# Patient Record
Sex: FEMALE | Race: WHITE | Employment: OTHER | URBAN - METROPOLITAN AREA
[De-identification: names, ages, dates, MRNs, and addresses within clinical notes are randomized per-mention and may not be internally consistent; named-entity substitution may affect disease eponyms.]

---

## 2019-06-08 ENCOUNTER — HOSPITAL ENCOUNTER (OUTPATIENT)
Facility: MEDICAL CENTER | Age: 84
End: 2019-06-08
Payer: MEDICARE

## 2019-06-08 ENCOUNTER — APPOINTMENT (OUTPATIENT)
Dept: RADIOLOGY | Facility: MEDICAL CENTER | Age: 84
DRG: 291 | End: 2019-06-08
Attending: INTERNAL MEDICINE
Payer: MEDICARE

## 2019-06-08 ENCOUNTER — HOSPITAL ENCOUNTER (INPATIENT)
Facility: MEDICAL CENTER | Age: 84
LOS: 9 days | DRG: 291 | End: 2019-06-17
Attending: INTERNAL MEDICINE | Admitting: INTERNAL MEDICINE
Payer: MEDICARE

## 2019-06-08 DIAGNOSIS — J44.1 COPD EXACERBATION (HCC): ICD-10-CM

## 2019-06-08 DIAGNOSIS — J90 PLEURAL EFFUSION ON RIGHT: ICD-10-CM

## 2019-06-08 PROBLEM — J81.0 FLASH PULMONARY EDEMA (HCC): Status: ACTIVE | Noted: 2019-06-08

## 2019-06-08 PROBLEM — J96.01 ACUTE HYPOXEMIC RESPIRATORY FAILURE (HCC): Status: ACTIVE | Noted: 2019-06-08

## 2019-06-08 PROBLEM — M79.89 SWELLING OF LOWER LEG: Status: ACTIVE | Noted: 2019-06-08

## 2019-06-08 LAB
ACTION RANGE TRIGGERED IACRT: NO
ANION GAP SERPL CALC-SCNC: 14 MMOL/L (ref 0–11.9)
APPEARANCE FLD: CLEAR
BASE EXCESS BLDA CALC-SCNC: -2 MMOL/L (ref -4–3)
BASOPHILS # BLD AUTO: 0.1 % (ref 0–1.8)
BASOPHILS # BLD: 0.02 K/UL (ref 0–0.12)
BNP SERPL-MCNC: 451 PG/ML (ref 0–100)
BODY FLD TYPE: NORMAL
BODY TEMPERATURE: ABNORMAL DEGREES
BUN SERPL-MCNC: 28 MG/DL (ref 8–22)
CALCIUM SERPL-MCNC: 8.7 MG/DL (ref 8.4–10.2)
CHLORIDE SERPL-SCNC: 99 MMOL/L (ref 96–112)
CO2 BLDA-SCNC: 24 MMOL/L (ref 20–33)
CO2 SERPL-SCNC: 24 MMOL/L (ref 20–33)
COLOR FLD: YELLOW
CREAT SERPL-MCNC: 1.48 MG/DL (ref 0.5–1.4)
CSF COMMENTS 1658: NORMAL
EOSINOPHIL # BLD AUTO: 0 K/UL (ref 0–0.51)
EOSINOPHIL NFR BLD: 0 % (ref 0–6.9)
EOSINOPHIL NFR FLD: 1 %
ERYTHROCYTE [DISTWIDTH] IN BLOOD BY AUTOMATED COUNT: 48.2 FL (ref 35.9–50)
GLUCOSE FLD-MCNC: 176 MG/DL
GLUCOSE SERPL-MCNC: 187 MG/DL (ref 65–99)
HCO3 BLDA-SCNC: 23.1 MMOL/L (ref 17–25)
HCT VFR BLD AUTO: 35.1 % (ref 37–47)
HGB BLD-MCNC: 10.8 G/DL (ref 12–16)
HOROWITZ INDEX BLDA+IHG-RTO: 63 MM[HG]
IMM GRANULOCYTES # BLD AUTO: 0.29 K/UL (ref 0–0.11)
IMM GRANULOCYTES NFR BLD AUTO: 1.6 % (ref 0–0.9)
INR PPP: 1.74 (ref 0.87–1.13)
INST. QUALIFIED PATIENT IIQPT: YES
LYMPHOCYTES # BLD AUTO: 0.3 K/UL (ref 1–4.8)
LYMPHOCYTES NFR BLD: 1.6 % (ref 22–41)
LYMPHOCYTES NFR FLD: 68 %
MCH RBC QN AUTO: 29.2 PG (ref 27–33)
MCHC RBC AUTO-ENTMCNC: 30.8 G/DL (ref 33.6–35)
MCV RBC AUTO: 94.9 FL (ref 81.4–97.8)
MESOTHL CELL NFR FLD: 3 %
MONOCYTES # BLD AUTO: 0.76 K/UL (ref 0–0.85)
MONOCYTES NFR BLD AUTO: 4.1 % (ref 0–13.4)
MONONUC CELLS NFR FLD: 8 %
NEUTROPHILS # BLD AUTO: 17.05 K/UL (ref 2–7.15)
NEUTROPHILS NFR BLD: 92.6 % (ref 44–72)
NEUTROPHILS NFR FLD: 20 %
NRBC # BLD AUTO: 0 K/UL
NRBC BLD-RTO: 0 /100 WBC
O2/TOTAL GAS SETTING VFR VENT: 100 %
PCO2 BLDA: 40.8 MMHG (ref 26–37)
PH BLDA: 7.36 [PH] (ref 7.4–7.5)
PH FLD: 8 [PH]
PLATELET # BLD AUTO: 413 K/UL (ref 164–446)
PMV BLD AUTO: 9.4 FL (ref 9–12.9)
PO2 BLDA: 63 MMHG (ref 64–87)
POTASSIUM SERPL-SCNC: 4.2 MMOL/L (ref 3.6–5.5)
PROT FLD-MCNC: 1.7 G/DL
PROTHROMBIN TIME: 20.7 SEC (ref 12–14.6)
RBC # BLD AUTO: 3.7 M/UL (ref 4.2–5.4)
RBC # FLD: <2000 CELLS/UL
SAO2 % BLDA: 91 % (ref 93–99)
SODIUM SERPL-SCNC: 137 MMOL/L (ref 135–145)
SPECIMEN DRAWN FROM PATIENT: ABNORMAL
TROPONIN I SERPL-MCNC: 0.03 NG/ML (ref 0–0.04)
WBC # BLD AUTO: 18.4 K/UL (ref 4.8–10.8)
WBC # FLD: 585 CELLS/UL

## 2019-06-08 PROCEDURE — 88112 CYTOPATH CELL ENHANCE TECH: CPT

## 2019-06-08 PROCEDURE — 80048 BASIC METABOLIC PNL TOTAL CA: CPT

## 2019-06-08 PROCEDURE — 89051 BODY FLUID CELL COUNT: CPT

## 2019-06-08 PROCEDURE — 94760 N-INVAS EAR/PLS OXIMETRY 1: CPT

## 2019-06-08 PROCEDURE — 700101 HCHG RX REV CODE 250: Performed by: INTERNAL MEDICINE

## 2019-06-08 PROCEDURE — 93005 ELECTROCARDIOGRAM TRACING: CPT | Performed by: INTERNAL MEDICINE

## 2019-06-08 PROCEDURE — 87015 SPECIMEN INFECT AGNT CONCNTJ: CPT

## 2019-06-08 PROCEDURE — 700105 HCHG RX REV CODE 258: Performed by: INTERNAL MEDICINE

## 2019-06-08 PROCEDURE — 84145 PROCALCITONIN (PCT): CPT

## 2019-06-08 PROCEDURE — 85610 PROTHROMBIN TIME: CPT

## 2019-06-08 PROCEDURE — A9270 NON-COVERED ITEM OR SERVICE: HCPCS | Performed by: INTERNAL MEDICINE

## 2019-06-08 PROCEDURE — 94640 AIRWAY INHALATION TREATMENT: CPT

## 2019-06-08 PROCEDURE — 71045 X-RAY EXAM CHEST 1 VIEW: CPT

## 2019-06-08 PROCEDURE — 82803 BLOOD GASES ANY COMBINATION: CPT

## 2019-06-08 PROCEDURE — 87070 CULTURE OTHR SPECIMN AEROBIC: CPT

## 2019-06-08 PROCEDURE — 700111 HCHG RX REV CODE 636 W/ 250 OVERRIDE (IP): Performed by: INTERNAL MEDICINE

## 2019-06-08 PROCEDURE — 85025 COMPLETE CBC W/AUTO DIFF WBC: CPT

## 2019-06-08 PROCEDURE — 87116 MYCOBACTERIA CULTURE: CPT

## 2019-06-08 PROCEDURE — 82945 GLUCOSE OTHER FLUID: CPT

## 2019-06-08 PROCEDURE — 99291 CRITICAL CARE FIRST HOUR: CPT | Mod: 25 | Performed by: INTERNAL MEDICINE

## 2019-06-08 PROCEDURE — 87205 SMEAR GRAM STAIN: CPT

## 2019-06-08 PROCEDURE — 83880 ASSAY OF NATRIURETIC PEPTIDE: CPT

## 2019-06-08 PROCEDURE — 0W993ZZ DRAINAGE OF RIGHT PLEURAL CAVITY, PERCUTANEOUS APPROACH: ICD-10-PCS | Performed by: INTERNAL MEDICINE

## 2019-06-08 PROCEDURE — 770022 HCHG ROOM/CARE - ICU (200)

## 2019-06-08 PROCEDURE — 87102 FUNGUS ISOLATION CULTURE: CPT

## 2019-06-08 PROCEDURE — 36600 WITHDRAWAL OF ARTERIAL BLOOD: CPT

## 2019-06-08 PROCEDURE — 87206 SMEAR FLUORESCENT/ACID STAI: CPT

## 2019-06-08 PROCEDURE — 84484 ASSAY OF TROPONIN QUANT: CPT | Mod: 91

## 2019-06-08 PROCEDURE — 700102 HCHG RX REV CODE 250 W/ 637 OVERRIDE(OP): Performed by: INTERNAL MEDICINE

## 2019-06-08 PROCEDURE — 83615 LACTATE (LD) (LDH) ENZYME: CPT

## 2019-06-08 PROCEDURE — 83986 ASSAY PH BODY FLUID NOS: CPT

## 2019-06-08 PROCEDURE — 94660 CPAP INITIATION&MGMT: CPT

## 2019-06-08 PROCEDURE — 32554 ASPIRATE PLEURA W/O IMAGING: CPT | Mod: RT

## 2019-06-08 PROCEDURE — 84157 ASSAY OF PROTEIN OTHER: CPT

## 2019-06-08 PROCEDURE — 700111 HCHG RX REV CODE 636 W/ 250 OVERRIDE (IP)

## 2019-06-08 PROCEDURE — 32555 ASPIRATE PLEURA W/ IMAGING: CPT | Mod: RT | Performed by: INTERNAL MEDICINE

## 2019-06-08 RX ORDER — LOSARTAN POTASSIUM 25 MG/1
25 TABLET ORAL
Status: DISCONTINUED | OUTPATIENT
Start: 2019-06-08 | End: 2019-06-14

## 2019-06-08 RX ORDER — METHYLPREDNISOLONE SODIUM SUCCINATE 40 MG/ML
62.5 INJECTION, POWDER, LYOPHILIZED, FOR SOLUTION INTRAMUSCULAR; INTRAVENOUS EVERY 6 HOURS
Status: DISCONTINUED | OUTPATIENT
Start: 2019-06-08 | End: 2019-06-09

## 2019-06-08 RX ORDER — FUROSEMIDE 10 MG/ML
80 INJECTION INTRAMUSCULAR; INTRAVENOUS
Status: DISCONTINUED | OUTPATIENT
Start: 2019-06-08 | End: 2019-06-09

## 2019-06-08 RX ORDER — FUROSEMIDE 10 MG/ML
INJECTION INTRAMUSCULAR; INTRAVENOUS
Status: COMPLETED
Start: 2019-06-08 | End: 2019-06-08

## 2019-06-08 RX ORDER — IPRATROPIUM BROMIDE AND ALBUTEROL SULFATE 2.5; .5 MG/3ML; MG/3ML
3 SOLUTION RESPIRATORY (INHALATION)
Status: DISCONTINUED | OUTPATIENT
Start: 2019-06-08 | End: 2019-06-17 | Stop reason: HOSPADM

## 2019-06-08 RX ORDER — CARVEDILOL 6.25 MG/1
6.25 TABLET ORAL 2 TIMES DAILY WITH MEALS
Status: DISCONTINUED | OUTPATIENT
Start: 2019-06-08 | End: 2019-06-17 | Stop reason: HOSPADM

## 2019-06-08 RX ADMIN — FUROSEMIDE 80 MG: 10 INJECTION, SOLUTION INTRAMUSCULAR; INTRAVENOUS at 17:48

## 2019-06-08 RX ADMIN — LOSARTAN POTASSIUM 25 MG: 25 TABLET, FILM COATED ORAL at 20:20

## 2019-06-08 RX ADMIN — DOXYCYCLINE 100 MG: 100 INJECTION, POWDER, LYOPHILIZED, FOR SOLUTION INTRAVENOUS at 18:42

## 2019-06-08 RX ADMIN — METHYLPREDNISOLONE SODIUM SUCCINATE 62.4 MG: 40 INJECTION, POWDER, FOR SOLUTION INTRAMUSCULAR; INTRAVENOUS at 18:41

## 2019-06-08 RX ADMIN — CARVEDILOL 6.25 MG: 6.25 TABLET, FILM COATED ORAL at 20:20

## 2019-06-08 RX ADMIN — METHYLPREDNISOLONE SODIUM SUCCINATE 62.4 MG: 40 INJECTION, POWDER, FOR SOLUTION INTRAMUSCULAR; INTRAVENOUS at 23:48

## 2019-06-08 RX ADMIN — CEFTRIAXONE 1 G: 1 INJECTION, POWDER, FOR SOLUTION INTRAMUSCULAR; INTRAVENOUS at 18:41

## 2019-06-08 RX ADMIN — IPRATROPIUM BROMIDE AND ALBUTEROL SULFATE 3 ML: .5; 3 SOLUTION RESPIRATORY (INHALATION) at 20:32

## 2019-06-08 RX ADMIN — IPRATROPIUM BROMIDE AND ALBUTEROL SULFATE 3 ML: .5; 3 SOLUTION RESPIRATORY (INHALATION) at 23:24

## 2019-06-08 ASSESSMENT — COGNITIVE AND FUNCTIONAL STATUS - GENERAL
CLIMB 3 TO 5 STEPS WITH RAILING: A LOT
SUGGESTED CMS G CODE MODIFIER MOBILITY: CL
WALKING IN HOSPITAL ROOM: A LOT
STANDING UP FROM CHAIR USING ARMS: A LITTLE
TURNING FROM BACK TO SIDE WHILE IN FLAT BAD: A LITTLE
DAILY ACTIVITIY SCORE: 24
SUGGESTED CMS G CODE MODIFIER DAILY ACTIVITY: CH
MOVING FROM LYING ON BACK TO SITTING ON SIDE OF FLAT BED: A LOT
MOBILITY SCORE: 14
MOVING TO AND FROM BED TO CHAIR: A LOT

## 2019-06-08 ASSESSMENT — PATIENT HEALTH QUESTIONNAIRE - PHQ9
SUM OF ALL RESPONSES TO PHQ QUESTIONS 1-9: 6
1. LITTLE INTEREST OR PLEASURE IN DOING THINGS: SEVERAL DAYS
7. TROUBLE CONCENTRATING ON THINGS, SUCH AS READING THE NEWSPAPER OR WATCHING TELEVISION: NOT AT ALL
6. FEELING BAD ABOUT YOURSELF - OR THAT YOU ARE A FAILURE OR HAVE LET YOURSELF OR YOUR FAMILY DOWN: NOT AL ALL
4. FEELING TIRED OR HAVING LITTLE ENERGY: SEVERAL DAYS
SUM OF ALL RESPONSES TO PHQ9 QUESTIONS 1 AND 2: 2
8. MOVING OR SPEAKING SO SLOWLY THAT OTHER PEOPLE COULD HAVE NOTICED. OR THE OPPOSITE, BEING SO FIGETY OR RESTLESS THAT YOU HAVE BEEN MOVING AROUND A LOT MORE THAN USUAL: NOT AT ALL
3. TROUBLE FALLING OR STAYING ASLEEP OR SLEEPING TOO MUCH: SEVERAL DAYS
5. POOR APPETITE OR OVEREATING: MORE THAN HALF THE DAYS
9. THOUGHTS THAT YOU WOULD BE BETTER OFF DEAD, OR OF HURTING YOURSELF: NOT AT ALL
2. FEELING DOWN, DEPRESSED, IRRITABLE, OR HOPELESS: SEVERAL DAYS

## 2019-06-08 ASSESSMENT — COPD QUESTIONNAIRES
HAVE YOU SMOKED AT LEAST 100 CIGARETTES IN YOUR ENTIRE LIFE: YES
IN THE PAST 12 MONTHS DO YOU DO LESS THAN YOU USED TO BECAUSE OF YOUR BREATHING PROBLEMS: AGREE
DO YOU EVER COUGH UP ANY MUCUS OR PHLEGM?: YES, A FEW DAYS A WEEK OR MONTH
DURING THE PAST 4 WEEKS HOW MUCH DID YOU FEEL SHORT OF BREATH: MOST  OR ALL OF THE TIME
COPD SCREENING SCORE: 8

## 2019-06-08 ASSESSMENT — LIFESTYLE VARIABLES
EVER_SMOKED: YES
EVER_SMOKED: YES
ALCOHOL_USE: NO

## 2019-06-08 NOTE — PROGRESS NOTES
Patient is a direct admit from [facility] in [place].   Dr Doss is accepting the patient and will place admit orders into [facility].

## 2019-06-09 ENCOUNTER — APPOINTMENT (OUTPATIENT)
Dept: CARDIOLOGY | Facility: MEDICAL CENTER | Age: 84
DRG: 291 | End: 2019-06-09
Attending: INTERNAL MEDICINE
Payer: MEDICARE

## 2019-06-09 ENCOUNTER — APPOINTMENT (OUTPATIENT)
Dept: RADIOLOGY | Facility: MEDICAL CENTER | Age: 84
DRG: 291 | End: 2019-06-09
Attending: INTERNAL MEDICINE
Payer: MEDICARE

## 2019-06-09 PROBLEM — I48.20 CHRONIC ATRIAL FIBRILLATION (HCC): Status: ACTIVE | Noted: 2019-06-09

## 2019-06-09 PROBLEM — I13.10 CARDIORENAL SYNDROME: Status: ACTIVE | Noted: 2019-06-09

## 2019-06-09 LAB
ACTION RANGE TRIGGERED IACRT: NO
ALBUMIN SERPL BCP-MCNC: 2.3 G/DL (ref 3.2–4.9)
ALBUMIN/GLOB SERPL: 0.7 G/DL
ALP SERPL-CCNC: 62 U/L (ref 30–99)
ALT SERPL-CCNC: 21 U/L (ref 2–50)
ANION GAP SERPL CALC-SCNC: 12 MMOL/L (ref 0–11.9)
AST SERPL-CCNC: 24 U/L (ref 12–45)
BASE EXCESS BLDA CALC-SCNC: 4 MMOL/L (ref -4–3)
BASOPHILS # BLD AUTO: 0.1 % (ref 0–1.8)
BASOPHILS # BLD: 0.01 K/UL (ref 0–0.12)
BILIRUB SERPL-MCNC: 0.4 MG/DL (ref 0.1–1.5)
BODY TEMPERATURE: ABNORMAL DEGREES
BUN SERPL-MCNC: 29 MG/DL (ref 8–22)
CALCIUM SERPL-MCNC: 8.5 MG/DL (ref 8.4–10.2)
CHLORIDE SERPL-SCNC: 101 MMOL/L (ref 96–112)
CO2 BLDA-SCNC: 30 MMOL/L (ref 20–33)
CO2 SERPL-SCNC: 27 MMOL/L (ref 20–33)
CREAT SERPL-MCNC: 1.44 MG/DL (ref 0.5–1.4)
EKG IMPRESSION: NORMAL
EOSINOPHIL # BLD AUTO: 0 K/UL (ref 0–0.51)
EOSINOPHIL NFR BLD: 0 % (ref 0–6.9)
ERYTHROCYTE [DISTWIDTH] IN BLOOD BY AUTOMATED COUNT: 46.9 FL (ref 35.9–50)
GLOBULIN SER CALC-MCNC: 3.3 G/DL (ref 1.9–3.5)
GLUCOSE SERPL-MCNC: 158 MG/DL (ref 65–99)
GRAM STN SPEC: NORMAL
HCO3 BLDA-SCNC: 29.1 MMOL/L (ref 17–25)
HCT VFR BLD AUTO: 32.3 % (ref 37–47)
HGB BLD-MCNC: 10.3 G/DL (ref 12–16)
HOROWITZ INDEX BLDA+IHG-RTO: 89 MM[HG]
IMM GRANULOCYTES # BLD AUTO: 0.14 K/UL (ref 0–0.11)
IMM GRANULOCYTES NFR BLD AUTO: 1.1 % (ref 0–0.9)
INST. QUALIFIED PATIENT IIQPT: YES
LDH FLD L TO P-CCNC: 109 U/L
LV EJECT FRACT  99904: 70
LV EJECT FRACT MOD 2C 99903: 76.43
LV EJECT FRACT MOD 4C 99902: 75.29
LV EJECT FRACT MOD BP 99901: 77.43
LYMPHOCYTES # BLD AUTO: 0.31 K/UL (ref 1–4.8)
LYMPHOCYTES NFR BLD: 2.5 % (ref 22–41)
MCH RBC QN AUTO: 29.8 PG (ref 27–33)
MCHC RBC AUTO-ENTMCNC: 31.9 G/DL (ref 33.6–35)
MCV RBC AUTO: 93.4 FL (ref 81.4–97.8)
MONOCYTES # BLD AUTO: 0.43 K/UL (ref 0–0.85)
MONOCYTES NFR BLD AUTO: 3.5 % (ref 0–13.4)
MRSA DNA SPEC QL NAA+PROBE: NORMAL
NEUTROPHILS # BLD AUTO: 11.55 K/UL (ref 2–7.15)
NEUTROPHILS NFR BLD: 92.8 % (ref 44–72)
NRBC # BLD AUTO: 0 K/UL
NRBC BLD-RTO: 0 /100 WBC
O2/TOTAL GAS SETTING VFR VENT: 65 %
PCO2 BLDA: 43.2 MMHG (ref 26–37)
PH BLDA: 7.44 [PH] (ref 7.4–7.5)
PLATELET # BLD AUTO: 374 K/UL (ref 164–446)
PMV BLD AUTO: 9.6 FL (ref 9–12.9)
PO2 BLDA: 58 MMHG (ref 64–87)
POTASSIUM SERPL-SCNC: 3.8 MMOL/L (ref 3.6–5.5)
PROCALCITONIN SERPL-MCNC: 0.08 NG/ML
PROT SERPL-MCNC: 5.6 G/DL (ref 6–8.2)
RBC # BLD AUTO: 3.46 M/UL (ref 4.2–5.4)
SAO2 % BLDA: 90 % (ref 93–99)
SIGNIFICANT IND 70042: NORMAL
SIGNIFICANT IND 70042: NORMAL
SITE SITE: NORMAL
SITE SITE: NORMAL
SODIUM SERPL-SCNC: 140 MMOL/L (ref 135–145)
SOURCE SOURCE: NORMAL
SOURCE SOURCE: NORMAL
SPECIMEN DRAWN FROM PATIENT: ABNORMAL
TROPONIN I SERPL-MCNC: <0.02 NG/ML (ref 0–0.04)
WBC # BLD AUTO: 12.4 K/UL (ref 4.8–10.8)

## 2019-06-09 PROCEDURE — 99292 CRITICAL CARE ADDL 30 MIN: CPT | Performed by: INTERNAL MEDICINE

## 2019-06-09 PROCEDURE — 94660 CPAP INITIATION&MGMT: CPT

## 2019-06-09 PROCEDURE — 71045 X-RAY EXAM CHEST 1 VIEW: CPT

## 2019-06-09 PROCEDURE — 94640 AIRWAY INHALATION TREATMENT: CPT

## 2019-06-09 PROCEDURE — 770022 HCHG ROOM/CARE - ICU (200)

## 2019-06-09 PROCEDURE — 93306 TTE W/DOPPLER COMPLETE: CPT | Mod: 26 | Performed by: INTERNAL MEDICINE

## 2019-06-09 PROCEDURE — 700101 HCHG RX REV CODE 250: Performed by: INTERNAL MEDICINE

## 2019-06-09 PROCEDURE — 93010 ELECTROCARDIOGRAM REPORT: CPT | Performed by: INTERNAL MEDICINE

## 2019-06-09 PROCEDURE — A9270 NON-COVERED ITEM OR SERVICE: HCPCS | Performed by: INTERNAL MEDICINE

## 2019-06-09 PROCEDURE — 93306 TTE W/DOPPLER COMPLETE: CPT

## 2019-06-09 PROCEDURE — 85025 COMPLETE CBC W/AUTO DIFF WBC: CPT

## 2019-06-09 PROCEDURE — 93970 EXTREMITY STUDY: CPT

## 2019-06-09 PROCEDURE — 700105 HCHG RX REV CODE 258: Performed by: INTERNAL MEDICINE

## 2019-06-09 PROCEDURE — 99291 CRITICAL CARE FIRST HOUR: CPT | Performed by: INTERNAL MEDICINE

## 2019-06-09 PROCEDURE — 94760 N-INVAS EAR/PLS OXIMETRY 1: CPT

## 2019-06-09 PROCEDURE — 82803 BLOOD GASES ANY COMBINATION: CPT

## 2019-06-09 PROCEDURE — 700111 HCHG RX REV CODE 636 W/ 250 OVERRIDE (IP): Performed by: INTERNAL MEDICINE

## 2019-06-09 PROCEDURE — 80053 COMPREHEN METABOLIC PANEL: CPT

## 2019-06-09 PROCEDURE — 87641 MR-STAPH DNA AMP PROBE: CPT

## 2019-06-09 PROCEDURE — 700102 HCHG RX REV CODE 250 W/ 637 OVERRIDE(OP): Performed by: INTERNAL MEDICINE

## 2019-06-09 RX ORDER — MORPHINE SULFATE 4 MG/ML
2 INJECTION, SOLUTION INTRAMUSCULAR; INTRAVENOUS EVERY 6 HOURS PRN
Status: DISCONTINUED | OUTPATIENT
Start: 2019-06-09 | End: 2019-06-17 | Stop reason: HOSPADM

## 2019-06-09 RX ORDER — AMLODIPINE BESYLATE 10 MG/1
10 TABLET ORAL DAILY
Status: ON HOLD | COMMUNITY
End: 2019-06-17

## 2019-06-09 RX ORDER — FUROSEMIDE 10 MG/ML
40 INJECTION INTRAMUSCULAR; INTRAVENOUS ONCE
Status: COMPLETED | OUTPATIENT
Start: 2019-06-09 | End: 2019-06-09

## 2019-06-09 RX ORDER — IPRATROPIUM BROMIDE AND ALBUTEROL SULFATE 2.5; .5 MG/3ML; MG/3ML
3 SOLUTION RESPIRATORY (INHALATION) EVERY 6 HOURS PRN
COMMUNITY

## 2019-06-09 RX ORDER — DOXYCYCLINE 100 MG/1
100 TABLET ORAL EVERY 12 HOURS
Status: DISPENSED | OUTPATIENT
Start: 2019-06-09 | End: 2019-06-12

## 2019-06-09 RX ORDER — METHYLPREDNISOLONE SODIUM SUCCINATE 40 MG/ML
40 INJECTION, POWDER, LYOPHILIZED, FOR SOLUTION INTRAMUSCULAR; INTRAVENOUS EVERY 8 HOURS
Status: DISCONTINUED | OUTPATIENT
Start: 2019-06-09 | End: 2019-06-12

## 2019-06-09 RX ORDER — NEOMYCIN SULFATE, POLYMYXIN B SULFATE, AND DEXAMETHASONE 3.5; 10000; 1 MG/G; [USP'U]/G; MG/G
1 OINTMENT OPHTHALMIC SEE ADMIN INSTRUCTIONS
Status: ON HOLD | COMMUNITY
Start: 2019-05-29 | End: 2019-07-09

## 2019-06-09 RX ORDER — MORPHINE SULFATE 4 MG/ML
2 INJECTION, SOLUTION INTRAMUSCULAR; INTRAVENOUS ONCE
Status: COMPLETED | OUTPATIENT
Start: 2019-06-09 | End: 2019-06-09

## 2019-06-09 RX ORDER — BUPROPION HYDROCHLORIDE 150 MG/1
150 TABLET, EXTENDED RELEASE ORAL DAILY
COMMUNITY
End: 2019-08-16

## 2019-06-09 RX ORDER — FERROUS GLUCONATE 324(38)MG
324 TABLET ORAL
COMMUNITY

## 2019-06-09 RX ORDER — FUROSEMIDE 10 MG/ML
40 INJECTION INTRAMUSCULAR; INTRAVENOUS
Status: DISCONTINUED | OUTPATIENT
Start: 2019-06-09 | End: 2019-06-13

## 2019-06-09 RX ADMIN — BENZOCAINE AND MENTHOL 1 LOZENGE: 15; 3.6 LOZENGE ORAL at 09:22

## 2019-06-09 RX ADMIN — DOXYCYCLINE 100 MG: 100 INJECTION, POWDER, LYOPHILIZED, FOR SOLUTION INTRAVENOUS at 05:21

## 2019-06-09 RX ADMIN — LOSARTAN POTASSIUM 25 MG: 25 TABLET, FILM COATED ORAL at 06:33

## 2019-06-09 RX ADMIN — IPRATROPIUM BROMIDE AND ALBUTEROL SULFATE 3 ML: .5; 3 SOLUTION RESPIRATORY (INHALATION) at 03:00

## 2019-06-09 RX ADMIN — METHYLPREDNISOLONE SODIUM SUCCINATE 62.4 MG: 40 INJECTION, POWDER, FOR SOLUTION INTRAMUSCULAR; INTRAVENOUS at 05:21

## 2019-06-09 RX ADMIN — FUROSEMIDE 40 MG: 10 INJECTION, SOLUTION INTRAMUSCULAR; INTRAVENOUS at 05:21

## 2019-06-09 RX ADMIN — FUROSEMIDE 40 MG: 10 INJECTION, SOLUTION INTRAMUSCULAR; INTRAVENOUS at 05:56

## 2019-06-09 RX ADMIN — DOXYCYCLINE 100 MG: 100 TABLET, FILM COATED ORAL at 17:05

## 2019-06-09 RX ADMIN — RIVAROXABAN 15 MG: 15 TABLET, FILM COATED ORAL at 17:05

## 2019-06-09 RX ADMIN — FUROSEMIDE 40 MG: 10 INJECTION, SOLUTION INTRAMUSCULAR; INTRAVENOUS at 17:04

## 2019-06-09 RX ADMIN — CARVEDILOL 6.25 MG: 6.25 TABLET, FILM COATED ORAL at 07:19

## 2019-06-09 RX ADMIN — CEFTRIAXONE 1 G: 1 INJECTION, POWDER, FOR SOLUTION INTRAMUSCULAR; INTRAVENOUS at 17:04

## 2019-06-09 RX ADMIN — METHYLPREDNISOLONE SODIUM SUCCINATE 40 MG: 40 INJECTION, POWDER, FOR SOLUTION INTRAMUSCULAR; INTRAVENOUS at 22:18

## 2019-06-09 RX ADMIN — MORPHINE SULFATE 2 MG: 4 INJECTION INTRAVENOUS at 14:52

## 2019-06-09 RX ADMIN — METHYLPREDNISOLONE SODIUM SUCCINATE 40 MG: 40 INJECTION, POWDER, FOR SOLUTION INTRAMUSCULAR; INTRAVENOUS at 14:00

## 2019-06-09 RX ADMIN — IPRATROPIUM BROMIDE AND ALBUTEROL SULFATE 3 ML: .5; 3 SOLUTION RESPIRATORY (INHALATION) at 22:06

## 2019-06-09 ASSESSMENT — CHA2DS2 SCORE
AGE 75 OR GREATER: YES
DIABETES: NO
CHF OR LEFT VENTRICULAR DYSFUNCTION: NO
CHA2DS2 VASC SCORE: 4
AGE 65 TO 74: NO
HYPERTENSION: YES
SEX: FEMALE
PRIOR STROKE OR TIA OR THROMBOEMBOLISM: NO
VASCULAR DISEASE: NO

## 2019-06-09 ASSESSMENT — PATIENT HEALTH QUESTIONNAIRE - PHQ9
SUM OF ALL RESPONSES TO PHQ QUESTIONS 1-9: 6
9. THOUGHTS THAT YOU WOULD BE BETTER OFF DEAD, OR OF HURTING YOURSELF: NOT AT ALL
3. TROUBLE FALLING OR STAYING ASLEEP OR SLEEPING TOO MUCH: SEVERAL DAYS
1. LITTLE INTEREST OR PLEASURE IN DOING THINGS: SEVERAL DAYS
4. FEELING TIRED OR HAVING LITTLE ENERGY: SEVERAL DAYS
SUM OF ALL RESPONSES TO PHQ9 QUESTIONS 1 AND 2: 2
5. POOR APPETITE OR OVEREATING: MORE THAN HALF THE DAYS
2. FEELING DOWN, DEPRESSED, IRRITABLE, OR HOPELESS: SEVERAL DAYS
6. FEELING BAD ABOUT YOURSELF - OR THAT YOU ARE A FAILURE OR HAVE LET YOURSELF OR YOUR FAMILY DOWN: NOT AL ALL
8. MOVING OR SPEAKING SO SLOWLY THAT OTHER PEOPLE COULD HAVE NOTICED. OR THE OPPOSITE, BEING SO FIGETY OR RESTLESS THAT YOU HAVE BEEN MOVING AROUND A LOT MORE THAN USUAL: NOT AT ALL
7. TROUBLE CONCENTRATING ON THINGS, SUCH AS READING THE NEWSPAPER OR WATCHING TELEVISION: NOT AT ALL

## 2019-06-09 NOTE — FLOWSHEET NOTE
This note also relates to the following rows which could not be included:  Respiration - Cannot attach notes to unvalidated device data  Pulse - Cannot attach notes to unvalidated device data  Heart Rate (Monitored) - Cannot attach notes to unvalidated device data  Pulse Oximetry - Cannot attach notes to unvalidated device data       06/08/19 2325   Events/Summary/Plan   Events/Summary/Plan Inline duoneb to HFNC   Interdisciplinary Plan of Care-Goals (Indications)   Bronchodilator Indications Physical Exam / Hyperinflation / Wheezing (bronchospasm)   Interdisciplinary Plan of Care-Outcomes    Bronchodilator Outcome Improved Vital Signs and Measures of Gas Exchange   Education   Education Yes - Pt. / Family has been Instructed in use of Respiratory Medications and Adverse Reactions   RT Assessment of Delivered Medications   Evaluation of Medication Delivery Daily Yes-- Pt /Family has been Instructed in use of Respiratory Medications and Adverse Reactions   Heated Hi Flow Nasal Cannula   Heated Hi Flow Nasal Cannula (HHFNC) Yes   FiO2 (HHFNC) 100   Flowrate (FNC) 70   Humidifier Temperature (FNC) 31   Date of Last Circuit Change 06/08/19   Circuit Change Next Due Date (Q 7 Days) 06/15/19   Breath Sounds   RUL Breath Sounds Crackles   RML Breath Sounds Diminished   RLL Breath Sounds Diminished   GINA Breath Sounds Crackles   LLL Breath Sounds Diminished   Oximetry   #Pulse Oximetry (Single Determination) Yes   Continuous Oximetry Yes   Oxygen   Home O2 Use Prior To Admission? Yes   Home O2 LPM Flow 3 LPM   Home O2 Delivery Method Nasal Cannula   Home O2 Frequency of Use At Sleep   O2 (LPM) 70   FiO2% 100 %   O2 Daily Delivery Respiratory  Highflow Nasal Cannula

## 2019-06-09 NOTE — FLOWSHEET NOTE
06/09/19 0500   Events/Summary/Plan   Events/Summary/Plan decreased FI02 to 50%   CPAP/BiPAP [employer]   FiO2 or LPM 50

## 2019-06-09 NOTE — FLOWSHEET NOTE
06/09/19 1425   Events/Summary/Plan   Events/Summary/Plan Placed pt on CPAP for increased WOB   Non-Invasive Resp Device Site Inspection Completed Intact   General Vent Information   Pulse Oximetry 91 %   Heart Rate (Monitored) 82   CPAP/BiPAP [employer]   Nocturnal CPAP or BiPAP CPAP   #System Evaluation Yes   Home Unit Used? No   Settings (If Known) 8   FiO2 or LPM 65   Chest Exam   Work Of Breathing / Effort Increased Work of Breathing

## 2019-06-09 NOTE — ASSESSMENT & PLAN NOTE
S/p 1000 cc removed on admission   Transudative by criteria, cardiogenic vs iatrogenic volume overload  Being diurised   F/u cytology and cultures

## 2019-06-09 NOTE — FLOWSHEET NOTE
06/09/19 1129   Events/Summary/Plan   Events/Summary/Plan Pt remains on HFNC    Non-Invasive Resp Device Site Inspection Completed Intact   Education   Education Yes - Pt. / Family has been Instructed in use of Respiratory Equipment   Heated Hi Flow Nasal Cannula   Heated Hi Flow Nasal Cannula (HHFNC) Yes   FiO2 (HHFNC) 80   Flowrate (HHFNC) 60   Humidifier Temperature (HHFNC) 31.0   Chest Exam   Respiration (!) 21   Pulse 98   Heart Rate (Monitored) 96   Oximetry   #Pulse Oximetry (Single Determination) Yes   Oxygen   Pulse Oximetry 88 %   O2 (LPM) 60   FiO2% 80 %   O2 Daily Delivery Respiratory  Highflow Nasal Cannula

## 2019-06-09 NOTE — FLOWSHEET NOTE
06/09/19 0627   Events/Summary/Plan   Events/Summary/Plan Pt remains on CPAP, sleeping comfortably   Non-Invasive Resp Device Site Inspection Completed Intact   Education   Education Yes - Pt. / Family has been Instructed in use of Respiratory Equipment   Chest Exam   Respiration 19   Pulse 90   Heart Rate (Monitored) 70   Breath Sounds   RUL Breath Sounds Crackles   RML Breath Sounds Diminished   RLL Breath Sounds Diminished   GINA Breath Sounds Crackles   LLL Breath Sounds Diminished   Oximetry   #Pulse Oximetry (Single Determination) Yes   Oxygen   Pulse Oximetry 93 %   FiO2% 50 %   O2 Daily Delivery Respiratory  (CPAP)

## 2019-06-09 NOTE — FLOWSHEET NOTE
06/08/19 1725   General Vent Information   Pulse Oximetry 94 %   Heart Rate (Monitored) (!) 116   Blood Gas / Site draw   Blood Gas / Site draw  #L Radial   Allens Test Performed Yes   Site Pressure Held X 5 Min Yes

## 2019-06-09 NOTE — H&P
Critical Care History & Physical Note    Date of Service  6/8/2019    Primary Care Physician  Winston Galvan M.D.    Consultants  None    Code Status  Full    Chief Complaint  Shortness of breath     History of Presenting Illness  84 y.o. female who presented 6/8/2019 From alcohol with progressive shortness of breath at rest and refractory hypoxemia.  The patient has a known history of COPD but does not take inhalers at home.  She continues to be an active smoker.  She denies recent fever, change in the character of sputum, or sick contacts.  She is bringing up mixed sputum.  She denies hemoptysis.  She has noticed chronic bilateral extensive leg swelling.  Chest x-ray showed flash pulmonary edema here and she had a brain atretic peptide of 4000 and [place].  Creatinine 1.33.  The patient denies cardiogenic history with except to atrial fibrillation on Xarelto at home.  She takes losartan for hypertension.  She has no previous history of heart cath or heart attacks.  This has been confirmed by the family at the bedside.  She denies chest pain currently.  EKG and troponin are pending. Requiring HFNC 100% with 70 LPM to barely maintain 88-92%.       Review of Systems  Review of Systems   Unable to perform ROS: Critical illness       Past Medical History   has a past medical history of Arrhythmia; GERD (gastroesophageal reflux disease); Heart burn; Hypertension; Indigestion; Other specified disorder of intestines; Pain; and Renal disorder.    Surgical History   has a past surgical history that includes knee replacement, total (2003); other neurological surg (2013); and lumbar fusion posterior (1/29/2014).     Family History  family history is not on file.     Social History   reports that she has been smoking Cigarettes.  She has a 40.00 pack-year smoking history. She has never used smokeless tobacco. She reports that she does not drink alcohol or use drugs.    Allergies  Allergies   Allergen Reactions   • Pcn  [Penicillins] Rash       Medications  Prior to Admission Medications   Prescriptions Last Dose Informant Patient Reported? Taking?   amiodarone (CORDARONE) 200 MG TABS  Patient Yes No   Sig: Take 200 mg by mouth every day.   aspirin EC (ECOTRIN) 81 MG TBEC  Patient Yes No   Sig: Take 81 mg by mouth every day.   docosahexanoic acid (OMEGA 3 FA) 1000 MG CAPS  Patient Yes No   Sig: Take 1,000 mg by mouth every day.   hydrochlorothiazide (HYDRODIURIL) 25 MG TABS  Patient Yes No   Sig: Take 25 mg by mouth every day.   hydrocodone-acetaminophen (NORCO) 5-325 MG TABS per tablet  Patient Yes No   Sig: Take 1-2 Tabs by mouth every four hours as needed.   losartan (COZAAR) 25 MG TABS  Patient Yes No   Sig: Take 25 mg by mouth every day.   omeprazole (PRILOSEC) 20 MG CPDR  Patient Yes No   Sig: Take 20 mg by mouth every day.   therapeutic multivitamin-minerals (THERAGRAN-M) TABS  Patient Yes No   Sig: Take 1 Tab by mouth every day.      Facility-Administered Medications: None       Physical Exam       Physical Exam   Constitutional: She is oriented to person, place, and time. She appears distressed.   HENT:   Head: Normocephalic and atraumatic.   Mouth/Throat: Oropharynx is clear and moist. No oropharyngeal exudate.   Eyes: Right eye exhibits no discharge. Left eye exhibits no discharge. No scleral icterus.   Neck: No tracheal deviation present. No thyromegaly present.   Cardiovascular: Regular rhythm, normal heart sounds and intact distal pulses.  Exam reveals no gallop and no friction rub.    No murmur heard.  Sinus tach    Pulmonary/Chest: No stridor. She is in respiratory distress. She has no wheezes. She has rales. She exhibits no tenderness.   Reduced AE BL    Abdominal: Soft. She exhibits no distension. There is no tenderness.   Musculoskeletal: She exhibits edema. She exhibits no tenderness or deformity.   +4 BL edema    Lymphadenopathy:     She has no cervical adenopathy.   Neurological: She is alert and oriented to  person, place, and time.   Awake, follows commands    Skin: Skin is warm. No rash noted. She is not diaphoretic. No erythema. No pallor.   Psychiatric: She has a normal mood and affect. Her behavior is normal. Judgment and thought content normal.   Nursing note and vitals reviewed.      Laboratory:          Recent Labs      06/08/19   1730   GLUCOSE  187*                 No results for input(s): TROPONINI in the last 72 hours.    Urinalysis:    No results found     Imaging:  DX-CHEST-PORTABLE (1 VIEW)   Final Result      1.  Diffuse bilateral moderate to severe pulmonary airspace process that are most consistent with edema      2.  Probable small right pleural effusion      3.  Enlarged cardiac silhouette      US-EXTREMITY VENOUS LOWER BILAT    (Results Pending)   EC-ECHOCARDIOGRAM COMPLETE W/O CONT    (Results Pending)         Assessment/Plan:  I anticipate this patient will require at least two midnights for appropriate medical management, necessitating inpatient admission.    * Acute hypoxemic respiratory failure (HCC)   Assessment & Plan    Stat ABG, CXR, EKG, troponin and BNP   Severe, currently requiring HFNC 70 LPM with 100%  Titrate oxygen for SpO2 >= 88%  D/w RT        Flash pulmonary edema (HCC)   Assessment & Plan    Cardiogenic vs ARDS  Leukocytosis 18.4 but no clinical indications of pneumonia  2D echo   Lasix 80 mg iv bid stat   Losartan + coreg low doses      COPD exacerbation (HCC)   Assessment & Plan    Abx for PNA per above  Duoneb q4 prn   Titrate oxygen for SpO2 >= 88%  Solumedrol 60 mg iv q6 hrs      Pleural effusion on right   Assessment & Plan    Suspect parapneumonic etiology   CXR - confirmed location and size  Likely CHF related but RLL is more infiltrated  Diagnostic and therapeutic thoracentesis now  Recheck CXR  Light's criteria, cell count & diff, cultures on fluid      Swelling of lower leg   Assessment & Plan    BL +4 edema  ? Cor pulmonale   Doppler lower ext r/o DVT   On xarelto for  afib at home          VTE prophylaxis: lovenox   Patient is critically ill.   The patient continues to have: severe hypoxic respiratory failure   The vital organ system that is affected is the: lungs, heart   If untreated there is a high chance of deterioration into: respiratory arrest   And eventually death.   The critical care that I am providing today is: HFNC titration, management of flash pulmonary edema   The critical that has been undertaken is medically complex.   There has been no overlap in critical care time.   D/w RN, RT, family and patient   Critical Care Time not including procedures: 45 minutes

## 2019-06-09 NOTE — FLOWSHEET NOTE
06/09/19 0255   Ventilator Management Group   Intensivist Group Yes   Events/Summary/Plan   Events/Summary/Plan Patient back on CPAP V60   Non-Invasive Resp Device Site Inspection Completed Intact;Skin Barrier Used   Location bridge of nose   General Vent Information   Resuscitation Bag Resuscitation Bag and Mask at Bedside and Checked   CPAP/BiPAP [employer]   Nocturnal CPAP or BiPAP CPAP   Home Unit Used? No   Settings (If Known) 12   FiO2 or LPM 60   Home Mask Used? (full face medium)   Vent Inline Medication   #(Single Treatment) Vent Inline Medication Yes   Chest Exam   Work Of Breathing / Effort Increased Work of Breathing   Breath Sounds   RUL Breath Sounds Crackles   RML Breath Sounds Diminished   RLL Breath Sounds Diminished   GINA Breath Sounds Crackles   LLL Breath Sounds Diminished   Secretions   Cough Non Productive

## 2019-06-09 NOTE — FLOWSHEET NOTE
06/09/19 0029   Events/Summary/Plan   Events/Summary/Plan Patient asleep on cpap   General Vent Information   Pulse Oximetry 92 %   Heart Rate (Monitored) 95

## 2019-06-09 NOTE — FLOWSHEET NOTE
06/08/19 9385   Events/Summary/Plan   Events/Summary/Plan CPAP started with V60: will titrate FI02 for Sp02 >92%   CPAP/BiPAP [employer]   Nocturnal CPAP or BiPAP CPAP   #System Evaluation Yes   Home Unit Used? No   Equipment Inspected for Cleanliness, Operation, and Safety? Yes   Settings (If Known) 12   FiO2 or    Home Mask Used? (Small full face mask)   Breath Sounds   RUL Breath Sounds Crackles   RML Breath Sounds Diminished   RLL Breath Sounds Diminished   GINA Breath Sounds Crackles   LLL Breath Sounds Diminished

## 2019-06-09 NOTE — PROGRESS NOTES
Critical Care Progress Note    Date of admission  6/8/2019    Chief Complaint  84 y.o. female admitted 6/8/2019 with shortness of breath     Hospital Course  84 y.o. female who presented 6/8/2019 From alcohol with progressive shortness of breath at rest and refractory hypoxemia.  The patient has a known history of COPD but does not take inhalers at home.  She continues to be an active smoker.  She denies recent fever, change in the character of sputum, or sick contacts.  She is bringing up mixed sputum.  She denies hemoptysis.  She has noticed chronic bilateral extensive leg swelling.  Chest x-ray showed flash pulmonary edema here and she had a BNP of 4000 and [place].  Creatinine 1.33.  The patient denies cardiogenic history with except to atrial fibrillation on Xarelto at home.  She takes losartan for hypertension.  She has no previous history of heart cath or heart attacks.  This has been confirmed by the family at the bedside.  She denies chest pain currently.  EKG and troponin were negative for ischemic event. Requiring HFNC 100% with 70 LPM to barely maintain 88-92%.     Interval Problem Update  Reviewed last 24 hour events:  Received CPAP 12 overnight  Back to HFNC but FiO2 has been reduced to 70% with good tolerance, remains on 70 LPM  S/p Rt sided thoracentesis last night, 1000 cc removed, transudative by criteria so far, LDH pending   No fever  On rocephin + doxy  -3300 with lasix 80 iv bid, no hypotension   Feels slightly better     Review of Systems  Review of Systems   Unable to perform ROS: Critical illness        Vital Signs for last 24 hours   Temp:  [36.3 °C (97.3 °F)-36.9 °C (98.4 °F)] 36.3 °C (97.3 °F)  Pulse:  [] 93  Resp:  [16-38] 16  SpO2:  [90 %-97 %] 91 %      Physical Exam   Physical Exam   Constitutional: She is oriented to person, place, and time. No distress.   HENT:   Head: Normocephalic and atraumatic.   Eyes: Right eye exhibits no discharge. Left eye exhibits no discharge. No  scleral icterus.   Neck: No tracheal deviation present. No thyromegaly present.   Cardiovascular: Regular rhythm, normal heart sounds and intact distal pulses.  Exam reveals no gallop and no friction rub.    No murmur heard.  Pulmonary/Chest: Effort normal. No stridor. No respiratory distress. She has no wheezes. She has rales. She exhibits no tenderness.   Reduced AE BL    Abdominal: Soft. She exhibits no distension. There is no tenderness.   Musculoskeletal: She exhibits no edema, tenderness or deformity.   Lymphadenopathy:     She has no cervical adenopathy.   Neurological: She is alert and oriented to person, place, and time.   Skin: Skin is warm. No rash noted. She is not diaphoretic. No erythema. No pallor.   Psychiatric: She has a normal mood and affect. Her behavior is normal. Judgment and thought content normal.   Nursing note and vitals reviewed.      Medications  Current Facility-Administered Medications   Medication Dose Route Frequency Provider Last Rate Last Dose   • methylPREDNISolone (SOLU-MEDROL) 40 MG injection 40 mg  40 mg Intravenous Q8HRS Jericho Doss M.D.       • furosemide (LASIX) injection 40 mg  40 mg Intravenous BID DIURETIC Jericho Doss M.D.       • cefTRIAXone (ROCEPHIN) 1 g in  mL IVPB  1 g Intravenous Q24HRS Jericho Doss M.D.   Stopped at 06/08/19 1911   • doxycycline (VIBRAMYCIN) 100 mg in  mL IVPB  100 mg Intravenous Q12HRS Jericho Doss M.D.   Stopped at 06/09/19 0621   • ipratropium-albuterol (DUONEB) nebulizer solution  3 mL Nebulization Q4H PRN (RT) Jericho Doss M.D.   3 mL at 06/09/19 0300   • carvedilol (COREG) tablet 6.25 mg  6.25 mg Oral BID WITH MEALS Jericho Doss M.D.   6.25 mg at 06/09/19 0719   • losartan (COZAAR) tablet 25 mg  25 mg Oral Q DAY Jericho Doss M.D.   25 mg at 06/09/19 0633       Fluids    Intake/Output Summary (Last 24 hours) at 06/09/19 0723  Last data filed at 06/09/19 0700   Gross per 24 hour   Intake              420 ml   Output              3725 ml   Net            -3305 ml       Laboratory  Recent Labs      06/08/19   1728   ISTATAPH  7.360*   ISTATAPCO2  40.8*   ISTATAPO2  63*   ISTATATCO2  24   [id number]  91*   ISTATARTHCO3  23.1   ISTATARTBE  -2   ISTATTEMP  see below   ISTATFIO2  100   ISTATSPEC  Arterial     Recent Labs      06/08/19   1730  06/08/19   2345   TROPONINI  0.03  <0.02   BNPBTYPENAT  451*   --      Recent Labs      06/08/19 1730 06/09/19   0310   SODIUM  137  140   POTASSIUM  4.2  3.8   CHLORIDE  99  101   CO2  24  27   BUN  28*  29*   CREATININE  1.48*  1.44*   CALCIUM  8.7  8.5     Recent Labs      06/08/19 1730 06/09/19   0310   ALTSGPT   --   21   ASTSGOT   --   24   ALKPHOSPHAT   --   62   TBILIRUBIN   --   0.4   GLUCOSE  187*  158*     Recent Labs      06/08/19 1730 06/08/19   1820  06/09/19   0310   WBC  18.4*   --   12.4*   NEUTSPOLYS  92.60*   --   92.80*   LYMPHOCYTES  1.60*   --   2.50*   MONOCYTES  4.10   --   3.50   EOSINOPHILS  0.00  1  0.00   BASOPHILS  0.10   --   0.10   ASTSGOT   --    --   24   ALTSGPT   --    --   21   ALKPHOSPHAT   --    --   62   TBILIRUBIN   --    --   0.4     Recent Labs      06/08/19 1730 06/09/19   0310   RBC  3.70*  3.46*   HEMOGLOBIN  10.8*  10.3*   HEMATOCRIT  35.1*  32.3*   PLATELETCT  413  374   PROTHROMBTM  20.7*   --    INR  1.74*   --        Imaging  X-Ray:  I have personally reviewed the images and compared with prior images.  EKG:  I have personally reviewed the images and compared with prior images.    Assessment/Plan  * Acute hypoxemic respiratory failure (HCC)   Assessment & Plan    Stat ABG showed yesterday 7.36/41/63/23  CXR showed Rt sided effusion, pulmonary edema and underlying fibrosis ? Amiodarone lung   Troponin -ve x2  , was 4000 in [place]   EKG left axis deviation with atrial flutter 1:2, no signs of ischemic event   Last night did 5 hours of CPAP 12   Now back to HFNC, 70% with 70 LPM  Reduce FiO2 to 65% and flow to 60 LPM, targeting  88-92%  Repeat CXR in PM   S/p Rt thoracentesis, f/u labs   Being diurised with good response, continue   D/c amiodarone, coreg for rate control      Flash pulmonary edema (HCC)   Assessment & Plan    Likely in part iatrogenic volume overload from previous hospital as she was being treated for PNA  BNP was 4000 in [place]   Leukocytosis down to 12 but no clinical indications of pneumonia  2D echo pending   Lasix 80 mg iv bid ongoing with over diuresis, -3300 since admission   Reduce lasix to 40 mg iv bid   Losartan + coreg low doses   RLL suspicious for a superimposed PNA  MRSA swab   Rocephin + Doxycycline; improving clinically      Cardiorenal syndrome   Assessment & Plan    Improving with diuresis   Recheck BMP tomorrow  Avoid nephrotoxins  Cautious electrolyte correction  Renal dose meds          Chronic atrial fibrillation (HCC)   Assessment & Plan    On Xarelto and amiodarone at home  DC amiodarone for suspected lung toxicity  Started Coreg yesterday  Resume home Xarelto       COPD exacerbation (HCC)   Assessment & Plan    Abx for PNA per above  Duoneb q4 prn   Titrate oxygen for SpO2 >= 88%  Reduce Solumedrol to 40 mg iv q8 hrs      Pleural effusion on right   Assessment & Plan    S/p 1000 cc removed on admission   Transudative by criteria, cardiogenic vs iatrogenic volume overload  Being diurised   F/u cytology and cultures      Swelling of lower leg   Assessment & Plan    BL +4 edema  ? Cor pulmonale   F/u Doppler lower ext r/o DVT   On xarelto for afib at home - resumed           VTE:  Lovenox  Ulcer: Not Indicated  Lines: Loaiza Catheter  Ongoing indication addressed    I have performed a physical exam and reviewed and updated ROS and Plan today (6/9/2019). In review of yesterday's note (6/8/2019), there are no changes except as documented above.     Discussed patient condition and risk of morbidity and/or mortality with Family, RN, RT and Pharmacy  The patient remains critically ill.  Critical care time = 43  minutes in directly providing and coordinating critical care and extensive data review.  No time overlap and excludes procedures.

## 2019-06-09 NOTE — FLOWSHEET NOTE
06/08/19 2032   Events/Summary/Plan   Events/Summary/Plan Franki tobarb given, family here socializing with patient    Non-Invasive Resp Device Site Inspection Completed Intact   Interdisciplinary Plan of Care-Goals (Indications)   Bronchodilator Indications Physical Exam / Hyperinflation / Wheezing (bronchospasm)   Interdisciplinary Plan of Care-Outcomes    Bronchodilator Outcome Improved Vital Signs and Measures of Gas Exchange   Education   Education Yes - Pt. / Family has been Instructed in use of Respiratory Equipment;Yes - Pt. / Family has been Instructed in use of Respiratory Medications and Adverse Reactions   RT Assessment of Delivered Medications   Evaluation of Medication Delivery Daily Yes-- Pt /Family has been Instructed in use of Respiratory Medications and Adverse Reactions   Heated Hi Flow Nasal Cannula   Heated Hi Flow Nasal Cannula (HHFNC) Yes   FiO2 (FNC) 100   Flowrate (FNC) 70   Humidifier Temperature ([facility]) 31   Date of Last Circuit Change 06/08/19   Circuit Change Next Due Date (Q 7 Days) 06/15/19   Breath Sounds   RUL Breath Sounds Rhonchi   RML Breath Sounds Diminished   RLL Breath Sounds Diminished   GINA Breath Sounds [place]onchi   LLL Breath Sounds Diminished   Oximetry   #Pulse Oximetry (Single Determination) Yes   Continuous Oximetry Yes   Oxygen   Home O2 Use Prior To Admission? Yes   Home O2 LPM Flow 2 LPM   Home O2 Delivery Method Nasal Cannula   Home O2 Frequency of Use At Sleep   O2 (LPM) 70   FiO2% 100 %   O2 Daily Delivery Respiratory  Highflow Nasal Cannula

## 2019-06-09 NOTE — FLOWSHEET NOTE
06/09/19 0235   Events/Summary/Plan   Events/Summary/Plan Patient requested to be taken off BiPAP anf placed back on HHFNC   Heated Hi Flow Nasal Cannula   Heated Hi Flow Nasal Cannula (HHFNC) Yes   FiO2 (HHFNC) 70   Flowrate (HHFNC) 50   Humidifier Temperature (HHFNC) 30   Chest Exam   Respiration 18   Pulse 90   Oximetry   Continuous Oximetry Yes

## 2019-06-09 NOTE — FLOWSHEET NOTE
06/09/19 0702   Events/Summary/Plan   Events/Summary/Plan Increased FIO2 to 70% floww to 70L    Heated Hi Flow Nasal Cannula   Heated Hi Flow Nasal Cannula (HHFNC) Yes   FiO2 (HHFNC) 70   Flowrate (HHFNC) 70   Chest Exam   Respiration 16   Pulse 93   Heart Rate (Monitored) 90   Oxygen   Pulse Oximetry 91 %

## 2019-06-09 NOTE — FLOWSHEET NOTE
06/08/19 0575   Events/Summary/Plan   Events/Summary/Plan Pt arrived in the unit on BIPAP. Placed pt on HFNC 100% 70L   Non-Invasive Resp Device Site Inspection Completed Intact   Heated Hi Flow Nasal Cannula NICU   Heated Hi Flow Nasal Cannula (HHFNC) NICU Yes   O2 Analyzer Calibrated Yes   Analyzed FIO2 (HHFNC) 100   Flowrate (HHFNC) 70   Humidifier Temperature (HHFNC) (warming up)   Date of Last Circuit Change 06/08/19   Circuit Change Next Due Date (Q 7 Days) 06/15/19

## 2019-06-09 NOTE — ED NOTES
Med Rec complete per Pt's Pharmacy [facility] @ [phone]  Allergies Reviewed    Pt started a 28 day course of Eye ointment on 5/29

## 2019-06-09 NOTE — ASSESSMENT & PLAN NOTE
On Xarelto and amiodarone at home  DC amiodarone for suspected lung toxicity  Started Coreg yesterday  Resume home Xarelto

## 2019-06-09 NOTE — CARE PLAN
Problem: Knowledge Deficit  Goal: Knowledge of disease process/condition, treatment plan, diagnostic tests, and medications will improve    Intervention: Explain information regarding disease process/condition, treatment plan, diagnostic tests, and medications and document in education  Updated pt and family on POC. Explained the purpose of pm meds, labs, and monitoring equipment to pt and family. They verbalized understanding.       Problem: Respiratory:  Goal: Respiratory status will improve    Intervention: Assess and monitor pulmonary status  Pt currently on HFNC at 70L and 100% FiO2. Pt is comfortable at rest, but does desat to the 80s with minimal exertion.

## 2019-06-09 NOTE — FLOWSHEET NOTE
06/09/19 0657   Events/Summary/Plan   Events/Summary/Plan Decreased FIO2 to 50% Flow to 50L   Non-Invasive Resp Device Site Inspection Completed Intact   Education   Education Yes - Pt. / Family has been Instructed in use of Respiratory Equipment   Heated Hi Flow Nasal Cannula   Heated Hi Flow Nasal Cannula (HHFNC) Yes   FiO2 (HHFNC) 50   Flowrate (HHFNC) 50   Chest Exam   Respiration 18   Pulse 73   Heart Rate (Monitored) 72   Breath Sounds   RUL Breath Sounds Clear;Crackles   RML Breath Sounds Clear;Crackles   RLL Breath Sounds Diminished   GINA Breath Sounds Clear;Crackles   LLL Breath Sounds Diminished   Oximetry   #Pulse Oximetry (Single Determination) Yes   Oxygen   Pulse Oximetry 92 %   O2 (LPM) 50   FiO2% 50 %   O2 Daily Delivery Respiratory  Highflow Nasal Cannula

## 2019-06-09 NOTE — ASSESSMENT & PLAN NOTE
Improving with diuresis   Recheck BMP tomorrow  Avoid nephrotoxins  Cautious electrolyte correction  Renal dose meds

## 2019-06-09 NOTE — ASSESSMENT & PLAN NOTE
BL +4 edema  ? Cor pulmonale   F/u Doppler lower ext r/o DVT   On xarelto for afib at home - resumed

## 2019-06-09 NOTE — FLOWSHEET NOTE
06/09/19 0653   Events/Summary/Plan   Events/Summary/Plan Pt is awake. Placed pt on HFNC   Non-Invasive Resp Device Site Inspection Completed Intact   General Vent Information   Pulse Oximetry 97 %   Heart Rate (Monitored) 75   Resuscitation Bag Resuscitation Bag and Mask at Bedside and Checked   Heated Hi Flow Nasal Cannula NICU   Analyzed FIO2 (HHFNC) 100   Flowrate (HHFNC) 70   Humidifier Temperature (HHFNC) (warming up)

## 2019-06-09 NOTE — PROGRESS NOTES
Assumed pt care. AAOx4. Pt denied pain or SOB at rest. Pt did desat to the 80s just talking to RN during assessment. Assessment completed. Updated pt on POC. Reminded pt to call for assistance. Call light within reach, bed in lowest position, bed alarm on, will continue to monitor.

## 2019-06-09 NOTE — ASSESSMENT & PLAN NOTE
Abx for PNA per above  Duoneb q4 prn   Titrate oxygen for SpO2 >= 88%  Reduce Solumedrol to 40 mg iv q8 hrs

## 2019-06-09 NOTE — ASSESSMENT & PLAN NOTE
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

## 2019-06-09 NOTE — ASSESSMENT & PLAN NOTE
Likely in part iatrogenic volume overload from previous hospital as she was being treated for PNA  BNP was 4000 in [place]   Leukocytosis down to 12 but no clinical indications of pneumonia  2D echo pending   Lasix 80 mg iv bid ongoing with over diuresis, -3300 since admission   Reduce lasix to 40 mg iv bid   Losartan + coreg low doses   RLL suspicious for a superimposed PNA  MRSA swab   Rocephin + Doxycycline; improving clinically

## 2019-06-09 NOTE — PROCEDURES
Thoracentesis  Date/Time: 6/8/2019 5:49 PM  Performed by: LUNA TOBAR  Authorized by: LUNA TOBAR     Consent:     Consent obtained:  Written    Consent given by:  Patient    Risks discussed:  Bleeding, infection and pneumothorax    Alternatives discussed:  No treatment  Universal protocol:     Procedure explained and questions answered to patient or proxy's satisfaction: yes      Relevant documents present and verified: yes      Test results available and properly labeled: yes      Imaging studies available: yes      Required blood products, implants, devices and special equipment available: yes      Site/side marked: yes      Immediately prior to procedure a time out was called: yes      Patient identity confirmed:  Verbally with patient and arm band  Anesthesia (see MAR for exact dosages):     Anesthesia method:  Local infiltration    Local anesthetic:  Lidocaine 1% w/o epi  Procedure details:     Preparation: Patient was prepped and draped in usual sterile fashion      Patient position:  Sitting    Location:  R midscapular line    Intercostal space:  8th    Puncture method:  Over-the-needle catheter    Ultrasound guidance: yes      Indwelling catheter placed: no      Needle gauge:  16    Catheter size:  6 Fr    Number of attempts:  1    Drainage characteristics:  Cloudy  Post-procedure details:     Chest x-ray performed: yes      Chest x-ray findings: Pending.    Patient tolerance of procedure:  Tolerated well, no immediate complications  Comments:      Removed 1000 ml slightly cloudy yellow fluid   EBL < 5 ml

## 2019-06-10 ENCOUNTER — APPOINTMENT (OUTPATIENT)
Dept: RADIOLOGY | Facility: MEDICAL CENTER | Age: 84
DRG: 291 | End: 2019-06-10
Attending: INTERNAL MEDICINE
Payer: MEDICARE

## 2019-06-10 LAB
ANION GAP SERPL CALC-SCNC: 14 MMOL/L (ref 0–11.9)
BASOPHILS # BLD AUTO: 0.1 % (ref 0–1.8)
BASOPHILS # BLD: 0.01 K/UL (ref 0–0.12)
BUN SERPL-MCNC: 42 MG/DL (ref 8–22)
CALCIUM SERPL-MCNC: 8 MG/DL (ref 8.4–10.2)
CHLORIDE SERPL-SCNC: 99 MMOL/L (ref 96–112)
CO2 SERPL-SCNC: 27 MMOL/L (ref 20–33)
CREAT SERPL-MCNC: 1.47 MG/DL (ref 0.5–1.4)
EOSINOPHIL # BLD AUTO: 0 K/UL (ref 0–0.51)
EOSINOPHIL NFR BLD: 0 % (ref 0–6.9)
ERYTHROCYTE [DISTWIDTH] IN BLOOD BY AUTOMATED COUNT: 47.4 FL (ref 35.9–50)
GLUCOSE SERPL-MCNC: 174 MG/DL (ref 65–99)
HCT VFR BLD AUTO: 31.7 % (ref 37–47)
HGB BLD-MCNC: 10.1 G/DL (ref 12–16)
IMM GRANULOCYTES # BLD AUTO: 0.1 K/UL (ref 0–0.11)
IMM GRANULOCYTES NFR BLD AUTO: 1 % (ref 0–0.9)
LYMPHOCYTES # BLD AUTO: 0.35 K/UL (ref 1–4.8)
LYMPHOCYTES NFR BLD: 3.4 % (ref 22–41)
MCH RBC QN AUTO: 29.5 PG (ref 27–33)
MCHC RBC AUTO-ENTMCNC: 31.9 G/DL (ref 33.6–35)
MCV RBC AUTO: 92.7 FL (ref 81.4–97.8)
MONOCYTES # BLD AUTO: 0.38 K/UL (ref 0–0.85)
MONOCYTES NFR BLD AUTO: 3.7 % (ref 0–13.4)
NEUTROPHILS # BLD AUTO: 9.56 K/UL (ref 2–7.15)
NEUTROPHILS NFR BLD: 91.8 % (ref 44–72)
NRBC # BLD AUTO: 0 K/UL
NRBC BLD-RTO: 0 /100 WBC
PLATELET # BLD AUTO: 360 K/UL (ref 164–446)
PMV BLD AUTO: 10.1 FL (ref 9–12.9)
POTASSIUM SERPL-SCNC: 3.1 MMOL/L (ref 3.6–5.5)
RBC # BLD AUTO: 3.42 M/UL (ref 4.2–5.4)
RHODAMINE-AURAMINE STN SPEC: NORMAL
SIGNIFICANT IND 70042: NORMAL
SITE SITE: NORMAL
SODIUM SERPL-SCNC: 140 MMOL/L (ref 135–145)
SOURCE SOURCE: NORMAL
WBC # BLD AUTO: 10.4 K/UL (ref 4.8–10.8)

## 2019-06-10 PROCEDURE — 94640 AIRWAY INHALATION TREATMENT: CPT

## 2019-06-10 PROCEDURE — A9270 NON-COVERED ITEM OR SERVICE: HCPCS | Performed by: INTERNAL MEDICINE

## 2019-06-10 PROCEDURE — 71250 CT THORAX DX C-: CPT

## 2019-06-10 PROCEDURE — 700102 HCHG RX REV CODE 250 W/ 637 OVERRIDE(OP): Performed by: INTERNAL MEDICINE

## 2019-06-10 PROCEDURE — 99291 CRITICAL CARE FIRST HOUR: CPT | Performed by: INTERNAL MEDICINE

## 2019-06-10 PROCEDURE — 94760 N-INVAS EAR/PLS OXIMETRY 1: CPT

## 2019-06-10 PROCEDURE — 700101 HCHG RX REV CODE 250: Performed by: INTERNAL MEDICINE

## 2019-06-10 PROCEDURE — 700102 HCHG RX REV CODE 250 W/ 637 OVERRIDE(OP): Performed by: HOSPITALIST

## 2019-06-10 PROCEDURE — 85025 COMPLETE CBC W/AUTO DIFF WBC: CPT

## 2019-06-10 PROCEDURE — A9270 NON-COVERED ITEM OR SERVICE: HCPCS | Performed by: HOSPITALIST

## 2019-06-10 PROCEDURE — 770022 HCHG ROOM/CARE - ICU (200)

## 2019-06-10 PROCEDURE — 700105 HCHG RX REV CODE 258: Performed by: INTERNAL MEDICINE

## 2019-06-10 PROCEDURE — 80048 BASIC METABOLIC PNL TOTAL CA: CPT

## 2019-06-10 PROCEDURE — 700111 HCHG RX REV CODE 636 W/ 250 OVERRIDE (IP): Performed by: INTERNAL MEDICINE

## 2019-06-10 RX ORDER — POTASSIUM CHLORIDE 20 MEQ/1
40 TABLET, EXTENDED RELEASE ORAL ONCE
Status: COMPLETED | OUTPATIENT
Start: 2019-06-10 | End: 2019-06-10

## 2019-06-10 RX ADMIN — IPRATROPIUM BROMIDE AND ALBUTEROL SULFATE 3 ML: .5; 3 SOLUTION RESPIRATORY (INHALATION) at 15:32

## 2019-06-10 RX ADMIN — FUROSEMIDE 40 MG: 10 INJECTION, SOLUTION INTRAMUSCULAR; INTRAVENOUS at 16:37

## 2019-06-10 RX ADMIN — METHYLPREDNISOLONE SODIUM SUCCINATE 40 MG: 40 INJECTION, POWDER, FOR SOLUTION INTRAMUSCULAR; INTRAVENOUS at 21:53

## 2019-06-10 RX ADMIN — IPRATROPIUM BROMIDE AND ALBUTEROL SULFATE 3 ML: .5; 3 SOLUTION RESPIRATORY (INHALATION) at 07:21

## 2019-06-10 RX ADMIN — DOXYCYCLINE 100 MG: 100 TABLET, FILM COATED ORAL at 05:51

## 2019-06-10 RX ADMIN — POTASSIUM CHLORIDE 40 MEQ: 1500 TABLET, EXTENDED RELEASE ORAL at 05:52

## 2019-06-10 RX ADMIN — METHYLPREDNISOLONE SODIUM SUCCINATE 40 MG: 40 INJECTION, POWDER, FOR SOLUTION INTRAMUSCULAR; INTRAVENOUS at 05:51

## 2019-06-10 RX ADMIN — FUROSEMIDE 40 MG: 10 INJECTION, SOLUTION INTRAMUSCULAR; INTRAVENOUS at 06:04

## 2019-06-10 RX ADMIN — CEFTRIAXONE 1 G: 1 INJECTION, POWDER, FOR SOLUTION INTRAMUSCULAR; INTRAVENOUS at 17:38

## 2019-06-10 RX ADMIN — RIVAROXABAN 15 MG: 15 TABLET, FILM COATED ORAL at 17:38

## 2019-06-10 RX ADMIN — IPRATROPIUM BROMIDE AND ALBUTEROL SULFATE 3 ML: .5; 3 SOLUTION RESPIRATORY (INHALATION) at 10:40

## 2019-06-10 RX ADMIN — DOXYCYCLINE 100 MG: 100 TABLET, FILM COATED ORAL at 17:37

## 2019-06-10 RX ADMIN — METHYLPREDNISOLONE SODIUM SUCCINATE 40 MG: 40 INJECTION, POWDER, FOR SOLUTION INTRAMUSCULAR; INTRAVENOUS at 13:24

## 2019-06-10 ASSESSMENT — ENCOUNTER SYMPTOMS
CARDIOVASCULAR NEGATIVE: 1
PSYCHIATRIC NEGATIVE: 1
WEAKNESS: 1
EYES NEGATIVE: 1
MUSCULOSKELETAL NEGATIVE: 1
SHORTNESS OF BREATH: 1
GASTROINTESTINAL NEGATIVE: 1

## 2019-06-10 NOTE — CARE PLAN
Problem: Oxygenation:  Goal: Maintain adequate oxygenation dependent on patient condition    Intervention: Levels of oxygenation will improve to baseline  Pt use O2 at home at 2L NC at sleep only. Pt current FIO2 is 65% on CPAP 8cmH2O. HFNC is standy a bedside. Per MD keep SPO2 between 88-92%

## 2019-06-10 NOTE — FLOWSHEET NOTE
06/10/19 0300   Events/Summary/Plan   Events/Summary/Plan CPAP cont with FI02 45%   Non-Invasive Resp Device Site Inspection Completed Skin Barrier Used   Location bridge of nose   General Vent Information   Pulse Oximetry 97 %   Heart Rate (Monitored) 79   CPAP/BiPAP [facility] [employer]   Nocturnal CPAP or BiPAP CPAP   Home Unit Used? No   Settings (If Known) 8   FiO2 or LPM 45   Home Mask Used? (full face mask)   Chest Exam   Work Of Breathing / Effort Mild   Breath Sounds   RUL Breath Sounds Clear;Diminished   RML Breath Sounds Diminished   RLL Breath Sounds Diminished   GINA Breath Sounds Clear;Diminished   LLL Breath Sounds Diminished

## 2019-06-10 NOTE — DIETARY
"Nutrition services: Day 2 of admit.  Ama Castillo is a 84 y.o. female with admitting DX of PNA, respiratory distress, acute respiratory failure with hypoxia.    Consult received for poor PO and wt loss PTA    Assessment:  Height: 165.1 cm (5' 5\")  Weight: 74.7 kg (164 lb 10.9 oz)  Body mass index is 27.4 kg/m²., BMI classification: acceptable for age  Diet/Intake: Cardiac/renal diet, 25-50% x2, 50-75% x1, % x1 meal    Evaluation:   1. Pt reports ongoing poor PO intake, worse over the last 1-2 weeks with <50% of her normal intake during this time. She has never been 'a big eater' but her appetite has been worse lately.  2. UBW was 150# prior to wt loss. Wt is currently 164# (74.7kg) by bed scale. No wt loss noted per records, however, pt had bilateral 4+ edema POA and 1L removed by thoracentesis a few days ago per MD notes, so fluid overload may be masking true wt loss.  3. She states that food is bland. She is amenable to trying more salt-free seasoning packets with her meals to improve the taste. Kitchen staff to provide menu so pt can order food items PRN.  4. Breakfast preferences obtained to guide meals going forward. She also would like to add snacks between meals.  5. Labs: K+ 3.1, Glc 174 - note steroids  6. Meds/fluids: Rocephin, Adoxa, Lasix, Solu-medrol    Malnutrition Risk: At risk 2' pt report of poor PO intake <50% of normal intake over prior 1-2 weeks, however, unable to meet criteria at this time.    Recommendations/Plan:  1. Continue current diet. BID snacks between meals. Breakfast per pt preference.   2. Encourage intake of meals/snacks  3. Document intake of all meals/snacks as % taken in ADL's to provide interdisciplinary communication across all shifts.   4. Monitor weight.  5. Nutrition rep will continue to see patient for ongoing meal and snack preferences.     RD monitoring.        "

## 2019-06-10 NOTE — FLOWSHEET NOTE
06/10/19 1040   Events/Summary/Plan   Events/Summary/Plan SVN given on pt's request   Interdisciplinary Plan of Care-Goals (Indications)   Bronchodilator Indications Physical Exam / Hyperinflation / Wheezing (bronchospasm)   Interdisciplinary Plan of Care-Outcomes    Bronchodilator Outcome Improved Vital Signs and Measures of Gas Exchange   SVN Group   #SVN Performed Yes   Given By: Mouthpiece   Heated Hi Flow Nasal Cannula   Heated Hi Flow Nasal Cannula (HHFNC) Yes   FiO2 (HHFNC) 90   Flowrate (HHFNC) 60   Respiratory WDL   Respiratory (WDL) X   Chest Exam   Work Of Breathing / Effort Moderate   Respiration (!) 22  (post 22)   Pulse 99  (post 117)   Heart Rate (Monitored) 99   Breath Sounds   Pre/Post Intervention Pre Intervention Assessment  (increased aeration following svn)   RUL Breath Sounds Expiratory Wheezes   RML Breath Sounds Diminished   RLL Breath Sounds Diminished   GINA Breath Sounds Expiratory Wheezes   LLL Breath Sounds Diminished   Oximetry   #Pulse Oximetry (Single Determination) Yes   Oxygen   Pulse Oximetry 90 %   O2 (LPM) 60   FiO2% 90 %   O2 Daily Delivery Respiratory  Highflow Nasal Cannula

## 2019-06-10 NOTE — PROGRESS NOTES
Received report and assumed care of pt. Pt is alert and oriented with HFNC in place, tolerating well. Pt not reporting any pain at this time. VSS.

## 2019-06-10 NOTE — PROGRESS NOTES
Report received from night RN this AM, POC discussed. Assessment completed. No c/o pain. Pt on HFNC, titrate O2 sats 88-92% per MD. Lines verified. POC discussed with pt. Fadia to gravity. Pt sitting up in bed for breakfast. Call light in reach, no needs at this time. Encouraged pt to call with needs, pt verbalizes understanding.

## 2019-06-10 NOTE — PROGRESS NOTES
Critical Care Progress Note    Date of admission  6/8/2019    Chief Complaint  84 y.o. female admitted 6/8/2019 with shortness of breath     Hospital Course  84 y.o. female who presented 6/8/2019 From alcohol with progressive shortness of breath at rest and refractory hypoxemia.  The patient has a known history of COPD but does not take inhalers at home.  She continues to be an active smoker.  She denies recent fever, change in the character of sputum, or sick contacts.  She is bringing up mixed sputum.  She denies hemoptysis.  She has noticed chronic bilateral extensive leg swelling.  Chest x-ray showed flash pulmonary edema here and she had a BNP of 4000 and [place].  Creatinine 1.33.  The patient denies cardiogenic history with except to atrial fibrillation on Xarelto at home.  She takes losartan for hypertension.  She has no previous history of heart cath or heart attacks.  This has been confirmed by the family at the bedside.  She denies chest pain currently.  EKG and troponin were negative for ischemic event. Requiring HFNC 100% with 70 LPM to barely maintain 88-92%.     Interval Problem Update  Reviewed last 24 hour events:  Continue to be on high flow nasal cannula FiO2 of 90% and flow of 60 L/min  No tachycardia  S/p Rt sided thoracentesis on Saturday, 1000 cc removed, transudative by white criteria so far  No fever  On rocephin + doxy  Procalcitonin was not elevated at 0.08  -5.5 Lwith lasix 80 iv bid  Borderline hypotension  No hypercapnia on ABG  CT chest done today showed right lower lobe pneumonia with diffuse airspace disease superimposing severe emphysema        Review of Systems  Review of Systems   Unable to perform ROS: Critical illness   Constitutional: Positive for malaise/fatigue.   HENT: Negative.    Eyes: Negative.    Respiratory: Positive for shortness of breath.    Cardiovascular: Negative.    Gastrointestinal: Negative.    Genitourinary: Negative.    Musculoskeletal: Negative.    Skin:  Negative.    Neurological: Positive for weakness.   Endo/Heme/Allergies: Negative.    Psychiatric/Behavioral: Negative.         Vital Signs for last 24 hours   Temp:  [36.3 °C (97.4 °F)-36.7 °C (98 °F)] 36.7 °C (98 °F)  Pulse:  [] 104  Resp:  [14-86] 20  BP: (104)/(59) 104/59  SpO2:  [88 %-99 %] 95 %      Physical Exam   Physical Exam   Constitutional: She is oriented to person, place, and time. No distress.   HENT:   Head: Normocephalic and atraumatic.   Eyes: Right eye exhibits no discharge. Left eye exhibits no discharge. No scleral icterus.   Neck: No tracheal deviation present. No thyromegaly present.   Cardiovascular: Regular rhythm, normal heart sounds and intact distal pulses.  Exam reveals no gallop and no friction rub.    No murmur heard.  Pulmonary/Chest: Effort normal. No stridor. No respiratory distress. She has no wheezes. She has rales. She exhibits no tenderness.   Reduced AE BL    Abdominal: Soft. She exhibits no distension. There is no tenderness.   Musculoskeletal: She exhibits no edema, tenderness or deformity.   Lymphadenopathy:     She has no cervical adenopathy.   Neurological: She is alert and oriented to person, place, and time.   Skin: Skin is warm. No rash noted. She is not diaphoretic. No erythema. No pallor.   Psychiatric: She has a normal mood and affect. Her behavior is normal. Judgment and thought content normal.   Nursing note and vitals reviewed.      Medications  Current Facility-Administered Medications   Medication Dose Route Frequency Provider Last Rate Last Dose   • methylPREDNISolone (SOLU-MEDROL) 40 MG injection 40 mg  40 mg Intravenous Q8HRS Jericho Doss M.D.   40 mg at 06/10/19 0551   • furosemide (LASIX) injection 40 mg  40 mg Intravenous BID DIURETIC Jericho Doss M.D.   40 mg at 06/10/19 0604   • rivaroxaban (XARELTO) tablet 15 mg  15 mg Oral PM MEAL Jericho oDss M.D.   15 mg at 06/09/19 1705   • benzocaine-menthol (CEPACOL) lozenge 1 Lozenge  1 Lozenge  Mouth/Throat Q2HRS PRN Jericho Doss M.D.   1 Lozenge at 06/09/19 0922   • doxycycline monohydrate (ADOXA) tablet 100 mg  100 mg Oral Q12HRS Jericho Doss M.D.   100 mg at 06/10/19 0551   • morphine (pf) 4 mg/ml injection 2 mg  2 mg Intravenous Q6HRS PRN Jericho Doss M.D.       • cefTRIAXone (ROCEPHIN) 1 g in  mL IVPB  1 g Intravenous Q24HRS Jericho Doss M.D.   Stopped at 06/09/19 1734   • ipratropium-albuterol (DUONEB) nebulizer solution  3 mL Nebulization Q4H PRN (RT) Jericho Doss M.D.   3 mL at 06/10/19 0721   • carvedilol (COREG) tablet 6.25 mg  6.25 mg Oral BID WITH MEALS Jericho Doss M.D.   Stopped at 06/09/19 1730   • losartan (COZAAR) tablet 25 mg  25 mg Oral Q DAY Jericho oDss M.D.   Stopped at 06/10/19 0600       Fluids    Intake/Output Summary (Last 24 hours) at 06/10/19 0944  Last data filed at 06/10/19 0800   Gross per 24 hour   Intake           646.67 ml   Output             2725 ml   Net         -2078.33 ml       Laboratory  Recent Labs      06/08/19   1728  06/09/19   1454   ISTATAPH  7.360*  7.437   ISTATAPCO2  40.8*  43.2*   ISTATAPO2  63*  58*   ISTATATCO2  24  30   [id number]  91*  90*   ISTATARTHCO3  23.1  29.1*   ISTATARTBE  -2  4*   ISTATTEMP  see below  see below   ISTATFIO2  100  65   ISTATSPEC  Arterial  Arterial     Recent Labs      06/08/19   1730  06/08/19   2345   TROPONINI  0.03  <0.02   BNPBTYPENAT  451*   --      Recent Labs      06/08/19   1730  06/09/19   0310  06/10/19   0307   SODIUM  137  140  140   POTASSIUM  4.2  3.8  3.1*   CHLORIDE  99  101  99   CO2  24  27  27   BUN  28*  29*  42*   CREATININE  1.48*  1.44*  1.47*   CALCIUM  8.7  8.5  8.0*     Recent Labs      06/08/19   1730  06/09/19   0310  06/10/19   0307   ALTSGPT   --   21   --    ASTSGOT   --   24   --    ALKPHOSPHAT   --   62   --    TBILIRUBIN   --   0.4   --    GLUCOSE  187*  158*  174*     Recent Labs      06/08/19 1730 06/08/19   1820  06/09/19   0310  06/10/19   0307   WBC  18.4*   --   12.4*   10.4   NEUTSPOLYS  92.60*   --   92.80*  91.80*   LYMPHOCYTES  1.60*   --   2.50*  3.40*   MONOCYTES  4.10   --   3.50  3.70   EOSINOPHILS  0.00  1  0.00  0.00   BASOPHILS  0.10   --   0.10  0.10   ASTSGOT   --    --   24   --    ALTSGPT   --    --   21   --    ALKPHOSPHAT   --    --   62   --    TBILIRUBIN   --    --   0.4   --      Recent Labs      06/08/19   1730  06/09/19   0310  06/10/19   0307   RBC  3.70*  3.46*  3.42*   HEMOGLOBIN  10.8*  10.3*  10.1*   HEMATOCRIT  35.1*  32.3*  31.7*   PLATELETCT  413  374  360   PROTHROMBTM  20.7*   --    --    INR  1.74*   --    --        Imaging  X-Ray:  I have personally reviewed the images and compared with prior images.  EKG:  I have personally reviewed the images and compared with prior images.    Assessment/Plan  * Acute hypoxemic respiratory failure (HCC)   Assessment & Plan    ABG did not show hypercapnia.  Patient is requiring high flow nasal cannula to maintain oxygen saturation above 90%.  CT chest showed right lower lobe pneumonia, fluid overload with severe underlying emphysema     BNP 4000 in [place], will repeat BMP today  EKG left axis deviation with atrial flutter 1:2, no signs of ischemic event    S/p Rt thoracentesis, showed transudate of effusion  Continue Lasix  D/c amiodarone, coreg for rate control      Flash pulmonary edema (HCC)   Assessment & Plan    Likely in part iatrogenic volume overload from previous hospital as she was being treated for PNA  BNP was 4000 in [place]   Echo showed elevated right ventricular systolic pressure 55 mmHg   Continue Lasix  -6 L since admission  Losartan + coreg low doses   RLL suspicious for a superimposed PNA  MRSA swab   Rocephin + Doxycycline; improving clinically      Acute kidney injury   Assessment & Plan    Improving  Now patient is at baseline with creatinine of 1.43       Chronic atrial fibrillation (HCC)   Assessment & Plan    On Xarelto and amiodarone at home  Continue Coreg  Resume home Xarelto       COPD  exacerbation (HCC)   Assessment & Plan    Abx for PNA per above  Duoneb q4 prn   Titrate oxygen for SpO2 >= 88%  Continue Solu-Medrol     Pleural effusion on right   Assessment & Plan    S/p 1000 cc removed on admission   Transudative by criteria, cardiogenic vs iatrogenic volume overload  Being diurised        Swelling of lower leg   Assessment & Plan    BL edema    Probably secondary to fluid overload/elevated right heart pressures  Doppler lower ext negative for DVT  Improving on diuretics        Right lower lobe pneumonia  With air bronchograms and consolidation  Continue antibiotics  Repeat procalcitonin level  Follow-up cultures  VTE:  Lovenox and NOAC  Ulcer: Not Indicated  Lines: Loaiza Catheter  Ongoing indication addressed    I have performed a physical exam and reviewed and updated ROS and Plan today (6/10/2019). In review of yesterday's note (6/9/2019), there are no changes except as documented above.     Discussed patient condition and risk of morbidity and/or mortality with Family, RN, RT and Pharmacy  The patient remains critically ill.  Critical care time = 38 minutes in directly providing and coordinating critical care and extensive data review.  No time overlap and excludes procedures.

## 2019-06-10 NOTE — PROGRESS NOTES
Pt down to CT by bed with ICU RN, ICU monitoring in place. Pt O2 sats 88-93% on 15 L NRB, telemetry afib 's, 's/60's. Pt back to room.

## 2019-06-10 NOTE — FLOWSHEET NOTE
06/10/19 1532   Events/Summary/Plan   Events/Summary/Plan SVN given while pt is sitting up in chair   Interdisciplinary Plan of Care-Goals (Indications)   Bronchodilator Indications Physical Exam / Hyperinflation / Wheezing (bronchospasm)   Interdisciplinary Plan of Care-Outcomes    Bronchodilator Outcome Improved Vital Signs and Measures of Gas Exchange   SVN Group   #SVN Performed Yes   Given By: Mouthpiece   Heated Hi Flow Nasal Cannula   Heated Hi Flow Nasal Cannula (HHFNC) Yes   FiO2 (HHFNC) 60   Flowrate (HHFNC) 65   Humidifier Temperature (HHFNC) 31   Chest Exam   Work Of Breathing / Effort Mild   Respiration 16  (post 16)   Pulse 82  (post 99)   Heart Rate (Monitored) 82   Breath Sounds   Pre/Post Intervention Pre Intervention Assessment  (increased aeration following svn)   RUL Breath Sounds Diminished   RML Breath Sounds Diminished   RLL Breath Sounds Diminished   GINA Breath Sounds Diminished   LLL Breath Sounds Diminished   Oximetry   #Pulse Oximetry (Single Determination) Yes   Oxygen   Pulse Oximetry 93 %   O2 (LPM) 65   FiO2% 60 %   O2 Daily Delivery Respiratory  Highflow Nasal Cannula

## 2019-06-10 NOTE — FLOWSHEET NOTE
06/09/19 1800   Events/Summary/Plan   Events/Summary/Plan switched from cpap to HFNC to eat meal   Non-Invasive Resp Device Site Inspection Completed Skin Barrier Used   Location bridge of nose   Heated Hi Flow Nasal Cannula   Heated Hi Flow Nasal Cannula (HHFNC) Yes   FiO2 (FNC) 80   Flowrate (HHFNC) 60   Humidifier Temperature (FNC) 31   Date of Last Circuit Change 06/08/19   Circuit Change Next Due Date (Q 7 Days) 06/15/19   Chest Exam   Respiration (!) 25   Pulse 91   Heart Rate (Monitored) 89   Work Of Breathing / Effort Increased Work of Breathing   Breath Sounds   RUL Breath Sounds Clear;Diminished   RML Breath Sounds Diminished   RLL Breath Sounds Diminished   GINA Breath Sounds Clear;Diminished   LLL Breath Sounds Diminished   Secretions   Cough Congested;Productive   Oximetry   Continuous Oximetry Yes   Oxygen   Home O2 Use Prior To Admission? Yes   Home O2 LPM Flow 3 LPM   Home O2 Delivery Method Nasal Cannula   Home O2 Frequency of Use At Sleep   Pulse Oximetry 89 %   O2 (LPM) 60   FiO2% 100 %   O2 Daily Delivery Respiratory  Highflow Nasal Cannula

## 2019-06-10 NOTE — CARE PLAN
Problem: Nutritional:  Goal: Achieve adequate nutritional intake  Patient will consume >50% of meals and snacks consistently  Outcome: PROGRESSING AS EXPECTED

## 2019-06-10 NOTE — FLOWSHEET NOTE
06/10/19 0541   Events/Summary/Plan   Events/Summary/Plan pt off cpap and on HFNC @ request of KIMBERLEY Rees   Heated Hi Flow Nasal Cannula   FiO2 (HHFNC) 50   Flowrate (HHFNC) 55   Humidifier Temperature (HHFNC) 31   Date of Last Circuit Change 06/08/19   Circuit Change Next Due Date (Q 7 Days) 06/15/19   Chest Exam   Work Of Breathing / Effort Mild   Breath Sounds   RUL Breath Sounds Clear;Diminished   RML Breath Sounds Diminished   RLL Breath Sounds Diminished   GINA Breath Sounds Clear;Diminished   LLL Breath Sounds Diminished   Secretions   Cough Congested   Oximetry   #Pulse Oximetry (Single Determination) Yes   Continuous Oximetry Yes   Oxygen   Home O2 Use Prior To Admission? Yes   Home O2 LPM Flow 3 LPM   Home O2 Delivery Method Nasal Cannula   Home O2 Frequency of Use At Sleep   O2 (LPM) 55   FiO2% 50 %   O2 Daily Delivery Respiratory  Highflow Nasal Cannula

## 2019-06-10 NOTE — PROGRESS NOTES
Critical care update  Called for respiratory distress  Started CPAP 8   Stat CXR showed slightly improved pulmonary edema and persisting RLL infiltrate  Ongoing diuresis and antibiotics  FiO2 requirement currently 65% with CPAP   Stat ABG ordered and revealed 7.44/43/58/29  Doppler LE pending, xarelto for afib on board  Reviewed Echo images, report is pending, it appears to me that her EF is preserved with significant diastolic dysfunction, no regional wall motion abnormality, mild pericardial effusion noticed, no apparent signs of RV failure, mild AR   Pt is likely anxious, start morphine 2 mg iv q6 prn  Continue diuresis for diastolic CHF with pulmonary edema    D/w RN, RT, patient and her granddaughter   Critical care time not including procedures 36 minutes

## 2019-06-10 NOTE — FLOWSHEET NOTE
06/10/19 0726   Events/Summary/Plan   Events/Summary/Plan SVN given, FI02 decreased to 90%, flow turned down to 60L   Interdisciplinary Plan of Care-Goals (Indications)   Bronchodilator Indications Physical Exam / Hyperinflation / Wheezing (bronchospasm)   Interdisciplinary Plan of Care-Outcomes    Bronchodilator Outcome Improved Vital Signs and Measures of Gas Exchange   SVN Group   #SVN Performed Yes   Given By: Mouthpiece   Date SVN Last Changed 06/10/19   Date SVN Next Change Due (Q 7 Days) 06/17/19   Heated Hi Flow Nasal Cannula   Heated Hi Flow Nasal Cannula (HHFNC) Yes   FiO2 (HHFNC) 100   Flowrate (HHFNC) 70   Humidifier Temperature (HHFNC) 31   Chest Exam   Respiration (!) 22  (post 20)   Pulse 83  (post 86)   Heart Rate (Monitored) 83   Work Of Breathing / Effort Moderate   Breath Sounds   RUL Breath Sounds Diminished  (increased aeration following svn)   RML Breath Sounds Diminished   RLL Breath Sounds Diminished   GINA Breath Sounds Diminished   LLL Breath Sounds Diminished   Oximetry   #Pulse Oximetry (Single Determination) Yes   Continuous Oximetry Yes   Oxygen   Pulse Oximetry 97 %   O2 (LPM) 70   FiO2% 100 %   O2 Daily Delivery Respiratory  Highflow Nasal Cannula

## 2019-06-10 NOTE — CARE PLAN
Problem: Safety  Goal: Will remain free from falls    Intervention: Implement fall precautions  Bed in low position, treaded slipper socks in place, personal belongings at bedside, call light in reach and bed alarm on. Pt demonstrates correct use of call light. Pt will remain free from falls.       Problem: Knowledge Deficit  Goal: Knowledge of disease process/condition, treatment plan, diagnostic tests, and medications will improve    Intervention: Explain information regarding disease process/condition, treatment plan, diagnostic tests, and medications and document in education  Lasix BID, huerta in place, discussed labs and titrate O2 for O2 sats 88-92% per MD order. Pt verbalizes understanding.       Problem: Urinary Elimination:  Goal: Ability to reestablish a normal urinary elimination pattern will improve    Intervention: Evaluate need to continue indwelling urinary catheter  Huerta in place, Lasix BID.

## 2019-06-10 NOTE — FLOWSHEET NOTE
"   06/10/19 0645   Events/Summary/Plan   Events/Summary/Plan Received call from night RN, Pt's sats low 80\"s on [facility]. FI02 increased to 100%, Flow increased to 70     "

## 2019-06-10 NOTE — CARE PLAN
Problem: Knowledge Deficit  Goal: Knowledge of disease process/condition, treatment plan, diagnostic tests, and medications will improve  Outcome: PROGRESSING AS EXPECTED  Discussed POC with pt and pts family. All questions answered and all are in agreement with POC.    Problem: Respiratory:  Goal: Respiratory status will improve  Outcome: PROGRESSING AS EXPECTED  Pt tolerated HFNC for longer period of time through the night. Pt requested CPAP at bedtime to help sleep.

## 2019-06-10 NOTE — FLOWSHEET NOTE
06/09/19 2200   Events/Summary/Plan   Events/Summary/Plan Patirnt place on cpap   Non-Invasive Resp Device Site Inspection Completed Skin Barrier Used   Location bridge of nose   General Vent Information   Resuscitation Bag Resuscitation Bag and Mask at Bedside and Checked   CPAP/BiPAP [facility] [employer]   Nocturnal CPAP or BiPAP CPAP   #System Evaluation Yes   Home Unit Used? No   Equipment Inspected for Cleanliness, Operation, and Safety? Yes   Settings (If Known) 8   FiO2 or LPM 65   Home Mask Used? (full face mask)   Vent Inline Medication   #(Single Treatment) Vent Inline Medication Yes   Chest Exam   Work Of Breathing / Effort Increased Work of Breathing   Breath Sounds   RUL Breath Sounds Expiratory Wheezes   RML Breath Sounds Diminished   RLL Breath Sounds Diminished   GINA Breath Sounds Expiratory Wheezes   LLL Breath Sounds Diminished   Secretions   Cough Congested;Strong   How Sputum Obtained Spontaneous

## 2019-06-10 NOTE — PROGRESS NOTES
Notified Dr Perkins of pts consistently low blood pressures. Per Dr Perkins no need to intervene unless MAP drops below 60

## 2019-06-11 LAB
ANION GAP SERPL CALC-SCNC: 13 MMOL/L (ref 0–11.9)
BASOPHILS # BLD AUTO: 0.1 % (ref 0–1.8)
BASOPHILS # BLD: 0.01 K/UL (ref 0–0.12)
BNP SERPL-MCNC: 307 PG/ML (ref 0–100)
BUN SERPL-MCNC: 45 MG/DL (ref 8–22)
CALCIUM SERPL-MCNC: 8.1 MG/DL (ref 8.4–10.2)
CHLORIDE SERPL-SCNC: 98 MMOL/L (ref 96–112)
CO2 SERPL-SCNC: 30 MMOL/L (ref 20–33)
CREAT SERPL-MCNC: 1.54 MG/DL (ref 0.5–1.4)
CYTOLOGY REG CYTOL: NORMAL
EOSINOPHIL # BLD AUTO: 0 K/UL (ref 0–0.51)
EOSINOPHIL NFR BLD: 0 % (ref 0–6.9)
ERYTHROCYTE [DISTWIDTH] IN BLOOD BY AUTOMATED COUNT: 47 FL (ref 35.9–50)
GLUCOSE SERPL-MCNC: 186 MG/DL (ref 65–99)
GRAM STN SPEC: NORMAL
HCT VFR BLD AUTO: 33.5 % (ref 37–47)
HGB BLD-MCNC: 10.7 G/DL (ref 12–16)
IMM GRANULOCYTES # BLD AUTO: 0.11 K/UL (ref 0–0.11)
IMM GRANULOCYTES NFR BLD AUTO: 1 % (ref 0–0.9)
LYMPHOCYTES # BLD AUTO: 0.3 K/UL (ref 1–4.8)
LYMPHOCYTES NFR BLD: 2.9 % (ref 22–41)
MCH RBC QN AUTO: 29.4 PG (ref 27–33)
MCHC RBC AUTO-ENTMCNC: 31.9 G/DL (ref 33.6–35)
MCV RBC AUTO: 92 FL (ref 81.4–97.8)
MONOCYTES # BLD AUTO: 0.35 K/UL (ref 0–0.85)
MONOCYTES NFR BLD AUTO: 3.3 % (ref 0–13.4)
NEUTROPHILS # BLD AUTO: 9.75 K/UL (ref 2–7.15)
NEUTROPHILS NFR BLD: 92.7 % (ref 44–72)
NRBC # BLD AUTO: 0 K/UL
NRBC BLD-RTO: 0 /100 WBC
PLATELET # BLD AUTO: 370 K/UL (ref 164–446)
PMV BLD AUTO: 9.6 FL (ref 9–12.9)
POTASSIUM SERPL-SCNC: 3.4 MMOL/L (ref 3.6–5.5)
PROCALCITONIN SERPL-MCNC: <0.05 NG/ML
RBC # BLD AUTO: 3.64 M/UL (ref 4.2–5.4)
SIGNIFICANT IND 70042: NORMAL
SITE SITE: NORMAL
SODIUM SERPL-SCNC: 141 MMOL/L (ref 135–145)
SOURCE SOURCE: NORMAL
WBC # BLD AUTO: 10.5 K/UL (ref 4.8–10.8)

## 2019-06-11 PROCEDURE — 94640 AIRWAY INHALATION TREATMENT: CPT

## 2019-06-11 PROCEDURE — 700102 HCHG RX REV CODE 250 W/ 637 OVERRIDE(OP): Performed by: INTERNAL MEDICINE

## 2019-06-11 PROCEDURE — 94760 N-INVAS EAR/PLS OXIMETRY 1: CPT

## 2019-06-11 PROCEDURE — 770022 HCHG ROOM/CARE - ICU (200)

## 2019-06-11 PROCEDURE — 83880 ASSAY OF NATRIURETIC PEPTIDE: CPT

## 2019-06-11 PROCEDURE — 85025 COMPLETE CBC W/AUTO DIFF WBC: CPT

## 2019-06-11 PROCEDURE — 700101 HCHG RX REV CODE 250: Performed by: INTERNAL MEDICINE

## 2019-06-11 PROCEDURE — 84145 PROCALCITONIN (PCT): CPT

## 2019-06-11 PROCEDURE — 700111 HCHG RX REV CODE 636 W/ 250 OVERRIDE (IP): Performed by: INTERNAL MEDICINE

## 2019-06-11 PROCEDURE — 87070 CULTURE OTHR SPECIMN AEROBIC: CPT

## 2019-06-11 PROCEDURE — 99233 SBSQ HOSP IP/OBS HIGH 50: CPT | Performed by: INTERNAL MEDICINE

## 2019-06-11 PROCEDURE — 80048 BASIC METABOLIC PNL TOTAL CA: CPT

## 2019-06-11 PROCEDURE — 700105 HCHG RX REV CODE 258: Performed by: INTERNAL MEDICINE

## 2019-06-11 PROCEDURE — A9270 NON-COVERED ITEM OR SERVICE: HCPCS | Performed by: INTERNAL MEDICINE

## 2019-06-11 PROCEDURE — 87205 SMEAR GRAM STAIN: CPT

## 2019-06-11 RX ADMIN — METHYLPREDNISOLONE SODIUM SUCCINATE 40 MG: 40 INJECTION, POWDER, FOR SOLUTION INTRAMUSCULAR; INTRAVENOUS at 06:03

## 2019-06-11 RX ADMIN — FUROSEMIDE 40 MG: 10 INJECTION, SOLUTION INTRAMUSCULAR; INTRAVENOUS at 16:37

## 2019-06-11 RX ADMIN — IPRATROPIUM BROMIDE AND ALBUTEROL SULFATE 3 ML: .5; 3 SOLUTION RESPIRATORY (INHALATION) at 07:20

## 2019-06-11 RX ADMIN — RIVAROXABAN 15 MG: 15 TABLET, FILM COATED ORAL at 17:43

## 2019-06-11 RX ADMIN — METHYLPREDNISOLONE SODIUM SUCCINATE 40 MG: 40 INJECTION, POWDER, FOR SOLUTION INTRAMUSCULAR; INTRAVENOUS at 15:09

## 2019-06-11 RX ADMIN — DOXYCYCLINE 100 MG: 100 TABLET, FILM COATED ORAL at 17:43

## 2019-06-11 RX ADMIN — CARVEDILOL 6.25 MG: 6.25 TABLET, FILM COATED ORAL at 08:47

## 2019-06-11 RX ADMIN — CEFTRIAXONE 1 G: 1 INJECTION, POWDER, FOR SOLUTION INTRAMUSCULAR; INTRAVENOUS at 18:03

## 2019-06-11 RX ADMIN — DOXYCYCLINE 100 MG: 100 TABLET, FILM COATED ORAL at 06:03

## 2019-06-11 RX ADMIN — METHYLPREDNISOLONE SODIUM SUCCINATE 40 MG: 40 INJECTION, POWDER, FOR SOLUTION INTRAMUSCULAR; INTRAVENOUS at 21:49

## 2019-06-11 RX ADMIN — IPRATROPIUM BROMIDE AND ALBUTEROL SULFATE 3 ML: .5; 3 SOLUTION RESPIRATORY (INHALATION) at 22:09

## 2019-06-11 RX ADMIN — FUROSEMIDE 40 MG: 10 INJECTION, SOLUTION INTRAMUSCULAR; INTRAVENOUS at 06:03

## 2019-06-11 ASSESSMENT — ENCOUNTER SYMPTOMS
PSYCHIATRIC NEGATIVE: 1
CARDIOVASCULAR NEGATIVE: 1
GASTROINTESTINAL NEGATIVE: 1
EYES NEGATIVE: 1
WEAKNESS: 1
MUSCULOSKELETAL NEGATIVE: 1
SHORTNESS OF BREATH: 1

## 2019-06-11 NOTE — PROGRESS NOTES
Pt sitting up in chair, tolerating well. O2 sats 88-92% on HFNC. Family at bedside throughout the morning.

## 2019-06-11 NOTE — FLOWSHEET NOTE
06/11/19 0720   Interdisciplinary Plan of Care-Goals (Indications)   Bronchodilator Indications Physical Exam / Hyperinflation / Wheezing (bronchospasm)   Interdisciplinary Plan of Care-Outcomes    Bronchodilator Outcome Improved Vital Signs and Measures of Gas Exchange   Education   Education Yes - Pt. / Family has been Instructed in use of Respiratory Equipment;Yes - Pt. / Family has been Instructed in use of Respiratory Medications and Adverse Reactions   RT Assessment of Delivered Medications   Evaluation of Medication Delivery Daily Yes-- Pt /Family has been Instructed in use of Respiratory Medications and Adverse Reactions   SVN Group   #SVN Performed Yes   Given By: Mouthpiece   Heated Hi Flow Nasal Cannula   Heated Hi Flow Nasal Cannula (HHFNC) Yes   FiO2 (HHFNC) 85   Flowrate (HHFNC) 55   Humidifier Temperature (HHFNC) 31   Respiratory WDL   Respiratory (WDL) X   Chest Exam   Work Of Breathing / Effort Moderate   Respiration (!) 25   Pulse (!) 106   Heart Rate (Monitored) 90   Breath Sounds   Pre/Post Intervention Pre Intervention Assessment   RUL Breath Sounds Diminished   RML Breath Sounds Diminished   RLL Breath Sounds Diminished   GINA Breath Sounds Diminished   LLL Breath Sounds Diminished   Secretions   Cough Congested   Oximetry   #Pulse Oximetry (Single Determination) Yes   Continuous Oximetry Yes   Oxygen   Pulse Oximetry 91 %   O2 (LPM) 55   FiO2% 85 %   O2 Daily Delivery Respiratory  Highflow Nasal Cannula

## 2019-06-11 NOTE — CARE PLAN
Problem: Safety  Goal: Will remain free from falls  Outcome: PROGRESSING AS EXPECTED  Pt calls appropriately when in need of assistance and does not act impulsively. Safety measures in place. All pt needs are being met.     Problem: Bowel/Gastric:  Goal: Normal bowel function is maintained or improved  Outcome: PROGRESSING SLOWER THAN EXPECTED  Pt still has not had a bowel movement. She states tht because he appetite has been poor she does not feel the need to go    Problem: Respiratory:  Goal: Respiratory status will improve  Pt requested to stay on HFNC throughout the night instead of switching to CPAP machine for sleep. Pt tolerating HFNC well with oxygen saturation remaining in the 90s range.

## 2019-06-11 NOTE — CARE PLAN
Problem: Safety  Goal: Will remain free from falls    Intervention: Implement fall precautions  Treaded slipper socks in place, bed in low position, pt near nurses station, personal belongings at bedside, call light in reach and pt demonstrates correct use of call light. Pt will remain free from falls.       Problem: Knowledge Deficit  Goal: Knowledge of disease process/condition, treatment plan, diagnostic tests, and medications will improve    Intervention: Explain information regarding disease process/condition, treatment plan, diagnostic tests, and medications and document in education  RT medications and steroids discussed with pt. Respiratory sputum culture obtained and sent to lab. Pt on HFNC, goal for O2 sats 88-92%. Pt verbalizes understanding.

## 2019-06-11 NOTE — DISCHARGE PLANNING
LSW spoke with pt and dtr Piper ([phone]) at bedside. Pt lives alone, has support from dtr and son. Pt has a walker at home but is independent with ADLs. Pt has a concentrator at home from [employer] for nightly 02. Pt has used [facility] in the past as well as [facility] and [facility] SNFs. Pt uses [facility] in [place] for Rx. Pt stated her PCP is Dr. Lalita Byrne. Pt f/u with [facility] for cardiology and also has a nephrologist.   D/C needs unknown at this time. CM team will follow.     Care Transition Team Assessment    Information Source  Orientation : Oriented x 4  Information Given By: Patient  Who is responsible for making decisions for patient? : Patient    Readmission Evaluation  Is this a readmission?: No    Elopement Risk  Legal Hold: No  Ambulatory or Self Mobile in Wheelchair: Yes  Disoriented: No  Psychiatric Symptoms: None  History of Wandering: No  Elopement this Admit: No  Vocalizing Wanting to Leave: No  Displays Behaviors, Body Language Wanting to Leave: No-Not at Risk for Elopement  Elopement Risk: Not at Risk for Elopement    Interdisciplinary Discharge Planning  Does Admitting Nurse Feel This Could be a Complex Discharge?: No  Lives with - Patient's Self Care Capacity: Unable To Determine At This Time  Patient or legal guardian wants to designate a caregiver (see row info): No  Support Systems: Family Member(s)  Housing / Facility: [address]  Do You Take your Prescribed Medications Regularly: Yes  Able to Return to Previous ADL's: Yes  Mobility Issues: Yes  Prior Services: None  Assistance Needed: Yes  Durable Medical Equipment: Walker, Home Oxygen    Discharge Preparedness  What is your plan after discharge?: Uncertain - pending medical team collaboration  What are your discharge supports?: Child (daughter Piper and son Corbin)  Prior Functional Level: Independent with Activities of Daily Living, Uses Walker  Difficulity with ADLs: None  Difficulity with IADLs: None    Functional  Assesment  Prior Functional Level: Independent with Activities of Daily Living, Uses Walker    Finances  Financial Barriers to Discharge: No  Prescription Coverage: Yes    Vision / Hearing Impairment  Vision Impairment : No  Right Eye Vision: Impaired, Wears Glasses  Left Eye Vision: Impaired, Wears Glasses  Hearing Impairment : No         Advance Directive  Advance Directive?: None    Domestic Abuse  Have you ever been the victim of abuse or violence?: No  Physical Abuse or Sexual Abuse: No  Verbal Abuse or Emotional Abuse: No  Possible Abuse Reported to:: Not Applicable    Psychological Assessment  History of Substance Abuse: None  History of Psychiatric Problems: No  Non-compliant with Treatment: No    Discharge Risks or Barriers  Discharge risks or barriers?: Lives alone, no community support, Complex medical needs    Anticipated Discharge Information  Anticipated discharge disposition: Discharge needs currently unknown  Discharge Address:  ([address] [place] [address] [address] [place] [address])  Discharge Contact Phone Number:  ([phone], [phone])

## 2019-06-11 NOTE — FLOWSHEET NOTE
06/10/19 2305   Heated Hi Flow Nasal Cannula   Heated Hi Flow Nasal Cannula (HHFNC) Yes   FiO2 (HHFNC) 60   Flowrate (HHFNC) 60   Humidifier Temperature (HHFNC) 31   Chest Exam   Respiration 18   Pulse 94   Heart Rate (Monitored) 92   Oxygen   Pulse Oximetry 98 %

## 2019-06-11 NOTE — FLOWSHEET NOTE
06/10/19 1905   Events/Summary/Plan   Events/Summary/Plan Patient in chair on hhfnc   Heated Hi Flow Nasal Cannula   Heated Hi Flow Nasal Cannula (HHFNC) Yes   FiO2 (HHFNC) 60   Flowrate (HHFNC) 60   Humidifier Temperature (HHFNC) 31   Respiratory WDL   Respiratory (WDL) X   Chest Exam   Respiration (!) 25   Pulse (!) 102   Heart Rate (Monitored) (!) 105   Breath Sounds   RUL Breath Sounds Diminished   RML Breath Sounds Diminished   RLL Breath Sounds Diminished   GINA Breath Sounds Diminished   LLL Breath Sounds Diminished   Oximetry   Continuous Oximetry Yes   Oxygen   Pulse Oximetry 93 %   O2 (LPM) 60   FiO2% 60 %

## 2019-06-11 NOTE — FLOWSHEET NOTE
06/11/19 0124   Heated Hi Flow Nasal Cannula   Heated Hi Flow Nasal Cannula (HHFNC) Yes   FiO2 (HHFNC) 60   Flowrate (HHFNC) 60   Humidifier Temperature (FNC) 31   Chest Exam   Respiration 18   Pulse 93   Heart Rate (Monitored) 76   Oxygen   Pulse Oximetry 98 %

## 2019-06-11 NOTE — PROGRESS NOTES
Report received from night RN, POC discussed. Assessment completed. Lines verified. Pt up sitting in chair for breakfast. POC discussed with pt, pt verbalizes understanding. No c/o pain. Call light in reach and pt demonstrates correct use of call light.

## 2019-06-11 NOTE — PROGRESS NOTES
Received report and assumed care of pt. Pt is alert and oriented with family at bedside. She is tolerating sitting up in chair with HFNC in place. Pt not reporting any pain at this time. VSS. Safety measures in place.

## 2019-06-12 ENCOUNTER — APPOINTMENT (OUTPATIENT)
Dept: RADIOLOGY | Facility: MEDICAL CENTER | Age: 84
DRG: 291 | End: 2019-06-12
Attending: INTERNAL MEDICINE
Payer: MEDICARE

## 2019-06-12 LAB
ANION GAP SERPL CALC-SCNC: 14 MMOL/L (ref 0–11.9)
BACTERIA FLD AEROBE CULT: NORMAL
BASOPHILS # BLD AUTO: 0.1 % (ref 0–1.8)
BASOPHILS # BLD: 0.01 K/UL (ref 0–0.12)
BUN SERPL-MCNC: 46 MG/DL (ref 8–22)
CALCIUM SERPL-MCNC: 8.4 MG/DL (ref 8.4–10.2)
CHLORIDE SERPL-SCNC: 93 MMOL/L (ref 96–112)
CO2 SERPL-SCNC: 32 MMOL/L (ref 20–33)
CREAT SERPL-MCNC: 1.64 MG/DL (ref 0.5–1.4)
EOSINOPHIL # BLD AUTO: 0 K/UL (ref 0–0.51)
EOSINOPHIL NFR BLD: 0 % (ref 0–6.9)
ERYTHROCYTE [DISTWIDTH] IN BLOOD BY AUTOMATED COUNT: 46.4 FL (ref 35.9–50)
GLUCOSE SERPL-MCNC: 209 MG/DL (ref 65–99)
GRAM STN SPEC: NORMAL
HCT VFR BLD AUTO: 34.2 % (ref 37–47)
HGB BLD-MCNC: 10.9 G/DL (ref 12–16)
IMM GRANULOCYTES # BLD AUTO: 0.15 K/UL (ref 0–0.11)
IMM GRANULOCYTES NFR BLD AUTO: 1.4 % (ref 0–0.9)
LYMPHOCYTES # BLD AUTO: 0.39 K/UL (ref 1–4.8)
LYMPHOCYTES NFR BLD: 3.7 % (ref 22–41)
MCH RBC QN AUTO: 29.4 PG (ref 27–33)
MCHC RBC AUTO-ENTMCNC: 31.9 G/DL (ref 33.6–35)
MCV RBC AUTO: 92.2 FL (ref 81.4–97.8)
MONOCYTES # BLD AUTO: 0.31 K/UL (ref 0–0.85)
MONOCYTES NFR BLD AUTO: 2.9 % (ref 0–13.4)
NEUTROPHILS # BLD AUTO: 9.78 K/UL (ref 2–7.15)
NEUTROPHILS NFR BLD: 91.9 % (ref 44–72)
NRBC # BLD AUTO: 0 K/UL
NRBC BLD-RTO: 0 /100 WBC
PLATELET # BLD AUTO: 384 K/UL (ref 164–446)
PMV BLD AUTO: 9.3 FL (ref 9–12.9)
POTASSIUM SERPL-SCNC: 3 MMOL/L (ref 3.6–5.5)
RBC # BLD AUTO: 3.71 M/UL (ref 4.2–5.4)
SIGNIFICANT IND 70042: NORMAL
SITE SITE: NORMAL
SODIUM SERPL-SCNC: 139 MMOL/L (ref 135–145)
SOURCE SOURCE: NORMAL
WBC # BLD AUTO: 10.6 K/UL (ref 4.8–10.8)

## 2019-06-12 PROCEDURE — 770022 HCHG ROOM/CARE - ICU (200)

## 2019-06-12 PROCEDURE — 80048 BASIC METABOLIC PNL TOTAL CA: CPT

## 2019-06-12 PROCEDURE — 700111 HCHG RX REV CODE 636 W/ 250 OVERRIDE (IP): Performed by: INTERNAL MEDICINE

## 2019-06-12 PROCEDURE — 85025 COMPLETE CBC W/AUTO DIFF WBC: CPT

## 2019-06-12 PROCEDURE — A9270 NON-COVERED ITEM OR SERVICE: HCPCS | Performed by: INTERNAL MEDICINE

## 2019-06-12 PROCEDURE — 94640 AIRWAY INHALATION TREATMENT: CPT

## 2019-06-12 PROCEDURE — 700102 HCHG RX REV CODE 250 W/ 637 OVERRIDE(OP): Performed by: INTERNAL MEDICINE

## 2019-06-12 PROCEDURE — 99406 BEHAV CHNG SMOKING 3-10 MIN: CPT

## 2019-06-12 PROCEDURE — 94760 N-INVAS EAR/PLS OXIMETRY 1: CPT

## 2019-06-12 PROCEDURE — 71045 X-RAY EXAM CHEST 1 VIEW: CPT

## 2019-06-12 PROCEDURE — 700105 HCHG RX REV CODE 258: Performed by: INTERNAL MEDICINE

## 2019-06-12 RX ORDER — POTASSIUM CHLORIDE 20 MEQ/1
40 TABLET, EXTENDED RELEASE ORAL ONCE
Status: COMPLETED | OUTPATIENT
Start: 2019-06-12 | End: 2019-06-12

## 2019-06-12 RX ORDER — PREDNISONE 20 MG/1
20 TABLET ORAL DAILY
Status: DISCONTINUED | OUTPATIENT
Start: 2019-06-12 | End: 2019-06-15

## 2019-06-12 RX ADMIN — PREDNISONE 20 MG: 20 TABLET ORAL at 12:49

## 2019-06-12 RX ADMIN — CEFTRIAXONE 1 G: 1 INJECTION, POWDER, FOR SOLUTION INTRAMUSCULAR; INTRAVENOUS at 18:04

## 2019-06-12 RX ADMIN — RIVAROXABAN 15 MG: 15 TABLET, FILM COATED ORAL at 18:04

## 2019-06-12 RX ADMIN — CARVEDILOL 6.25 MG: 6.25 TABLET, FILM COATED ORAL at 08:05

## 2019-06-12 RX ADMIN — POTASSIUM CHLORIDE 40 MEQ: 1500 TABLET, EXTENDED RELEASE ORAL at 05:50

## 2019-06-12 RX ADMIN — FUROSEMIDE 40 MG: 10 INJECTION, SOLUTION INTRAMUSCULAR; INTRAVENOUS at 16:16

## 2019-06-12 RX ADMIN — METHYLPREDNISOLONE SODIUM SUCCINATE 40 MG: 40 INJECTION, POWDER, FOR SOLUTION INTRAMUSCULAR; INTRAVENOUS at 05:45

## 2019-06-12 RX ADMIN — FUROSEMIDE 40 MG: 10 INJECTION, SOLUTION INTRAMUSCULAR; INTRAVENOUS at 05:45

## 2019-06-12 NOTE — PROGRESS NOTES
Patient sitting up in chair visiting with family. Ambulated ICU hallway earlier on 5L O2 via NC. Tolerated well.

## 2019-06-12 NOTE — PROGRESS NOTES
Patient continues to call appropriately for assistance. Transitioned to 5L NC per RT. Denies any pain at this time. Sitting up in recliner for lunch.

## 2019-06-12 NOTE — CARE PLAN
Problem: Oxygenation:  Goal: Maintain adequate oxygenation dependent on patient condition  Respiratory Therapy Update    Therapy changed to 5 LPM NC  Events/Summary/Plan:  Cont as tolerated. HHFNC at bedside if needed.

## 2019-06-12 NOTE — PROGRESS NOTES
Received report and assumed care of pt. Pt is sitting up in chair with HFNC in place. Family is at bedside with pt. VSS. Pt not complaining of any pain at this time. All needs being met. Safety measures in place and call light within reach.

## 2019-06-12 NOTE — RESPIRATORY CARE
"COPD EDUCATION by COPD CLINICAL EDUCATOR  6/12/2019  at  4:35 PM by Sanjuana Rosas     Patient interviewed by COPD education team.  Patient unable to participate in full program.  Short intervention has been conducted.  A comprehensive packet including information about COPD, treatments, and smoking cessation given.    Smoking Cessation Intervention 3 -10 minutes completed.  Provided smoking cessation packet with \"Tips to Quit\" and flyer for \"Free Smoking Cessation Classes\".     A personalized \"COPD Action Plan\" was reviewed with and provided to the patient.  "

## 2019-06-12 NOTE — PROGRESS NOTES
Critical Care Progress Note    Date of admission  6/8/2019    Chief Complaint  84 y.o. female admitted 6/8/2019 with shortness of breath     Hospital Course  84 y.o. female who presented 6/8/2019 From alcohol with progressive shortness of breath at rest and refractory hypoxemia.  The patient has a known history of COPD but does not take inhalers at home.  She continues to be an active smoker.  She denies recent fever, change in the character of sputum, or sick contacts.  She is bringing up mixed sputum.  She denies hemoptysis.  She has noticed chronic bilateral extensive leg swelling.  Chest x-ray showed flash pulmonary edema here and she had a BNP of 4000 and [place].  Creatinine 1.33.  The patient denies cardiogenic history with except to atrial fibrillation on Xarelto at home.  She takes losartan for hypertension.  She has no previous history of heart cath or heart attacks.  This has been confirmed by the family at the bedside.  She denies chest pain currently.  EKG and troponin were negative for ischemic event. Requiring HFNC 100% with 70 LPM to barely maintain 88-92%.     Interval Problem Update  Reviewed last 24 hour events:  Continue to be on high flow nasal cannula FiO2 of 70 and flow of 60 L/min  No tachycardia  No fever  On rocephin + doxy  Repeated procrastinating continue to be elevated  -7.9 Lwith lasix 80 iv bid  Borderline hypotension  No hypercapnia on ABG  CT chest done  showed right lower lobe pneumonia with diffuse airspace disease superimposing severe emphysema        Review of Systems  Review of Systems   Unable to perform ROS: Critical illness   Constitutional: Positive for malaise/fatigue.   HENT: Negative.    Eyes: Negative.    Respiratory: Positive for shortness of breath.    Cardiovascular: Negative.    Gastrointestinal: Negative.    Genitourinary: Negative.    Musculoskeletal: Negative.    Skin: Negative.    Neurological: Positive for weakness.   Endo/Heme/Allergies: Negative.     Psychiatric/Behavioral: Negative.         Vital Signs for last 24 hours   Temp:  [36.4 °C (97.5 °F)-37 °C (98.6 °F)] 37 °C (98.6 °F)  Pulse:  [] 72  Resp:  [14-68] 48  BP: ()/(52-70) 87/70  SpO2:  [88 %-100 %] 97 %      Physical Exam   Physical Exam   Constitutional: She is oriented to person, place, and time. No distress.   HENT:   Head: Normocephalic and atraumatic.   Eyes: Right eye exhibits no discharge. Left eye exhibits no discharge. No scleral icterus.   Neck: No tracheal deviation present. No thyromegaly present.   Cardiovascular: Regular rhythm, normal heart sounds and intact distal pulses.  Exam reveals no gallop and no friction rub.    No murmur heard.  Pulmonary/Chest: Effort normal. No stridor. No respiratory distress. She has no wheezes. She has rales. She exhibits no tenderness.   Reduced AE BL    Abdominal: Soft. She exhibits no distension. There is no tenderness.   Musculoskeletal: She exhibits no edema, tenderness or deformity.   Lymphadenopathy:     She has no cervical adenopathy.   Neurological: She is alert and oriented to person, place, and time.   Skin: Skin is warm. No rash noted. She is not diaphoretic. No erythema. No pallor.   Psychiatric: She has a normal mood and affect. Her behavior is normal. Judgment and thought content normal.   Nursing note and vitals reviewed.      Medications  Current Facility-Administered Medications   Medication Dose Route Frequency Provider Last Rate Last Dose   • methylPREDNISolone (SOLU-MEDROL) 40 MG injection 40 mg  40 mg Intravenous Q8HRS Jericho Doss M.D.   40 mg at 06/11/19 2149   • furosemide (LASIX) injection 40 mg  40 mg Intravenous BID DIURETIC Jericho Doss M.D.   40 mg at 06/11/19 1637   • rivaroxaban (XARELTO) tablet 15 mg  15 mg Oral PM MEAL Jericho Doss M.D.   15 mg at 06/11/19 1743   • benzocaine-menthol (CEPACOL) lozenge 1 Lozenge  1 Lozenge Mouth/Throat Q2HRS PRN Jericho Doss M.D.   1 Lozenge at 06/09/19 0922   • doxycycline  monohydrate (ADOXA) tablet 100 mg  100 mg Oral Q12HRS Jericho Doss M.D.   100 mg at 06/11/19 1743   • morphine (pf) 4 mg/ml injection 2 mg  2 mg Intravenous Q6HRS PRN Jericho Doss M.D.       • cefTRIAXone (ROCEPHIN) 1 g in  mL IVPB  1 g Intravenous Q24HRS Jericho Doss M.D.   Stopped at 06/11/19 1833   • ipratropium-albuterol (DUONEB) nebulizer solution  3 mL Nebulization Q4H PRN (RT) Jericho Doss M.D.   3 mL at 06/11/19 2209   • carvedilol (COREG) tablet 6.25 mg  6.25 mg Oral BID WITH MEALS Jericho Doss M.D.   Stopped at 06/11/19 1738   • losartan (COZAAR) tablet 25 mg  25 mg Oral Q DAY Jericho Doss M.D.   Stopped at 06/10/19 0600       Fluids    Intake/Output Summary (Last 24 hours) at 06/11/19 2254  Last data filed at 06/11/19 2220   Gross per 24 hour   Intake             1460 ml   Output             2425 ml   Net             -965 ml       Laboratory  Recent Labs      06/09/19   1454   ISTATAPH  7.437   ISTATAPCO2  43.2*   ISTATAPO2  58*   ISTATATCO2  30   [id number]  90*   ISTATARTHCO3  29.1*   ISTATARTBE  4*   ISTATTEMP  see below   ISTATFIO2  65   ISTATSPEC  Arterial     Recent Labs      06/08/19   2345  06/11/19   0459   TROPONINI  <0.02   --    BNPBTYPENAT   --   307*     Recent Labs      06/09/19   0310  06/10/19   0307  06/11/19   0459   SODIUM  140  140  141   POTASSIUM  3.8  3.1*  3.4*   CHLORIDE  101  99  98   CO2  27  27  30   BUN  29*  42*  45*   CREATININE  1.44*  1.47*  1.54*   CALCIUM  8.5  8.0*  8.1*     Recent Labs      06/09/19   0310  06/10/19   0307  06/11/19   0459   ALTSGPT  21   --    --    ASTSGOT  24   --    --    ALKPHOSPHAT  62   --    --    TBILIRUBIN  0.4   --    --    GLUCOSE  158*  174*  186*     Recent Labs      06/09/19   0310  06/10/19   0307  06/11/19   0459   WBC  12.4*  10.4  10.5   NEUTSPOLYS  92.80*  91.80*  92.70*   LYMPHOCYTES  2.50*  3.40*  2.90*   MONOCYTES  3.50  3.70  3.30   EOSINOPHILS  0.00  0.00  0.00   BASOPHILS  0.10  0.10  0.10   ASTSGOT  24   --     --    ALTSGPT  21   --    --    ALKPHOSPHAT  62   --    --    TBILIRUBIN  0.4   --    --      Recent Labs      06/09/19   0310  06/10/19   0307  06/11/19   0459   RBC  3.46*  3.42*  3.64*   HEMOGLOBIN  10.3*  10.1*  10.7*   HEMATOCRIT  32.3*  31.7*  33.5*   PLATELETCT  374  360  370       Imaging  X-Ray:  I have personally reviewed the images and compared with prior images.  EKG:  I have personally reviewed the images and compared with prior images.    Assessment/Plan  * Acute hypoxemic respiratory failure (HCC)   Assessment & Plan    Multifactorial including severe emphysema, pulmonary edema and probable component of pneumonia even though the patient procalcitonin was noted elevated   ABG did not show hypercapnia.    Patient is requiring high flow nasal cannula to maintain oxygen saturation above 90%.  CT chest showed right lower lobe pneumonia, fluid overload with severe underlying emphysema     BNP 4000 in [place], will repeat BMP here showed significant improvement, 307  Continue to be in A. fib rate controlled   S/p Rt thoracentesis, showed transudate of effusion  Continue Lasix   coreg for rate control      Flash pulmonary edema (HCC)   Assessment & Plan    Likely in part iatrogenic volume overload from previous hospital as she was being treated for PNA  BNP was 4000 in [place]   Echo showed elevated right ventricular systolic pressure 55 mmHg   Continue Lasix  -7.7 L since admission  Losartan + coreg low doses   RLL suspicious for a superimposed PNA  MRSA swab negative  Rocephin + Doxycycline; improving clinically      Acute kidney injury   Assessment & Plan    Improving  Now patient is at baseline with creatinine of 1.43       Chronic atrial fibrillation (HCC)   Assessment & Plan    On Xarelto and amiodarone at home  Continue Coreg  Resume home Xarelto       COPD exacerbation (HCC)   Assessment & Plan    Abx for PNA per above  Duoneb q4 prn   Titrate oxygen for SpO2 >= 88%  Continue steroids, switch to p.o.  Prednisone  She will need outpatient follow-up with the pulmonary function test     Pleural effusion on right   Assessment & Plan    S/p 1000 cc removed on admission   Transudative by criteria, cardiogenic vs iatrogenic volume overload  Being diurised        Swelling of lower leg   Assessment & Plan    BL edema    Probably secondary to fluid overload/elevated right heart pressures  Doppler lower ext negative for DVT  Improving on diuretics        Right lower lobe pneumonia  With air bronchograms and consolidation  Continue antibiotics  Follow-up cultures  VTE:  Lovenox and NOAC  Ulcer: Not Indicated  Lines: Loaiza Catheter  Ongoing indication addressed    I have performed a physical exam and reviewed and updated ROS and Plan today (6/11/2019). In review of yesterday's note (6/10/2019), there are no changes except as documented above.     Discussed patient condition and risk of morbidity and/or mortality with Family, RN, RT and Pharmacy

## 2019-06-12 NOTE — FLOWSHEET NOTE
This note also relates to the following rows which could not be included:  Respiration - Cannot attach notes to unvalidated device data  Pulse - Cannot attach notes to unvalidated device data  Heart Rate (Monitored) - Cannot attach notes to unvalidated device data  Pulse Oximetry - Cannot attach notes to unvalidated device data       06/11/19 2203   Events/Summary/Plan   Events/Summary/Plan prn tx   Interdisciplinary Plan of Care-Goals (Indications)   Bronchodilator Indications History / Diagnosis   Interdisciplinary Plan of Care-Outcomes    Bronchodilator Outcome Improved Vital Signs and Measures of Gas Exchange   Education   Education Yes - Pt. / Family has been Instructed in use of Respiratory Equipment;Yes - Pt. / Family has been Instructed in use of Respiratory Medications and Adverse Reactions   RT Assessment of Delivered Medications   Evaluation of Medication Delivery Daily Yes-- Pt /Family has been Instructed in use of Respiratory Medications and Adverse Reactions   SVN Group   #SVN Performed Yes   Given By: Mouthpiece   Heated Hi Flow Nasal Cannula   Heated Hi Flow Nasal Cannula (HHFNC) Yes   FiO2 (HHFNC) 50   Flowrate (HHFNC) 40   Humidifier Temperature (HHFNC) 31   Respiratory WDL   Respiratory (WDL) X   Chest Exam   Work Of Breathing / Effort Mild   Secretions   Cough Congested;Non Productive   Oximetry   #Pulse Oximetry (Single Determination) Yes   Oxygen   O2 (LPM) 40   FiO2% 50 %   O2 Daily Delivery Respiratory  Highflow Nasal Cannula

## 2019-06-12 NOTE — PROGRESS NOTES
0700-Report from Negrita AMADOR RN. POC reviewed. Patient currently sitting up in bed. Denies any pain or discomfort at this time. Call light in reach. Pt demonstrates appropriate use of light.

## 2019-06-12 NOTE — CARE PLAN
Problem: Respiratory:  Goal: Respiratory status will improve  Outcome: PROGRESSING AS EXPECTED  Patient's O2 sats will remain > 90%. Currently on high flow nasal cannula. Plan to transition to normal NC this afternoon.     Problem: Psychosocial Needs:  Goal: Level of anxiety will decrease  Outcome: PROGRESSING AS EXPECTED  Patient's psychosocial needs will be met. Encouraged to express any concerns throughout stay.

## 2019-06-12 NOTE — CARE PLAN
Problem: Communication  Goal: The ability to communicate needs accurately and effectively will improve  Outcome: PROGRESSING AS EXPECTED  Discussed POC with pt and family. All in agreement with POC. Updated on current pt status. Per family and pt pt is improving.     Problem: Bowel/Gastric:  Goal: Normal bowel function is maintained or improved  Outcome: PROGRESSING SLOWER THAN EXPECTED  Pt still has not had a bowel movement on this admit. Pt keeps trying, RN will try to get orders for medication to help.     Problem: Respiratory:  Goal: Respiratory status will improve  Outcome: PROGRESSING AS EXPECTED  Pt was able to wean down on O2 demands significantly throughout the night. Pt is still tolerating HFNC.

## 2019-06-12 NOTE — FLOWSHEET NOTE
This note also relates to the following rows which could not be included:  Respiration - Cannot attach notes to unvalidated device data  Pulse - Cannot attach notes to unvalidated device data  Heart Rate (Monitored) - Cannot attach notes to unvalidated device data  Pulse Oximetry - Cannot attach notes to unvalidated device data       06/11/19 1940   Heated Hi Flow Nasal Cannula   Heated Hi Flow Nasal Cannula (HHFNC) Yes   FiO2 (HHFNC) 75   Flowrate (HHFNC) 55   Humidifier Temperature (HHFNC) 31   Chest Exam   Work Of Breathing / Effort Mild  (pt talking on phone)   Oxygen   O2 (LPM) 55   FiO2% 75 %   O2 Daily Delivery Respiratory  Highflow Nasal Cannula

## 2019-06-12 NOTE — CARE PLAN
Problem: Nutritional:  Goal: Achieve adequate nutritional intake  Patient will consume >50% of meals and snacks consistently   Outcome: MET Date Met: 06/12/19  PO intake % of most recorded meals on 6/10 and 6/11.

## 2019-06-13 LAB
ANION GAP SERPL CALC-SCNC: 13 MMOL/L (ref 0–11.9)
BACTERIA SPEC RESP CULT: NORMAL
BASOPHILS # BLD AUTO: 0.1 % (ref 0–1.8)
BASOPHILS # BLD: 0.02 K/UL (ref 0–0.12)
BUN SERPL-MCNC: 48 MG/DL (ref 8–22)
CALCIUM SERPL-MCNC: 8.3 MG/DL (ref 8.4–10.2)
CHLORIDE SERPL-SCNC: 90 MMOL/L (ref 96–112)
CO2 SERPL-SCNC: 36 MMOL/L (ref 20–33)
CREAT SERPL-MCNC: 1.71 MG/DL (ref 0.5–1.4)
EOSINOPHIL # BLD AUTO: 0 K/UL (ref 0–0.51)
EOSINOPHIL NFR BLD: 0 % (ref 0–6.9)
ERYTHROCYTE [DISTWIDTH] IN BLOOD BY AUTOMATED COUNT: 46.4 FL (ref 35.9–50)
GLUCOSE SERPL-MCNC: 293 MG/DL (ref 65–99)
GRAM STN SPEC: NORMAL
HCT VFR BLD AUTO: 39.7 % (ref 37–47)
HGB BLD-MCNC: 12.5 G/DL (ref 12–16)
IMM GRANULOCYTES # BLD AUTO: 0.25 K/UL (ref 0–0.11)
IMM GRANULOCYTES NFR BLD AUTO: 1.4 % (ref 0–0.9)
LYMPHOCYTES # BLD AUTO: 0.49 K/UL (ref 1–4.8)
LYMPHOCYTES NFR BLD: 2.8 % (ref 22–41)
MCH RBC QN AUTO: 29.3 PG (ref 27–33)
MCHC RBC AUTO-ENTMCNC: 31.5 G/DL (ref 33.6–35)
MCV RBC AUTO: 93 FL (ref 81.4–97.8)
MONOCYTES # BLD AUTO: 0.74 K/UL (ref 0–0.85)
MONOCYTES NFR BLD AUTO: 4.2 % (ref 0–13.4)
NEUTROPHILS # BLD AUTO: 16.1 K/UL (ref 2–7.15)
NEUTROPHILS NFR BLD: 91.5 % (ref 44–72)
NRBC # BLD AUTO: 0 K/UL
NRBC BLD-RTO: 0 /100 WBC
PLATELET # BLD AUTO: 431 K/UL (ref 164–446)
PMV BLD AUTO: 9.4 FL (ref 9–12.9)
POTASSIUM SERPL-SCNC: 3.5 MMOL/L (ref 3.6–5.5)
RBC # BLD AUTO: 4.27 M/UL (ref 4.2–5.4)
SIGNIFICANT IND 70042: NORMAL
SITE SITE: NORMAL
SODIUM SERPL-SCNC: 139 MMOL/L (ref 135–145)
SOURCE SOURCE: NORMAL
WBC # BLD AUTO: 17.6 K/UL (ref 4.8–10.8)

## 2019-06-13 PROCEDURE — 700105 HCHG RX REV CODE 258: Performed by: INTERNAL MEDICINE

## 2019-06-13 PROCEDURE — 99233 SBSQ HOSP IP/OBS HIGH 50: CPT | Performed by: INTERNAL MEDICINE

## 2019-06-13 PROCEDURE — 700102 HCHG RX REV CODE 250 W/ 637 OVERRIDE(OP): Performed by: INTERNAL MEDICINE

## 2019-06-13 PROCEDURE — 83036 HEMOGLOBIN GLYCOSYLATED A1C: CPT

## 2019-06-13 PROCEDURE — 85025 COMPLETE CBC W/AUTO DIFF WBC: CPT

## 2019-06-13 PROCEDURE — A9270 NON-COVERED ITEM OR SERVICE: HCPCS | Performed by: INTERNAL MEDICINE

## 2019-06-13 PROCEDURE — 700111 HCHG RX REV CODE 636 W/ 250 OVERRIDE (IP): Performed by: INTERNAL MEDICINE

## 2019-06-13 PROCEDURE — 770020 HCHG ROOM/CARE - TELE (206)

## 2019-06-13 PROCEDURE — 80048 BASIC METABOLIC PNL TOTAL CA: CPT

## 2019-06-13 RX ORDER — POTASSIUM CHLORIDE 20 MEQ/1
40 TABLET, EXTENDED RELEASE ORAL ONCE
Status: COMPLETED | OUTPATIENT
Start: 2019-06-13 | End: 2019-06-13

## 2019-06-13 RX ORDER — FUROSEMIDE 10 MG/ML
20 INJECTION INTRAMUSCULAR; INTRAVENOUS
Status: DISCONTINUED | OUTPATIENT
Start: 2019-06-13 | End: 2019-06-14

## 2019-06-13 RX ADMIN — POTASSIUM CHLORIDE 40 MEQ: 1500 TABLET, EXTENDED RELEASE ORAL at 11:15

## 2019-06-13 RX ADMIN — FUROSEMIDE 20 MG: 10 INJECTION, SOLUTION INTRAVENOUS at 16:29

## 2019-06-13 RX ADMIN — FUROSEMIDE 40 MG: 10 INJECTION, SOLUTION INTRAMUSCULAR; INTRAVENOUS at 05:32

## 2019-06-13 RX ADMIN — CEFTRIAXONE 1 G: 1 INJECTION, POWDER, FOR SOLUTION INTRAMUSCULAR; INTRAVENOUS at 17:52

## 2019-06-13 RX ADMIN — PREDNISONE 20 MG: 20 TABLET ORAL at 05:32

## 2019-06-13 RX ADMIN — RIVAROXABAN 15 MG: 15 TABLET, FILM COATED ORAL at 17:52

## 2019-06-13 ASSESSMENT — ENCOUNTER SYMPTOMS
MUSCULOSKELETAL NEGATIVE: 1
GASTROINTESTINAL NEGATIVE: 1
WEAKNESS: 1
PSYCHIATRIC NEGATIVE: 1
CARDIOVASCULAR NEGATIVE: 1
SHORTNESS OF BREATH: 1
EYES NEGATIVE: 1

## 2019-06-13 NOTE — PROGRESS NOTES
Critical Care Progress Note    Date of admission  6/8/2019    Chief Complaint  84 y.o. female admitted 6/8/2019 with shortness of breath     Hospital Course  84 y.o. female who presented 6/8/2019 From alcohol with progressive shortness of breath at rest and refractory hypoxemia.  The patient has a known history of COPD but does not take inhalers at home.  She continues to be an active smoker.  She denies recent fever, change in the character of sputum, or sick contacts.  She is bringing up mixed sputum.  She denies hemoptysis.  She has noticed chronic bilateral extensive leg swelling.  Chest x-ray showed flash pulmonary edema here and she had a BNP of 4000 and [place].  Creatinine 1.33.  The patient denies cardiogenic history with except to atrial fibrillation on Xarelto at home.  She takes losartan for hypertension.  She has no previous history of heart cath or heart attacks.  This has been confirmed by the family at the bedside.  She denies chest pain currently.  EKG and troponin were negative for ischemic event. Requiring HFNC 100% with 70 LPM to barely maintain 88-92%.     Interval Problem Update  Reviewed last 24 hour events:  Significant improvement since yesterday.  The patient is on nasal cannula 4 L/min, O2 saturation around 94%  No tachycardia  No fever  Completed course of Rocephin today    -8.8 Lwith lasix 80 iv bid  Borderline hypotension  No hypercapnia on ABG  CT chest done  showed right lower lobe pneumonia with diffuse airspace disease superimposing severe emphysema        Review of Systems  Review of Systems   Unable to perform ROS: Critical illness   Constitutional: Positive for malaise/fatigue.   HENT: Negative.    Eyes: Negative.    Respiratory: Positive for shortness of breath.    Cardiovascular: Negative.    Gastrointestinal: Negative.    Genitourinary: Negative.    Musculoskeletal: Negative.    Skin: Negative.    Neurological: Positive for weakness.   Endo/Heme/Allergies: Negative.     Psychiatric/Behavioral: Negative.         Vital Signs for last 24 hours   Temp:  [36.7 °C (98.1 °F)-37.1 °C (98.8 °F)] 37.1 °C (98.7 °F)  Pulse:  [] 90  Resp:  [15-48] 33  SpO2:  [87 %-96 %] 92 %      Physical Exam   Physical Exam   Constitutional: She is oriented to person, place, and time. No distress.   HENT:   Head: Normocephalic and atraumatic.   Eyes: Right eye exhibits no discharge. Left eye exhibits no discharge. No scleral icterus.   Neck: No tracheal deviation present. No thyromegaly present.   Cardiovascular: Regular rhythm, normal heart sounds and intact distal pulses.  Exam reveals no gallop and no friction rub.    No murmur heard.  Pulmonary/Chest: Effort normal. No stridor. No respiratory distress. She has no wheezes. She has rales. She exhibits no tenderness.   Reduced AE BL    Abdominal: Soft. She exhibits no distension. There is no tenderness.   Musculoskeletal: She exhibits no edema, tenderness or deformity.   Lymphadenopathy:     She has no cervical adenopathy.   Neurological: She is alert and oriented to person, place, and time.   Skin: Skin is warm. No rash noted. She is not diaphoretic. No erythema. No pallor.   Psychiatric: She has a normal mood and affect. Her behavior is normal. Judgment and thought content normal.   Nursing note and vitals reviewed.      Medications  Current Facility-Administered Medications   Medication Dose Route Frequency Provider Last Rate Last Dose   • predniSONE (DELTASONE) tablet 20 mg  20 mg Oral DAILY Bryanna Herman M.D.   20 mg at 06/13/19 0532   • furosemide (LASIX) injection 40 mg  40 mg Intravenous BID DIURETIC Jericho Doss M.D.   40 mg at 06/13/19 0532   • rivaroxaban (XARELTO) tablet 15 mg  15 mg Oral PM MEAL Jericho Doss M.D.   15 mg at 06/12/19 1804   • benzocaine-menthol (CEPACOL) lozenge 1 Lozenge  1 Lozenge Mouth/Throat Q2HRS PRN Jericho Doss M.D.   1 Lozenge at 06/09/19 0960   • morphine (pf) 4 mg/ml injection 2 mg  2 mg Intravenous Q6HRS  PRN Jericho Doss M.D.       • cefTRIAXone (ROCEPHIN) 1 g in  mL IVPB  1 g Intravenous Q24HRS Jericho Doss M.D.   Stopped at 06/12/19 1834   • ipratropium-albuterol (DUONEB) nebulizer solution  3 mL Nebulization Q4H PRN (RT) Jericho Doss M.D.   3 mL at 06/11/19 2209   • carvedilol (COREG) tablet 6.25 mg  6.25 mg Oral BID WITH MEALS Jericho Doss M.D.   Stopped at 06/12/19 1730   • losartan (COZAAR) tablet 25 mg  25 mg Oral Q DAY Jericho Doss M.D.   Stopped at 06/10/19 0600       Fluids    Intake/Output Summary (Last 24 hours) at 06/13/19 1200  Last data filed at 06/13/19 1000   Gross per 24 hour   Intake             1160 ml   Output             2185 ml   Net            -1025 ml       Laboratory      Recent Labs      06/11/19 0459   BNPBTYPENAT  307*     Recent Labs      06/11/19 0459 06/12/19   0408  06/13/19   0915   SODIUM  141  139  139   POTASSIUM  3.4*  3.0*  3.5*   CHLORIDE  98  93*  90*   CO2  30  32  36*   BUN  45*  46*  48*   CREATININE  1.54*  1.64*  1.71*   CALCIUM  8.1*  8.4  8.3*     Recent Labs      06/11/19 0459 06/12/19   0408  06/13/19   0915   GLUCOSE  186*  209*  293*     Recent Labs      06/11/19 0459 06/12/19   0408  06/13/19   0915   WBC  10.5  10.6  17.6*   NEUTSPOLYS  92.70*  91.90*  91.50*   LYMPHOCYTES  2.90*  3.70*  2.80*   MONOCYTES  3.30  2.90  4.20   EOSINOPHILS  0.00  0.00  0.00   BASOPHILS  0.10  0.10  0.10     Recent Labs      06/11/19 0459 06/12/19   0408  06/13/19   0915   RBC  3.64*  3.71*  4.27   HEMOGLOBIN  10.7*  10.9*  12.5   HEMATOCRIT  33.5*  34.2*  39.7   PLATELETCT  370  384  431       Imaging  X-Ray:  I have personally reviewed the images and compared with prior images.  EKG:  I have personally reviewed the images and compared with prior images.    Assessment/Plan  * Acute hypoxemic respiratory failure (HCC)   Assessment & Plan    Multifactorial including severe emphysema, pulmonary edema and probable component of pneumonia even though the patient  procalcitonin was not elevated   ABG did not show hypercapnia.    We were able to wean her from high flow nasal cannula yesterday  CT chest showed right lower lobe pneumonia, fluid overload with severe underlying emphysema     BNP 4000 in [place], will repeat BMP here showed significant improvement, 307  Continue to be in A. fib rate controlled   S/p Rt thoracentesis, showed transudate of effusion  Continue Lasix, we will decrease the dose  Completed course of ceftriaxone and doxycycline   coreg for rate control     Patient was taken amiodarone.  This will be discontinued acute the patient and rate control for her A. fib for the possibility of pulmonary toxicity     Flash pulmonary edema (HCC)   Assessment & Plan    Likely in part iatrogenic volume overload from previous hospital as she was being treated for PNA  BNP was 4000 in [place]   Echo showed elevated right ventricular systolic pressure 55 mmHg   Continue Lasix  -7.7 L since admission  Losartan + coreg low doses   RLL suspicious for a superimposed PNA  MRSA swab negative  Rocephin + Doxycycline; improving clinically      Acute kidney injury   Assessment & Plan    Improving  Now patient is at baseline with creatinine of 1.43       Chronic atrial fibrillation (HCC)   Assessment & Plan    On Xarelto and amiodarone at home  Continue Coreg  Resume home Xarelto  Amiodarone will be discontinued given the patient's chronic lung disease and the possibility of pulmonary toxicity.  Heart rate is controlled with Coreg.  She will need close follow-up after discharge to manage her atrial fibrillation       COPD exacerbation (HCC)   Assessment & Plan    Abx for PNA per above  Duoneb q4 prn   Titrate oxygen for SpO2 >= 88%  Continue steroids, switch to p.o. Prednisone  She will need outpatient follow-up with the pulmonary function test     Pleural effusion on right   Assessment & Plan    S/p 1000 cc removed on admission   Transudative by criteria, cardiogenic vs iatrogenic volume  overload  Being diurised        Swelling of lower leg   Assessment & Plan    BL edema    Probably secondary to fluid overload/elevated right heart pressures  Doppler lower ext negative for DVT  Improving on diuretics        Right lower lobe pneumonia  With air bronchograms and consolidation  Completed antibiotics  Follow-up cultures  VTE:  Lovenox and NOAC  Ulcer: Not Indicated  Lines: Loaiza Catheter  Ongoing indication addressed    I have performed a physical exam and reviewed and updated ROS and Plan today (6/13/2019). In review of yesterday's note (6/12/2019), there are no changes except as documented above.     Discussed patient condition and risk of morbidity and/or mortality with Family, RN, RT and Pharmacy

## 2019-06-13 NOTE — CARE PLAN
Problem: Bowel/Gastric:  Goal: Normal bowel function is maintained or improved  Outcome: PROGRESSING AS EXPECTED      Problem: Respiratory:  Goal: Respiratory status will improve  Outcome: PROGRESSING AS EXPECTED  Pt is almost back to baseline O2 demands and is tolerating NC well.

## 2019-06-13 NOTE — PROGRESS NOTES
Report to Negrita CHU. POC reviewed. Pt sitting up in chair visiting with family. No needs at this time.

## 2019-06-13 NOTE — PROGRESS NOTES
Received report and assumed care of pt. Pt is alert and oriented with family at bedside. Pt not complaining of any pain and all needs are being met. Safety measures in place. VSS. Pt tolerating 3L NC at this time.

## 2019-06-13 NOTE — PROGRESS NOTES
0700-Report from KIMBERLEY Rees. POC reviewed. Patient assisted to chair and with bed bath. No complaints of pain or discomfort at this time. Call light in reach.     1000-Loaiza catheter removed. Expectations set with patient for her to void on her own and up to commode. Family at bedside visiting.

## 2019-06-13 NOTE — CARE PLAN
Problem: Respiratory:  Goal: Respiratory status will improve  Outcome: PROGRESSING AS EXPECTED  Patient's respiratory status continues to improve. On 4L NC and sating mid 90s.    Problem: Urinary Elimination:  Goal: Ability to reestablish a normal urinary elimination pattern will improve  Outcome: PROGRESSING AS EXPECTED  Loaiza dc'd this AM. Patient will void within 6 hours of removal.

## 2019-06-14 LAB
GLUCOSE BLD-MCNC: 130 MG/DL (ref 65–99)
GLUCOSE BLD-MCNC: 164 MG/DL (ref 65–99)

## 2019-06-14 PROCEDURE — 97161 PT EVAL LOW COMPLEX 20 MIN: CPT

## 2019-06-14 PROCEDURE — 700102 HCHG RX REV CODE 250 W/ 637 OVERRIDE(OP): Performed by: INTERNAL MEDICINE

## 2019-06-14 PROCEDURE — 700111 HCHG RX REV CODE 636 W/ 250 OVERRIDE (IP): Performed by: INTERNAL MEDICINE

## 2019-06-14 PROCEDURE — 99232 SBSQ HOSP IP/OBS MODERATE 35: CPT | Performed by: INTERNAL MEDICINE

## 2019-06-14 PROCEDURE — 97535 SELF CARE MNGMENT TRAINING: CPT

## 2019-06-14 PROCEDURE — A9270 NON-COVERED ITEM OR SERVICE: HCPCS | Performed by: INTERNAL MEDICINE

## 2019-06-14 PROCEDURE — 82962 GLUCOSE BLOOD TEST: CPT

## 2019-06-14 PROCEDURE — 97165 OT EVAL LOW COMPLEX 30 MIN: CPT

## 2019-06-14 PROCEDURE — 99233 SBSQ HOSP IP/OBS HIGH 50: CPT | Performed by: INTERNAL MEDICINE

## 2019-06-14 PROCEDURE — 770020 HCHG ROOM/CARE - TELE (206)

## 2019-06-14 RX ORDER — DEXTROSE MONOHYDRATE 25 G/50ML
25 INJECTION, SOLUTION INTRAVENOUS
Status: DISCONTINUED | OUTPATIENT
Start: 2019-06-14 | End: 2019-06-17 | Stop reason: HOSPADM

## 2019-06-14 RX ORDER — FUROSEMIDE 20 MG/1
20 TABLET ORAL
Status: DISCONTINUED | OUTPATIENT
Start: 2019-06-15 | End: 2019-06-17

## 2019-06-14 RX ADMIN — CARVEDILOL 6.25 MG: 6.25 TABLET, FILM COATED ORAL at 18:05

## 2019-06-14 RX ADMIN — CARVEDILOL 6.25 MG: 6.25 TABLET, FILM COATED ORAL at 08:05

## 2019-06-14 RX ADMIN — PREDNISONE 20 MG: 20 TABLET ORAL at 05:01

## 2019-06-14 RX ADMIN — INSULIN HUMAN 1 UNITS: 100 INJECTION, SOLUTION PARENTERAL at 18:04

## 2019-06-14 RX ADMIN — RIVAROXABAN 15 MG: 15 TABLET, FILM COATED ORAL at 18:05

## 2019-06-14 RX ADMIN — FUROSEMIDE 20 MG: 10 INJECTION, SOLUTION INTRAVENOUS at 05:01

## 2019-06-14 ASSESSMENT — ENCOUNTER SYMPTOMS
EYES NEGATIVE: 1
SHORTNESS OF BREATH: 1
WEAKNESS: 1
NAUSEA: 0
WHEEZING: 1
CARDIOVASCULAR NEGATIVE: 1
MUSCULOSKELETAL NEGATIVE: 1
ABDOMINAL PAIN: 0
VOMITING: 0
GASTROINTESTINAL NEGATIVE: 1
CHILLS: 0
COUGH: 1
FEVER: 0
PSYCHIATRIC NEGATIVE: 1

## 2019-06-14 ASSESSMENT — GAIT ASSESSMENTS
GAIT LEVEL OF ASSIST: STAND BY ASSIST
ASSISTIVE DEVICE: OTHER (COMMENTS)
DISTANCE (FEET): 80
DEVIATION: BRADYKINETIC;OTHER (COMMENT)

## 2019-06-14 ASSESSMENT — ACTIVITIES OF DAILY LIVING (ADL): TOILETING: INDEPENDENT

## 2019-06-14 ASSESSMENT — COGNITIVE AND FUNCTIONAL STATUS - GENERAL
MOBILITY SCORE: 21
WALKING IN HOSPITAL ROOM: A LITTLE
SUGGESTED CMS G CODE MODIFIER MOBILITY: CJ
CLIMB 3 TO 5 STEPS WITH RAILING: A LOT

## 2019-06-14 NOTE — THERAPY
"Occupational Therapy Evaluation completed.   Functional Status:  Admitted with SOB. O2@4L NC (at home,wears O2 at night). A&Ox4. Performs ADL transfers with Sup. Walks to/from br,sink,chair with Sup, tri-pod walker. Toileting, grooming with Sup. LB dressing with Sup. Reviewed energy conservation, home safety during ADL's.      Plan of Care: Patient with no further skilled OT needs in the acute care setting at this time  Discharge Recommendations:  Equipment: No Equipment Needed; owns tri-pod walk, sh chair,reacher. Discharge to home with home health for home safety eval.  Lives alone. Daughter and grandson live 1 block away; bring meals over and assist with other IADL's when needed.     See \"Rehab Therapy-Acute\" Patient Summary Report for complete documentation.    "

## 2019-06-14 NOTE — ASSESSMENT & PLAN NOTE
Appears to have resolved. She really has no wheezing at all right now  Stop steroids  Add inhalers for this.   Rt protocol

## 2019-06-14 NOTE — PROGRESS NOTES
Bedside report received from KIMBERLEY Fletcher. Pt sitting on EOB, c/o SIMS. Remains on O2 via nasal cannula. Fall precautions/safety and hourly rounds maintained. Will continue to monitor.

## 2019-06-14 NOTE — PROGRESS NOTES
Patient transferred to room 316-1. Able to ambulate with assistance of walker. Report to JUAN CHU.

## 2019-06-14 NOTE — CARE PLAN
Problem: Knowledge Deficit  Goal: Knowledge of the prescribed therapeutic regimen will improve  Outcome: PROGRESSING AS EXPECTED  Pt education provided on hospital medications and plan of care, including low salt diet and monitoring of I/Os. Smoking cessation education provided. All questions answered.     Problem: Respiratory:  Goal: Respiratory status will improve  Outcome: PROGRESSING AS EXPECTED  Remains on O2 via nasal cannula, O2 sats stable. +SIMS while ambulating to bathroom. Continues IV lasix as ordered. I/Os maintained.

## 2019-06-14 NOTE — PROGRESS NOTES
VSS. Remains on 3-4L O2 via nasal cannula, desats with exertion per PT, recovers with a few minutes of rest. O2 sats 90-93% while resting. Continues diuresis as ordered, strict I/Os maintained. Labs reviewed with hospitalist and pulm. No new orders for K replacement at this time. Education provided. Fall precautions/safety maintained. Hourly rounding completed. Will continue to monitor.

## 2019-06-14 NOTE — ASSESSMENT & PLAN NOTE
-slowly improving, multifactorial  I suspect this is 2/2 pulmonary hypertension, iatrogenic from previous IV fluids and pneumonia.   S/p diuresis. Titrate o2 as tolerated. Not improved today.   Treated for pneumonia already

## 2019-06-14 NOTE — PROGRESS NOTES
Monitor Summary     Rhythm: a-fib  Measurements: -/0.08/-  ECTOPIES: Rare PVCs        Normal Values  Rhythm SR  HR Range    Measurements 0.12-0.20 / 0.06-0.10  / 0.30-0.52

## 2019-06-14 NOTE — PROGRESS NOTES
· 2 RN skin check complete with KIMBERLEY Epps  · Skin assessment: Pt has diffuse bruising, warts on bilat hands, old scar on back, small tear on L groin, sacrum red, intact, blanching. BLE edema +4, nonpitting, warts on bilat feet.   · Devices in place: telemetry monitor, silicone NC, skid socks, IV.   · Skin assessed under devices yes  · Confirmed pressure ulcers found on none  · New potential pressure ulcers noted on sacrum    · The following interventions in place: silicone NC, mepilex, pillows used for positioning.

## 2019-06-14 NOTE — PROGRESS NOTES
Hospital Medicine Daily Progress Note    Date of Service  6/14/2019    Chief Complaint  84 y.o. female admitted 6/8/2019 with severe emphysema and HF    Hospital Course    see below      Interval Problem Update  Hyperglycemia-likely steroid induced, but check A1c and start ISS    RF-improving, still on 4 L, BUN and CO2 rising. Pruning appearing. Patient feels that her breathing has improved quite bit    A fib-persistent on tele    Consultants/Specialty  PULM    Code Status  FCFC    Disposition  TELE    Review of Systems  Review of Systems   Constitutional: Negative for chills and fever.   Respiratory: Positive for cough, shortness of breath and wheezing.    Cardiovascular: Positive for leg swelling. Negative for chest pain.   Gastrointestinal: Negative for abdominal pain, nausea and vomiting.   Neurological: Positive for weakness.   All other systems reviewed and are negative.       Physical Exam  Temp:  [36.1 °C (97 °F)-36.7 °C (98.1 °F)] 36.7 °C (98.1 °F)  Pulse:  [] 80  Resp:  [18-22] 20  BP: ()/() 97/45  SpO2:  [90 %-96 %] 93 %    Physical Exam   Constitutional: She is oriented to person, place, and time. She appears well-developed and well-nourished. No distress.   Speaking in full sentences   HENT:   Head: Normocephalic and atraumatic.   Mouth/Throat: No oropharyngeal exudate.   NC in place   Eyes: Pupils are equal, round, and reactive to light. No scleral icterus.   Neck: Normal range of motion. Neck supple. No thyromegaly present.   Cardiovascular: Normal rate, regular rhythm, normal heart sounds and intact distal pulses.    No murmur heard.  Pulmonary/Chest: Effort normal. No respiratory distress. She has wheezes (diffuse, mild). She has rales.   Abdominal: Soft. Bowel sounds are normal. There is no tenderness.   Musculoskeletal: Normal range of motion. She exhibits edema. She exhibits no tenderness.   Warm peripherally   Neurological: She is alert and oriented to person, place, and time.  No cranial nerve deficit.   Skin: Skin is warm and dry. No rash noted.   Psychiatric: She has a normal mood and affect.   Nursing note and vitals reviewed.      Fluids    Intake/Output Summary (Last 24 hours) at 06/14/19 1521  Last data filed at 06/14/19 1400   Gross per 24 hour   Intake              580 ml   Output             2100 ml   Net            -1520 ml       Laboratory  Recent Labs      06/12/19   0408  06/13/19   0915   WBC  10.6  17.6*   RBC  3.71*  4.27   HEMOGLOBIN  10.9*  12.5   HEMATOCRIT  34.2*  39.7   MCV  92.2  93.0   MCH  29.4  29.3   MCHC  31.9*  31.5*   RDW  46.4  46.4   PLATELETCT  384  431   MPV  9.3  9.4     Recent Labs      06/12/19   0408  06/13/19   0915   SODIUM  139  139   POTASSIUM  3.0*  3.5*   CHLORIDE  93*  90*   CO2  32  36*   GLUCOSE  209*  293*   BUN  46*  48*   CREATININE  1.64*  1.71*   CALCIUM  8.4  8.3*                   Imaging  DX-CHEST-PORTABLE (1 VIEW)   Final Result         1.  Pulmonary edema and/or infiltrates are identified, which are stable since the prior exam.   2.  Cardiomegaly   3.  Atherosclerosis      CT-CHEST (THORAX) W/O   Final Result      1.  Multifocal pneumonia and small right pleural effusion. Follow-up chest CT in 2-3 months is advised to evaluate for any underlying lung lesions.   2.  COPD.   3.  Mild mediastinal lymphadenopathy, likely reactive.   4.  Cardiomegaly with right atrial enlargement.               EC-ECHOCARDIOGRAM COMPLETE W/O CONT   Final Result      DX-CHEST-PORTABLE (1 VIEW)   Final Result      No significant change      US-EXTREMITY VENOUS LOWER BILAT   Final Result      Negative for right or left lower extremity deep venous thrombus      DX-CHEST-PORTABLE (1 VIEW)   Final Result      1.  No pneumothorax status post right thoracentesis.   2.  Persistent bilateral interstitial and alveolar opacities, slightly improved on the right.      DX-CHEST-PORTABLE (1 VIEW)   Final Result      1.  Diffuse bilateral moderate to severe pulmonary  airspace process that are most consistent with edema      2.  Probable small right pleural effusion      3.  Enlarged cardiac silhouette           Assessment/Plan  * Acute hypoxemic respiratory failure (HCC)- (present on admission)   Assessment & Plan    -slowly improving, multifactorial, mostlry related to underlying primary lung issues  -see above  -try to wean o2     Flash pulmonary edema (HCC)- (present on admission)   Assessment & Plan    -improving     Cardiorenal syndrome- (present on admission)   Assessment & Plan    -change diuretics to 20 mg daily, Cr and CO2 rising  -check daily BMP and MG     Chronic atrial fibrillation (HCC)- (present on admission)   Assessment & Plan    -HR is ok, on xarelto  -no AMIO given lung concerns  -continue coreg 6.25 mg BID     COPD exacerbation (HCC)- (present on admission)   Assessment & Plan    -improving, BD's, steroids, back off on diuretics, O2 supplementation, almost certainly will need home o2  -stop using tobacco products     Pleural effusion on right- (present on admission)   Assessment & Plan    -s/p thora, transudative, doing well with diuretics     Swelling of lower leg- (present on admission)   Assessment & Plan    -improving, back off on diuretics   -mechanically elevate legs          VTE prophylaxis: xarelto

## 2019-06-14 NOTE — THERAPY
"Physical Therapy Evaluation completed.   Bed Mobility:  Supine to Sit: Modified Independent  Transfers: Sit to Stand: Supervised  Gait: Level Of Assist: Stand by Assist with tri-pod walker       Plan of Care: Will benefit from Physical Therapy 3 times per week  Discharge Recommendations: Equipment: No Equipment Needed. Recommend outpatient or home health transitional care services for continued physical therapy services.    See \"Rehab Therapy-Acute\" Patient Summary Report for complete documentation.     Pt was recently admitted for SOB, COPD excacerbation, and pulmonary edema. Pt presented to PT with impaired gait and dec activity tolerance. Pt was primarily limited by SOB and fatigue. Pt was able to demonstrate Mod I to SPV for bed mobility, sit<>stand and transfers, however, SBA for ambulation secondary to poor activity tolerance and poor SpO2 during ambulation. Pt required seated rest break during ambulation and demonstrated with SpO2 of 73% on 4L. Pt was able to return back to 90% with deep breathing and bump of O2 to 5L. Pt required edcuation, cues, and demo on appropriate deep breathing, pacing, rest breaks, walk talk test, and limitations on activity tolerance. Pt will continue to benefit from skilled PT while in house, with recommendation for HH therapy services upon d/c to home. Will continue to follow.   "

## 2019-06-14 NOTE — ASSESSMENT & PLAN NOTE
Likely from pulmonary hypertension and from IV fluids.   S/p diuresis. No dvt seen. Continue to trend.

## 2019-06-14 NOTE — PROGRESS NOTES
Critical Care Progress Note    Date of admission  6/8/2019    Chief Complaint  84 y.o. female admitted 6/8/2019 with shortness of breath     Hospital Course  84 y.o. female who presented 6/8/2019 From alcohol with progressive shortness of breath at rest and refractory hypoxemia.  The patient has a known history of COPD but does not take inhalers at home.  She continues to be an active smoker.  She denies recent fever, change in the character of sputum, or sick contacts.  She is bringing up mixed sputum.  She denies hemoptysis.  She has noticed chronic bilateral extensive leg swelling.  Chest x-ray showed flash pulmonary edema here and she had a BNP of 4000 and [place].  Creatinine 1.33.  The patient denies cardiogenic history with except to atrial fibrillation on Xarelto at home.  She takes losartan for hypertension.  She has no previous history of heart cath or heart attacks.  This has been confirmed by the family at the bedside.  She denies chest pain currently.  EKG and troponin were negative for ischemic event. Requiring HFNC 100% with 70 LPM to barely maintain 88-92%.     Interval Problem Update  Reviewed last 24 hour events:  Transferred out of the ICU last night  Continue to need oxygen supplementation 4 L/min via nasal cannula  -10Lwith lasix 80 iv bid  Borderline hypotension  No hypercapnia on ABG  CT chest done  showed right lower lobe pneumonia with diffuse airspace disease superimposing severe emphysema  Creatinine today is 1.71  Afebrile overnight        Review of Systems  Review of Systems   Unable to perform ROS: Critical illness   Constitutional: Positive for malaise/fatigue.   HENT: Negative.    Eyes: Negative.    Respiratory: Positive for shortness of breath.    Cardiovascular: Negative.    Gastrointestinal: Negative.    Genitourinary: Negative.    Musculoskeletal: Negative.    Skin: Negative.    Neurological: Positive for weakness.   Endo/Heme/Allergies: Negative.    Psychiatric/Behavioral: Negative.          Vital Signs for last 24 hours   Temp:  [36.1 °C (97 °F)-36.7 °C (98.1 °F)] 36.4 °C (97.6 °F)  Pulse:  [] 85  Resp:  [18-22] 18  BP: ()/() 98/55  SpO2:  [90 %-96 %] 93 %      Physical Exam   Physical Exam   Constitutional: She is oriented to person, place, and time. No distress.   HENT:   Head: Normocephalic and atraumatic.   Eyes: Right eye exhibits no discharge. Left eye exhibits no discharge. No scleral icterus.   Neck: No tracheal deviation present. No thyromegaly present.   Cardiovascular: Regular rhythm, normal heart sounds and intact distal pulses.  Exam reveals no gallop and no friction rub.    No murmur heard.  Pulmonary/Chest: Effort normal. No stridor. No respiratory distress. She has no wheezes. She has rales. She exhibits no tenderness.   Reduced AE BL    Abdominal: Soft. She exhibits no distension. There is no tenderness.   Musculoskeletal: She exhibits no edema, tenderness or deformity.   Lymphadenopathy:     She has no cervical adenopathy.   Neurological: She is alert and oriented to person, place, and time.   Skin: Skin is warm. No rash noted. She is not diaphoretic. No erythema. No pallor.   Psychiatric: She has a normal mood and affect. Her behavior is normal. Judgment and thought content normal.   Nursing note and vitals reviewed.      Medications  Current Facility-Administered Medications   Medication Dose Route Frequency Provider Last Rate Last Dose   • furosemide (LASIX) injection 20 mg  20 mg Intravenous BID DIURETIC Bryanna Herman M.D.   20 mg at 06/14/19 0501   • predniSONE (DELTASONE) tablet 20 mg  20 mg Oral DAILY Bryanna Herman M.D.   20 mg at 06/14/19 0501   • rivaroxaban (XARELTO) tablet 15 mg  15 mg Oral PM MEAL Jericho Doss M.D.   15 mg at 06/13/19 5952   • benzocaine-menthol (CEPACOL) lozenge 1 Lozenge  1 Lozenge Mouth/Throat Q2HRS PRN Jericho Doss M.D.   1 Lozenge at 06/09/19 3300   • morphine (pf) 4 mg/ml injection 2 mg  2 mg Intravenous Q6HRS PRN Aahd  MIKEL Doss       • ipratropium-albuterol (DUONEB) nebulizer solution  3 mL Nebulization Q4H PRN (RT) [facility] MIKEL Doss   3 mL at 06/11/19 2209   • carvedilol (COREG) tablet 6.25 mg  6.25 mg Oral BID WITH MEALS Ady Moser M.D.   6.25 mg at 06/14/19 0805       Fluids    Intake/Output Summary (Last 24 hours) at 06/14/19 1335  Last data filed at 06/14/19 1200   Gross per 24 hour   Intake              460 ml   Output             1850 ml   Net            -1390 ml       Laboratory          Recent Labs      06/12/19   0408  06/13/19   0915   SODIUM  139  139   POTASSIUM  3.0*  3.5*   CHLORIDE  93*  90*   CO2  32  36*   BUN  46*  48*   CREATININE  1.64*  1.71*   CALCIUM  8.4  8.3*     Recent Labs      06/12/19   0408  06/13/19   0915   GLUCOSE  209*  293*     Recent Labs      06/12/19   0408  06/13/19   0915   WBC  10.6  17.6*   NEUTSPOLYS  91.90*  91.50*   LYMPHOCYTES  3.70*  2.80*   MONOCYTES  2.90  4.20   EOSINOPHILS  0.00  0.00   BASOPHILS  0.10  0.10     Recent Labs      06/12/19   0408  06/13/19   0915   RBC  3.71*  4.27   HEMOGLOBIN  10.9*  12.5   HEMATOCRIT  34.2*  39.7   PLATELETCT  384  431       Imaging  X-Ray:  I have personally reviewed the images and compared with prior images.  EKG:  I have personally reviewed the images and compared with prior images.    Assessment/Plan  * Acute hypoxemic respiratory failure (HCC)   Assessment & Plan    Multifactorial including severe emphysema, pulmonary edema and probable component of pneumonia even though the patient procalcitonin was not elevated   ABG did not show hypercapnia.    We were able to wean her from high flow nasal cannula yesterday  CT chest showed right lower lobe pneumonia, fluid overload with severe underlying emphysema     BNP 4000 in [place], will repeat BMP here showed significant improvement, 307  Continue to be in A. fib rate controlled   S/p Rt thoracentesis, showed transudate of effusion  Continue Lasix, I will decrease the dose to 20 once  daily p.o. since the patient kidney function is slightly worse with signs of prerenal azotemia   Completed course of ceftriaxone and doxycycline   coreg for rate control, increase dose as tolerated by blood pressure    Patient was taken amiodarone.  This will be discontinued acute the patient and rate control for her A. fib for the possibility of pulmonary toxicity     Flash pulmonary edema (HCC)   Assessment & Plan    Likely in part iatrogenic volume overload from previous hospital as she was being treated for PNA  BNP was 4000 in [place]   Echo showed elevated right ventricular systolic pressure 55 mmHg   Continue Lasix  -7.7 L since admission  Losartan + coreg low doses   RLL suspicious for a superimposed PNA  MRSA swab negative  Rocephin + Doxycycline; improving clinically      Acute kidney injury   Assessment & Plan    Improving  Now patient is at baseline with creatinine of 1.43       Chronic atrial fibrillation (HCC)   Assessment & Plan    On Xarelto and amiodarone at home  Continue Coreg  Resume home Xarelto  Amiodarone will be discontinued given the patient's chronic lung disease and the possibility of pulmonary toxicity.  Heart rate is controlled with Coreg.  She will need close follow-up after discharge to manage her atrial fibrillation       COPD exacerbation (HCC)   Assessment & Plan    Abx for PNA per above  Duoneb q4 prn   Titrate oxygen for SpO2 >= 88%  Continue steroids, switch to p.o. Prednisone  She will need outpatient follow-up with the pulmonary function test     Pleural effusion on right   Assessment & Plan    S/p 1000 cc removed on admission   Transudative by criteria, cardiogenic vs iatrogenic volume overload  Being diurised        Swelling of lower leg   Assessment & Plan    BL edema    Probably secondary to fluid overload/elevated right heart pressures  Doppler lower ext negative for DVT  Improving on diuretics        Right lower lobe pneumonia  With air bronchograms and  consolidation  Completed antibiotics  Follow-up cultures  VTE:  NOAC  Ulcer: Not Indicated  Lines: Loaiza Catheter  Ongoing indication addressed    I have performed a physical exam and reviewed and updated ROS and Plan today (6/14/2019). In review of yesterday's note (6/13/2019), there are no changes except as documented above.     Discussed patient condition and risk of morbidity and/or mortality with Family, RN, RT and Pharmacy

## 2019-06-14 NOTE — ASSESSMENT & PLAN NOTE
-I suspect iatrogenic on arrival. S.p diuresis.    normal... Well appearing, well nourished, awake, alert, oriented to person, place, time/situation and in no apparent distress.

## 2019-06-14 NOTE — ASSESSMENT & PLAN NOTE
-HR controlled, on xarelto  -no AMIO given lung concerns  -continue coreg 6.25 mg BID, no further adjustments needed today, remains in this rhythm

## 2019-06-14 NOTE — PROGRESS NOTES
Assumed pt care. AOx4. VSS.  Denies any pain at this time.  Denies any other distress. This RN agrees w/ prior RN's assessments unless otherwise noted.  Discussed POC.  Pt verbalized understanding.  Hourly rounding in place. Fall precautions in place and call lights w/in reach.  Will continue to monitor.

## 2019-06-14 NOTE — CARE PLAN
Problem: Venous Thromboembolism (VTW)/Deep Vein Thrombosis (DVT) Prevention:  Goal: Patient will participate in Venous Thrombosis (VTE)/Deep Vein Thrombosis (DVT)Prevention Measures  Outcome: PROGRESSING AS EXPECTED      Problem: Skin Integrity  Goal: Risk for impaired skin integrity will decrease  Outcome: PROGRESSING AS EXPECTED

## 2019-06-14 NOTE — PROGRESS NOTES
Telemetry Shift Summary    Rhythm Afib/flutter  HR Range 70-90s  Ectopy none  Measurements -/.08/-        Normal Values  Rhythm SR  HR Range    Measurements 0.12-0.20 / 0.06-0.10  / 0.30-0.52

## 2019-06-15 LAB
ANION GAP SERPL CALC-SCNC: 12 MMOL/L (ref 0–11.9)
BASOPHILS # BLD AUTO: 0.3 % (ref 0–1.8)
BASOPHILS # BLD: 0.03 K/UL (ref 0–0.12)
BUN SERPL-MCNC: 46 MG/DL (ref 8–22)
CALCIUM SERPL-MCNC: 8 MG/DL (ref 8.4–10.2)
CHLORIDE SERPL-SCNC: 91 MMOL/L (ref 96–112)
CO2 SERPL-SCNC: 37 MMOL/L (ref 20–33)
CREAT SERPL-MCNC: 1.55 MG/DL (ref 0.5–1.4)
EOSINOPHIL # BLD AUTO: 0.29 K/UL (ref 0–0.51)
EOSINOPHIL NFR BLD: 2.4 % (ref 0–6.9)
ERYTHROCYTE [DISTWIDTH] IN BLOOD BY AUTOMATED COUNT: 47.5 FL (ref 35.9–50)
EST. AVERAGE GLUCOSE BLD GHB EST-MCNC: 151 MG/DL
GLUCOSE BLD-MCNC: 125 MG/DL (ref 65–99)
GLUCOSE BLD-MCNC: 138 MG/DL (ref 65–99)
GLUCOSE BLD-MCNC: 192 MG/DL (ref 65–99)
GLUCOSE BLD-MCNC: 225 MG/DL (ref 65–99)
GLUCOSE SERPL-MCNC: 120 MG/DL (ref 65–99)
HBA1C MFR BLD: 6.9 % (ref 0–5.6)
HCT VFR BLD AUTO: 37.3 % (ref 37–47)
HGB BLD-MCNC: 11.5 G/DL (ref 12–16)
IMM GRANULOCYTES # BLD AUTO: 0.25 K/UL (ref 0–0.11)
IMM GRANULOCYTES NFR BLD AUTO: 2.1 % (ref 0–0.9)
LYMPHOCYTES # BLD AUTO: 1.43 K/UL (ref 1–4.8)
LYMPHOCYTES NFR BLD: 12 % (ref 22–41)
MAGNESIUM SERPL-MCNC: 1.7 MG/DL (ref 1.5–2.5)
MCH RBC QN AUTO: 28.8 PG (ref 27–33)
MCHC RBC AUTO-ENTMCNC: 30.8 G/DL (ref 33.6–35)
MCV RBC AUTO: 93.5 FL (ref 81.4–97.8)
MONOCYTES # BLD AUTO: 0.74 K/UL (ref 0–0.85)
MONOCYTES NFR BLD AUTO: 6.2 % (ref 0–13.4)
NEUTROPHILS # BLD AUTO: 9.2 K/UL (ref 2–7.15)
NEUTROPHILS NFR BLD: 77 % (ref 44–72)
NRBC # BLD AUTO: 0 K/UL
NRBC BLD-RTO: 0 /100 WBC
PLATELET # BLD AUTO: 310 K/UL (ref 164–446)
PMV BLD AUTO: 9.9 FL (ref 9–12.9)
POTASSIUM SERPL-SCNC: 3.6 MMOL/L (ref 3.6–5.5)
RBC # BLD AUTO: 3.99 M/UL (ref 4.2–5.4)
SODIUM SERPL-SCNC: 140 MMOL/L (ref 135–145)
WBC # BLD AUTO: 11.9 K/UL (ref 4.8–10.8)

## 2019-06-15 PROCEDURE — 99232 SBSQ HOSP IP/OBS MODERATE 35: CPT | Performed by: INTERNAL MEDICINE

## 2019-06-15 PROCEDURE — 700102 HCHG RX REV CODE 250 W/ 637 OVERRIDE(OP): Performed by: INTERNAL MEDICINE

## 2019-06-15 PROCEDURE — 770020 HCHG ROOM/CARE - TELE (206)

## 2019-06-15 PROCEDURE — 85025 COMPLETE CBC W/AUTO DIFF WBC: CPT

## 2019-06-15 PROCEDURE — A9270 NON-COVERED ITEM OR SERVICE: HCPCS | Performed by: INTERNAL MEDICINE

## 2019-06-15 PROCEDURE — 82962 GLUCOSE BLOOD TEST: CPT | Mod: 91

## 2019-06-15 PROCEDURE — 80048 BASIC METABOLIC PNL TOTAL CA: CPT

## 2019-06-15 PROCEDURE — 94760 N-INVAS EAR/PLS OXIMETRY 1: CPT

## 2019-06-15 PROCEDURE — 83735 ASSAY OF MAGNESIUM: CPT

## 2019-06-15 PROCEDURE — 700111 HCHG RX REV CODE 636 W/ 250 OVERRIDE (IP): Performed by: INTERNAL MEDICINE

## 2019-06-15 RX ORDER — PREDNISONE 5 MG/1
10 TABLET ORAL DAILY
Status: DISCONTINUED | OUTPATIENT
Start: 2019-06-16 | End: 2019-06-16

## 2019-06-15 RX ADMIN — PREDNISONE 20 MG: 20 TABLET ORAL at 06:03

## 2019-06-15 RX ADMIN — RIVAROXABAN 15 MG: 15 TABLET, FILM COATED ORAL at 17:12

## 2019-06-15 RX ADMIN — INSULIN HUMAN 1 UNITS: 100 INJECTION, SOLUTION PARENTERAL at 20:30

## 2019-06-15 RX ADMIN — CARVEDILOL 6.25 MG: 6.25 TABLET, FILM COATED ORAL at 17:12

## 2019-06-15 RX ADMIN — INSULIN HUMAN 2 UNITS: 100 INJECTION, SOLUTION PARENTERAL at 10:57

## 2019-06-15 RX ADMIN — FUROSEMIDE 20 MG: 20 TABLET ORAL at 06:02

## 2019-06-15 ASSESSMENT — ENCOUNTER SYMPTOMS
FEVER: 0
WEAKNESS: 1
GASTROINTESTINAL NEGATIVE: 1
WHEEZING: 1
COUGH: 1
CARDIOVASCULAR NEGATIVE: 1
EYES NEGATIVE: 1
DIARRHEA: 0
PSYCHIATRIC NEGATIVE: 1
SHORTNESS OF BREATH: 1
MUSCULOSKELETAL NEGATIVE: 1

## 2019-06-15 NOTE — FACE TO FACE
Face to Face Supporting Documentation - Home Health    The encounter with this patient was in whole or in part the primary reason for home health admission.    Date of encounter:   Patient:                    MRN:                       YOB: 2019  Ama Castillo  [id number]  1935     Home health to see patient for:  Skilled Nursing care for assessment, interventions & education, Home health aide and Physical Therapy evaluation and treatment    Skilled need for:  Exacerbation of Chronic Disease State COPD    Skilled nursing interventions to include:  Comment: needs help with therapies    Homebound status evidenced by:  Need the aid of supportive devices such as crutches, canes, wheelchairs or walkers. Leaving home requires a considerable and taxing effort. There is a normal inability to leave the home.    Community Physician to provide follow up care: Winston Galvan M.D.     Optional Interventions? No      I certify the face to face encounter for this home health care referral meets the CMS requirements and the encounter/clinical assessment with the patient was, in whole, or in part, for the medical condition(s) listed above, which is the primary reason for home health care. Based on my clinical findings: the service(s) are medically necessary, support the need for home health care, and the homebound criteria are met.  I certify that this patient has had a face to face encounter by myself.  Ady Moser M.D. - NPI: [id number]

## 2019-06-15 NOTE — PROGRESS NOTES
Critical Care Progress Note    Date of admission  6/8/2019    Chief Complaint  84 y.o. female admitted 6/8/2019 with shortness of breath     Hospital Course  84 y.o. female who presented 6/8/2019 From alcohol with progressive shortness of breath at rest and refractory hypoxemia.  The patient has a known history of COPD but does not take inhalers at home.  She continues to be an active smoker.  She denies recent fever, change in the character of sputum, or sick contacts.  She is bringing up mixed sputum.  She denies hemoptysis.  She has noticed chronic bilateral extensive leg swelling.  Chest x-ray showed flash pulmonary edema here and she had a BNP of 4000 and [place].  Creatinine 1.33.  The patient denies cardiogenic history with except to atrial fibrillation on Xarelto at home.  She takes losartan for hypertension.  She has no previous history of heart cath or heart attacks.  This has been confirmed by the family at the bedside.  She denies chest pain currently.  EKG and troponin were negative for ischemic event. Requiring HFNC 100% with 70 LPM to barely maintain 88-92%.     Interval Problem Update  Reviewed last 24 hour events:  Continue to need oxygen supplementation 4 L/min via nasal cannula  -10.6Lwith lasix now 20 mg p.o. daily  No hypercapnia on ABG  CT chest done  showed right lower lobe pneumonia with diffuse airspace disease superimposing severe emphysema  Creatinine today is 1.5.  Which is baseline  Afebrile overnight          Review of Systems  Review of Systems   Unable to perform ROS: Critical illness   Constitutional: Positive for malaise/fatigue.   HENT: Negative.    Eyes: Negative.    Respiratory: Positive for shortness of breath.    Cardiovascular: Negative.    Gastrointestinal: Negative.    Genitourinary: Negative.    Musculoskeletal: Negative.    Skin: Negative.    Neurological: Positive for weakness.   Endo/Heme/Allergies: Negative.    Psychiatric/Behavioral: Negative.         Vital Signs for last  24 hours   Temp:  [36.2 °C (97.1 °F)-36.8 °C (98.3 °F)] 36.8 °C (98.2 °F)  Pulse:  [59-92] 90  Resp:  [18-20] 18  BP: ()/(39-58) 103/50  SpO2:  [90 %-95 %] 94 %      Physical Exam   Physical Exam   Constitutional: She is oriented to person, place, and time. No distress.   HENT:   Head: Normocephalic and atraumatic.   Eyes: Right eye exhibits no discharge. Left eye exhibits no discharge. No scleral icterus.   Neck: No tracheal deviation present. No thyromegaly present.   Cardiovascular: Regular rhythm, normal heart sounds and intact distal pulses.  Exam reveals no gallop and no friction rub.    No murmur heard.  Pulmonary/Chest: Effort normal. No stridor. No respiratory distress. She has no wheezes. She has rales. She exhibits no tenderness.   Reduced AE BL    Abdominal: Soft. She exhibits no distension. There is no tenderness.   Musculoskeletal: She exhibits no edema, tenderness or deformity.   Lymphadenopathy:     She has no cervical adenopathy.   Neurological: She is alert and oriented to person, place, and time.   Skin: Skin is warm. No rash noted. She is not diaphoretic. No erythema. No pallor.   Psychiatric: She has a normal mood and affect. Her behavior is normal. Judgment and thought content normal.   Nursing note and vitals reviewed.      Medications  Current Facility-Administered Medications   Medication Dose Route Frequency Provider Last Rate Last Dose   • furosemide (LASIX) tablet 20 mg  20 mg Oral Q DAY Bryanna Herman M.D.   20 mg at 06/15/19 0602   • insulin regular (HUMULIN R) injection 1-6 Units  1-6 Units Subcutaneous 4X/DAY JUSTIN Moser M.D.   2 Units at 06/15/19 1057    And   • glucose 4 g chewable tablet 16 g  16 g Oral Q15 MIN PRN Ady Moser M.D.        And   • dextrose 50% (D50W) injection 25 mL  25 mL Intravenous Q15 MIN PRN Ady Moser M.D.       • predniSONE (DELTASONE) tablet 20 mg  20 mg Oral DAILY Bryanna Herman M.D.   20 mg at 06/15/19 0603   • rivaroxaban  (XARELTO) tablet 15 mg  15 mg Oral PM MEAL Jericho Doss M.D.   15 mg at 06/14/19 1805   • benzocaine-menthol (CEPACOL) lozenge 1 Lozenge  1 Lozenge Mouth/Throat Q2HRS PRN Jericho Doss M.D.   1 Lozenge at 06/09/19 0922   • morphine (pf) 4 mg/ml injection 2 mg  2 mg Intravenous Q6HRS PRN Jericho Doss M.D.       • ipratropium-albuterol (DUONEB) nebulizer solution  3 mL Nebulization Q4H PRN (RT) Jericho Doss M.D.   3 mL at 06/11/19 2209   • carvedilol (COREG) tablet 6.25 mg  6.25 mg Oral BID WITH MEALS Ady Moser M.D.   Stopped at 06/15/19 0730       Fluids    Intake/Output Summary (Last 24 hours) at 06/15/19 1432  Last data filed at 06/15/19 1200   Gross per 24 hour   Intake              570 ml   Output              900 ml   Net             -330 ml       Laboratory          Recent Labs      06/13/19   0915  06/15/19   0313   SODIUM  139  140   POTASSIUM  3.5*  3.6   CHLORIDE  90*  91*   CO2  36*  37*   BUN  48*  46*   CREATININE  1.71*  1.55*   MAGNESIUM   --   1.7   CALCIUM  8.3*  8.0*     Recent Labs      06/13/19   0915  06/15/19   0313   GLUCOSE  293*  120*     Recent Labs      06/13/19   0915  06/15/19   0313   WBC  17.6*  11.9*   NEUTSPOLYS  91.50*  77.00*   LYMPHOCYTES  2.80*  12.00*   MONOCYTES  4.20  6.20   EOSINOPHILS  0.00  2.40   BASOPHILS  0.10  0.30     Recent Labs      06/13/19   0915  06/15/19   0313   RBC  4.27  3.99*   HEMOGLOBIN  12.5  11.5*   HEMATOCRIT  39.7  37.3   PLATELETCT  431  310       Imaging  X-Ray:  I have personally reviewed the images and compared with prior images.  EKG:  I have personally reviewed the images and compared with prior images.    Assessment/Plan  * Acute hypoxemic respiratory failure (HCC)   Assessment & Plan    Multifactorial including severe emphysema, pulmonary edema and probable component of pneumonia even though the patient procalcitonin was not elevated   ABG did not show hypercapnia.    Now on nasal cannula 4 L/min  CT chest showed right lower lobe  pneumonia, fluid overload with severe underlying emphysema   BNP 4000 in [place]  Continue to be in A. fib rate controlled   S/p Rt thoracentesis, showed transudate of effusion  Continue Lasix, dose decreased 20 once daily p.o.   Completed course of ceftriaxone and doxycycline   coreg for rate control, increase dose as tolerated by blood pressure  Patient was taken amiodarone.  This will be discontinued acute the patient and rate control for her A. fib for the possibility of pulmonary toxicity  Check a chest x-ray in a.m.  Patient probably will need oxygen at discharge  Consider PT evaluation for short-term rehabilitation  Patient will need pulmonary follow-up with PFTs post discharge  Patient will need close follow-up with primary care physician since she will be discharged on Lasix   Flash pulmonary edema (HCC)   Assessment & Plan    Likely in part iatrogenic volume overload from previous hospital as she was being treated for PNA  BNP was 4000 in [place]   Echo showed elevated right ventricular systolic pressure 55 mmHg   Continue Lasix  -10.6L since admission  Losartan + coreg low doses        Acute kidney injury   Assessment & Plan    Improving  Now patient is at baseline with creatinine of 1.55       Chronic atrial fibrillation (HCC)   Assessment & Plan    On Xarelto and amiodarone at home  Continue Coreg  Resume home Xarelto  Amiodarone will be discontinued given the patient's chronic lung disease and the possibility of pulmonary toxicity.  Heart rate is controlled with Coreg.  She will need close follow-up after discharge to manage her atrial fibrillation       COPD exacerbation (HCC)   Assessment & Plan    Abx for PNA per above  Duoneb q4 prn   Titrate oxygen for SpO2 >= 88%  Continue steroids, switch to p.o. Prednisone and taper down dose  She will need outpatient follow-up with the pulmonary function test     Pleural effusion on right   Assessment & Plan    S/p 1000 cc removed on admission   Transudative by  criteria, cardiogenic vs iatrogenic volume overload  Being diurised        Swelling of lower leg   Assessment & Plan    BL edema    Probably secondary to fluid overload/elevated right heart pressures  Doppler lower ext negative for DVT  Improving on diuretics        Right lower lobe pneumonia  With air bronchograms and consolidation  Completed antibiotics  Follow-up cultures  VTE:  NOAC  Ulcer: Not Indicated  Lines: Loaiza Catheter  Ongoing indication addressed    I have performed a physical exam and reviewed and updated ROS and Plan today (6/15/2019). In review of yesterday's note (6/14/2019), there are no changes except as documented above.     Discussed patient condition and risk of morbidity and/or mortality with Family, RN, RT and Pharmacy

## 2019-06-15 NOTE — DISCHARGE PLANNING
Received Choice form at 5092  Agency/Facility Name: [facility]  Referral sent per Choice form @ [phone]

## 2019-06-15 NOTE — PROGRESS NOTES
Telemetry Shift Summary     Rhythm a-flutter  HR Range 60s-80s  Ectopy rare PVCs  Measurements -/.10/-           Normal Values  Rhythm SR  HR Range    Measurements 0.12-0.20 / 0.06-0.10  / 0.30-0.52

## 2019-06-15 NOTE — CARE PLAN
Problem: Safety  Goal: Will remain free from falls  Outcome: PROGRESSING AS EXPECTED    Universal fall precautions in place    Problem: Respiratory:  Goal: Respiratory status will improve  Outcome: PROGRESSING AS EXPECTED  Titrate O2 as needed

## 2019-06-15 NOTE — PROGRESS NOTES
Telemetry Shift Summary     Rhythm Afib/Flutter  HR Range 70s-100s  Ectopy rare PVC  Measurements na/.08/na

## 2019-06-15 NOTE — PROGRESS NOTES
Report given to KIMBERLEY Odom.   Care released.   Safety precautions in place.   Call light in reach.  Patient resting in chair.

## 2019-06-15 NOTE — PROGRESS NOTES
Patient sitting up in chair  Morning medications given  No insulin coverage needed  Extra blanket given  Needs addressed at this time

## 2019-06-15 NOTE — PROGRESS NOTES
Hospital Medicine Daily Progress Note    Date of Service  6/15/2019    Chief Complaint  84 y.o. female admitted 6/8/2019 with severe emphysema and HF    Hospital Course    see below      Interval Problem Update  Hyperglycemia-sugars remain decently controlled at this time. A1c is 6.9 indicating mild DM.     RF-remains on 4 L, lungs are about the same, feels slightly better today. Afebrile.     A fib-remains in atrial flutter, HR in the 60's to 80's. Rare PVC's noted, denies any clear palpitations.     Consultants/Specialty  PULM    Code Status  FCFC    Disposition  TELE, place HH orders today    Review of Systems  Review of Systems   Constitutional: Negative for fever.        Very slow clinical progression   HENT: Negative for hearing loss and tinnitus.    Respiratory: Positive for cough, shortness of breath and wheezing.         Slow improvement noted today   Cardiovascular: Positive for leg swelling. Negative for chest pain.        Unchanged   Gastrointestinal: Negative for diarrhea.   Genitourinary: Negative for dysuria.   Neurological: Positive for weakness.   All other systems reviewed and are negative.       Physical Exam  Temp:  [36.2 °C (97.1 °F)-36.8 °C (98.3 °F)] 36.8 °C (98.2 °F)  Pulse:  [59-92] 59  Resp:  [18-20] 18  BP: ()/(39-58) 111/51  SpO2:  [90 %-95 %] 95 %    Physical Exam   Constitutional: She is oriented to person, place, and time. She appears well-developed and well-nourished.   Speaking in full sentences   HENT:   Head: Normocephalic and atraumatic.   Mouth/Throat: No oropharyngeal exudate.   NC in place   Eyes: Pupils are equal, round, and reactive to light.   Neck: Normal range of motion. Neck supple.   Cardiovascular: Normal rate, regular rhythm, normal heart sounds and intact distal pulses.    No murmur heard.  Pulmonary/Chest: Effort normal. No stridor. She has wheezes (diffuse, mild). She has rales.   Less pronounced today   Abdominal: Soft. Bowel sounds are normal. She exhibits  no distension.   Musculoskeletal: Normal range of motion. She exhibits edema. She exhibits no tenderness.   Warm peripherally, no changes In edema today   Neurological: She is alert and oriented to person, place, and time. No cranial nerve deficit.   Skin: Skin is warm and dry. No rash noted.   Psychiatric: She has a normal mood and affect.   Nursing note and vitals reviewed.      Fluids    Intake/Output Summary (Last 24 hours) at 06/15/19 1036  Last data filed at 06/15/19 0800   Gross per 24 hour   Intake              330 ml   Output             1450 ml   Net            -1120 ml       Laboratory  Recent Labs      06/13/19   0915  06/15/19   0313   WBC  17.6*  11.9*   RBC  4.27  3.99*   HEMOGLOBIN  12.5  11.5*   HEMATOCRIT  39.7  37.3   MCV  93.0  93.5   MCH  29.3  28.8   MCHC  31.5*  30.8*   RDW  46.4  47.5   PLATELETCT  431  310   MPV  9.4  9.9     Recent Labs      06/13/19   0915  06/15/19   0313   SODIUM  139  140   POTASSIUM  3.5*  3.6   CHLORIDE  90*  91*   CO2  36*  37*   GLUCOSE  293*  120*   BUN  48*  46*   CREATININE  1.71*  1.55*   CALCIUM  8.3*  8.0*                   Imaging  DX-CHEST-PORTABLE (1 VIEW)   Final Result         1.  Pulmonary edema and/or infiltrates are identified, which are stable since the prior exam.   2.  Cardiomegaly   3.  Atherosclerosis      CT-CHEST (THORAX) W/O   Final Result      1.  Multifocal pneumonia and small right pleural effusion. Follow-up chest CT in 2-3 months is advised to evaluate for any underlying lung lesions.   2.  COPD.   3.  Mild mediastinal lymphadenopathy, likely reactive.   4.  Cardiomegaly with right atrial enlargement.               EC-ECHOCARDIOGRAM COMPLETE W/O CONT   Final Result      DX-CHEST-PORTABLE (1 VIEW)   Final Result      No significant change      US-EXTREMITY VENOUS LOWER BILAT   Final Result      Negative for right or left lower extremity deep venous thrombus      DX-CHEST-PORTABLE (1 VIEW)   Final Result      1.  No pneumothorax status post  right thoracentesis.   2.  Persistent bilateral interstitial and alveolar opacities, slightly improved on the right.      DX-CHEST-PORTABLE (1 VIEW)   Final Result      1.  Diffuse bilateral moderate to severe pulmonary airspace process that are most consistent with edema      2.  Probable small right pleural effusion      3.  Enlarged cardiac silhouette           Assessment/Plan  * Acute hypoxemic respiratory failure (HCC)- (present on admission)   Assessment & Plan    -slowly improving, multifactorial, mostlry related to underlying primary lung issues  -see above  -try to wean o2     Flash pulmonary edema (HCC)- (present on admission)   Assessment & Plan    -improving     Cardiorenal syndrome- (present on admission)   Assessment & Plan    -continue diuretics to 20 mg daily, Cr and CO2 have stabilized now  -check daily BMP and MG     Chronic atrial fibrillation (HCC)- (present on admission)   Assessment & Plan    -HR is ok, on xarelto  -no AMIO given lung concerns  -continue coreg 6.25 mg BID, no further adjustments needed today, remains in this rhythm     COPD exacerbation (HCC)- (present on admission)   Assessment & Plan    -improving, BD's, steroids, O2 supplementation, almost certainly will need home o2  -very slow improvement noted today  -stop using tobacco products     Pleural effusion on right- (present on admission)   Assessment & Plan    -s/p thora, transudative, doing well with diuretics     Swelling of lower leg- (present on admission)   Assessment & Plan    -improving, back off on diuretics   -mechanically elevate legs          VTE prophylaxis: xarelto

## 2019-06-15 NOTE — DISCHARGE PLANNING
Anticipated Discharge Disposition: Home with HH    Action: Met with pt at bedside to obtain choice for HH. Choice form completed and faxed to [facility]    Barriers to Discharge: HH acceptance    Plan: F/U with HH on Monday

## 2019-06-15 NOTE — FLOWSHEET NOTE
06/15/19 1445   Events/Summary/Plan   Events/Summary/Plan SPO2 check   Non-Invasive Resp Device Site Inspection Completed Intact   Therapy Not Performed   Type of Therapy Not Performed SVN   Reason Therapy Not Performed PRN, No Indication   Chest Exam   Respiration 18   Pulse 89   Oximetry   #Pulse Oximetry (Single Determination) Yes   Oxygen   Pulse Oximetry 91 %   O2 (LPM) 3.5   O2 Daily Delivery Respiratory  Silicone Nasal Cannula

## 2019-06-15 NOTE — CARE PLAN
Problem: Safety  Goal: Will remain free from injury  Outcome: PROGRESSING AS EXPECTED  Pt instructed to call for assistance as needed

## 2019-06-16 ENCOUNTER — APPOINTMENT (OUTPATIENT)
Dept: RADIOLOGY | Facility: MEDICAL CENTER | Age: 84
DRG: 291 | End: 2019-06-16
Attending: INTERNAL MEDICINE
Payer: MEDICARE

## 2019-06-16 PROBLEM — I27.20 PULMONARY HYPERTENSION (HCC): Status: ACTIVE | Noted: 2019-06-16

## 2019-06-16 PROBLEM — J18.9 MULTIFOCAL PNEUMONIA: Status: ACTIVE | Noted: 2019-06-16

## 2019-06-16 LAB
ANION GAP SERPL CALC-SCNC: 4 MMOL/L (ref 0–11.9)
BUN SERPL-MCNC: 36 MG/DL (ref 8–22)
CALCIUM SERPL-MCNC: 8 MG/DL (ref 8.4–10.2)
CHLORIDE SERPL-SCNC: 95 MMOL/L (ref 96–112)
CO2 SERPL-SCNC: 40 MMOL/L (ref 20–33)
CREAT SERPL-MCNC: 1.41 MG/DL (ref 0.5–1.4)
GLUCOSE BLD-MCNC: 110 MG/DL (ref 65–99)
GLUCOSE BLD-MCNC: 115 MG/DL (ref 65–99)
GLUCOSE BLD-MCNC: 162 MG/DL (ref 65–99)
GLUCOSE BLD-MCNC: 189 MG/DL (ref 65–99)
GLUCOSE SERPL-MCNC: 126 MG/DL (ref 65–99)
MAGNESIUM SERPL-MCNC: 1.8 MG/DL (ref 1.5–2.5)
POTASSIUM SERPL-SCNC: 3.9 MMOL/L (ref 3.6–5.5)
SODIUM SERPL-SCNC: 139 MMOL/L (ref 135–145)

## 2019-06-16 PROCEDURE — 700102 HCHG RX REV CODE 250 W/ 637 OVERRIDE(OP): Performed by: INTERNAL MEDICINE

## 2019-06-16 PROCEDURE — 83735 ASSAY OF MAGNESIUM: CPT

## 2019-06-16 PROCEDURE — 99233 SBSQ HOSP IP/OBS HIGH 50: CPT | Performed by: HOSPITALIST

## 2019-06-16 PROCEDURE — 94760 N-INVAS EAR/PLS OXIMETRY 1: CPT

## 2019-06-16 PROCEDURE — 700102 HCHG RX REV CODE 250 W/ 637 OVERRIDE(OP): Performed by: HOSPITALIST

## 2019-06-16 PROCEDURE — 700111 HCHG RX REV CODE 636 W/ 250 OVERRIDE (IP): Performed by: INTERNAL MEDICINE

## 2019-06-16 PROCEDURE — 82962 GLUCOSE BLOOD TEST: CPT | Mod: 91

## 2019-06-16 PROCEDURE — A9270 NON-COVERED ITEM OR SERVICE: HCPCS | Performed by: INTERNAL MEDICINE

## 2019-06-16 PROCEDURE — 80048 BASIC METABOLIC PNL TOTAL CA: CPT

## 2019-06-16 PROCEDURE — 770020 HCHG ROOM/CARE - TELE (206)

## 2019-06-16 PROCEDURE — A9270 NON-COVERED ITEM OR SERVICE: HCPCS | Performed by: HOSPITALIST

## 2019-06-16 PROCEDURE — 99232 SBSQ HOSP IP/OBS MODERATE 35: CPT | Performed by: INTERNAL MEDICINE

## 2019-06-16 PROCEDURE — 71046 X-RAY EXAM CHEST 2 VIEWS: CPT

## 2019-06-16 RX ORDER — TIOTROPIUM BROMIDE 18 UG/1
1 CAPSULE ORAL; RESPIRATORY (INHALATION) DAILY
Status: DISCONTINUED | OUTPATIENT
Start: 2019-06-17 | End: 2019-06-17 | Stop reason: HOSPADM

## 2019-06-16 RX ORDER — TIOTROPIUM BROMIDE 18 UG/1
1 CAPSULE ORAL; RESPIRATORY (INHALATION)
Status: DISCONTINUED | OUTPATIENT
Start: 2019-06-16 | End: 2019-06-16

## 2019-06-16 RX ORDER — BUDESONIDE AND FORMOTEROL FUMARATE DIHYDRATE 80; 4.5 UG/1; UG/1
2 AEROSOL RESPIRATORY (INHALATION) 2 TIMES DAILY
Status: DISCONTINUED | OUTPATIENT
Start: 2019-06-16 | End: 2019-06-17 | Stop reason: HOSPADM

## 2019-06-16 RX ORDER — BUDESONIDE AND FORMOTEROL FUMARATE DIHYDRATE 80; 4.5 UG/1; UG/1
2 AEROSOL RESPIRATORY (INHALATION)
Status: DISCONTINUED | OUTPATIENT
Start: 2019-06-16 | End: 2019-06-16

## 2019-06-16 RX ADMIN — INSULIN HUMAN 1 UNITS: 100 INJECTION, SOLUTION PARENTERAL at 11:42

## 2019-06-16 RX ADMIN — INSULIN HUMAN 1 UNITS: 100 INJECTION, SOLUTION PARENTERAL at 21:14

## 2019-06-16 RX ADMIN — PREDNISONE 10 MG: 5 TABLET ORAL at 06:06

## 2019-06-16 RX ADMIN — TIOTROPIUM BROMIDE 1 CAPSULE: 18 CAPSULE ORAL; RESPIRATORY (INHALATION) at 10:35

## 2019-06-16 RX ADMIN — CARVEDILOL 6.25 MG: 6.25 TABLET, FILM COATED ORAL at 17:11

## 2019-06-16 RX ADMIN — RIVAROXABAN 15 MG: 15 TABLET, FILM COATED ORAL at 17:39

## 2019-06-16 RX ADMIN — BUDESONIDE AND FORMOTEROL FUMARATE DIHYDRATE 2 PUFF: 80; 4.5 AEROSOL RESPIRATORY (INHALATION) at 09:45

## 2019-06-16 RX ADMIN — FUROSEMIDE 20 MG: 20 TABLET ORAL at 06:06

## 2019-06-16 ASSESSMENT — ENCOUNTER SYMPTOMS
WHEEZING: 1
MUSCULOSKELETAL NEGATIVE: 1
GASTROINTESTINAL NEGATIVE: 1
SHORTNESS OF BREATH: 1
EYES NEGATIVE: 1
COUGH: 1
CARDIOVASCULAR NEGATIVE: 1
PSYCHIATRIC NEGATIVE: 1
FEVER: 0
WEAKNESS: 1
DIARRHEA: 0

## 2019-06-16 NOTE — PROGRESS NOTES
Hospital Medicine Daily Progress Note    Date of Service  6/16/2019    Chief Complaint  84 y.o. female admitted 6/8/2019 with dyspnea and worsening hypoxia.      Hospital Course    She was initially admitted and treated for acute respiratory failure thought to be cardiogenic in nature. She was diuresed and treated for a COPD flare. She had a right pleural effusion that was tapped showing a transudate.       Interval Problem Update  She is still hypoxic requiring 3-4 L O2. She says she has to wear 3L at home but only at night. She is still coughing up brown sputum. I reviewed her imaging below. I stopped her steroids.     Echocardiography Laboratory  No prior study is available for comparison.   Left ventricular ejection fraction is visually estimated to be 70%.  Moderate aortic insufficiency.  Estimated right ventricular systolic pressure  is 55 mmHg.    US venous  Negative for right or left lower extremity deep venous thrombus    cxr  Persistent airspace opacity throughout the right lung  Small right pleural effusion. No pneumothorax.  Stable cardiopericardial silhouette.          Consultants/Specialty  PULM    Code Status  full    Disposition  Home with home o2 and HH.     Review of Systems  Review of Systems   Constitutional: Negative for fever.        Very slow clinical progression   HENT: Negative for hearing loss and tinnitus.    Respiratory: Positive for cough, shortness of breath and wheezing.         Slow improvement noted today   Cardiovascular: Positive for leg swelling. Negative for chest pain.        Unchanged   Gastrointestinal: Negative for diarrhea.   Genitourinary: Negative for dysuria.   Neurological: Positive for weakness.   All other systems reviewed and are negative.       Physical Exam  Temp:  [36.3 °C (97.3 °F)-36.8 °C (98.2 °F)] 36.5 °C (97.7 °F)  Pulse:  [51-90] 51  Resp:  [18] 18  BP: ()/(30-57) 109/49  SpO2:  [91 %-96 %] 94 %    Physical Exam   Constitutional: She is oriented to  person, place, and time. She appears well-developed and well-nourished.   Speaking in full sentences   HENT:   Head: Normocephalic and atraumatic.   Mouth/Throat: No oropharyngeal exudate.   NC in place   Eyes: Pupils are equal, round, and reactive to light.   Neck: Normal range of motion. Neck supple.   Cardiovascular: Normal rate, regular rhythm, normal heart sounds and intact distal pulses.    No murmur heard.  Pulmonary/Chest: Effort normal. No stridor. She has wheezes (diffuse, mild). She has rales.   Less pronounced today   Abdominal: Soft. Bowel sounds are normal. She exhibits no distension.   Musculoskeletal: Normal range of motion. She exhibits edema. She exhibits no tenderness.   Warm peripherally, no changes In edema today   Neurological: She is alert and oriented to person, place, and time. No cranial nerve deficit.   Skin: Skin is warm and dry. No rash noted.   Psychiatric: She has a normal mood and affect.   Nursing note and vitals reviewed.      Fluids    Intake/Output Summary (Last 24 hours) at 06/16/19 0926  Last data filed at 06/16/19 0900   Gross per 24 hour   Intake              720 ml   Output              825 ml   Net             -105 ml       Laboratory  Recent Labs      06/15/19   0313   WBC  11.9*   RBC  3.99*   HEMOGLOBIN  11.5*   HEMATOCRIT  37.3   MCV  93.5   MCH  28.8   MCHC  30.8*   RDW  47.5   PLATELETCT  310   MPV  9.9     Recent Labs      06/15/19   0313  06/16/19   0316   SODIUM  140  139   POTASSIUM  3.6  3.9   CHLORIDE  91*  95*   CO2  37*  40*   GLUCOSE  120*  126*   BUN  46*  36*   CREATININE  1.55*  1.41*   CALCIUM  8.0*  8.0*                   Imaging  DX-CHEST-2 VIEWS   Final Result         Persistent airspace opacity throughout the right lung, in keeping with pneumonia.      DX-CHEST-PORTABLE (1 VIEW)   Final Result         1.  Pulmonary edema and/or infiltrates are identified, which are stable since the prior exam.   2.  Cardiomegaly   3.  Atherosclerosis      CT-CHEST  (THORAX) W/O   Final Result      1.  Multifocal pneumonia and small right pleural effusion. Follow-up chest CT in 2-3 months is advised to evaluate for any underlying lung lesions.   2.  COPD.   3.  Mild mediastinal lymphadenopathy, likely reactive.   4.  Cardiomegaly with right atrial enlargement.               EC-ECHOCARDIOGRAM COMPLETE W/O CONT   Final Result      DX-CHEST-PORTABLE (1 VIEW)   Final Result      No significant change      US-EXTREMITY VENOUS LOWER BILAT   Final Result      Negative for right or left lower extremity deep venous thrombus      DX-CHEST-PORTABLE (1 VIEW)   Final Result      1.  No pneumothorax status post right thoracentesis.   2.  Persistent bilateral interstitial and alveolar opacities, slightly improved on the right.      DX-CHEST-PORTABLE (1 VIEW)   Final Result      1.  Diffuse bilateral moderate to severe pulmonary airspace process that are most consistent with edema      2.  Probable small right pleural effusion      3.  Enlarged cardiac silhouette           Assessment/Plan  * Acute hypoxemic respiratory failure (HCC)- (present on admission)   Assessment & Plan    -slowly improving, multifactorial  I suspect this is 2/2 pulmonary hypertension, iatrogenic from previous IV fluids and pneumonia.   S/p diuresis. Titrate o2 as tolerated. Not improved today.   Treated for pneumonia already       Flash pulmonary edema (HCC)- (present on admission)   Assessment & Plan    -I suspect iatrogenic on arrival. S.p diuresis.      Pulmonary hypertension (HCC)   Assessment & Plan    Severe. Continue as tolerated diuresis. Titrate o2 as tolerated     Multifocal pneumonia   Assessment & Plan    Marked rhales on right and right sided infiltrates on cxr. Unlikely amio related  Suspect pneumonia that has been treated. cxr will lag behind clinical improvement by 6 weeks.      Cardiorenal syndrome   Assessment & Plan    -continue diuretics to 20 mg daily, Cr and CO2 have stabilized now  -check daily  BMP and MG     Chronic atrial fibrillation (HCC)- (present on admission)   Assessment & Plan    -HR controlled, on xarelto  -no AMIO given lung concerns  -continue coreg 6.25 mg BID, no further adjustments needed today, remains in this rhythm     COPD exacerbation (HCC)- (present on admission)   Assessment & Plan    Appears to have resolved. She really has no wheezing at all right now  Stop steroids  Add inhalers for this.   Rt protocol       Pleural effusion on right- (present on admission)   Assessment & Plan    -s/p thora, transudative, doing well with diuretics     Swelling of lower leg- (present on admission)   Assessment & Plan    Likely from pulmonary hypertension and from IV fluids.   S/p diuresis. No dvt seen. Continue to trend.           VTE prophylaxis: xarelto

## 2019-06-16 NOTE — PROGRESS NOTES
Report given to KIMBERLEY Leyva.   Care released.   Safety precautions in place.   Call light in reach.  Patient resting in bed.  Bed alarm on.

## 2019-06-16 NOTE — ASSESSMENT & PLAN NOTE
Marked rhales on right and right sided infiltrates on cxr. Unlikely amio related  Suspect pneumonia that has been treated. cxr will lag behind clinical improvement by 6 weeks.

## 2019-06-16 NOTE — PROGRESS NOTES
Telemetry Shift Summary     Rhythm A-fib/flutter  HR Range 60s-80s  Ectopy Rare bigeminy/couplets; occasional PVCs  Measurements -/.08/-           Normal Values  Rhythm SR  HR Range    Measurements 0.12-0.20 / 0.06-0.10  / 0.30-0.52

## 2019-06-16 NOTE — CARE PLAN
Problem: Communication  Goal: The ability to communicate needs accurately and effectively will improve  Outcome: PROGRESSING AS EXPECTED  Pt up with stand by assist, pt up in hallway for ambulating. Pt on oxygen during ambulation. Pt assessed by RT this AM.     Problem: Safety  Goal: Will remain free from injury  Outcome: PROGRESSING AS EXPECTED  Pt educated to call prior to ambulating, pt calling appropriately.

## 2019-06-16 NOTE — CARE PLAN
Problem: Safety  Goal: Will remain free from falls  Outcome: PROGRESSING AS EXPECTED  [place] fall precautions in place  Patient calls appropriately before ambulating/using restroom     Problem: Respiratory:  Goal: Respiratory status will improve  Outcome: PROGRESSING AS EXPECTED  Titrating O2 as necessary

## 2019-06-16 NOTE — PROGRESS NOTES
Pt up with 1 person assist, pt denying any pain at this time. PT strict I&O, daily weights. Pt educated to call prior to ambulating, pt stating understanding.

## 2019-06-16 NOTE — PROGRESS NOTES
Critical Care Progress Note    Date of admission  6/8/2019    Chief Complaint  84 y.o. female admitted 6/8/2019 with shortness of breath     Hospital Course  84 y.o. female who presented 6/8/2019 From alcohol with progressive shortness of breath at rest and refractory hypoxemia.  The patient has a known history of COPD but does not take inhalers at home.  She continues to be an active smoker.  She denies recent fever, change in the character of sputum, or sick contacts.  She is bringing up mixed sputum.  She denies hemoptysis.  She has noticed chronic bilateral extensive leg swelling.  Chest x-ray showed flash pulmonary edema here and she had a BNP of 4000 and [place].  Creatinine 1.33.  The patient denies cardiogenic history with except to atrial fibrillation on Xarelto at home.  She takes losartan for hypertension.  She has no previous history of heart cath or heart attacks.  This has been confirmed by the family at the bedside.  She denies chest pain currently.  EKG and troponin were negative for ischemic event. Requiring HFNC 100% with 70 LPM to barely maintain 88-92%.     Interval Problem Update  Reviewed last 24 hour events:  Continue to need oxygen supplementation 4 L/min via nasal cannula  -10.6Lwith lasix now 20 mg p.o. daily  No hypercapnia on ABG  CT chest done  showed right lower lobe pneumonia with diffuse airspace disease superimposing severe emphysema  Creatinine today is 1.5.  Which is baseline  Afebrile overnight          Review of Systems  Review of Systems   Unable to perform ROS: Critical illness   Constitutional: Positive for malaise/fatigue.   HENT: Negative.    Eyes: Negative.    Respiratory: Positive for shortness of breath.    Cardiovascular: Negative.    Gastrointestinal: Negative.    Genitourinary: Negative.    Musculoskeletal: Negative.    Skin: Negative.    Neurological: Positive for weakness.   Endo/Heme/Allergies: Negative.    Psychiatric/Behavioral: Negative.         Vital Signs for last  24 hours   Temp:  [36.3 °C (97.3 °F)-36.6 °C (97.9 °F)] 36.5 °C (97.7 °F)  Pulse:  [51-89] 55  Resp:  [17-18] 17  BP: ()/(30-57) 109/49  SpO2:  [91 %-96 %] 94 %      Physical Exam   Physical Exam   Constitutional: She is oriented to person, place, and time. No distress.   HENT:   Head: Normocephalic and atraumatic.   Eyes: Right eye exhibits no discharge. Left eye exhibits no discharge. No scleral icterus.   Neck: No tracheal deviation present. No thyromegaly present.   Cardiovascular: Regular rhythm, normal heart sounds and intact distal pulses.  Exam reveals no gallop and no friction rub.    No murmur heard.  Pulmonary/Chest: Effort normal. No stridor. No respiratory distress. She has no wheezes. She has rales. She exhibits no tenderness.   Reduced AE BL    Abdominal: Soft. She exhibits no distension. There is no tenderness.   Musculoskeletal: She exhibits no edema, tenderness or deformity.   Lymphadenopathy:     She has no cervical adenopathy.   Neurological: She is alert and oriented to person, place, and time.   Skin: Skin is warm. No rash noted. She is not diaphoretic. No erythema. No pallor.   Psychiatric: She has a normal mood and affect. Her behavior is normal. Judgment and thought content normal.   Nursing note and vitals reviewed.      Medications  Current Facility-Administered Medications   Medication Dose Route Frequency Provider Last Rate Last Dose   • budesonide-formoterol (SYMBICORT) 80-4.5 MCG/ACT inhaler 2 Puff  2 Puff Inhalation BID (RT) Stephane Mclean M.D.   2 Puff at 06/16/19 0945   • tiotropium (SPIRIVA) 18 MCG inhalation capsule 1 Cap  1 Cap Inhalation QDAILY (RT) Stephane Mclean M.D.   1 Cap at 06/16/19 1035   • predniSONE (DELTASONE) tablet 10 mg  10 mg Oral DAILY Bryanna Herman M.D.   10 mg at 06/16/19 0606   • furosemide (LASIX) tablet 20 mg  20 mg Oral Q DAY Bryanna Herman M.D.   20 mg at 06/16/19 0606   • insulin regular (HUMULIN R) injection 1-6 Units  1-6 Units Subcutaneous  4X/DAY ACHS Ady Moser M.D.   1 Units at 06/16/19 1142    And   • glucose 4 g chewable tablet 16 g  16 g Oral Q15 MIN PRN Ady Moser M.D.        And   • dextrose 50% (D50W) injection 25 mL  25 mL Intravenous Q15 MIN PRN Ady Moser M.D.       • rivaroxaban (XARELTO) tablet 15 mg  15 mg Oral PM MEAL Jericho Doss M.D.   15 mg at 06/15/19 1712   • benzocaine-menthol (CEPACOL) lozenge 1 Lozenge  1 Lozenge Mouth/Throat Q2HRS PRN Jericho Doss M.D.   1 Lozenge at 06/09/19 0922   • morphine (pf) 4 mg/ml injection 2 mg  2 mg Intravenous Q6HRS PRN Jericho Doss M.D.       • ipratropium-albuterol (DUONEB) nebulizer solution  3 mL Nebulization Q4H PRN (RT) Jericho Doss M.D.   Stopped at 06/16/19 1013   • carvedilol (COREG) tablet 6.25 mg  6.25 mg Oral BID WITH MEALS Ady Moser M.D.   Stopped at 06/16/19 0730       Fluids    Intake/Output Summary (Last 24 hours) at 06/16/19 1220  Last data filed at 06/16/19 1000   Gross per 24 hour   Intake              660 ml   Output              900 ml   Net             -240 ml       Laboratory          Recent Labs      06/15/19   0313  06/16/19   0316   SODIUM  140  139   POTASSIUM  3.6  3.9   CHLORIDE  91*  95*   CO2  37*  40*   BUN  46*  36*   CREATININE  1.55*  1.41*   MAGNESIUM  1.7  1.8   CALCIUM  8.0*  8.0*     Recent Labs      06/15/19   0313  06/16/19   0316   GLUCOSE  120*  126*     Recent Labs      06/15/19   0313   WBC  11.9*   NEUTSPOLYS  77.00*   LYMPHOCYTES  12.00*   MONOCYTES  6.20   EOSINOPHILS  2.40   BASOPHILS  0.30     Recent Labs      06/15/19   0313   RBC  3.99*   HEMOGLOBIN  11.5*   HEMATOCRIT  37.3   PLATELETCT  310       Imaging  X-Ray:  I have personally reviewed the images and compared with prior images.  EKG:  I have personally reviewed the images and compared with prior images.    Assessment/Plan  * Acute hypoxemic respiratory failure (HCC)   Assessment & Plan    Multifactorial including severe emphysema, pulmonary edema, probable  amiodarone toxicity and probable component of pneumonia even though the patient procalcitonin was not elevated   ABG did not show hypercapnia.    Now on nasal cannula 4 L/min  CT chest showed right lower lobe pneumonia, fluid overload with severe underlying emphysema    Repeat chest x-ray today continue to show right lung volume loss with right lower lobe infiltrates  BNP 4000 in [place]  Continue to be in A. fib rate controlled   S/p Rt thoracentesis, showed transudate of effusion  Continue Lasix, dose decreased 20 once daily p.o.   Completed course of ceftriaxone and doxycycline   coreg for rate control, increase dose as tolerated by blood pressure  Patient was taken off amiodarone, for the possibility of pulmonary toxicity  Check a chest x-ray in a.m.  Patient probably will need oxygen at discharge  Consider PT evaluation for short-term rehabilitation  Patient will need pulmonary follow-up with PFTs and repeat imagings including CT scan in 2 to 3 months post discharge  Patient will need close follow-up with primary care physician since she will be discharged on Lasix    pulmonary edema (HCC)   Assessment & Plan    Likely in part iatrogenic volume overload from previous hospital as she was being treated for PNA  BNP was 4000 in [place]   Echo showed elevated right ventricular systolic pressure 55 mmHg   Continue Lasix  >10.L negative since admission  Losartan + coreg low doses        Acute kidney injury   Assessment & Plan    Improving  Now patient is at baseline with creatinine of 1.55       Chronic atrial fibrillation (HCC)   Assessment & Plan    On Xarelto and amiodarone at home  Continue Coreg  Resume home Xarelto  Amiodarone will be discontinued given the patient's chronic lung disease and the possibility of pulmonary toxicity.  Heart rate is controlled with Coreg.  She will need close follow-up after discharge to manage her atrial fibrillation       COPD exacerbation (HCC)   Assessment & Plan    Abx for PNA per  above  Duoneb q4 prn   Titrate oxygen for SpO2 >= 88%  Continue steroids, switch to p.o. Prednisone and taper down dose  She will need outpatient follow-up with the pulmonary function test     Pleural effusion on right   Assessment & Plan    S/p 1000 cc removed on admission   Transudative by criteria, cardiogenic vs iatrogenic volume overload  Being diurised        Swelling of lower leg   Assessment & Plan    BL edema    Probably secondary to fluid overload/elevated right heart pressures  Doppler lower ext negative for DVT  Improving on diuretics        Right lower lobe pneumonia  With air bronchograms and consolidation  Completed antibiotics  Follow-up cultures  VTE:  NOAC  Ulcer: Not Indicated  Lines: [place]iza Catheter  Ongoing indication addressed    I have performed a physical exam and reviewed and updated ROS and Plan today (6/16/2019). In review of yesterday's note (6/15/2019), there are no changes except as documented above.     Discussed patient condition and risk of morbidity and/or mortality with Family, RN, RT and Pharmacy

## 2019-06-17 ENCOUNTER — PATIENT OUTREACH (OUTPATIENT)
Dept: HEALTH INFORMATION MANAGEMENT | Facility: OTHER | Age: 84
End: 2019-06-17

## 2019-06-17 VITALS
DIASTOLIC BLOOD PRESSURE: 48 MMHG | SYSTOLIC BLOOD PRESSURE: 105 MMHG | TEMPERATURE: 97.8 F | HEIGHT: 65 IN | WEIGHT: 151.46 LBS | HEART RATE: 89 BPM | OXYGEN SATURATION: 93 % | RESPIRATION RATE: 18 BRPM | BODY MASS INDEX: 25.23 KG/M2

## 2019-06-17 LAB
ANION GAP SERPL CALC-SCNC: 7 MMOL/L (ref 0–11.9)
BUN SERPL-MCNC: 31 MG/DL (ref 8–22)
CALCIUM SERPL-MCNC: 8.5 MG/DL (ref 8.4–10.2)
CHLORIDE SERPL-SCNC: 94 MMOL/L (ref 96–112)
CO2 SERPL-SCNC: 39 MMOL/L (ref 20–33)
CREAT SERPL-MCNC: 1.74 MG/DL (ref 0.5–1.4)
GLUCOSE BLD-MCNC: 118 MG/DL (ref 65–99)
GLUCOSE BLD-MCNC: 123 MG/DL (ref 65–99)
GLUCOSE SERPL-MCNC: 123 MG/DL (ref 65–99)
POTASSIUM SERPL-SCNC: 4.4 MMOL/L (ref 3.6–5.5)
SODIUM SERPL-SCNC: 140 MMOL/L (ref 135–145)

## 2019-06-17 PROCEDURE — 82962 GLUCOSE BLOOD TEST: CPT

## 2019-06-17 PROCEDURE — 80048 BASIC METABOLIC PNL TOTAL CA: CPT

## 2019-06-17 PROCEDURE — 99239 HOSP IP/OBS DSCHRG MGMT >30: CPT | Performed by: HOSPITALIST

## 2019-06-17 PROCEDURE — A9270 NON-COVERED ITEM OR SERVICE: HCPCS | Performed by: INTERNAL MEDICINE

## 2019-06-17 PROCEDURE — A9270 NON-COVERED ITEM OR SERVICE: HCPCS | Performed by: HOSPITALIST

## 2019-06-17 PROCEDURE — 700102 HCHG RX REV CODE 250 W/ 637 OVERRIDE(OP): Performed by: HOSPITALIST

## 2019-06-17 PROCEDURE — 700102 HCHG RX REV CODE 250 W/ 637 OVERRIDE(OP): Performed by: INTERNAL MEDICINE

## 2019-06-17 RX ORDER — BUDESONIDE AND FORMOTEROL FUMARATE DIHYDRATE 80; 4.5 UG/1; UG/1
2 AEROSOL RESPIRATORY (INHALATION) 2 TIMES DAILY
Qty: 1 INHALER | Refills: 3 | Status: SHIPPED | OUTPATIENT
Start: 2019-06-17 | End: 2019-09-16

## 2019-06-17 RX ORDER — TIOTROPIUM BROMIDE 18 UG/1
18 CAPSULE ORAL; RESPIRATORY (INHALATION) DAILY
Qty: 30 CAP | Refills: 3 | Status: SHIPPED | OUTPATIENT
Start: 2019-06-18 | End: 2019-10-16

## 2019-06-17 RX ORDER — CARVEDILOL 6.25 MG/1
6.25 TABLET ORAL 2 TIMES DAILY WITH MEALS
Qty: 60 TAB | Refills: 3 | Status: SHIPPED | OUTPATIENT
Start: 2019-06-17

## 2019-06-17 RX ADMIN — TIOTROPIUM BROMIDE 1 CAPSULE: 18 CAPSULE ORAL; RESPIRATORY (INHALATION) at 06:28

## 2019-06-17 RX ADMIN — FUROSEMIDE 20 MG: 20 TABLET ORAL at 06:25

## 2019-06-17 RX ADMIN — BUDESONIDE AND FORMOTEROL FUMARATE DIHYDRATE 2 PUFF: 80; 4.5 AEROSOL RESPIRATORY (INHALATION) at 06:27

## 2019-06-17 NOTE — DISCHARGE PLANNING
LSW spoke with pt at bedside regarding 02. Pt stated she has a portable [employer] machine for 02 and a concentrator through [facility]. Pt consented to have referral sent to [facility] for updated 02 requirements.

## 2019-06-17 NOTE — DISCHARGE SUMMARY
Discharge Summary    CHIEF COMPLAINT ON ADMISSION  No chief complaint on file.      Reason for Admission  Pneumonia     Admission Date  6/8/2019    CODE STATUS  DNAR/DNI    HPI & HOSPITAL COURSE  This is a 84 y.o. female here with  dyspnea and worsening hypoxia. She had been treated in [place] for a pneumonia.     She was initially admitted and treated for acute respiratory failure thought to be cardiogenic and iatrogenic in nature. She was diuresed and treated for a COPD flare. She completed antibiotics for her pneumonia. She had a right pleural effusion that was tapped showing a transudate.   She improved through her hospital course without complication other than persistent hypoxia. She has an extensive right sided infiltrate on cxr with a normal procalcitonin. She will be discharged home on home o2. This has been escalated from the 3L only at night she had previously been on. She has completed steroids here and will be on inhalers with prn nebulizer treatments at home. She has been set up with follow up with pulmonary medicine as well as her pcp.     Therefore, she is discharged in good and stable condition to home with close outpatient follow-up.    The patient recovered much more quickly than anticipated on admission.    Discharge Date  6/17/19    FOLLOW UP ITEMS POST DISCHARGE  none    DISCHARGE DIAGNOSES  Principal Problem:    Acute hypoxemic respiratory failure (HCC) POA: Yes  Active Problems:    Flash pulmonary edema (HCC) POA: Yes    Swelling of lower leg POA: Yes    Pleural effusion on right POA: Yes    COPD exacerbation (HCC) POA: Yes    Chronic atrial fibrillation (HCC) POA: Yes    Multifocal pneumonia POA: Unknown    Pulmonary hypertension (HCC) POA: Unknown  Resolved Problems:    * No resolved hospital problems. *      FOLLOW UP  Future Appointments  Date Time Provider Department Center   7/5/2019 9:45 AM Ashlee Jesus P.A.-C. PULM None     [facility] "[employer] [address]  [address]  [phone]        MICKEY Sandy  [address] [place] [address]  [phone]      [facility]  called office and left a voicemail requesting office to call to schedule appointment. If you do not hear from them please call to schedule.       MEDICATIONS ON DISCHARGE     Medication List      START taking these medications      Instructions   budesonide-formoterol 80-4.5 MCG/ACT Aero  Commonly known as:  SYMBICORT   Inhale 2 Puffs by mouth 2 Times a Day.  Dose:  2 Puff     carvedilol 6.25 MG Tabs  Commonly known as:  COREG   Take 1 Tab by mouth 2 times a day, with meals.  Dose:  6.25 mg     tiotropium 18 MCG Caps  Start taking on:  6/18/2019  Commonly known as:  SPIRIVA   Inhale 1 Cap by mouth every day.  Dose:  18 mcg        CONTINUE taking these medications      Instructions   buPROPion  MG Tb12 sustained-release tablet  Commonly known as:  WELLBUTRIN-SR   Take 150 mg by mouth every day.  Dose:  150 mg     ferrous gluconate 324 (38 Fe) MG Tabs  Commonly known as:  FERGON   Take 324 mg by mouth every morning with breakfast.  Dose:  324 mg     ipratropium-albuterol 0.5-2.5 (3) MG/3ML nebulizer solution  Commonly known as:  DUONEB   3 mL by Nebulization route every 6 hours as needed for Shortness of Breath.  Dose:  3 mL     neomycin-polymixin-dexamethasone 3.5-89052-3.1 Oint ophthalmic ointment  Commonly known as:  MAXITROL   Place 1 Application in right eye See Admin Instructions. Apply small amount to right eye Four times a day for 7 days Three times a day for 7 days Two times a day for 7 days Once time a day for 7 days  Dose:  1 Application     omeprazole 20 MG delayed-release capsule  Commonly known as:  PRILOSEC   Take 20 mg by mouth every day.  Dose:  20 mg     XARELTO 15 MG Tabs tablet  Generic drug:  rivaroxaban   Take 15 mg by mouth every day.  Dose:  15 mg        STOP taking these medications    amLODIPine 10 MG Tabs  Commonly known as:  NORVASC     hydroCHLOROthiazide 25 MG  Tabs  Commonly known as:  HYDRODIURIL            Allergies  Allergies   Allergen Reactions   • Pcn [Penicillins] Rash     Tolerated ceftriaxone 6/2019       DIET  Orders Placed This Encounter   Procedures   • Diet Order Cardiac, Diabetic     Standing Status:   Standing     Number of Occurrences:   1     Order Specific Question:   Diet:     Answer:   Cardiac [6]     Order Specific Question:   Diet:     Answer:   Diabetic [3]       ACTIVITY  As tolerated.  Weight bearing as tolerated    CONSULTATIONS  pulmonary- [facility]    PROCEDURES  thoracentesis    LABORATORY  Lab Results   Component Value Date    SODIUM 140 06/17/2019    POTASSIUM 4.4 06/17/2019    CHLORIDE 94 (L) 06/17/2019    CO2 39 (H) 06/17/2019    GLUCOSE 123 (H) 06/17/2019    BUN 31 (H) 06/17/2019    CREATININE 1.74 (H) 06/17/2019        Lab Results   Component Value Date    WBC 11.9 (H) 06/15/2019    HEMOGLOBIN 11.5 (L) 06/15/2019    HEMATOCRIT 37.3 06/15/2019    PLATELETCT 310 06/15/2019        Total time of the discharge process exceeds 35 minutes.

## 2019-06-17 NOTE — PROGRESS NOTES
Telemetry Shift Summary    Rhythm A flutter  HR Range 83-90  Ectopy Rare PVCs  Measurements -/0.08/-        Normal Values  Rhythm SR  HR Range    Measurements 0.12-0.20 / 0.06-0.10  / 0.30-0.52

## 2019-06-17 NOTE — CARE PLAN
Problem: Communication  Goal: The ability to communicate needs accurately and effectively will improve  Outcome: PROGRESSING AS EXPECTED  Discussed use of pain scale, call light, medications and nonpharmacologic ways to control pain. Whiteboard updated, POC discussed.

## 2019-06-17 NOTE — PROGRESS NOTES
Bedside report received from Giuliana CHU. Pt resting in chair, RR even and unlabored. Safety precautions in place, call light within reach. Pt belongings within reach. POC discussed, pain assessed. Pt belongings within reach.

## 2019-06-17 NOTE — PROGRESS NOTES
Telemetry Shift Summary    Rhythm A flutter  HR Range 76-90  Ectopy Rare couplet, Occasional PVCs  Measurements -/0.10/-        Normal Values  Rhythm SR  HR Range    Measurements 0.12-0.20 / 0.06-0.10  / 0.30-0.52

## 2019-06-17 NOTE — DISCHARGE PLANNING
Agency/Facility Name: [facility]  Spoke To: Correspondence  Outcome: Patient accepted.

## 2019-06-17 NOTE — DISCHARGE PLANNING
Received Choice form at [phone]   Agency/Facility Name: [facility]  Referral sent per Choice form @ [phone]

## 2019-06-17 NOTE — DISCHARGE INSTRUCTIONS
Discharge Instructions    Discharged to home by car with relative. Discharged via wheelchair, hospital escort: Refused.  Special equipment needed: Oxygen    Be sure to schedule a follow-up appointment with your primary care doctor or any specialists as instructed.     Discharge Plan:   Smoking Cessation Offered: Patient Counseled  Pneumococcal Vaccine Administered/Refused: Given (See MAR) (Give to pt at discharge)  Influenza Vaccine Indication: Not indicated: Previously immunized this influenza season and > 8 years of age    I understand that a diet low in cholesterol, fat, and sodium is recommended for good health. Unless I have been given specific instructions below for another diet, I accept this instruction as my diet prescription.   Other diet: Regular    Special Instructions: None    · Is patient discharged on Warfarin / Coumadin?   No     Depression / Suicide Risk    As you are discharged from this [facility] facility, it is important to learn how to keep safe from harming yourself.    Recognize the warning signs:  · Abrupt changes in personality, positive or negative- including increase in energy   · Giving away possessions  · Change in eating patterns- significant weight changes-  positive or negative  · Change in sleeping patterns- unable to sleep or sleeping all the time   · Unwillingness or inability to communicate  · Depression  · Unusual sadness, discouragement and loneliness  · Talk of wanting to die  · Neglect of personal appearance   · Rebelliousness- reckless behavior  · Withdrawal from people/activities they love  · Confusion- inability to concentrate     If you or a loved one observes any of these behaviors or has concerns about self-harm, here's what you can do:  · Talk about it- your feelings and reasons for harming yourself  · Remove any means that you might use to hurt yourself (examples: pills, rope, extension cords, firearm)  · Get professional help from the community (Mental Health,  Substance Abuse, psychological counseling)  · Do not be alone:Call your Safe Contact- someone whom you trust who will be there for you.  · Call your local CRISIS HOTLINE 368-0426 or 298-363-1531  · Call your local Children's Mobile Crisis Response Team [place] (559) 322-0235 or www.[place].com  · Call the toll free National Suicide Prevention Hotlines   · National Suicide Prevention Lifeline 741-582-LWGE (7885)  · [place] [facility] Line Network 800-SUICIDE (704-6831)      Acute Respiratory Failure, Adult  Acute respiratory failure occurs when there is not enough oxygen passing from your lungs to your body. When this happens, your lungs have trouble removing carbon dioxide from the blood. This causes your blood oxygen level to drop too low as carbon dioxide builds up.  Acute respiratory failure is a medical emergency. It can develop quickly, but it is temporary if treated promptly. Your lung capacity, or how much air your lungs can hold, may improve with time, exercise, and treatment.  What are the causes?  There are many possible causes of acute respiratory failure, including:  · Lung injury.  · Chest injury or damage to the ribs or tissues near the lungs.  · Lung conditions that affect the flow of air and blood into and out of the lungs, such as pneumonia, acute respiratory distress syndrome, and cystic fibrosis.  · Medical conditions, such as strokes or spinal cord injuries, that affect the muscles and nerves that control breathing.  · Blood infection (sepsis).  · Inflammation of the pancreas (pancreatitis).  · A blood clot in the lungs (pulmonary embolism).  · A large-volume blood transfusion.  · Burns.  · Near-drowning.  · Seizure.  · Smoke inhalation.  · Reaction to medicines.  · Alcohol or drug overdose.  What increases the risk?  This condition is more likely to develop in people who have:  · A blocked airway.  · Asthma.  · A condition or disease that damages or weakens the muscles, nerves, bones, or  tissues that are involved in breathing.  · A serious infection.  · A health problem that blocks the unconscious reflex that is involved in breathing, such as hypothyroidism or sleep apnea.  · A lung injury or trauma.  What are the signs or symptoms?  Trouble breathing is the main symptom of acute respiratory failure. Symptoms may also include:  · Rapid breathing.  · Restlessness or anxiety.  · Skin, lips, or fingernails that appear blue (cyanosis).  · Rapid heart rate.  · Abnormal heart rhythms (arrhythmias).  · Confusion or changes in behavior.  · Tiredness or loss of energy.  · Feeling sleepy or having a loss of consciousness.  How is this diagnosed?  Your health care provider can diagnose acute respiratory failure with a medical history and physical exam. During the exam, your health care provider will listen to your heart and check for crackling or wheezing sounds in your lungs. Your may also have tests to confirm the diagnosis and determine what is causing respiratory failure. These tests may include:  · Measuring the amount of oxygen in your blood (pulse oximetry). The measurement comes from a small device that is placed on your finger, earlobe, or toe.  · Other blood tests to measure blood gases and to look for signs of infection.  · Sampling your cerebral spinal fluid or tracheal fluid to check for infections.  · Chest X-ray to look for fluid in spaces that should be filled with air.  · Electrocardiogram (ECG) to look at the heart's electrical activity.  How is this treated?  Treatment for this condition usually takes places in a hospital intensive care unit (ICU). Treatment depends on what is causing the condition. It may include one or more treatments until your symptoms improve. Treatment may include:  · Supplemental oxygen. Extra oxygen is given through a tube in the nose, a face mask, or a haskins.  · A device such as a continuous positive airway pressure (CPAP) or bi-level positive airway pressure (BiPAP  or BPAP) machine. This treatment uses mild air pressure to keep the airways open. A mask or other device will be placed over your nose or mouth. A tube that is connected to a motor will deliver oxygen through the mask.  · Ventilator. This treatment helps move air into and out of the lungs. This may be done with a bag and mask or a machine. For this treatment, a tube is placed in your windpipe (trachea) so air and oxygen can flow to the lungs.  · Extracorporeal membrane oxygenation (ECMO). This treatment temporarily takes over the function of the heart and lungs, supplying oxygen and removing carbon dioxide. ECMO gives the lungs a chance to recover. It may be used if a ventilator is not effective.  · Tracheostomy. This is a procedure that creates a hole in the neck to insert a breathing tube.  · Receiving fluids and medicines.  · Rocking the bed to help breathing.  Follow these instructions at home:  · Take over-the-counter and prescription medicines only as told by your health care provider.  · Return to normal activities as told by your health care provider. Ask your health care provider what activities are safe for you.  · Keep all follow-up visits as told by your health care provider. This is important.  How is this prevented?  Treating infections and medical conditions that may lead to acute respiratory failure can help prevent the condition from developing.  Contact a health care provider if:  · You have a fever.  · Your symptoms do not improve or they get worse.  Get help right away if:  · You are having trouble breathing.  · You lose consciousness.  · Your have cyanosis or turn blue.  · You develop a rapid heart rate.  · You are confused.  These symptoms may represent a serious problem that is an emergency. Do not wait to see if the symptoms will go away. Get medical help right away. Call your local emergency services (911 in the [place] Do not drive yourself to the hospital.   This information is not intended  to replace advice given to you by your health care provider. Make sure you discuss any questions you have with your health care provider.  Document Released: 12/23/2014 Document Revised: 07/15/2017 Document Reviewed: 07/05/2017  [employer] Interactive Patient Education © 2017 Elsevier Inc.    Budesonide; Formoterol Inhalation  What is this medicine?  BUDESONIDE; FORMOTEROL (byoo RADHAMES oh nide; for MOH te rol) inhalation is a combination of 2 medicines that decrease inflammation and help to open up the airways in your lungs. It is used to treat asthma. It is also used to treat chronic obstructive pulmonary disease (COPD), including chronic bronchitis or emphysema. Do NOT use for an acute asthma or COPD attack.  This medicine may be used for other purposes; ask your health care provider or pharmacist if you have questions.  COMMON BRAND NAME(S): Symbicort  What should I tell my health care provider before I take this medicine?  They need to know if you have any of these conditions:  -bone problems  -diabetes  -heart disease or irregular heartbeat  -high blood pressure  -immune system problems  -infection  -liver disease  -worsening asthma  -an unusual or allergic reaction to budesonide, formoterol, medicines, foods, dyes, or preservatives  -pregnant or trying to get pregnant  -breast-feeding  How should I use this medicine?  This medicine is inhaled through the mouth. Rinse your mouth with water after use. Make sure not to swallow the water. Follow the directions on your prescription label. Do not use more often than directed. Do not stop taking except on your doctor's advice. Make sure that you are using your inhaler correctly. Ask your doctor or health care provider if you have any questions.  A special MedGuide will be given to you by the pharmacist with each prescription and refill. Be sure to read this information carefully each time.  Talk to your pediatrician regarding the use of this medicine in children. While  this drug may be prescribed for children as young as 6 years of age for selected conditions, precautions do apply.  Overdosage: If you think you have taken too much of this medicine contact a poison control center or emergency room at once.  NOTE: This medicine is only for you. Do not share this medicine with others.  What if I miss a dose?  If you miss a dose, use it as soon as you remember. If it is almost time for your next dose, use only that dose and continue with your regular schedule, spacing doses evenly. Do not use double or extra doses.  What may interact with this medicine?  Do not take this medicine with any of the following medications:  -MAOIs like Carbex, Eldepryl, Marplan, Nardil, and Parnate  -mifepristone  -probucol  -procarbazine  -some other medicines for asthma like formoterol, salmeterol  This medicine may also interact with the following medications:  -antibiotics like clarithromycin, erythromycin  -cimetidine  -diuretics  -grapefruit juice  -itraconazole  -ketoconazole  -medicines for depression, anxiety, or psychotic disturbances  -medicines for irregular heartbeat  -methadone  -some heart medicines like atenolol, metoprolol  -some other medicines for breathing problems  -some vaccines  This list may not describe all possible interactions. Give your health care provider a list of all the medicines, herbs, non-prescription drugs, or dietary supplements you use. Also tell them if you smoke, drink alcohol, or use illegal drugs. Some items may interact with your medicine.  What should I watch for while using this medicine?  Tell your doctor or health care professional if your symptoms do not improve or get worse. If you need to use your short-acting inhalers more often, call your doctor right away. Do not use more than every 12 hours.  If you have asthma, be aware that using this medicine may increase your risk of dying from asthma related problems. Talk to your doctor about the risks and  benefits of taking this medicine. NEVER use this medicine for an acute asthma attack.  This medicine may increase your risk of getting an infection. Tell your doctor or health care professional if you are around anyone with measles or chickenpox, or if you develop sores or blisters that do not heal properly.  What side effects may I notice from receiving this medicine?  Side effects that you should report to your doctor or health care professional as soon as possible:  -allergic reactions such as skin rash or itching, hives, swelling of the face, lips or tongue  -breathing problems  -changes in vision  -chest pain  -fast, irregular heartbeat  -feeling faint or lightheaded, falls  -fever  -high blood pressure  -nervousness  -tremors  -white patches or sores in mouth  Side effects that usually do not require medical attention (report to your doctor or health care professional if they continue or are bothersome):  -cough  -different taste in mouth  -headache  -sore throat  -stuffy nose  -stomach upset  This list may not describe all possible side effects. Call your doctor for medical advice about side effects. You may report side effects to FDA at 9-273-FDA-9456.  Where should I keep my medicine?  Keep out of the reach of children.  Store in a dry place at room temperature between 20 and 25 degrees C (68 and 77 degrees F). Do not get the inhaler wet. Keep track of the number of doses used. Throw away the inhaler after using the marked number of inhalations or after the expiration date, whichever comes first. Do not burn or puncture canister.  NOTE: This sheet is a summary. It may not cover all possible information. If you have questions about this medicine, talk to your doctor, pharmacist, or health care provider.  © 2018 Elsevier/Gold Standard (2017-04-06 11:21:50)    Tiotropium inhalation powder  What is this medicine?  TIOTROPIUM (mone oh TRO pee um) is a bronchodilator. It helps open up the airways in your lungs to  make it easier to breathe. This medicine is used to treat chronic obstructive pulmonary disease (COPD), including emphysema and chronic bronchitis. Do not use this medicine for an acute attack.  This medicine may be used for other purposes; ask your health care provider or pharmacist if you have questions.  COMMON BRAND NAME(S): Spiriva HandiHaler  What should I tell my health care provider before I take this medicine?  They need to know if you have any of these conditions:  -bladder problems or difficulty passing urine  -glaucoma  -kidney disease  -prostate trouble  -an unusual or allergic reaction to tiotropium, ipratropium, atropine, other medicines, lactose, foods, dyes, or preservatives  -pregnant or trying to get pregnant  -breast-feeding  How should I use this medicine?  This medicine is used in a special inhaler. Do NOT swallow the capsules. Do NOT use a spacer device. Follow the directions on the prescription label. Take your medicine at regular intervals. Do not take it more often than directed. Do not stop taking except on your doctor's advice. Make sure that you are using your inhaler correctly. Ask your doctor or health care provider if you have any questions. Small pieces of the capsule may get in your mouth or throat when you breathe in your medicine. This is normal and should not hurt you.  Talk to your pediatrician regarding the use of this medicine in children. Special care may be needed.  Overdosage: If you think you have taken too much of this medicine contact a poison control center or emergency room at once.  NOTE: This medicine is only for you. Do not share this medicine with others.  What if I miss a dose?  If you miss a dose, use it as soon as you can. If it is almost time for your next dose, use only that dose and continue with your regular schedule. Do not use double or extra doses.  What may interact with this medicine?  This medicine may also interact with the following  medications:  -atropine  -antihistamines for allergy, cough and cold  -certain medicines for bladder problems like oxybutynin, tolterodine  -certain medicines for stomach problems like dicyclomine, hyoscyamine  -certain medicines for travel sickness like scopolamine  -certain medicines for Parkinson's disease like benztropine, trihexyphenidyl  -ipratropium  This list may not describe all possible interactions. Give your health care provider a list of all the medicines, herbs, non-prescription drugs, or dietary supplements you use. Also tell them if you smoke, drink alcohol, or use illegal drugs. Some items may interact with your medicine.  What should I watch for while using this medicine?  Visit your doctor or health care professional for regular checks on your progress. Tell your doctor if your symptoms do not improve. Do not use more medicine than directed. If your symptoms get worse while you are using this medicine, call your doctor right away.  Do not get the this medicine in your eyes. It can cause irritation, pain, or blurred vision.  You may get dizzy. Do not drive, use machinery, or do anything that needs mental alertness until you know how this medicine affects you. Do not stand or sit up quickly, especially if you are an older patient. This reduces the risk of dizzy or fainting spells.  Clean the inhaler as directed in the patient information sheet that comes with this medicine.  What side effects may I notice from receiving this medicine?  Side effects that you should report to your doctor or health care professional as soon as possible:  -allergic reactions like skin rash or hives, swelling of the face, lips, or tongue  -breathing problems  -changes in vision  -chest pain  -fast heartbeat  -infection or flu-like symptoms  -trouble passing urine or change in the amount of urine  Side effects that usually do not require medical attention (report to your doctor or health care professional if they continue  or are bothersome):  -constipation  -cough  -dizziness  -dry mouth  -headache  -muscle pain  -sore throat  -stomach upset  This list may not describe all possible side effects. Call your doctor for medical advice about side effects. You may report side effects to FDA at 7-304-FDA-7583.  Where should I keep my medicine?  Keep out of the reach of children.  Store at room temperature between 15 and 30 degrees C (59 and 86 degrees F). Protect from humidity. Keep capsules in the foil pack until you are ready to use. Throw away any unused medicine after the expiration date.  NOTE: This sheet is a summary. It may not cover all possible information. If you have questions about this medicine, talk to your doctor, pharmacist, or health care provider.  © 2018 Elsevier/Gold Standard (2015-09-30 13:19:07)

## 2019-06-17 NOTE — PROGRESS NOTES
Pt received discharge orders, called scheduling at 2077 to schedule pulmonologist apt and primary care apt. Discussed with Josey from  about set up of HH and home oxygen. Pts tele box removed and IV discontinued. Awaiting home oxygen tank from [facility] prior to pt being discharged.

## 2019-06-17 NOTE — PROGRESS NOTES
Telemetry Shift Summary    Rhythm A fib/flutter  HR Range 60-70's  Ectopy Rare PVC, PAC, multifocal PVC's  Measurements -/0.08/-        Normal Values  Rhythm SR  HR Range    Measurements 0.12-0.20 / 0.06-0.10  / 0.30-0.52

## 2019-06-17 NOTE — CARE PLAN
Problem: Safety  Goal: Will remain free from injury  Outcome: PROGRESSING AS EXPECTED  Pt up with 1 person assist and home walker, pt tolerating well.     Problem: Respiratory:  Goal: Respiratory status will improve  Outcome: PROGRESSING AS EXPECTED  Pt on 3 liters of oxygen, walking 02 stats placed in chart yesterday. Home oxygen needed. Pt stating oxygen available at home.

## 2019-06-17 NOTE — FACE TO FACE
"Face to Face Note  -  Durable Medical Equipment    Stephane Mclean M.D. - NPI: [id number]  I certify that this patient is under my care and that they had a durable medical equipment(DME)face to face encounter by myself that meets the physician DME face-to-face encounter requirements with this patient on:    Date of encounter:   Patient:                    MRN:                       YOB: 2019  Ama Castillo  [id number]  1935     The encounter with the patient was in whole, or in part, for the following medical condition, which is the primary reason for durable medical equipment:  COPD    I certify that, based on my findings, the following durable medical equipment is medically necessary:  Oxygen.    HOME O2 Saturation Measurements:(Values must be present for Home Oxygen orders)  Room air sat at rest: 90  Room air sat with amb: 78  With liters of O2: 4, O2 sat at rest with O2: 95  With Liters of O2: 4, O2 sat with amb with O2 : 89  Is the patient mobile?: Yes    My Clinical findings support the need for the above equipment due to:  Hypoxia    Supporting Symptoms: The patient requires supplemental oxygen, as the following interventions have been tried with limited or no improvement: \"Bronchodilators and/or steroid inhalers    "

## 2019-06-17 NOTE — DISCHARGE PLANNING
Received Choice form at 2659  Agency/Facility Name: [facility]  Referral sent per Choice form @ 3231

## 2019-06-17 NOTE — PROGRESS NOTES
Assessment complete, medicated per MAR. Discussed POC and medications, allowed time to ask questions. Pt resting in bed, RR even and unlabored. Pt educated to call for assistance. Declines needs at this time. Safety precautions in place.     0000 pt resting in bed, eyes closed, RR even and unlabored.     0200 pt up to bathroom and back to bed SBA, tolerated well.     0615 pt medicated per MAR. Ray crackers provided for snack. No additional needs at this time.

## 2019-06-17 NOTE — CARE PLAN
Problem: Safety  Goal: Will remain free from injury  Outcome: PROGRESSING AS EXPECTED  Pt educated to call for assistance. Toileted before medications. Personal belongings within reach, room uncluttered.

## 2019-06-17 NOTE — PROGRESS NOTES
Pt sitting at edge of bed, pt stating home oxygen company used is [employer] in [place]. Pts bed locked and in lowest position.

## 2019-06-17 NOTE — DISCHARGE PLANNING
Anticipated Discharge Disposition: home with  and [facility]    Action: LSW spoke with pt at bedside regarding new 02 orders. Pt would like to continue to [facility]. Pt signed consent form.   LSW faxed choice to [facility]    Barriers to Discharge: none, pt using portable 02 for drive home, [facility] confirmed    Plan: LSW to f/u with referral to [place]

## 2019-06-24 ENCOUNTER — TELEPHONE (OUTPATIENT)
Dept: PULMONOLOGY | Facility: HOSPICE | Age: 84
End: 2019-06-24

## 2019-06-24 NOTE — TELEPHONE ENCOUNTER
"Patient's nurse Nick called states patient's blood pressure has been running low 80's/ over low 40's past three days. Pt currently takes  carvedilol (COREG) 6.25 MG Tab states she has been reduced down to \"one and a half tab 2x a day\" took medication Saturday Nick states it has gone up to low 90's over low 50's. States she has been calling all of patient providers to inform. Pt was seen in the hospital for copd per Nurse. Pt was seen by Dr. Herman and Dr. Doss pt scheduled to see TARIK John, APRN 7/9/19 and seeing PCP Thursday 6/27/19    "

## 2019-06-24 NOTE — TELEPHONE ENCOUNTER
LEFT [phone] Informed as directed below per TIM Flynn

## 2019-06-26 ENCOUNTER — HOSPITAL ENCOUNTER (INPATIENT)
Facility: MEDICAL CENTER | Age: 84
LOS: 14 days | DRG: 205 | End: 2019-07-10
Attending: HOSPITALIST | Admitting: HOSPITALIST
Payer: MEDICARE

## 2019-06-26 ENCOUNTER — HOSPITAL ENCOUNTER (OUTPATIENT)
Facility: MEDICAL CENTER | Age: 84
DRG: 205 | End: 2019-06-26
Admitting: HOSPITALIST
Payer: MEDICARE

## 2019-06-26 ENCOUNTER — APPOINTMENT (OUTPATIENT)
Dept: RADIOLOGY | Facility: MEDICAL CENTER | Age: 84
DRG: 205 | End: 2019-06-26
Attending: INTERNAL MEDICINE
Payer: MEDICARE

## 2019-06-26 ENCOUNTER — APPOINTMENT (OUTPATIENT)
Dept: RADIOLOGY | Facility: MEDICAL CENTER | Age: 84
DRG: 205 | End: 2019-06-26
Attending: HOSPITALIST
Payer: MEDICARE

## 2019-06-26 DIAGNOSIS — I13.10 CARDIORENAL SYNDROME WITH RENAL FAILURE, STAGE 1-4 OR UNSPECIFIED CHRONIC KIDNEY DISEASE, WITHOUT HEART FAILURE: ICD-10-CM

## 2019-06-26 DIAGNOSIS — J81.0 FLASH PULMONARY EDEMA (HCC): ICD-10-CM

## 2019-06-26 DIAGNOSIS — T46.2X5A AMIODARONE PULMONARY TOXICITY: ICD-10-CM

## 2019-06-26 DIAGNOSIS — J98.4 AMIODARONE PULMONARY TOXICITY: ICD-10-CM

## 2019-06-26 DIAGNOSIS — I48.20 CHRONIC ATRIAL FIBRILLATION (HCC): ICD-10-CM

## 2019-06-26 DIAGNOSIS — N18.30 STAGE 3 CHRONIC KIDNEY DISEASE (HCC): ICD-10-CM

## 2019-06-26 DIAGNOSIS — J44.9 CHRONIC OBSTRUCTIVE PULMONARY DISEASE, UNSPECIFIED COPD TYPE (HCC): ICD-10-CM

## 2019-06-26 DIAGNOSIS — M43.10 ACQUIRED SPONDYLOLISTHESIS: ICD-10-CM

## 2019-06-26 DIAGNOSIS — K21.9 GASTROESOPHAGEAL REFLUX DISEASE WITHOUT ESOPHAGITIS: ICD-10-CM

## 2019-06-26 DIAGNOSIS — M79.89 SWELLING OF LOWER LEG: ICD-10-CM

## 2019-06-26 DIAGNOSIS — J96.21 ACUTE ON CHRONIC RESPIRATORY FAILURE WITH HYPOXIA (HCC): ICD-10-CM

## 2019-06-26 DIAGNOSIS — I27.20 PULMONARY HYPERTENSION (HCC): ICD-10-CM

## 2019-06-26 DIAGNOSIS — J18.9 MULTIFOCAL PNEUMONIA: ICD-10-CM

## 2019-06-26 DIAGNOSIS — J90 PLEURAL EFFUSION ON RIGHT: ICD-10-CM

## 2019-06-26 PROBLEM — J81.1 PULMONARY EDEMA: Status: ACTIVE | Noted: 2019-06-26

## 2019-06-26 LAB
ACTION RANGE TRIGGERED IACRT: NO
ALBUMIN SERPL BCP-MCNC: 3.1 G/DL (ref 3.2–4.9)
ALBUMIN/GLOB SERPL: 1.2 G/DL
ALP SERPL-CCNC: 65 U/L (ref 30–99)
ALT SERPL-CCNC: 16 U/L (ref 2–50)
AMYLASE FLD-CCNC: 16 U/L
ANION GAP SERPL CALC-SCNC: 7 MMOL/L (ref 0–11.9)
APPEARANCE FLD: NORMAL
AST SERPL-CCNC: 14 U/L (ref 12–45)
BASE EXCESS BLDA CALC-SCNC: -1 MMOL/L (ref -4–3)
BASOPHILS # BLD AUTO: 0.3 % (ref 0–1.8)
BASOPHILS # BLD: 0.03 K/UL (ref 0–0.12)
BILIRUB SERPL-MCNC: 1.1 MG/DL (ref 0.1–1.5)
BODY FLD TYPE: NORMAL
BODY TEMPERATURE: ABNORMAL DEGREES
BUN SERPL-MCNC: 21 MG/DL (ref 8–22)
CALCIUM SERPL-MCNC: 8.6 MG/DL (ref 8.5–10.5)
CHLORIDE SERPL-SCNC: 101 MMOL/L (ref 96–112)
CO2 BLDA-SCNC: 25 MMOL/L (ref 20–33)
CO2 SERPL-SCNC: 27 MMOL/L (ref 20–33)
COLOR FLD: YELLOW
CREAT SERPL-MCNC: 1.37 MG/DL (ref 0.5–1.4)
CRP SERPL HS-MCNC: 25.42 MG/DL (ref 0–0.75)
CSF COMMENTS 1658: NORMAL
CYTOLOGY REG CYTOL: NORMAL
EOSINOPHIL # BLD AUTO: 0.29 K/UL (ref 0–0.51)
EOSINOPHIL NFR BLD: 2.8 % (ref 0–6.9)
EOSINOPHIL NFR FLD: 1 %
ERYTHROCYTE [DISTWIDTH] IN BLOOD BY AUTOMATED COUNT: 52.2 FL (ref 35.9–50)
GLOBULIN SER CALC-MCNC: 2.6 G/DL (ref 1.9–3.5)
GLUCOSE FLD-MCNC: 140 MG/DL
GLUCOSE SERPL-MCNC: 114 MG/DL (ref 65–99)
GRAM STN SPEC: NORMAL
HCO3 BLDA-SCNC: 24 MMOL/L (ref 17–25)
HCT VFR BLD AUTO: 30.4 % (ref 37–47)
HGB BLD-MCNC: 9.2 G/DL (ref 12–16)
HOROWITZ INDEX BLDA+IHG-RTO: 90 MM[HG]
IMM GRANULOCYTES # BLD AUTO: 0.07 K/UL (ref 0–0.11)
IMM GRANULOCYTES NFR BLD AUTO: 0.7 % (ref 0–0.9)
INST. QUALIFIED PATIENT IIQPT: YES
LACTATE BLD-SCNC: 1.2 MMOL/L (ref 0.5–2)
LACTATE BLD-SCNC: 1.4 MMOL/L (ref 0.5–2)
LDH FLD L TO P-CCNC: 110 U/L
LYMPHOCYTES # BLD AUTO: 0.95 K/UL (ref 1–4.8)
LYMPHOCYTES NFR BLD: 9.3 % (ref 22–41)
LYMPHOCYTES NFR FLD: 61 %
MAGNESIUM SERPL-MCNC: 1.9 MG/DL (ref 1.5–2.5)
MCH RBC QN AUTO: 28.3 PG (ref 27–33)
MCHC RBC AUTO-ENTMCNC: 30 G/DL (ref 33.6–35)
MCV RBC AUTO: 94.1 FL (ref 81.4–97.8)
MESOTHL CELL NFR FLD: 1 %
MONOCYTES # BLD AUTO: 0.82 K/UL (ref 0–0.85)
MONOCYTES NFR BLD AUTO: 8.1 % (ref 0–13.4)
MONONUC CELLS NFR FLD: 13 %
NEUTROPHILS # BLD AUTO: 8.02 K/UL (ref 2–7.15)
NEUTROPHILS NFR BLD: 78.8 % (ref 44–72)
NEUTROPHILS NFR FLD: 24 %
NRBC # BLD AUTO: 0 K/UL
NRBC BLD-RTO: 0 /100 WBC
O2/TOTAL GAS SETTING VFR VENT: 60 %
PCO2 BLDA: 39.4 MMHG (ref 26–37)
PCO2 TEMP ADJ BLDA: 39.4 MMHG (ref 26–37)
PH BLDA: 7.39 [PH] (ref 7.4–7.5)
PH FLD: 7 [PH]
PH TEMP ADJ BLDA: 7.39 [PH] (ref 7.4–7.5)
PLATELET # BLD AUTO: 160 K/UL (ref 164–446)
PMV BLD AUTO: 10.5 FL (ref 9–12.9)
PO2 BLDA: 54 MMHG (ref 64–87)
PO2 TEMP ADJ BLDA: 54 MMHG (ref 64–87)
POTASSIUM SERPL-SCNC: 3.9 MMOL/L (ref 3.6–5.5)
PROCALCITONIN SERPL-MCNC: 0.05 NG/ML
PROT FLD-MCNC: 1.6 G/DL
PROT SERPL-MCNC: 5.7 G/DL (ref 6–8.2)
RBC # BLD AUTO: 3.22 M/UL (ref 4.2–5.4)
RBC # FLD: <2000 CELLS/UL
RHEUMATOID FACT SER IA-ACNC: 17 IU/ML (ref 0–14)
SAO2 % BLDA: 87 % (ref 93–99)
SIGNIFICANT IND 70042: NORMAL
SITE SITE: NORMAL
SODIUM SERPL-SCNC: 135 MMOL/L (ref 135–145)
SOURCE SOURCE: NORMAL
SPECIMEN DRAWN FROM PATIENT: ABNORMAL
TROPONIN I SERPL-MCNC: <0.01 NG/ML (ref 0–0.04)
WBC # BLD AUTO: 10.2 K/UL (ref 4.8–10.8)
WBC # FLD: 651 CELLS/UL

## 2019-06-26 PROCEDURE — 36600 WITHDRAWAL OF ARTERIAL BLOOD: CPT

## 2019-06-26 PROCEDURE — 87205 SMEAR GRAM STAIN: CPT

## 2019-06-26 PROCEDURE — 99223 1ST HOSP IP/OBS HIGH 75: CPT | Mod: AI | Performed by: INTERNAL MEDICINE

## 2019-06-26 PROCEDURE — 0W993ZZ DRAINAGE OF RIGHT PLEURAL CAVITY, PERCUTANEOUS APPROACH: ICD-10-PCS | Performed by: RADIOLOGY

## 2019-06-26 PROCEDURE — 84157 ASSAY OF PROTEIN OTHER: CPT

## 2019-06-26 PROCEDURE — 85025 COMPLETE CBC W/AUTO DIFF WBC: CPT

## 2019-06-26 PROCEDURE — 83605 ASSAY OF LACTIC ACID: CPT

## 2019-06-26 PROCEDURE — 700111 HCHG RX REV CODE 636 W/ 250 OVERRIDE (IP): Performed by: INTERNAL MEDICINE

## 2019-06-26 PROCEDURE — 87206 SMEAR FLUORESCENT/ACID STAI: CPT

## 2019-06-26 PROCEDURE — 83986 ASSAY PH BODY FLUID NOS: CPT

## 2019-06-26 PROCEDURE — 99291 CRITICAL CARE FIRST HOUR: CPT | Performed by: INTERNAL MEDICINE

## 2019-06-26 PROCEDURE — 88112 CYTOPATH CELL ENHANCE TECH: CPT

## 2019-06-26 PROCEDURE — 86038 ANTINUCLEAR ANTIBODIES: CPT

## 2019-06-26 PROCEDURE — 82803 BLOOD GASES ANY COMBINATION: CPT

## 2019-06-26 PROCEDURE — A9270 NON-COVERED ITEM OR SERVICE: HCPCS | Performed by: INTERNAL MEDICINE

## 2019-06-26 PROCEDURE — 87015 SPECIMEN INFECT AGNT CONCNTJ: CPT | Mod: 91

## 2019-06-26 PROCEDURE — 80053 COMPREHEN METABOLIC PANEL: CPT

## 2019-06-26 PROCEDURE — 94640 AIRWAY INHALATION TREATMENT: CPT

## 2019-06-26 PROCEDURE — 83735 ASSAY OF MAGNESIUM: CPT

## 2019-06-26 PROCEDURE — 84145 PROCALCITONIN (PCT): CPT

## 2019-06-26 PROCEDURE — 700102 HCHG RX REV CODE 250 W/ 637 OVERRIDE(OP): Performed by: INTERNAL MEDICINE

## 2019-06-26 PROCEDURE — 71045 X-RAY EXAM CHEST 1 VIEW: CPT

## 2019-06-26 PROCEDURE — 83516 IMMUNOASSAY NONANTIBODY: CPT

## 2019-06-26 PROCEDURE — 83615 LACTATE (LD) (LDH) ENZYME: CPT

## 2019-06-26 PROCEDURE — 94760 N-INVAS EAR/PLS OXIMETRY 1: CPT

## 2019-06-26 PROCEDURE — 4410569 US-THORACENTESIS PUNCTURE

## 2019-06-26 PROCEDURE — 770022 HCHG ROOM/CARE - ICU (200)

## 2019-06-26 PROCEDURE — 82150 ASSAY OF AMYLASE: CPT

## 2019-06-26 PROCEDURE — 88305 TISSUE EXAM BY PATHOLOGIST: CPT

## 2019-06-26 PROCEDURE — 700105 HCHG RX REV CODE 258: Performed by: INTERNAL MEDICINE

## 2019-06-26 PROCEDURE — 82945 GLUCOSE OTHER FLUID: CPT

## 2019-06-26 PROCEDURE — 86140 C-REACTIVE PROTEIN: CPT

## 2019-06-26 PROCEDURE — 86431 RHEUMATOID FACTOR QUANT: CPT

## 2019-06-26 PROCEDURE — 89051 BODY FLUID CELL COUNT: CPT

## 2019-06-26 PROCEDURE — 87102 FUNGUS ISOLATION CULTURE: CPT

## 2019-06-26 PROCEDURE — 700105 HCHG RX REV CODE 258

## 2019-06-26 PROCEDURE — 87116 MYCOBACTERIA CULTURE: CPT

## 2019-06-26 PROCEDURE — 87070 CULTURE OTHR SPECIMN AEROBIC: CPT

## 2019-06-26 PROCEDURE — 700101 HCHG RX REV CODE 250: Performed by: INTERNAL MEDICINE

## 2019-06-26 PROCEDURE — 84484 ASSAY OF TROPONIN QUANT: CPT

## 2019-06-26 RX ORDER — BISACODYL 10 MG
10 SUPPOSITORY, RECTAL RECTAL
Status: DISCONTINUED | OUTPATIENT
Start: 2019-06-26 | End: 2019-07-10 | Stop reason: HOSPADM

## 2019-06-26 RX ORDER — FUROSEMIDE 10 MG/ML
40 INJECTION INTRAMUSCULAR; INTRAVENOUS
Status: DISCONTINUED | OUTPATIENT
Start: 2019-06-26 | End: 2019-06-30

## 2019-06-26 RX ORDER — ACETAMINOPHEN 325 MG/1
650 TABLET ORAL EVERY 6 HOURS PRN
Status: DISCONTINUED | OUTPATIENT
Start: 2019-06-26 | End: 2019-07-10 | Stop reason: HOSPADM

## 2019-06-26 RX ORDER — ONDANSETRON 2 MG/ML
4 INJECTION INTRAMUSCULAR; INTRAVENOUS EVERY 4 HOURS PRN
Status: DISCONTINUED | OUTPATIENT
Start: 2019-06-26 | End: 2019-07-10 | Stop reason: HOSPADM

## 2019-06-26 RX ORDER — SODIUM CHLORIDE 9 MG/ML
500 INJECTION, SOLUTION INTRAVENOUS ONCE
Status: COMPLETED | OUTPATIENT
Start: 2019-06-26 | End: 2019-06-26

## 2019-06-26 RX ORDER — OMEPRAZOLE 20 MG/1
20 CAPSULE, DELAYED RELEASE ORAL DAILY
Status: DISCONTINUED | OUTPATIENT
Start: 2019-06-26 | End: 2019-07-09

## 2019-06-26 RX ORDER — MAGNESIUM SULFATE HEPTAHYDRATE 40 MG/ML
2 INJECTION, SOLUTION INTRAVENOUS ONCE
Status: COMPLETED | OUTPATIENT
Start: 2019-06-26 | End: 2019-06-26

## 2019-06-26 RX ORDER — POTASSIUM CHLORIDE 20 MEQ/1
20 TABLET, EXTENDED RELEASE ORAL ONCE
Status: COMPLETED | OUTPATIENT
Start: 2019-06-26 | End: 2019-06-26

## 2019-06-26 RX ORDER — SODIUM CHLORIDE 9 MG/ML
INJECTION, SOLUTION INTRAVENOUS
Status: COMPLETED
Start: 2019-06-26 | End: 2019-06-26

## 2019-06-26 RX ORDER — POLYETHYLENE GLYCOL 3350 17 G/17G
1 POWDER, FOR SOLUTION ORAL
Status: DISCONTINUED | OUTPATIENT
Start: 2019-06-26 | End: 2019-07-10 | Stop reason: HOSPADM

## 2019-06-26 RX ORDER — BUDESONIDE AND FORMOTEROL FUMARATE DIHYDRATE 80; 4.5 UG/1; UG/1
2 AEROSOL RESPIRATORY (INHALATION)
Status: DISCONTINUED | OUTPATIENT
Start: 2019-06-26 | End: 2019-06-28

## 2019-06-26 RX ORDER — IPRATROPIUM BROMIDE AND ALBUTEROL SULFATE 2.5; .5 MG/3ML; MG/3ML
3 SOLUTION RESPIRATORY (INHALATION)
Status: DISCONTINUED | OUTPATIENT
Start: 2019-06-26 | End: 2019-06-27

## 2019-06-26 RX ORDER — ONDANSETRON 4 MG/1
4 TABLET, ORALLY DISINTEGRATING ORAL EVERY 4 HOURS PRN
Status: DISCONTINUED | OUTPATIENT
Start: 2019-06-26 | End: 2019-07-10 | Stop reason: HOSPADM

## 2019-06-26 RX ORDER — METHYLPREDNISOLONE SODIUM SUCCINATE 125 MG/2ML
62.5 INJECTION, POWDER, LYOPHILIZED, FOR SOLUTION INTRAMUSCULAR; INTRAVENOUS EVERY 6 HOURS
Status: DISCONTINUED | OUTPATIENT
Start: 2019-06-26 | End: 2019-06-28

## 2019-06-26 RX ORDER — BUPROPION HYDROCHLORIDE 150 MG/1
150 TABLET, EXTENDED RELEASE ORAL DAILY
Status: DISCONTINUED | OUTPATIENT
Start: 2019-06-26 | End: 2019-07-10 | Stop reason: HOSPADM

## 2019-06-26 RX ORDER — TIOTROPIUM BROMIDE 18 UG/1
1 CAPSULE ORAL; RESPIRATORY (INHALATION)
Status: DISCONTINUED | OUTPATIENT
Start: 2019-06-26 | End: 2019-06-28

## 2019-06-26 RX ORDER — AMOXICILLIN 250 MG
2 CAPSULE ORAL 2 TIMES DAILY
Status: DISCONTINUED | OUTPATIENT
Start: 2019-06-26 | End: 2019-07-10 | Stop reason: HOSPADM

## 2019-06-26 RX ADMIN — BUDESONIDE AND FORMOTEROL FUMARATE DIHYDRATE 2 PUFF: 80; 4.5 AEROSOL RESPIRATORY (INHALATION) at 07:13

## 2019-06-26 RX ADMIN — POTASSIUM CHLORIDE 20 MEQ: 1500 TABLET, EXTENDED RELEASE ORAL at 13:33

## 2019-06-26 RX ADMIN — SENNOSIDES AND DOCUSATE SODIUM 2 TABLET: 8.6; 5 TABLET ORAL at 17:43

## 2019-06-26 RX ADMIN — BUDESONIDE AND FORMOTEROL FUMARATE DIHYDRATE 2 PUFF: 80; 4.5 AEROSOL RESPIRATORY (INHALATION) at 19:20

## 2019-06-26 RX ADMIN — IPRATROPIUM BROMIDE AND ALBUTEROL SULFATE 3 ML: .5; 3 SOLUTION RESPIRATORY (INHALATION) at 22:57

## 2019-06-26 RX ADMIN — IPRATROPIUM BROMIDE AND ALBUTEROL SULFATE 3 ML: .5; 3 SOLUTION RESPIRATORY (INHALATION) at 19:18

## 2019-06-26 RX ADMIN — METHYLPREDNISOLONE SODIUM SUCCINATE 62.5 MG: 125 INJECTION, POWDER, FOR SOLUTION INTRAMUSCULAR; INTRAVENOUS at 17:42

## 2019-06-26 RX ADMIN — METHYLPREDNISOLONE SODIUM SUCCINATE 62.5 MG: 125 INJECTION, POWDER, FOR SOLUTION INTRAMUSCULAR; INTRAVENOUS at 09:47

## 2019-06-26 RX ADMIN — FUROSEMIDE 40 MG: 10 INJECTION, SOLUTION INTRAVENOUS at 06:50

## 2019-06-26 RX ADMIN — SODIUM CHLORIDE 500 ML: 9 INJECTION, SOLUTION INTRAVENOUS at 09:48

## 2019-06-26 RX ADMIN — SODIUM CHLORIDE 500 ML: 9 INJECTION, SOLUTION INTRAVENOUS at 17:43

## 2019-06-26 RX ADMIN — IPRATROPIUM BROMIDE AND ALBUTEROL SULFATE 3 ML: .5; 3 SOLUTION RESPIRATORY (INHALATION) at 07:12

## 2019-06-26 RX ADMIN — OMEPRAZOLE 20 MG: 20 CAPSULE, DELAYED RELEASE ORAL at 06:50

## 2019-06-26 RX ADMIN — METHYLPREDNISOLONE SODIUM SUCCINATE 62.5 MG: 125 INJECTION, POWDER, FOR SOLUTION INTRAMUSCULAR; INTRAVENOUS at 23:42

## 2019-06-26 RX ADMIN — TIOTROPIUM BROMIDE 1 CAPSULE: 18 CAPSULE ORAL; RESPIRATORY (INHALATION) at 07:13

## 2019-06-26 RX ADMIN — IPRATROPIUM BROMIDE AND ALBUTEROL SULFATE 3 ML: .5; 3 SOLUTION RESPIRATORY (INHALATION) at 12:02

## 2019-06-26 RX ADMIN — MAGNESIUM SULFATE IN WATER 2 G: 40 INJECTION, SOLUTION INTRAVENOUS at 16:05

## 2019-06-26 RX ADMIN — IPRATROPIUM BROMIDE AND ALBUTEROL SULFATE 3 ML: .5; 3 SOLUTION RESPIRATORY (INHALATION) at 15:36

## 2019-06-26 ASSESSMENT — ENCOUNTER SYMPTOMS
FLANK PAIN: 0
ABDOMINAL PAIN: 0
LOSS OF CONSCIOUSNESS: 0
ORTHOPNEA: 1
NAUSEA: 0
HEADACHES: 0
MYALGIAS: 0
DIZZINESS: 0
FALLS: 1
BLOOD IN STOOL: 0
SHORTNESS OF BREATH: 1
VOMITING: 0
BACK PAIN: 0
FEVER: 0
SORE THROAT: 0
DIAPHORESIS: 0
WHEEZING: 0
PALPITATIONS: 0
BLURRED VISION: 0
BRUISES/BLEEDS EASILY: 0
SEIZURES: 0
DIARRHEA: 0
CHILLS: 0
FOCAL WEAKNESS: 0
SPUTUM PRODUCTION: 0
NECK PAIN: 0
COUGH: 1

## 2019-06-26 ASSESSMENT — COGNITIVE AND FUNCTIONAL STATUS - GENERAL
TOILETING: A LITTLE
MOBILITY SCORE: 18
SUGGESTED CMS G CODE MODIFIER MOBILITY: CK
DAILY ACTIVITIY SCORE: 22
STANDING UP FROM CHAIR USING ARMS: A LOT
WALKING IN HOSPITAL ROOM: A LOT
DRESSING REGULAR LOWER BODY CLOTHING: A LITTLE
CLIMB 3 TO 5 STEPS WITH RAILING: A LOT
SUGGESTED CMS G CODE MODIFIER DAILY ACTIVITY: CJ

## 2019-06-26 ASSESSMENT — LIFESTYLE VARIABLES
PACK_YEARS: 40
EVER_SMOKED: YES
EVER_SMOKED: YES

## 2019-06-26 ASSESSMENT — COPD QUESTIONNAIRES
COPD SCREENING SCORE: 9
DURING THE PAST 4 WEEKS HOW MUCH DID YOU FEEL SHORT OF BREATH: MOST  OR ALL OF THE TIME
DO YOU EVER COUGH UP ANY MUCUS OR PHLEGM?: YES, A FEW DAYS A WEEK OR MONTH
HAVE YOU SMOKED AT LEAST 100 CIGARETTES IN YOUR ENTIRE LIFE: YES
HAVE YOU SMOKED AT LEAST 100 CIGARETTES IN YOUR ENTIRE LIFE: YES
DO YOU EVER COUGH UP ANY MUCUS OR PHLEGM?: YES, A FEW DAYS A WEEK OR MONTH
DURING THE PAST 4 WEEKS HOW MUCH DID YOU FEEL SHORT OF BREATH: MOST  OR ALL OF THE TIME
COPD SCREENING SCORE: 9
IN THE PAST 12 MONTHS DO YOU DO LESS THAN YOU USED TO BECAUSE OF YOUR BREATHING PROBLEMS: STRONGLY AGREE

## 2019-06-26 ASSESSMENT — PATIENT HEALTH QUESTIONNAIRE - PHQ9
2. FEELING DOWN, DEPRESSED, IRRITABLE, OR HOPELESS: NOT AT ALL
SUM OF ALL RESPONSES TO PHQ9 QUESTIONS 1 AND 2: 0
1. LITTLE INTEREST OR PLEASURE IN DOING THINGS: NOT AT ALL

## 2019-06-26 NOTE — PROGRESS NOTES
Transferred from [facility].  Found in [facility] [facility] to have acute respiratory failure with hypoxia.  Improved, and then transferred to this facility for higher level of care.  Dr. Freed from intensive care has been consulted and agreed to accept the patient as well.

## 2019-06-26 NOTE — CONSULTS
Critical Care Consultation    Date of consult: 6/26/2019    Referring Physician  Johann Montero M.D.    Reason for Consultation  Hypoxia, respiratory distress, pulmonary infiltrate    History of Presenting Illness  84 y.o. female who presented 6/26/2019 with recent hospitalization at [facility] 6/8-6/17 for abnormal chest x-ray and hypoxia.  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  Amiodarone was discontinued.  She was treated with IV corticosteroids during that hospitalization including Solu-Medrol 62.5 mg every 6 hours on 6 8, decreased to 40 mg every 8 hours from 6 9 through 6/12.  This was then lowered to prednisone 20 mg daily 6/12 through 6/15 then discontinued on 617 at discharge.  Amiodarone was not resumed.  After discharge home she was initially doing well on 2 to 3 L oxygen via nasal cannula.  She lives in a [facility] near [place] and her daughter checks on her frequently and checks her pulse oximetry.  This is been adequate with a goal between 88 and 92%.  She is a lifelong smoker a pack a day for most of her adult life but quit smoking at the time of her recent hospitalization this month.  She been feeling relatively well until the day of admission here.  She was apparently going to use the bathroom using her walker but her legs gave out and she fell to the ground and apparently did hit her the right side of her head.  There is no loss of consciousness and she denied presyncopal symptoms.  Her family called EMS she was found to be hypoxemic with room air saturation of 62% and was placed on nonrebreather and transferred to [facility].  Chest x-ray was again very abnormal with diffuse right greater than left alveolar infiltrates.  CT scan of the head was negative.  She remained at high oxygen requiring 10 L and was transferred to [facility] for further evaluation.  I reviewed her prior CT scan from 6/10 which showed diffuse  "multifocal parenchymal consolidation most notably in the right lung and emphysematous change.  Chest x-ray here shows worsening multifocal airspace disease on the right and some left lower lobe airspace disease as well as effusion.  Today she is undergoing a right thoracentesis and ongoing diuretic therapy.  She has history of chronic atrial fibrillation on home\" therapy with Xarelto, this is been temporarily held for thoracentesis.    Code Status  DNAR/DNI    Review of Systems  Review of Systems   Constitutional: Negative for chills and fever.   Eyes: Negative for blurred vision.   Respiratory: Positive for cough and shortness of breath. Negative for hemoptysis, wheezing and stridor.    Cardiovascular: Negative for chest pain and palpitations.   Gastrointestinal: Negative for heartburn and nausea.   Genitourinary: Negative for dysuria.   Musculoskeletal: Negative for myalgias.   Neurological: Negative for headaches.       Past Medical History   has a past medical history of Arrhythmia; GERD (gastroesophageal reflux disease); Heart burn; Hypertension; Indigestion; Other specified disorder of intestines; Pain; and Renal disorder.    Surgical History   has a past surgical history that includes knee replacement, total (2003); other neurological surg (2013); and lumbar fusion posterior (1/29/2014).    Family History  family history includes Alcohol/Drug in her brother; Cancer in her brother, mother, and sister.    Social History   reports that she quit smoking about 3 weeks ago. Her smoking use included Cigarettes. She started smoking about 67 years ago. She has a 40.00 pack-year smoking history. She has never used smokeless tobacco. She reports that she does not drink alcohol or use drugs.    Medications  Home Medications    **Home medications have not yet been reviewed for this encounter**       Current Facility-Administered Medications   Medication Dose Route Frequency Provider Last Rate Last Dose   • acetaminophen " (TYLENOL) tablet 650 mg  650 mg Oral Q6HRS PRN Johann Montero M.D.       • senna-docusate (PERICOLACE or SENOKOT S) 8.6-50 MG per tablet 2 Tab  2 Tab Oral BID Santosh Elena M.D.        And   • polyethylene glycol/lytes (MIRALAX) PACKET 1 Packet  1 Packet Oral QDAY PRN Santosh Elena M.D.        And   • magnesium hydroxide (MILK OF MAGNESIA) suspension 30 mL  30 mL Oral QDAY PRN Santosh Elena M.D.        And   • bisacodyl (DULCOLAX) suppository 10 mg  10 mg Rectal QDAY PRN Santosh Elena M.D.       • Respiratory Care per Protocol   Nebulization Continuous RT Santosh Elena M.D.       • acetaminophen (TYLENOL) tablet 650 mg  650 mg Oral Q6HRS PRN Santosh Elena M.D.       • ipratropium-albuterol (DUONEB) nebulizer solution  3 mL Nebulization Q4HRS (RT) Santosh Elena M.D.   3 mL at 06/26/19 0712   • ondansetron (ZOFRAN) syringe/vial injection 4 mg  4 mg Intravenous Q4HRS PRN Santosh Elena M.D.       • ondansetron (ZOFRAN ODT) dispertab 4 mg  4 mg Oral Q4HRS PRN Santosh Elena M.D.       • budesonide-formoterol (SYMBICORT) 80-4.5 MCG/ACT inhaler 2 Puff  2 Puff Inhalation BID (RT) Santosh Elena M.D.   2 Puff at 06/26/19 0713   • buPROPion SR (WELLBUTRIN-SR) tablet 150 mg  150 mg Oral DAILY Santosh Elena M.D.   Stopped at 06/26/19 0600   • omeprazole (PRILOSEC) capsule 20 mg  20 mg Oral DAILY Santosh Elena M.D.   20 mg at 06/26/19 0650   • tiotropium (SPIRIVA) 18 MCG inhalation capsule 1 Cap  1 Cap Inhalation QDAILY (RT) Santosh Elena M.D.   1 Cap at 06/26/19 0713   • furosemide (LASIX) injection 40 mg  40 mg Intravenous Q DAY Santosh Elena M.D.   40 mg at 06/26/19 0650   • methylPREDNISolone sod succ (SOLU-MEDROL) 125 MG injection 62.5 mg  62.5 mg Intravenous Q6HRS Pablo Gaona M.D.           Allergies  Allergies   Allergen Reactions   • Pcn [Penicillins] Rash     Tolerated ceftriaxone 6/2019       Vital Signs last 24 hours  Temp:  [36.3 °C (97.3 °F)] 36.3 °C (97.3 °F)  Pulse:  [] 112  Resp:  [18-28] 24  SpO2:  [91 %-96 %] 91  %    Physical Exam  Physical Exam   Constitutional: She is oriented to person, place, and time.   Thin elderly female not currently distress   HENT:   Head: Normocephalic and atraumatic.   Eyes: Conjunctivae are normal. No scleral icterus.   Neck: Neck supple. No tracheal deviation present.   Cardiovascular: Intact distal pulses.    No murmur heard.  Atrial fibrillation, chronic, rate 90-1 10   Pulmonary/Chest: She has no wheezes. She has rales.   Abdominal: Soft. She exhibits no distension. There is no tenderness.   Musculoskeletal: She exhibits edema.   Neurological: She is alert and oriented to person, place, and time. No cranial nerve deficit.   Skin: Skin is warm.       Fluids    Intake/Output Summary (Last 24 hours) at 06/26/19 0858  Last data filed at 06/26/19 0800   Gross per 24 hour   Intake               50 ml   Output              100 ml   Net              -50 ml       Laboratory  Recent Results (from the past 48 hour(s))   CBC WITH DIFFERENTIAL    Collection Time: 06/26/19  5:30 AM   Result Value Ref Range    WBC 10.2 4.8 - 10.8 K/uL    RBC 3.22 (L) 4.20 - 5.40 M/uL    Hemoglobin 9.2 (L) 12.0 - 16.0 g/dL    Hematocrit 30.4 (L) 37.0 - 47.0 %    MCV 94.1 81.4 - 97.8 fL    MCH 28.3 27.0 - 33.0 pg    MCHC 30.0 (L) 33.6 - 35.0 g/dL    RDW 52.2 (H) 35.9 - 50.0 fL    Platelet Count 160 (L) 164 - 446 K/uL    MPV 10.5 9.0 - 12.9 fL    Neutrophils-Polys 78.80 (H) 44.00 - 72.00 %    Lymphocytes 9.30 (L) 22.00 - 41.00 %    Monocytes 8.10 0.00 - 13.40 %    Eosinophils 2.80 0.00 - 6.90 %    Basophils 0.30 0.00 - 1.80 %    Immature Granulocytes 0.70 0.00 - 0.90 %    Nucleated RBC 0.00 /100 WBC    Neutrophils (Absolute) 8.02 (H) 2.00 - 7.15 K/uL    Lymphs (Absolute) 0.95 (L) 1.00 - 4.80 K/uL    Monos (Absolute) 0.82 0.00 - 0.85 K/uL    Eos (Absolute) 0.29 0.00 - 0.51 K/uL    Baso (Absolute) 0.03 0.00 - 0.12 K/uL    Immature Granulocytes (abs) 0.07 0.00 - 0.11 K/uL    NRBC (Absolute) 0.00 K/uL   Comp Metabolic Panel     Collection Time: 06/26/19  5:30 AM   Result Value Ref Range    Sodium 135 135 - 145 mmol/L    Potassium 3.9 3.6 - 5.5 mmol/L    Chloride 101 96 - 112 mmol/L    Co2 27 20 - 33 mmol/L    Anion Gap 7.0 0.0 - 11.9    Glucose 114 (H) 65 - 99 mg/dL    Bun 21 8 - 22 mg/dL    Creatinine 1.37 0.50 - 1.40 mg/dL    Calcium 8.6 8.5 - 10.5 mg/dL    AST(SGOT) 14 12 - 45 U/L    ALT(SGPT) 16 2 - 50 U/L    Alkaline Phosphatase 65 30 - 99 U/L    Total Bilirubin 1.1 0.1 - 1.5 mg/dL    Albumin 3.1 (L) 3.2 - 4.9 g/dL    Total Protein 5.7 (L) 6.0 - 8.2 g/dL    Globulin 2.6 1.9 - 3.5 g/dL    A-G Ratio 1.2 g/dL   PROCALCITONIN    Collection Time: 06/26/19  5:30 AM   Result Value Ref Range    Procalcitonin 0.05 <0.25 ng/mL   LACTIC ACID    Collection Time: 06/26/19  5:30 AM   Result Value Ref Range    Lactic Acid 1.4 0.5 - 2.0 mmol/L   Magnesium    Collection Time: 06/26/19  5:30 AM   Result Value Ref Range    Magnesium 1.9 1.5 - 2.5 mg/dL   Troponin    Collection Time: 06/26/19  5:30 AM   Result Value Ref Range    Troponin I <0.01 0.00 - 0.04 ng/mL   ESTIMATED GFR    Collection Time: 06/26/19  5:30 AM   Result Value Ref Range    GFR If  44 (A) >60 mL/min/1.73 m 2    GFR If Non  37 (A) >60 mL/min/1.73 m 2   ISTAT ARTERIAL BLOOD GAS    Collection Time: 06/26/19  6:40 AM   Result Value Ref Range    Ph 7.392 (L) 7.400 - 7.500    Pco2 39.4 (H) 26.0 - 37.0 mmHg    Po2 54 (L) 64 - 87 mmHg    Tco2 25 20 - 33 mmol/L    S02 87 (L) 93 - 99 %    Hco3 24.0 17.0 - 25.0 mmol/L    BE -1 -4 - 3 mmol/L    Body Temp 98.6 F degrees    O2 Therapy 60 %    iPF Ratio 90     Ph Temp Erin 7.392 (L) 7.400 - 7.500    Pco2 Temp Co 39.4 (H) 26.0 - 37.0 mmHg    Po2 Temp Cor 54 (L) 64 - 87 mmHg    Specimen Arterial     Action Range Triggered NO     Inst. Qualified Patient YES        Imaging  DX-CHEST-PORTABLE (1 VIEW)   Final Result      Pneumothorax is not identified      Stable pulmonary scarring with right volume loss, right worse than  left consolidation      US-THORACENTESIS PUNCTURE RIGHT   Final Result      1. Ultrasound guided right sided diagnostic and therapeutic thoracentesis.      2. 700 mL of fluid withdrawn.      Findings were discussed with Juan on 6/26/2019 12:40 PM.      DX-CHEST-PORTABLE (1 VIEW)   Final Result      1.  Tiny RIGHT pneumothorax   2.  Decreased RIGHT pleural fluid   3.  Otherwise no significant interval change      Findings were discussed with MARSHALL FOX on 6/26/2019 12:33 PM.      DX-CHEST-PORTABLE (1 VIEW)   Final Result      1.  Worsening hypoinflation and multifocal interstitial and alveolar opacities particularly involving the RIGHT lung, favoring pneumonia over edema.  Probable background of interstitial lung disease.   2.  RIGHT pleural fluid collection, apparently increased.      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  There may be cognitive pulmonary edema however I suspect acute lung injury related to amiodarone pulmonary toxicity  Patient has been on amiodarone for approximately 2 years by her report  Amiodarone discontinued at recent hospitalization 6/8  Amiodarone pulmonary toxicity can present in multiple forms.  These include interstitial pneumonitis, organizing pneumonia, ARDS, diffuse alveolar hemorrhage or nodules  Appearance on chest imaging most  consistent with an organizing pneumonia type.  She appears to be responding well to corticosteroid therapy and cessation of amiodarone during her recent hospitalization.  At this point I recommend the following:  Reinitiation of IV corticosteroids with Solu-Medrol then transition down to oral prednisone with a more prolonged 2-3-month course depending on her clinical response  Discontinuation of amiodarone is the hallmark of therapy and most patients do get improvement in pulmonary symptoms  At this point eosinophils pneumonia or alveolar hemorrhage appears less likely however if she does not clinically respond he may proceed with bronchoscopy and BAL.  Transbronchial lung biopsy is usually less helpful.  I have a very low suspicion for infectious etiology here however will follow her clinical course and response.  I will repeat CT scan of the chest and absent limited immune serology.     Stage 3 chronic kidney disease (HCC)- (present on admission)   Assessment & Plan    Follow renal function, avoid nephrotoxic agents     Pulmonary hypertension (HCC)- (present on admission)   Assessment & Plan    Follow-up echocardiogram after appropriate treatment     Chronic atrial fibrillation (HCC)- (present on admission)   Assessment & Plan    Continue rate control as needed  Xarelto held for thoracentesis, resume after procedure     COPD (chronic obstructive pulmonary disease) (HCC)- (present on admission)   Assessment & Plan    , Continue RT protocols, bronchodilators and titrated oxygen, goal SPO2 88-92%     Pleural effusion on right- (present on admission)   Assessment & Plan    Prior thoracentesis 6/8 consistent with transudate of effusion  Given worsened respiratory status and recurrence of effusion we will repeat thoracentesis today  However, low suspicion for parapneumonic effusion, malignant effusion or other exudate     Swelling of lower leg- (present on admission)   Assessment & Plan    Gentle diuresis        Patient's been transferred to the intensive care unit with hypoxic respiratory failure.  She is at high risk for further deterioration.  Titrated high flow nasal cannula and above.  Discussed patient condition and risk of morbidity and/or mortality with Hospitalist, RN, RT and Pharmacy.    The patient remains critically ill.  Critical care time = 33 minutes in directly providing and coordinating critical care and extensive data review.  No time overlap and excludes procedures.

## 2019-06-26 NOTE — CARE PLAN
Problem: Communication  Goal: The ability to communicate needs accurately and effectively will improve  Outcome: PROGRESSING AS EXPECTED  Patient A&Ox4, able to use call bell appropriately for needs and concerns.     Problem: Safety  Goal: Will remain free from injury  Outcome: PROGRESSING AS EXPECTED  Bed low and locked with alarm on.  Belongings within reach.  Uses call bell correctly and waits for assistance with ambulation.

## 2019-06-26 NOTE — ASSESSMENT & PLAN NOTE
Long course of steroids projected  CT noted    2D echo on 6/9/2019 reveals  Normal left ventricular size and systolic function. Normal regional   wall motion. Left ventricular ejection fraction is visually estimated   to be 70%. Diastolic function is difficult to assess with atrial   Fibrillation. Normal right ventricular size and systolic function.  Mildly dilated left atrium. Mild mitral regurgitation. Moderate aortic insufficiency.   Moderate tricuspid regurgitation. Right atrial pressure is estimated to   be 15 mmHg. Estimated right ventricular systolic pressure  is 55 mmHg

## 2019-06-26 NOTE — OR SURGEON
Immediate Post- Operative Note        PostOp Diagnosis: R pleural effusion      Procedure(s): US guided R thoracentesis      Estimated Blood Loss: Less than 5 ml        Complications: None            6/26/2019     12:35 PM     Peter Castillo

## 2019-06-26 NOTE — CARE PLAN
Problem: Oxygenation:  Goal: Maintain adequate oxygenation dependent on patient condition  Outcome: PROGRESSING SLOWER THAN EXPECTED    Intervention: Manage oxygen therapy by monitoring pulse oximetry and/or ABG values  Pt on continuous Sp02 monitor      Problem: Bronchoconstriction:  Goal: Improve in air movement and diminished wheezing  Outcome: PROGRESSING AS EXPECTED  Q4/svn with Duoneb    BID/MDI with Symbicort    QD/MDI with Spiriva

## 2019-06-26 NOTE — PROGRESS NOTES
PHYSICIAN: DR FOX  TECH: MONA    PROCEDURE: ULTRASOUND GUIDED THORACENTESIS    PT PROCEDURE WAS DONE IN HER ROOM AT BEDSIDE. 700 ML OF FLUID WAS REMOVED FROM PT RT THORAX. 700 ML OF FLUID WAS SENT TO LAB. PT APPEARED TO BE STABLE FOR HER PROCEDURE. VITALS MONITORED AT BEDSIDE WITH RN PRESENT. XRAY ORDERED TO BE DONE AT BEDSIDE POST THORACENTESIS.

## 2019-06-26 NOTE — ASSESSMENT & PLAN NOTE
Creatinine rising, Lasix was stopped  Avoiding nephrotoxics  Renally dose medications  Monitor BMP

## 2019-06-26 NOTE — PROGRESS NOTES
Pt arrived from [place], via air transport. Pt is a&ox4, able to follow commands. Pt on NRB mask, spo2 89-93%. HR 90's, afib.     2 RN skin check complete with Taya CHU. Skin C,D,I. Generalized briusing. 3+ pitting edema to BLE.   Devices in place oxy mask, BP cuff, telemetry, spo2 monitor.  The following interventions in place turns q 2 hours.

## 2019-06-26 NOTE — ASSESSMENT & PLAN NOTE
She has end-stage COPD and likely underlying amiodarone toxicity  She was treated with a course of cefepime and doxycycline and diuresis  She was started on high-dose IV steroids  She was weaned from high flow nasal cannula  Pulmonology was involved, she had poor prognosis.  Patient elects for hospice  Transition to oral steroids which she will continue on discharge with prophylactic Bactrim  Continue supportive O2

## 2019-06-26 NOTE — THERAPY
Physical Therapy Contact Note    PT consult received, awaiting/getting thoracocentesis; will follow up post when appropriate;     Promise Ray, PT, DPT Pager: [phone]

## 2019-06-26 NOTE — ASSESSMENT & PLAN NOTE
Restarting anticoagulation, given renal insufficiency changed to Eliquis.  May need to stop Eliquis if renal function declines.  Monitor BMP  Controlled on coreg

## 2019-06-26 NOTE — H&P
Hospital Medicine History & Physical Note    Date of Service  6/26/2019    Primary Care Physician  SALVADOR Sandy.    Consultants  Pulmonology    Code Status  DNR/DNI    Chief Complaint  Shortness of breath and ground-level fall    History of Presenting Illness  84 y.o. female with a past medical history of COPD on 3 L of oxygen, atrial fibrillation on Xarelto, pulmonary hypertension, GERD who presented 6/26/2019 with shortness of breath and ground-level fall.  The patient was walking to her bathroom using a walker when her legs gave out and she suffered a ground-level fall striking her right occipital region.  She denied loss of consciousness, lightheadedness, chest pain, focal weakness or numbness.  Her family found her to be short of breath and called EMS.  She was found to be hypoxic at 62% on room air.  She was placed on 15 L nonrebreather and transferred to [facility].  She reports swelling of her lower extremities and orthopnea. The patient denies chest pain, fevers, chills, nausea, vomiting or abdominal pain.  The patient was recently admitted to [facility] for acute respiratory failure secondary to pulmonary edema which was thought to be iatrogenic and COPD exacerbation.  She completed a course of antibiotics and was diuresed.  She underwent thoracentesis of right pleural effusion that revealed transudate.  She was seen by pulmonology and was discharged home on 3 L of oxygen.    I have reviewed the medical records from the transferring facility which are summarized as follows:  WBC 10.1, hemoglobin 9.5, hematocrit 29.7, MCV 92.8, platelets 143  Sodium 135, potassium 4.1, chloride 102, bicarb 28.7, anion gap 4.3, glucose 123, BUN 22, creatinine 1.59, calcium 8.4, AST 15, albumin 2.2, protein 5.5, total bili 0.9, ALT 24, alkaline phosphatase 68  Troponin less than 0.017, BNP 4518, lactic acid 1.5  INR 1.6, PT 16, PTT 36.4  ABG: pH 7.4, PCO2 39.4, PO2 67.9  Chest x-ray:  Relatively diffuse increased hazy opacities in the lungs, right greater than left suggestive of edema and/or infiltrate.  This overall appears worse compared to the prior exam.  An underlying neoplastic process cannot be excluded.  Blunting of the right costophrenic angle suggestive of a right-sided pleural effusion.  No evidence of pneumothorax.  Prominence of the cardiac silhouette.  CT head: No evidence of acute intracranial injury.  Cerebral atrophy with probable small vessel ischemic changes noted.  Blood cultures were obtained at the [facility] facility.  Patient's blood pressure was marginal at 92/52.  Patient was given breathing treatments at the [facility] facility and her oxygen was titrated down to 10 L with a saturation of 95%.  The case was discussed with pulmonology Dr. Freed and was transferred to [facility] for higher level of care.    Review of Systems  Review of Systems   Constitutional: Positive for malaise/fatigue. Negative for chills, diaphoresis and fever.   HENT: Negative for hearing loss and sore throat.    Eyes: Negative for blurred vision.   Respiratory: Positive for cough and shortness of breath. Negative for sputum production and wheezing.    Cardiovascular: Positive for orthopnea and leg swelling. Negative for chest pain and palpitations.   Gastrointestinal: Negative for abdominal pain, blood in stool, diarrhea, nausea and vomiting.   Genitourinary: Negative for dysuria, flank pain and urgency.   Musculoskeletal: Positive for falls. Negative for back pain, joint pain, myalgias and neck pain.   Skin: Negative for rash.   Neurological: Negative for dizziness, focal weakness, seizures, loss of consciousness and headaches.   Endo/Heme/Allergies: Does not bruise/bleed easily.   Psychiatric/Behavioral: Negative for suicidal ideas.   All other systems reviewed and are negative.      Past Medical History   has a past medical history of Arrhythmia; GERD (gastroesophageal reflux disease); Heart burn;  Hypertension; Indigestion; Other specified disorder of intestines; Pain; and Renal disorder.    Surgical History   has a past surgical history that includes knee replacement, total (2003); other neurological surg (2013); and lumbar fusion posterior (1/29/2014).     Family History  family history includes Alcohol/Drug in her brother; Cancer in her brother, mother, and sister.     Social History   reports that she quit smoking about 3 weeks ago. Her smoking use included Cigarettes. She started smoking about 67 years ago. She has a 40.00 pack-year smoking history. She has never used smokeless tobacco. She reports that she does not drink alcohol or use drugs.    Allergies  Allergies   Allergen Reactions   • Pcn [Penicillins] Rash     Tolerated ceftriaxone 6/2019       Medications  Prior to Admission Medications   Prescriptions Last Dose Informant Patient Reported? Taking?   buPROPion SR (WELLBUTRIN-SR) 150 MG TABLET SR 12 HR sustained-release tablet  Patient's Home Pharmacy Yes No   Sig: Take 150 mg by mouth every day.   budesonide-formoterol (SYMBICORT) 80-4.5 MCG/ACT Aerosol   No No   Sig: Inhale 2 Puffs by mouth 2 Times a Day.   carvedilol (COREG) 6.25 MG Tab   No No   Sig: Take 1 Tab by mouth 2 times a day, with meals.   ferrous gluconate (FERGON) 324 (38 Fe) MG Tab  Patient's Home Pharmacy Yes No   Sig: Take 324 mg by mouth every morning with breakfast.   ipratropium-albuterol (DUONEB) 0.5-2.5 (3) MG/3ML nebulizer solution  Patient's Home Pharmacy Yes No   Sig: 3 mL by Nebulization route every 6 hours as needed for Shortness of Breath.   neomycin-polymixin-dexamethasone (MAXITROL) 3.5-77749-8.1 Ointment ophthalmic ointment  Patient's Home Pharmacy Yes No   Sig: Place 1 Application in right eye See Admin Instructions. Apply small amount to right eye  Four times a day for 7 days  Three times a day for 7 days  Two times a day for 7 days  Once time a day for 7 days   omeprazole (PRILOSEC) 20 MG CPDR  Patient's Home  Pharmacy Yes No   Sig: Take 20 mg by mouth every day.   rivaroxaban (XARELTO) 15 MG Tab tablet  Patient's Home Pharmacy Yes No   Sig: Take 15 mg by mouth every day.   tiotropium (SPIRIVA) 18 MCG Cap   No No   Sig: Inhale 1 Cap by mouth every day.      Facility-Administered Medications: None       Physical Exam  Temp:  [36.3 °C (97.3 °F)] 36.3 °C (97.3 °F)  Pulse:  [102] 102  Resp:  [26] 26  SpO2:  [93 %] 93 %    Physical Exam   Constitutional: She is oriented to person, place, and time. She appears well-developed and well-nourished. No distress.   HENT:   Head: Normocephalic and atraumatic.   Mouth/Throat: Oropharynx is clear and moist.   Eyes: Pupils are equal, round, and reactive to light. Conjunctivae are normal. Right eye exhibits no discharge. Left eye exhibits no discharge. No scleral icterus.   Neck: Normal range of motion. Neck supple. No tracheal deviation present.   Cardiovascular: Regular rhythm and normal heart sounds.    Tachycardic   Pulmonary/Chest: No stridor. She has no wheezes.   Tachypneic  Bilateral crackles   Abdominal: Soft. Bowel sounds are normal. She exhibits no distension. There is no tenderness. There is no rebound and no guarding.   Musculoskeletal: She exhibits edema (2+ pitting edema bilaterally). She exhibits no tenderness or deformity.   Lymphadenopathy:     She has no cervical adenopathy.   Neurological: She is alert and oriented to person, place, and time. No cranial nerve deficit. Coordination normal.   Skin: Skin is warm and dry. No rash noted. She is not diaphoretic. No erythema.   Psychiatric: She has a normal mood and affect. Her behavior is normal.   Nursing note and vitals reviewed.      Laboratory:          No results for input(s): ALTSGPT, ASTSGOT, ALKPHOSPHAT, TBILIRUBIN, DBILIRUBIN, GAMMAGT, AMYLASE, LIPASE, ALB, PREALBUMIN, GLUCOSE in the last 72 hours.              No results for input(s): TROPONINI in the last 72 hours.    Urinalysis:    No results found      Imaging:  DX-CHEST-PORTABLE (1 VIEW)    (Results Pending)         Assessment/Plan:  I anticipate this patient will require at least two midnights for appropriate medical management, necessitating inpatient admission.    Acute on chronic respiratory failure with hypoxia (HCC)- (present on admission)   Assessment & Plan    Secondary to pulmonary edema and right sided pleural effusion  We will start the patient on IV Lasix with holding parameters  Ultrasound-guided thoracentesis  Continue supplemental oxygen with RT protocol  Check procalcitonin, if elevated will start on antibiotics  DuoNeb as needed     Pulmonary edema- (present on admission)   Assessment & Plan    Started on IV Lasix with holding parameters  Fluid and salt restriction  Check urine protein creatinine ratio    2D echo on 6/9/2019 reveals  Normal left ventricular size and systolic function. Normal regional   wall motion. Left ventricular ejection fraction is visually estimated   to be 70%. Diastolic function is difficult to assess with atrial   Fibrillation. Normal right ventricular size and systolic function.  Mildly dilated left atrium. Mild mitral regurgitation. Moderate aortic insufficiency.   Moderate tricuspid regurgitation. Right atrial pressure is estimated to   be 15 mmHg. Estimated right ventricular systolic pressure  is 55 mmHg     Stage 3 chronic kidney disease (HCC)- (present on admission)   Assessment & Plan    At baseline kidney function  Avoiding nephrotoxics: NSAIDs etc  Monitor BMP         GERD (gastroesophageal reflux disease)- (present on admission)   Assessment & Plan    Continue omeprazole     Pulmonary hypertension (HCC)- (present on admission)   Assessment & Plan    Continue supplemental oxygen and diuresis     Chronic atrial fibrillation (HCC)- (present on admission)   Assessment & Plan    Hold Coreg due to hypotension  Hold Xarelto for thoracentesis     COPD (chronic obstructive pulmonary disease) (HCC)- (present on  admission)   Assessment & Plan    No active wheezing at this time  Continue Spiriva and Symbicort  DuoNeb with RT protocol     Pleural effusion on right- (present on admission)   Assessment & Plan    Ultrasound-guided thoracentesis ordered  Hold Xarelto       Swelling of lower leg- (present on admission)   Assessment & Plan    Check ultrasound to rule out DVT  Elevate extremities  Started on diuresis as blood pressure allows         VTE prophylaxis: SCD    I spent a total of 30 minutes of non face to face time performing additional research, reviewing medical records from transferring facility, discussing plan of care with other healthcare providers. Start time: 5 00 am. End time: 5 30 am

## 2019-06-26 NOTE — PROGRESS NOTES
Direct Admit from Dr. Levine at [facility]. Dr. Montero accepting for Respiratory Failure. Dr. Freed will be performing a Pulmonary Consult. All signed and held orders will need to be released upon patient arrival. Patient arriving via air transport.

## 2019-06-27 ENCOUNTER — APPOINTMENT (OUTPATIENT)
Dept: RADIOLOGY | Facility: MEDICAL CENTER | Age: 84
DRG: 205 | End: 2019-06-27
Attending: INTERNAL MEDICINE
Payer: MEDICARE

## 2019-06-27 PROBLEM — T46.2X5A AMIODARONE PULMONARY TOXICITY: Status: ACTIVE | Noted: 2019-06-26

## 2019-06-27 PROBLEM — J98.4 AMIODARONE PULMONARY TOXICITY: Status: ACTIVE | Noted: 2019-06-26

## 2019-06-27 LAB
ALBUMIN SERPL BCP-MCNC: 2.8 G/DL (ref 3.2–4.9)
ALBUMIN/GLOB SERPL: 1.1 G/DL
ALP SERPL-CCNC: 63 U/L (ref 30–99)
ALT SERPL-CCNC: 14 U/L (ref 2–50)
ANION GAP SERPL CALC-SCNC: 12 MMOL/L (ref 0–11.9)
AST SERPL-CCNC: 12 U/L (ref 12–45)
BASOPHILS # BLD AUTO: 0.1 % (ref 0–1.8)
BASOPHILS # BLD: 0.01 K/UL (ref 0–0.12)
BILIRUB SERPL-MCNC: 0.7 MG/DL (ref 0.1–1.5)
BNP SERPL-MCNC: 402 PG/ML (ref 0–100)
BUN SERPL-MCNC: 29 MG/DL (ref 8–22)
CALCIUM SERPL-MCNC: 8.2 MG/DL (ref 8.5–10.5)
CHLORIDE SERPL-SCNC: 98 MMOL/L (ref 96–112)
CO2 SERPL-SCNC: 26 MMOL/L (ref 20–33)
CREAT SERPL-MCNC: 1.4 MG/DL (ref 0.5–1.4)
EOSINOPHIL # BLD AUTO: 0 K/UL (ref 0–0.51)
EOSINOPHIL NFR BLD: 0 % (ref 0–6.9)
ERYTHROCYTE [DISTWIDTH] IN BLOOD BY AUTOMATED COUNT: 51.8 FL (ref 35.9–50)
GLOBULIN SER CALC-MCNC: 2.5 G/DL (ref 1.9–3.5)
GLUCOSE BLD-MCNC: 222 MG/DL (ref 65–99)
GLUCOSE SERPL-MCNC: 307 MG/DL (ref 65–99)
HCT VFR BLD AUTO: 32.2 % (ref 37–47)
HGB BLD-MCNC: 9.8 G/DL (ref 12–16)
IMM GRANULOCYTES # BLD AUTO: 0.05 K/UL (ref 0–0.11)
IMM GRANULOCYTES NFR BLD AUTO: 0.6 % (ref 0–0.9)
LYMPHOCYTES # BLD AUTO: 0.33 K/UL (ref 1–4.8)
LYMPHOCYTES NFR BLD: 4 % (ref 22–41)
MAGNESIUM SERPL-MCNC: 2.3 MG/DL (ref 1.5–2.5)
MCH RBC QN AUTO: 28.6 PG (ref 27–33)
MCHC RBC AUTO-ENTMCNC: 30.4 G/DL (ref 33.6–35)
MCV RBC AUTO: 93.9 FL (ref 81.4–97.8)
MONOCYTES # BLD AUTO: 0.27 K/UL (ref 0–0.85)
MONOCYTES NFR BLD AUTO: 3.3 % (ref 0–13.4)
MYELOPEROXIDASE AB SER-ACNC: 0 AU/ML (ref 0–19)
NEUTROPHILS # BLD AUTO: 7.64 K/UL (ref 2–7.15)
NEUTROPHILS NFR BLD: 92 % (ref 44–72)
NRBC # BLD AUTO: 0 K/UL
NRBC BLD-RTO: 0 /100 WBC
NUCLEAR IGG SER QL IA: NORMAL
PHOSPHATE SERPL-MCNC: 3.7 MG/DL (ref 2.5–4.5)
PLATELET # BLD AUTO: 186 K/UL (ref 164–446)
PMV BLD AUTO: 10.5 FL (ref 9–12.9)
POTASSIUM SERPL-SCNC: 4 MMOL/L (ref 3.6–5.5)
PROT SERPL-MCNC: 5.3 G/DL (ref 6–8.2)
PROTEINASE3 AB SER-ACNC: 2 AU/ML (ref 0–19)
RBC # BLD AUTO: 3.43 M/UL (ref 4.2–5.4)
SODIUM SERPL-SCNC: 136 MMOL/L (ref 135–145)
WBC # BLD AUTO: 8.3 K/UL (ref 4.8–10.8)

## 2019-06-27 PROCEDURE — 80053 COMPREHEN METABOLIC PANEL: CPT

## 2019-06-27 PROCEDURE — 700111 HCHG RX REV CODE 636 W/ 250 OVERRIDE (IP): Performed by: INTERNAL MEDICINE

## 2019-06-27 PROCEDURE — 700102 HCHG RX REV CODE 250 W/ 637 OVERRIDE(OP): Performed by: INTERNAL MEDICINE

## 2019-06-27 PROCEDURE — 84100 ASSAY OF PHOSPHORUS: CPT

## 2019-06-27 PROCEDURE — 85025 COMPLETE CBC W/AUTO DIFF WBC: CPT

## 2019-06-27 PROCEDURE — 82962 GLUCOSE BLOOD TEST: CPT

## 2019-06-27 PROCEDURE — 94640 AIRWAY INHALATION TREATMENT: CPT

## 2019-06-27 PROCEDURE — 700101 HCHG RX REV CODE 250: Performed by: INTERNAL MEDICINE

## 2019-06-27 PROCEDURE — A9270 NON-COVERED ITEM OR SERVICE: HCPCS | Performed by: HOSPITALIST

## 2019-06-27 PROCEDURE — 83735 ASSAY OF MAGNESIUM: CPT

## 2019-06-27 PROCEDURE — 93970 EXTREMITY STUDY: CPT

## 2019-06-27 PROCEDURE — 700102 HCHG RX REV CODE 250 W/ 637 OVERRIDE(OP): Performed by: HOSPITALIST

## 2019-06-27 PROCEDURE — 770022 HCHG ROOM/CARE - ICU (200)

## 2019-06-27 PROCEDURE — 99233 SBSQ HOSP IP/OBS HIGH 50: CPT | Performed by: INTERNAL MEDICINE

## 2019-06-27 PROCEDURE — A9270 NON-COVERED ITEM OR SERVICE: HCPCS | Performed by: INTERNAL MEDICINE

## 2019-06-27 PROCEDURE — 71250 CT THORAX DX C-: CPT

## 2019-06-27 PROCEDURE — 99233 SBSQ HOSP IP/OBS HIGH 50: CPT | Performed by: HOSPITALIST

## 2019-06-27 PROCEDURE — 83880 ASSAY OF NATRIURETIC PEPTIDE: CPT

## 2019-06-27 RX ORDER — IPRATROPIUM BROMIDE AND ALBUTEROL SULFATE 2.5; .5 MG/3ML; MG/3ML
3 SOLUTION RESPIRATORY (INHALATION)
Status: DISCONTINUED | OUTPATIENT
Start: 2019-06-27 | End: 2019-06-28

## 2019-06-27 RX ADMIN — SENNOSIDES AND DOCUSATE SODIUM 2 TABLET: 8.6; 5 TABLET ORAL at 05:45

## 2019-06-27 RX ADMIN — IPRATROPIUM BROMIDE AND ALBUTEROL SULFATE 3 ML: .5; 3 SOLUTION RESPIRATORY (INHALATION) at 11:52

## 2019-06-27 RX ADMIN — OMEPRAZOLE 20 MG: 20 CAPSULE, DELAYED RELEASE ORAL at 05:45

## 2019-06-27 RX ADMIN — METHYLPREDNISOLONE SODIUM SUCCINATE 62.5 MG: 125 INJECTION, POWDER, FOR SOLUTION INTRAMUSCULAR; INTRAVENOUS at 23:16

## 2019-06-27 RX ADMIN — BUPROPION HYDROCHLORIDE 150 MG: 150 TABLET, EXTENDED RELEASE ORAL at 05:45

## 2019-06-27 RX ADMIN — METHYLPREDNISOLONE SODIUM SUCCINATE 62.5 MG: 125 INJECTION, POWDER, FOR SOLUTION INTRAMUSCULAR; INTRAVENOUS at 05:39

## 2019-06-27 RX ADMIN — IPRATROPIUM BROMIDE AND ALBUTEROL SULFATE 3 ML: .5; 3 SOLUTION RESPIRATORY (INHALATION) at 15:23

## 2019-06-27 RX ADMIN — INSULIN HUMAN 3 UNITS: 100 INJECTION, SOLUTION PARENTERAL at 21:01

## 2019-06-27 RX ADMIN — APIXABAN 5 MG: 5 TABLET, FILM COATED ORAL at 17:37

## 2019-06-27 RX ADMIN — IPRATROPIUM BROMIDE AND ALBUTEROL SULFATE 3 ML: .5; 3 SOLUTION RESPIRATORY (INHALATION) at 02:41

## 2019-06-27 RX ADMIN — BUDESONIDE AND FORMOTEROL FUMARATE DIHYDRATE 2 PUFF: 80; 4.5 AEROSOL RESPIRATORY (INHALATION) at 07:27

## 2019-06-27 RX ADMIN — INSULIN HUMAN 3 UNITS: 100 INJECTION, SOLUTION PARENTERAL at 17:38

## 2019-06-27 RX ADMIN — METHYLPREDNISOLONE SODIUM SUCCINATE 62.5 MG: 125 INJECTION, POWDER, FOR SOLUTION INTRAMUSCULAR; INTRAVENOUS at 11:34

## 2019-06-27 RX ADMIN — IPRATROPIUM BROMIDE AND ALBUTEROL SULFATE 3 ML: .5; 3 SOLUTION RESPIRATORY (INHALATION) at 20:03

## 2019-06-27 RX ADMIN — BUDESONIDE AND FORMOTEROL FUMARATE DIHYDRATE 2 PUFF: 80; 4.5 AEROSOL RESPIRATORY (INHALATION) at 20:03

## 2019-06-27 RX ADMIN — METHYLPREDNISOLONE SODIUM SUCCINATE 62.5 MG: 125 INJECTION, POWDER, FOR SOLUTION INTRAMUSCULAR; INTRAVENOUS at 17:37

## 2019-06-27 RX ADMIN — SENNOSIDES AND DOCUSATE SODIUM 2 TABLET: 8.6; 5 TABLET ORAL at 17:37

## 2019-06-27 RX ADMIN — TIOTROPIUM BROMIDE 1 CAPSULE: 18 CAPSULE ORAL; RESPIRATORY (INHALATION) at 07:27

## 2019-06-27 RX ADMIN — FUROSEMIDE 40 MG: 10 INJECTION, SOLUTION INTRAVENOUS at 05:40

## 2019-06-27 RX ADMIN — IPRATROPIUM BROMIDE AND ALBUTEROL SULFATE 3 ML: .5; 3 SOLUTION RESPIRATORY (INHALATION) at 07:27

## 2019-06-27 ASSESSMENT — ENCOUNTER SYMPTOMS
FEVER: 0
SHORTNESS OF BREATH: 1
GASTROINTESTINAL NEGATIVE: 1
BACK PAIN: 0
POLYDIPSIA: 0
DIZZINESS: 0
CHILLS: 0
BRUISES/BLEEDS EASILY: 0
WEAKNESS: 1
NAUSEA: 0
NECK PAIN: 0
NERVOUS/ANXIOUS: 1
PALPITATIONS: 0
WHEEZING: 0
MYALGIAS: 0
DEPRESSION: 0
COUGH: 1
HEADACHES: 0
EYES NEGATIVE: 1
BLURRED VISION: 0
STRIDOR: 0
MUSCULOSKELETAL NEGATIVE: 1
HEMOPTYSIS: 0
VOMITING: 0
FOCAL WEAKNESS: 0
HEARTBURN: 0

## 2019-06-27 NOTE — PROGRESS NOTES
Bedside shift report with [employer] [facility]. Pt alert and oriented, watching tv and denies complaints. No dyspnea noted on HF O2 and vss.

## 2019-06-27 NOTE — ASSESSMENT & PLAN NOTE
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   Iatrogenic volume overload plus possible diastolic dysfunction   Continue cefepime + doxycycline  F/u MRSA swab   PCT 0.05, atypical infection possible  Titrate HFNC for SpO2 88-92%, currently 40% with 30 LPM - making some improvement   Continue IV solumedrol 125 q6 for total of 48 hours given oxygenation improvement   ABG today showed slightly improving PF ratio   Mixed alkalosis likely secondary to hypoxia, steroids and diuresis   D/c lasix as Cr started to increase, pt looks euvolemic   D/w son in detail, palliative is seeing pt today   I explained that getting her home is unlikely as she continues to require high flow oxygen, we might be able to get her to 6 LPM but cannot guarantee, in addition, if she does get down to 6 LPM NC, she is probably going to fail again off high dose steroids/diuretics   Son wants to know if family should come visit, which I agreed with and supported  It seems patient and son are gradually accepting the poor prognosis and irreversiblity of her lung fibrosis

## 2019-06-27 NOTE — ASSESSMENT & PLAN NOTE
Continue RT protocols, bronchodilators and titrated oxygen, goal SPO2 88-92%  On high dose IV steroids  On doxycycline

## 2019-06-27 NOTE — FLOWSHEET NOTE
06/27/19 1153   Events/Summary/Plan   Events/Summary/Plan taken off hhfnc, placed on 5L NC   Interdisciplinary Plan of Care-Goals (Indications)   Bronchodilator Indications History / Diagnosis   Interdisciplinary Plan of Care-Outcomes    Bronchodilator Outcome Improved Vital Signs and Measures of Gas Exchange   Education   Education Yes - Pt. / Family has been Instructed in use of Respiratory Medications and Adverse Reactions   RT Assessment of Delivered Medications   Evaluation of Medication Delivery Daily Yes-- Pt /Family has been Instructed in use of Respiratory Medications and Adverse Reactions   SVN Group   #SVN Performed Yes   Given By: Mouthpiece   Respiratory WDL   Respiratory (WDL) X   Chest Exam   Work Of Breathing / Effort Mild   Respiration 20   Pulse 81   Heart Rate (Monitored) 83   Breath Sounds   RUL Breath Sounds Clear   RML Breath Sounds Diminished   RLL Breath Sounds Diminished   GINA Breath Sounds Clear   LLL Breath Sounds Diminished   Oximetry   Continuous Oximetry Yes   Oxygen   Pulse Oximetry 98 %   O2 (LPM) 5   O2 Daily Delivery Respiratory  Silicone Nasal Cannula

## 2019-06-27 NOTE — CARE PLAN
Problem: Safety  Goal: Will remain free from injury  Bed locked and in low position. Bed alarm on at all times. Pt instructed to call prior to getting out of bed, call light within reach. Mobility assessed and documented- 1 person hand held assist     Problem: Respiratory:  Goal: Respiratory status will improve  Collaboration w/ RT. Continuous O2 monitoring in place. Lung sounds assessed and documented. HFNC in place

## 2019-06-27 NOTE — PROGRESS NOTES
Critical Care Progress Note    Date of admission  6/26/2019    Chief Complaint  84 y.o. female admitted 6/26/2019 with ground-level fall, hypoxia and abnormal chest x-ray    Hospital Course    84 y.o. female who presented 6/26/2019 with recent hospitalization at [facility] 6/8-6/17 for abnormal chest x-ray and hypoxia.  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  Amiodarone was discontinued.  She was treated with IV corticosteroids during that hospitalization including Solu-Medrol 62.5 mg every 6 hours on 6 8, decreased to 40 mg every 8 hours from 6 9 through 6/12.  This was then lowered to prednisone 20 mg daily 6/12 through 6/15 then discontinued on 617 at discharge.  Amiodarone was not resumed.  After discharge home she was initially doing well on 2 to 3 L oxygen via nasal cannula.  She lives in a [facility] near [place] and her daughter checks on her frequently and checks her pulse oximetry.  This is been adequate with a goal between 88 and 92%.  She is a lifelong smoker a pack a day for most of her adult life but quit smoking at the time of her recent hospitalization this month.  She been feeling relatively well until the day of admission here.  She was apparently going to use the bathroom using her walker but her legs gave out and she fell to the ground and apparently did hit her the right side of her head.  There is no loss of consciousness and she denied presyncopal symptoms.  Her family called EMS she was found to be hypoxemic with room air saturation of 62% and was placed on nonrebreather and transferred to [facility].  Chest x-ray was again very abnormal with diffuse right greater than left alveolar infiltrates.  CT scan of the head was negative.  She remained at high oxygen requiring 10 L and was transferred to [facility] for further evaluation.  I reviewed her prior CT scan from 6/10 which showed diffuse multifocal parenchymal  "consolidation most notably in the right lung and emphysematous change.  Chest x-ray here shows worsening multifocal airspace disease on the right and some left lower lobe airspace disease as well as effusion.  Today she is undergoing a right thoracentesis and ongoing diuretic therapy.  She has history of chronic atrial fibrillation on home\" therapy with Xarelto, this is been temporarily held for thoracentesis.      Interval Problem Update  Reviewed last 24 hour events:   NAOE   A/O x 4   Chronic AF 70 - 110   Normotensive   1-2+ LE edema   Afebrile   I/O = 1.6/2.6   50L, 60% HFNC, no cxr today   Xarelto to resume today   PPI       Review of Systems  Review of Systems   Constitutional: Negative for chills and fever.   Eyes: Negative for blurred vision.   Respiratory: Positive for cough. Negative for hemoptysis and stridor.    Cardiovascular: Negative for chest pain and palpitations.   Gastrointestinal: Negative for heartburn, nausea and vomiting.   Genitourinary: Negative for dysuria.   Musculoskeletal: Negative for myalgias.        Vital Signs for last 24 hours   Temp:  [35.9 °C (96.6 °F)-36.5 °C (97.7 °F)] 35.9 °C (96.6 °F)  Pulse:  [] 102  Resp:  [12-32] 24  SpO2:  [90 %-99 %] 93 %    Hemodynamic parameters for last 24 hours       Respiratory Information for the last 24 hours       Physical Exam   Physical Exam   Constitutional: She is oriented to person, place, and time. She appears well-developed.   HENT:   Head: Atraumatic.   Eyes: Pupils are equal, round, and reactive to light.   Neck: Neck supple. No tracheal deviation present.   Cardiovascular: Normal rate and intact distal pulses.    No murmur heard.  Atrial fibrillation   Pulmonary/Chest: No respiratory distress. She has no wheezes. She has rales.   Abdominal: She exhibits no distension.   Musculoskeletal: She exhibits no edema or tenderness.   Neurological: She is alert and oriented to person, place, and time. No cranial nerve deficit.   Skin: Skin " is dry.       Medications  Current Facility-Administered Medications   Medication Dose Route Frequency Provider Last Rate Last Dose   • acetaminophen (TYLENOL) tablet 650 mg  650 mg Oral Q6HRS PRN Johann Montero M.D.       • senna-docusate (PERICOLACE or SENOKOT S) 8.6-50 MG per tablet 2 Tab  2 Tab Oral BID Santosh Elena M.D.   2 Tab at 06/27/19 0545    And   • polyethylene glycol/lytes (MIRALAX) PACKET 1 Packet  1 Packet Oral QDAY PRN Santosh Elena M.D.        And   • magnesium hydroxide (MILK OF MAGNESIA) suspension 30 mL  30 mL Oral QDAY PRN Santosh Elena M.D.        And   • bisacodyl (DULCOLAX) suppository 10 mg  10 mg Rectal QDAY PRN Santosh Elena M.D.       • Respiratory Care per Protocol   Nebulization Continuous RT Santosh Elena M.D.       • acetaminophen (TYLENOL) tablet 650 mg  650 mg Oral Q6HRS PRN Santosh Elena M.D.       • ipratropium-albuterol (DUONEB) nebulizer solution  3 mL Nebulization Q4HRS (RT) Santosh Elena M.D.   3 mL at 06/27/19 0727   • ondansetron (ZOFRAN) syringe/vial injection 4 mg  4 mg Intravenous Q4HRS PRN Santosh Elena M.D.       • ondansetron (ZOFRAN ODT) dispertab 4 mg  4 mg Oral Q4HRS PRN Santosh Elena M.D.       • budesonide-formoterol (SYMBICORT) 80-4.5 MCG/ACT inhaler 2 Puff  2 Puff Inhalation BID (RT) Santosh Elena M.D.   2 Puff at 06/27/19 0727   • buPROPion SR (WELLBUTRIN-SR) tablet 150 mg  150 mg Oral DAILY Santosh Elena M.D.   150 mg at 06/27/19 0545   • omeprazole (PRILOSEC) capsule 20 mg  20 mg Oral DAILY Santosh Elena M.D.   20 mg at 06/27/19 0545   • tiotropium (SPIRIVA) 18 MCG inhalation capsule 1 Cap  1 Cap Inhalation QDAILY (RT) Santosh Elena M.D.   1 Cap at 06/27/19 0759   • furosemide (LASIX) injection 40 mg  40 mg Intravenous Q DAY Santosh Elena M.D.   40 mg at 06/27/19 0540   • methylPREDNISolone sod succ (SOLU-MEDROL) 125 MG injection 62.5 mg  62.5 mg Intravenous Q6HRS Pablo Gaona M.D.   62.5 mg at 06/27/19 0539       Fluids    Intake/Output Summary (Last 24 hours) at  06/27/19 0805  Last data filed at 06/27/19 0600   Gross per 24 hour   Intake             1600 ml   Output             2675 ml   Net            -1075 ml       Laboratory  Recent Labs      06/26/19   0640   ISTATAPH  7.392*   ISTATAPCO2  39.4*   ISTATAPO2  54*   ISTATATCO2  25   UTMCISW2XYS  87*   ISTATARTHCO3  24.0   ISTATARTBE  -1   ISTATTEMP  98.6 F   ISTATFIO2  60   ISTATSPEC  Arterial   ISTATAPHTC  7.392*   YOCELIJC9PM  54*     Recent Labs      06/26/19   0530  06/27/19   0257   TROPONINI  <0.01   --    BNPBTYPENAT   --   402*     Recent Labs      06/26/19   0530   SODIUM  135   POTASSIUM  3.9   CHLORIDE  101   CO2  27   BUN  21   CREATININE  1.37   MAGNESIUM  1.9   CALCIUM  8.6     Recent Labs      06/26/19   0530   ALTSGPT  16   ASTSGOT  14   ALKPHOSPHAT  65   TBILIRUBIN  1.1   GLUCOSE  114*     Recent Labs      06/26/19   0530  06/26/19   1135   WBC  10.2   --    NEUTSPOLYS  78.80*   --    LYMPHOCYTES  9.30*   --    MONOCYTES  8.10   --    EOSINOPHILS  2.80  1   BASOPHILS  0.30   --    ASTSGOT  14   --    ALTSGPT  16   --    ALKPHOSPHAT  65   --    TBILIRUBIN  1.1   --      Recent Labs      06/26/19   0530   RBC  3.22*   HEMOGLOBIN  9.2*   HEMATOCRIT  30.4*   PLATELETCT  160*       Imaging  X-Ray:  I have personally reviewed the images and compared with prior images. 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  There may be cognitive pulmonary  edema however I suspect acute lung injury related to amiodarone pulmonary toxicity  Patient has been on amiodarone for approximately 2 years by her report  Amiodarone discontinued at recent hospitalization 6/8  Amiodarone pulmonary toxicity can present in multiple forms.  These include interstitial pneumonitis, organizing pneumonia, ARDS, diffuse alveolar hemorrhage or nodules  Appearance on chest imaging most consistent with an organizing pneumonia type.  She appears to be responding well to corticosteroid therapy and cessation of amiodarone during her recent hospitalization.  At this point I recommend the following:  Reinitiation of IV corticosteroids with Solu-Medrol then transition down to oral prednisone with a more prolonged 2-3-month course depending on her clinical response  Discontinuation of amiodarone is the hallmark of therapy and most patients do get improvement in pulmonary symptoms  At this point eosinophils pneumonia or alveolar hemorrhage appears less likely however if she does not clinically respond he may proceed with bronchoscopy and BAL.  Transbronchial lung biopsy is usually less helpful.  I have a very low suspicion for infectious etiology here however will follow her clinical course and response.  I will repeat CT scan of the chest and absent limited immune serology.     Stage 3 chronic kidney disease (HCC)- (present on admission)   Assessment & Plan    Follow renal function, avoid nephrotoxic agents     Pulmonary hypertension (HCC)- (present on admission)   Assessment & Plan    Follow-up echocardiogram after appropriate treatment     Chronic atrial fibrillation (HCC)- (present on admission)   Assessment & Plan    Continue rate control as needed  Xarelto held for thoracentesis, resume after procedure     COPD (chronic obstructive pulmonary disease) (HCC)- (present on admission)   Assessment & Plan    , Continue RT protocols, bronchodilators and titrated oxygen, goal SPO2 88-92%     Pleural  effusion on right- (present on admission)   Assessment & Plan    Prior thoracentesis 6/8 consistent with transudate of effusion  Given worsened respiratory status and recurrence of effusion we will repeat thoracentesis today  However, low suspicion for parapneumonic effusion, malignant effusion or other exudate     Swelling of lower leg- (present on admission)   Assessment & Plan    Gentle diuresis        Update:  Small right pneumothorax after thoracentesis yesterday noted, resolved on chest imaging.  Slightly more short of breath and some pleuritic chest pain today, will repeat chest imaging to rule out expansion of pneumothorax.  CT chest had been ordered and will be performed today.    I will continue IV Solu-Medrol today then transition to oral prednisone 40 mg daily tomorrow.  I will continue with Lasix and follow clinical status.  Her oxygen requirement has improved.    VTE:  NOAC  Ulcer: PPI  Lines: None    I have performed a physical exam and reviewed and updated ROS and Plan today (6/27/2019). In review of yesterday's note (6/26/2019), there are no changes except as documented above.     Discussed patient condition and risk of morbidity and/or mortality with Hospitalist, RN, RT, Pharmacy and QA team  High risk medically complex patient

## 2019-06-27 NOTE — ASSESSMENT & PLAN NOTE
S/p thoracentesis on 6/26. Transudative, mostly from volume overload, pulmonary hypertension can be contributing via bronchial venous congestion.  Chest x-ray show that the pleural effusion has re-accumlated, but is stable for now

## 2019-06-27 NOTE — ASSESSMENT & PLAN NOTE
Amiodarone was stopped earlier in June she was admitted with similar complaints.   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

## 2019-06-27 NOTE — CARE PLAN
Problem: Oxygenation:  Goal: Maintain adequate oxygenation dependent on patient condition  Outcome: PROGRESSING AS EXPECTED    Intervention: Manage oxygen therapy by monitoring pulse oximetry and/or ABG values  Pt on HHFNC 50L, 60% FiO2.  Will continue to titrate as tolerated.        Problem: Bronchoconstriction:  Goal: Improve in air movement and diminished wheezing  Outcome: PROGRESSING AS EXPECTED    Intervention: Implement inhaled treatments  Duoneb Q4  Symbicort BID

## 2019-06-27 NOTE — PROGRESS NOTES
[facility] Medicine Daily Progress Note    Date of Service  6/27/2019    Chief Complaint  84 y.o. female admitted 6/26/2019 with recent hospitalization at [facility], at the time it was felt the patient might have a combination of COPD, amiodarone lung toxicity and pulmonary hypertension leading to dyspnea, the patient was discharged and returns with increased respiratory difficulty.  The patient has extensive history of tobacco abuse.  Patient was admitted with hypoxia, respiratory failure.    Hospital Course    Admission with respiratory failure, hypoxia, likely amiodarone lung toxicity    Interval Problem Update  Patient seen and examined today. ICU Care  Care and plan discussed in IDT/Hot rounds.  Lines and assistive devices reviewed.    Patient tolerating treatment and therapies.  All Data, Medication data reviewed.  Case discussed with nursing as available.  Plan of Care reviewed with patient and notified of changes.  6/27 the patient remains in ICU, on high flow nasal cannula, alert and oriented x4, and chronic A. fib, rate controlled, blood pressure normal, lower extremity edema 1-2, received Lasix with additional urine output, CT chest today pending  Consultants/Specialty  Pulmonary critical care    Code Status  DNR/DNI    Disposition  ICU with need for stabilization    Review of Systems  Review of Systems   Constitutional: Positive for malaise/fatigue. Negative for chills and fever.   HENT: Negative.    Eyes: Negative.    Respiratory: Positive for cough and shortness of breath.    Cardiovascular: Positive for leg swelling. Negative for chest pain and palpitations.   Gastrointestinal: Negative.  Negative for heartburn, nausea and vomiting.   Genitourinary: Negative.  Negative for dysuria and frequency.   Musculoskeletal: Negative.  Negative for back pain and neck pain.   Skin: Negative.  Negative for itching and rash.   Neurological: Positive for weakness. Negative for dizziness, focal weakness and  headaches.   Endo/Heme/Allergies: Negative.  Negative for polydipsia. Does not bruise/bleed easily.   Psychiatric/Behavioral: Negative for depression. The patient is nervous/anxious.         Physical Exam  Temp:  [35.9 °C (96.6 °F)-36.5 °C (97.7 °F)] 35.9 °C (96.6 °F)  Pulse:  [] 102  Resp:  [12-32] 24  SpO2:  [90 %-99 %] 93 %    Physical Exam   Constitutional: She is oriented to person, place, and time. She appears well-developed and well-nourished.   HENT:   Head: Normocephalic and atraumatic.   Nose: Nose normal.   Mouth/Throat: Oropharynx is clear and moist.   Eyes: Pupils are equal, round, and reactive to light. Conjunctivae and EOM are normal.   Neck: Normal range of motion. Neck supple. No JVD present. No thyromegaly present.   Cardiovascular: Normal rate, regular rhythm, normal heart sounds and intact distal pulses.  Exam reveals no gallop and no friction rub.    Capillary refill <3 secs   Pulmonary/Chest: Effort normal. No respiratory distress. She has no wheezes. She has rales.   Abdominal: Soft. Bowel sounds are normal. She exhibits no distension and no mass. There is no tenderness. There is no rebound and no guarding.   Musculoskeletal: Normal range of motion. She exhibits no edema or tenderness.   Lymphadenopathy:     She has no cervical adenopathy.   Neurological: She is alert and oriented to person, place, and time. No cranial nerve deficit.   Skin: Skin is warm and dry. She is not diaphoretic. No cyanosis.   Psychiatric: She has a normal mood and affect. Her behavior is normal.   Nursing note and vitals reviewed.      Fluids    Intake/Output Summary (Last 24 hours) at 06/27/19 0807  Last data filed at 06/27/19 0600   Gross per 24 hour   Intake             1600 ml   Output             2675 ml   Net            -1075 ml       Laboratory  Recent Labs      06/26/19   0530   WBC  10.2   RBC  3.22*   HEMOGLOBIN  9.2*   HEMATOCRIT  30.4*   MCV  94.1   MCH  28.3   MCHC  30.0*   RDW  52.2*   PLATELETCT   160*   MPV  10.5     Recent Labs      06/26/19   0530   SODIUM  135   POTASSIUM  3.9   CHLORIDE  101   CO2  27   GLUCOSE  114*   BUN  21   CREATININE  1.37   CALCIUM  8.6         Recent Labs      06/27/19   0257   BNPBTYPENAT  402*           Imaging  US-EXTREMITY VENOUS LOWER BILAT   Final Result      DX-CHEST-PORTABLE (1 VIEW)   Final Result      Pneumothorax is not identified      Stable pulmonary scarring with right volume loss, right worse than left consolidation      US-THORACENTESIS PUNCTURE RIGHT   Final Result      1. Ultrasound guided right sided diagnostic and therapeutic thoracentesis.      2. 700 mL of fluid withdrawn.      Findings were discussed with Juan on 6/26/2019 12:40 PM.      DX-CHEST-PORTABLE (1 VIEW)   Final Result      1.  Tiny RIGHT pneumothorax   2.  Decreased RIGHT pleural fluid   3.  Otherwise no significant interval change      Findings were discussed with MARSHALL FOX on 6/26/2019 12:33 PM.      DX-CHEST-PORTABLE (1 VIEW)   Final Result      1.  Worsening hypoinflation and multifocal interstitial and alveolar opacities particularly involving the RIGHT lung, favoring pneumonia over edema.  Probable background of interstitial lung disease.   2.  RIGHT pleural fluid collection, apparently increased.      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 Normal regional   wall  motion. Left ventricular ejection fraction is visually estimated   to be 70%. Diastolic function is difficult to assess with atrial   Fibrillation. Normal right ventricular size and systolic function.  Mildly dilated left atrium. Mild mitral regurgitation. Moderate aortic insufficiency.   Moderate tricuspid regurgitation. Right atrial pressure is estimated to   be 15 mmHg. Estimated right ventricular systolic pressure  is 55 mmHg     Stage 3 chronic kidney disease (HCC)- (present on admission)   Assessment & Plan    At baseline kidney function  Avoiding nephrotoxics: NSAIDs etc  Monitor BMP         GERD (gastroesophageal reflux disease)- (present on admission)   Assessment & Plan    Continue omeprazole     Pulmonary hypertension (HCC)- (present on admission)   Assessment & Plan    Continue supplemental oxygen and diuresis     Chronic atrial fibrillation (HCC)- (present on admission)   Assessment & Plan    Restart home medication regimen, holding Coreg for this time  Restarting anticoagulation, given renal insufficiency changed to Eliquis     COPD (chronic obstructive pulmonary disease) (HCC)- (present on admission)   Assessment & Plan    No active wheezing at this time  Continue Spiriva and Symbicort  DuoNeb with RT protocol     Pleural effusion on right- (present on admission)   Assessment & Plan    Status post thoracentesis, yielding 700 cc of fluid       Swelling of lower leg- (present on admission)   Assessment & Plan    Negative for DVT  Elevate extremities  Started on diuresis as blood pressure allows        Plan   Recheck CT chest  Continue steroids  Diuresis as tolerated  Restart anticoagulation with Eliquis  Respiratory care and titration of high flow nasal cannula  Medically complex and high risk  See orders  Supportive care  VTE prophylaxis: Eliquis  I have performed a physical exam and reviewed and updated ROS and Plan today . In review of yesterday's note , there are no changes except as documented  above.

## 2019-06-28 LAB
ANION GAP SERPL CALC-SCNC: 10 MMOL/L (ref 0–11.9)
BNP SERPL-MCNC: 275 PG/ML (ref 0–100)
BUN SERPL-MCNC: 31 MG/DL (ref 8–22)
CALCIUM SERPL-MCNC: 8.4 MG/DL (ref 8.5–10.5)
CHLORIDE SERPL-SCNC: 101 MMOL/L (ref 96–112)
CO2 SERPL-SCNC: 26 MMOL/L (ref 20–33)
CREAT SERPL-MCNC: 1.35 MG/DL (ref 0.5–1.4)
ERYTHROCYTE [DISTWIDTH] IN BLOOD BY AUTOMATED COUNT: 50.1 FL (ref 35.9–50)
GLUCOSE BLD-MCNC: 134 MG/DL (ref 65–99)
GLUCOSE BLD-MCNC: 182 MG/DL (ref 65–99)
GLUCOSE BLD-MCNC: 189 MG/DL (ref 65–99)
GLUCOSE BLD-MCNC: 198 MG/DL (ref 65–99)
GLUCOSE BLD-MCNC: 204 MG/DL (ref 65–99)
GLUCOSE SERPL-MCNC: 183 MG/DL (ref 65–99)
HCT VFR BLD AUTO: 28.8 % (ref 37–47)
HGB BLD-MCNC: 9.3 G/DL (ref 12–16)
MAGNESIUM SERPL-MCNC: 2.1 MG/DL (ref 1.5–2.5)
MCH RBC QN AUTO: 29.6 PG (ref 27–33)
MCHC RBC AUTO-ENTMCNC: 32.3 G/DL (ref 33.6–35)
MCV RBC AUTO: 91.7 FL (ref 81.4–97.8)
PHOSPHATE SERPL-MCNC: 4.2 MG/DL (ref 2.5–4.5)
PLATELET # BLD AUTO: 189 K/UL (ref 164–446)
PMV BLD AUTO: 10.4 FL (ref 9–12.9)
POTASSIUM SERPL-SCNC: 4.1 MMOL/L (ref 3.6–5.5)
RBC # BLD AUTO: 3.14 M/UL (ref 4.2–5.4)
RHODAMINE-AURAMINE STN SPEC: NORMAL
SIGNIFICANT IND 70042: NORMAL
SITE SITE: NORMAL
SODIUM SERPL-SCNC: 137 MMOL/L (ref 135–145)
SOURCE SOURCE: NORMAL
WBC # BLD AUTO: 11.5 K/UL (ref 4.8–10.8)

## 2019-06-28 PROCEDURE — 84100 ASSAY OF PHOSPHORUS: CPT

## 2019-06-28 PROCEDURE — 82962 GLUCOSE BLOOD TEST: CPT | Mod: 91

## 2019-06-28 PROCEDURE — 85027 COMPLETE CBC AUTOMATED: CPT

## 2019-06-28 PROCEDURE — A9270 NON-COVERED ITEM OR SERVICE: HCPCS | Performed by: HOSPITALIST

## 2019-06-28 PROCEDURE — 94640 AIRWAY INHALATION TREATMENT: CPT

## 2019-06-28 PROCEDURE — 700102 HCHG RX REV CODE 250 W/ 637 OVERRIDE(OP): Performed by: HOSPITALIST

## 2019-06-28 PROCEDURE — 97165 OT EVAL LOW COMPLEX 30 MIN: CPT

## 2019-06-28 PROCEDURE — 80048 BASIC METABOLIC PNL TOTAL CA: CPT

## 2019-06-28 PROCEDURE — 770020 HCHG ROOM/CARE - TELE (206)

## 2019-06-28 PROCEDURE — 83735 ASSAY OF MAGNESIUM: CPT

## 2019-06-28 PROCEDURE — A9270 NON-COVERED ITEM OR SERVICE: HCPCS | Performed by: INTERNAL MEDICINE

## 2019-06-28 PROCEDURE — 99233 SBSQ HOSP IP/OBS HIGH 50: CPT | Performed by: HOSPITALIST

## 2019-06-28 PROCEDURE — 83880 ASSAY OF NATRIURETIC PEPTIDE: CPT

## 2019-06-28 PROCEDURE — 99233 SBSQ HOSP IP/OBS HIGH 50: CPT | Performed by: INTERNAL MEDICINE

## 2019-06-28 PROCEDURE — 700111 HCHG RX REV CODE 636 W/ 250 OVERRIDE (IP): Performed by: INTERNAL MEDICINE

## 2019-06-28 PROCEDURE — 97162 PT EVAL MOD COMPLEX 30 MIN: CPT

## 2019-06-28 PROCEDURE — 700101 HCHG RX REV CODE 250: Performed by: INTERNAL MEDICINE

## 2019-06-28 PROCEDURE — 700102 HCHG RX REV CODE 250 W/ 637 OVERRIDE(OP): Performed by: INTERNAL MEDICINE

## 2019-06-28 RX ORDER — TIOTROPIUM BROMIDE 18 UG/1
1 CAPSULE ORAL; RESPIRATORY (INHALATION) DAILY
Status: DISCONTINUED | OUTPATIENT
Start: 2019-06-29 | End: 2019-07-04

## 2019-06-28 RX ORDER — IPRATROPIUM BROMIDE AND ALBUTEROL SULFATE 2.5; .5 MG/3ML; MG/3ML
3 SOLUTION RESPIRATORY (INHALATION)
Status: DISCONTINUED | OUTPATIENT
Start: 2019-06-28 | End: 2019-07-03

## 2019-06-28 RX ORDER — PREDNISONE 10 MG/1
40 TABLET ORAL DAILY
Status: DISCONTINUED | OUTPATIENT
Start: 2019-06-28 | End: 2019-07-01

## 2019-06-28 RX ORDER — BUDESONIDE AND FORMOTEROL FUMARATE DIHYDRATE 80; 4.5 UG/1; UG/1
2 AEROSOL RESPIRATORY (INHALATION) 2 TIMES DAILY
Status: DISCONTINUED | OUTPATIENT
Start: 2019-06-28 | End: 2019-07-01

## 2019-06-28 RX ADMIN — BUDESONIDE AND FORMOTEROL FUMARATE DIHYDRATE 2 PUFF: 80; 4.5 AEROSOL RESPIRATORY (INHALATION) at 06:50

## 2019-06-28 RX ADMIN — IPRATROPIUM BROMIDE AND ALBUTEROL SULFATE 3 ML: .5; 3 SOLUTION RESPIRATORY (INHALATION) at 11:56

## 2019-06-28 RX ADMIN — INSULIN HUMAN 2 UNITS: 100 INJECTION, SOLUTION PARENTERAL at 11:45

## 2019-06-28 RX ADMIN — IPRATROPIUM BROMIDE AND ALBUTEROL SULFATE 3 ML: .5; 3 SOLUTION RESPIRATORY (INHALATION) at 18:42

## 2019-06-28 RX ADMIN — IPRATROPIUM BROMIDE AND ALBUTEROL SULFATE 3 ML: .5; 3 SOLUTION RESPIRATORY (INHALATION) at 06:50

## 2019-06-28 RX ADMIN — PREDNISONE 40 MG: 10 TABLET ORAL at 11:45

## 2019-06-28 RX ADMIN — BUDESONIDE AND FORMOTEROL FUMARATE DIHYDRATE 2 PUFF: 80; 4.5 AEROSOL RESPIRATORY (INHALATION) at 17:30

## 2019-06-28 RX ADMIN — INSULIN HUMAN 2 UNITS: 100 INJECTION, SOLUTION PARENTERAL at 05:53

## 2019-06-28 RX ADMIN — METHYLPREDNISOLONE SODIUM SUCCINATE 62.5 MG: 125 INJECTION, POWDER, FOR SOLUTION INTRAMUSCULAR; INTRAVENOUS at 05:46

## 2019-06-28 RX ADMIN — APIXABAN 5 MG: 5 TABLET, FILM COATED ORAL at 17:30

## 2019-06-28 RX ADMIN — INSULIN HUMAN 2 UNITS: 100 INJECTION, SOLUTION PARENTERAL at 20:55

## 2019-06-28 RX ADMIN — APIXABAN 5 MG: 5 TABLET, FILM COATED ORAL at 05:42

## 2019-06-28 RX ADMIN — OMEPRAZOLE 20 MG: 20 CAPSULE, DELAYED RELEASE ORAL at 05:44

## 2019-06-28 RX ADMIN — IPRATROPIUM BROMIDE AND ALBUTEROL SULFATE 3 ML: .5; 3 SOLUTION RESPIRATORY (INHALATION) at 15:43

## 2019-06-28 RX ADMIN — TIOTROPIUM BROMIDE 1 CAPSULE: 18 CAPSULE ORAL; RESPIRATORY (INHALATION) at 06:50

## 2019-06-28 RX ADMIN — FUROSEMIDE 40 MG: 10 INJECTION, SOLUTION INTRAVENOUS at 05:46

## 2019-06-28 RX ADMIN — BUPROPION HYDROCHLORIDE 150 MG: 150 TABLET, EXTENDED RELEASE ORAL at 05:42

## 2019-06-28 ASSESSMENT — ENCOUNTER SYMPTOMS
VOMITING: 0
SHORTNESS OF BREATH: 1
WEAKNESS: 1
NERVOUS/ANXIOUS: 1
MYALGIAS: 0
HEMOPTYSIS: 0
DIZZINESS: 0
GASTROINTESTINAL NEGATIVE: 1
POLYDIPSIA: 0
HEADACHES: 0
FEVER: 0
NECK PAIN: 0
CHILLS: 0
COUGH: 1
MUSCULOSKELETAL NEGATIVE: 1
DEPRESSION: 0
PALPITATIONS: 0
FOCAL WEAKNESS: 0
BLURRED VISION: 0
EYES NEGATIVE: 1
HEARTBURN: 0
BACK PAIN: 0
BRUISES/BLEEDS EASILY: 0
STRIDOR: 0
NAUSEA: 0

## 2019-06-28 ASSESSMENT — COGNITIVE AND FUNCTIONAL STATUS - GENERAL
MOVING TO AND FROM BED TO CHAIR: UNABLE
DRESSING REGULAR UPPER BODY CLOTHING: A LITTLE
CLIMB 3 TO 5 STEPS WITH RAILING: TOTAL
DRESSING REGULAR LOWER BODY CLOTHING: A LITTLE
HELP NEEDED FOR BATHING: A LITTLE
WALKING IN HOSPITAL ROOM: A LOT
STANDING UP FROM CHAIR USING ARMS: A LITTLE
SUGGESTED CMS G CODE MODIFIER MOBILITY: CM
MOVING FROM LYING ON BACK TO SITTING ON SIDE OF FLAT BED: UNABLE
MOBILITY SCORE: 9
DAILY ACTIVITIY SCORE: 19
TOILETING: A LOT
TURNING FROM BACK TO SIDE WHILE IN FLAT BAD: UNABLE
SUGGESTED CMS G CODE MODIFIER DAILY ACTIVITY: CK

## 2019-06-28 ASSESSMENT — GAIT ASSESSMENTS
ASSISTIVE DEVICE: OTHER (COMMENTS)
DEVIATION: INCREASED BASE OF SUPPORT;BRADYKINETIC;SHUFFLED GAIT
GAIT LEVEL OF ASSIST: MINIMAL ASSIST
DISTANCE (FEET): 5

## 2019-06-28 ASSESSMENT — ACTIVITIES OF DAILY LIVING (ADL): TOILETING: INDEPENDENT

## 2019-06-28 NOTE — PROGRESS NOTES
Pt tolerating diet well, O2 titrated by RT. Remains dyspnic with low o2 sats after ambulation. Recovers after several minutes

## 2019-06-28 NOTE — CARE PLAN
Problem: Oxygenation:  Goal: Maintain adequate oxygenation dependent on patient condition  Outcome: PROGRESSING AS EXPECTED    Intervention: Manage oxygen therapy by monitoring pulse oximetry and/or ABG values   Pt on 10L oxymask, will continue to titrate as tolerated      Problem: Bronchoconstriction:  Goal: Improve in air movement and diminished wheezing  Outcome: PROGRESSING AS EXPECTED    Intervention: Implement inhaled treatments  Duoneb QID  Symbicort BID  Spiriva Qday

## 2019-06-28 NOTE — THERAPY
"Occupational Therapy Evaluation completed.   Functional Status: Supine > EOB min A, transfers with 3WW min A, LB dressing min A, toileting max A  Plan of Care: Will benefit from Occupational Therapy 3 times per week  Discharge Recommendations:  Equipment: Will Continue to Assess for Equipment Needs. Post-acute therapy: Discharge to a transitional care facility for continued skilled therapy services.    See \"Rehab Therapy-Acute\" Patient Summary Report for complete documentation.    84 y.o. female with h/o COPD (3L at baseline), a-fib, HTN, who presented with SOB, GLF. Pt dx with Amiodarone pulmonary toxicity, pleural effusion, CKDIII. Seen now for OT eval. On high flow oxygen. Pt is completing basic ADL and transfers with min A. She is independent at baseline, but uses 4WW or 3WW and has assist from family with I-ADL. Pt is limited by: generalized weakness, activity intolerance, balance impairment. Acute OT to follow to train on compensatory ADL, progress safe transfers and standing activity, assist with DC planning and DME needs. As of this assessment, pt would benefit from post-acute transitional care, however may progress to the point where she could DC home with family support and HH. Will continue to monitor and update recs accordingly.      "

## 2019-06-28 NOTE — CARE PLAN
Problem: Safety  Goal: Will remain free from injury  Bed locked and in low position. Bed alarm on at all times. Pt instructed to call prior to getting out of bed, demonstrates understanding. Call light within reach. Treaded slipper socks in place. Pt utilizes own home walker at bedside     Problem: Respiratory:  Goal: Respiratory status will improve  Pt O2 sat drops significantly w/ exertion. Continuous O2 monitoring in place, pt requiring 10L oxymask. Collaboration w/ RT

## 2019-06-28 NOTE — PROGRESS NOTES
Pt updated on elevated blood glucose and new order for insulin. Has had hyperglycemia before with steroid therapy per son. Back to bed to travel to CT scan.

## 2019-06-28 NOTE — CARE PLAN
Problem: Knowledge Deficit  Goal: Knowledge of disease process/condition, treatment plan, diagnostic tests, and medications will improve  Outcome: PROGRESSING AS EXPECTED  All care explained as given

## 2019-06-28 NOTE — PROGRESS NOTES
Critical Care Progress Note    Date of admission  6/26/2019    Chief Complaint  84 y.o. female admitted 6/26/2019 with ground-level fall, hypoxia and abnormal chest x-ray    Hospital Course    84 y.o. female who presented 6/26/2019 with recent hospitalization at [facility] 6/8-6/17 for abnormal chest x-ray and hypoxia.  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  Amiodarone was discontinued.  She was treated with IV corticosteroids during that hospitalization including Solu-Medrol 62.5 mg every 6 hours on 6 8, decreased to 40 mg every 8 hours from 6 9 through 6/12.  This was then lowered to prednisone 20 mg daily 6/12 through 6/15 then discontinued on 617 at discharge.  Amiodarone was not resumed.  After discharge home she was initially doing well on 2 to 3 L oxygen via nasal cannula.  She lives in a [facility] near [place] and her daughter checks on her frequently and checks her pulse oximetry.  This is been adequate with a goal between 88 and 92%.  She is a lifelong smoker a pack a day for most of her adult life but quit smoking at the time of her recent hospitalization this month.  She been feeling relatively well until the day of admission here.  She was apparently going to use the bathroom using her walker but her legs gave out and she fell to the ground and apparently did hit her the right side of her head.  There is no loss of consciousness and she denied presyncopal symptoms.  Her family called EMS she was found to be hypoxemic with room air saturation of 62% and was placed on nonrebreather and transferred to [facility].  Chest x-ray was again very abnormal with diffuse right greater than left alveolar infiltrates.  CT scan of the head was negative.  She remained at high oxygen requiring 10 L and was transferred to [facility] for further evaluation.  I reviewed her prior CT scan from 6/10 which showed diffuse multifocal parenchymal  "consolidation most notably in the right lung and emphysematous change.  Chest x-ray here shows worsening multifocal airspace disease on the right and some left lower lobe airspace disease as well as effusion.  Today she is undergoing a right thoracentesis and ongoing diuretic therapy.  She has history of chronic atrial fibrillation on home\" therapy with Xarelto, this is been temporarily held for thoracentesis.      Interval Problem Update  Reviewed last 24 hour events:   Afebrile, Tm 97.4   WBC 11.5; on solumedrol; transition to PO pred today   On Eliquis   Lasix 40 IV qd; I/O = 1.4/1.2   A/o x 4   AF 70 - 110   Normotensive/soft   6 lpm oxygen; on/off HFNC   2 gm sodium diet   Home PPI   No abx   Mg and phos nl       Yesterday:   NAOE   A/O x 4   Chronic AF 70 - 110   Normotensive   1-2+ LE edema   Afebrile   I/O = 1.6/2.6   50L, 60% HFNC, no cxr today   Xarelto to resume today   PPI       Review of Systems  Review of Systems   Constitutional: Negative for chills and fever.   Eyes: Negative for blurred vision.   Respiratory: Positive for cough. Negative for hemoptysis and stridor.    Cardiovascular: Negative for chest pain and palpitations.   Gastrointestinal: Negative for heartburn, nausea and vomiting.   Genitourinary: Negative for dysuria.   Musculoskeletal: Negative for myalgias.        Vital Signs for last 24 hours   Temp:  [36.1 °C (97 °F)-36.3 °C (97.4 °F)] 36.2 °C (97.2 °F)  Pulse:  [] 109  Resp:  [16-36] 29  SpO2:  [85 %-98 %] 93 %    Hemodynamic parameters for last 24 hours       Respiratory Information for the last 24 hours       Physical Exam   Physical Exam   Constitutional: She is oriented to person, place, and time. She appears well-developed.   HENT:   Head: Atraumatic.   Eyes: Pupils are equal, round, and reactive to light.   Neck: Neck supple. No tracheal deviation present.   Cardiovascular: Normal rate and intact distal pulses.    No murmur heard.  Atrial fibrillation   Pulmonary/Chest: No " respiratory distress. She has no wheezes. She has rales.   Abdominal: She exhibits no distension.   Musculoskeletal: She exhibits no edema or tenderness.   Neurological: She is alert and oriented to person, place, and time. No cranial nerve deficit.   Skin: Skin is dry.   Updated, unchanged    Medications  Current Facility-Administered Medications   Medication Dose Route Frequency Provider Last Rate Last Dose   • insulin regular (HUMULIN R) injection 2-9 Units  2-9 Units Subcutaneous 4X/DAY ACHS Pablo Gaona M.D.   2 Units at 06/28/19 0553    And   • glucose 4 g chewable tablet 16 g  16 g Oral Q15 MIN PRN Pablo Gaona M.D.        And   • DEXTROSE 10% BOLUS 250 mL  250 mL Intravenous Q15 MIN PRN Pablo Gaona M.D.       • ipratropium-albuterol (DUONEB) nebulizer solution  3 mL Nebulization 4X/DAY (RT) Pablo Gaona M.D.   3 mL at 06/28/19 0650   • apixaban (ELIQUIS) tablet 5 mg  5 mg Oral BID Raphael Martinez M.D.   5 mg at 06/28/19 0542   • acetaminophen (TYLENOL) tablet 650 mg  650 mg Oral Q6HRS PRN Johann Montero M.D.       • senna-docusate (PERICOLACE or SENOKOT S) 8.6-50 MG per tablet 2 Tab  2 Tab Oral BID Santosh Elena M.D.   2 Tab at 06/27/19 1737    And   • polyethylene glycol/lytes (MIRALAX) PACKET 1 Packet  1 Packet Oral QDAY PRN Santosh Elena M.D.        And   • magnesium hydroxide (MILK OF MAGNESIA) suspension 30 mL  30 mL Oral QDAY PRN Santosh Elena M.D.        And   • bisacodyl (DULCOLAX) suppository 10 mg  10 mg Rectal QDAY PRN Santosh Elena M.D.       • Respiratory Care per Protocol   Nebulization Continuous RT Santosh Elena M.D.       • acetaminophen (TYLENOL) tablet 650 mg  650 mg Oral Q6HRS PRN Santosh Elena M.D.       • ondansetron (ZOFRAN) syringe/vial injection 4 mg  4 mg Intravenous Q4HRS PRN Santosh Elena M.D.       • ondansetron (ZOFRAN ODT) dispertab 4 mg  4 mg Oral Q4HRS PRN Santosh Elena M.D.       • budesonide-formoterol (SYMBICORT) 80-4.5 MCG/ACT inhaler 2 Puff  2 Puff Inhalation  BID (RT) Santosh Elena M.D.   2 Puff at 06/28/19 0650   • buPROPion SR (WELLBUTRIN-SR) tablet 150 mg  150 mg Oral DAILY Santosh Elena M.D.   150 mg at 06/28/19 0542   • omeprazole (PRILOSEC) capsule 20 mg  20 mg Oral DAILY Santosh Elena M.D.   20 mg at 06/28/19 0544   • tiotropium (SPIRIVA) 18 MCG inhalation capsule 1 Cap  1 Cap Inhalation QDAILY (RT) Santosh Elena M.D.   1 Cap at 06/28/19 0650   • furosemide (LASIX) injection 40 mg  40 mg Intravenous Q DAY Santosh Elena M.D.   40 mg at 06/28/19 0546   • methylPREDNISolone sod succ (SOLU-MEDROL) 125 MG injection 62.5 mg  62.5 mg Intravenous Q6HRS Pablo Gaona M.D.   62.5 mg at 06/28/19 0546       Fluids    Intake/Output Summary (Last 24 hours) at 06/28/19 0914  Last data filed at 06/28/19 0800   Gross per 24 hour   Intake             1410 ml   Output             1075 ml   Net              335 ml       Laboratory  Recent Labs      06/26/19   0640   ISTATAPH  7.392*   ISTATAPCO2  39.4*   ISTATAPO2  54*   ISTATATCO2  25   [id number]  87*   ISTATARTHCO3  24.0   ISTATARTBE  -1   ISTATTEMP  98.6 F   ISTATFIO2  60   ISTATSPEC  Arterial   ISTATAPHTC  7.392*   [id number]  54*     Recent Labs      06/26/19   0530  06/27/19   0257  06/28/19   0427   TROPONINI  <0.01   --    --    BNPBTYPENAT   --   402*  275*     Recent Labs      06/26/19   0530  06/27/19   1130  06/28/19   0427   SODIUM  135  136  137   POTASSIUM  3.9  4.0  4.1   CHLORIDE  101  98  101   CO2  27 26 26   BUN  21  29*  31*   CREATININE  1.37  1.40  1.35   MAGNESIUM  1.9  2.3  2.1   PHOSPHORUS   --   3.7  4.2   CALCIUM  8.6  8.2*  8.4*     Recent Labs      06/26/19   0530  06/27/19   1130  06/28/19   0427   ALTSGPT  16  14   --    ASTSGOT  14  12   --    ALKPHOSPHAT  65  63   --    TBILIRUBIN  1.1  0.7   --    GLUCOSE  114*  307*  183*     Recent Labs      06/26/19   0530  06/26/19   1135  06/27/19   1130  06/28/19   0427   WBC  10.2   --   8.3  11.5*   NEUTSPOLYS  78.80*   --   92.00*   --    LYMPHOCYTES   9.30*   --   4.00*   --    MONOCYTES  8.10   --   3.30   --    EOSINOPHILS  2.80  1  0.00   --    BASOPHILS  0.30   --   0.10   --    ASTSGOT  14   --   12   --    ALTSGPT  16   --   14   --    ALKPHOSPHAT  65   --   63   --    TBILIRUBIN  1.1   --   0.7   --      Recent Labs      06/26/19   0530  06/27/19   1130  06/28/19   0427   RBC  3.22*  3.43*  3.14*   HEMOGLOBIN  9.2*  9.8*  9.3*   HEMATOCRIT  30.4*  32.2*  28.8*   PLATELETCT  160*  186  189       Imaging  X-Ray:  I have personally reviewed the images and compared with prior images. 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  There may be cognitive pulmonary edema however I suspect acute lung injury related to amiodarone pulmonary toxicity  Patient has been on amiodarone for approximately 2 years by her report  Amiodarone discontinued at recent hospitalization 6/8  Amiodarone pulmonary toxicity can present in multiple forms.  These include interstitial pneumonitis, organizing pneumonia, ARDS, diffuse alveolar hemorrhage or nodules  Appearance on chest imaging most consistent with an organizing pneumonia type.  She appears to be responding well to corticosteroid therapy and cessation of amiodarone during her recent hospitalization.  At this point I recommend the following:  Reinitiation of IV corticosteroids with Solu-Medrol then transition down to oral prednisone with a more prolonged 2-3-month  course depending on her clinical response  Discontinuation of amiodarone is the hallmark of therapy and most patients do get improvement in pulmonary symptoms  At this point eosinophils pneumonia or alveolar hemorrhage appears less likely however if she does not clinically respond he may proceed with bronchoscopy and BAL.  Transbronchial lung biopsy is usually less helpful.  I have a very low suspicion for infectious etiology here however will follow her clinical course and response.  I will repeat CT scan of the chest and absent limited immune serology.     Stage 3 chronic kidney disease (HCC)- (present on admission)   Assessment & Plan    Follow renal function, avoid nephrotoxic agents     Pulmonary hypertension (HCC)- (present on admission)   Assessment & Plan    Follow-up echocardiogram after appropriate treatment     Chronic atrial fibrillation (HCC)- (present on admission)   Assessment & Plan    Continue rate control as needed  Xarelto held for thoracentesis, resume after procedure     COPD (chronic obstructive pulmonary disease) (HCC)- (present on admission)   Assessment & Plan    , Continue RT protocols, bronchodilators and titrated oxygen, goal SPO2 88-92%     Pleural effusion on right- (present on admission)   Assessment & Plan    Prior thoracentesis 6/8 consistent with transudate of effusion  Given worsened respiratory status and recurrence of effusion we will repeat thoracentesis today  However, low suspicion for parapneumonic effusion, malignant effusion or other exudate     Swelling of lower leg- (present on admission)   Assessment & Plan    Gentle diuresis        Update:  CT scan noted  Transition to oral prednisone 40 mg daily now, plan for prolonged prednisone course of the next several weeks  Monitor clinical response, FiO2 requirement  If not clinically improving may require bronchoscopy with BAL, transbronchial biopsy rule out alternative etiology for diffuse lung disease  Okay to move out of  ICU  Pulmonary service will continue to follow    VTE:  NOAC  Ulcer: PPI  Lines: None    I have performed a physical exam and reviewed and updated ROS and Plan today (6/28/2019). In review of yesterday's note (6/27/2019), there are no changes except as documented above.     Discussed patient condition and risk of morbidity and/or mortality with Hospitalist, RN, RT, Pharmacy and QA team  High risk medically complex patient

## 2019-06-28 NOTE — CARE PLAN
Problem: Fluid Volume:  Goal: Will maintain balanced intake and output  Outcome: PROGRESSING AS EXPECTED  Diuresing after lasix, edema stable/improving slightly

## 2019-06-28 NOTE — PROGRESS NOTES
[facility] Medicine Daily Progress Note    Date of Service  6/28/2019    Chief Complaint  84 y.o. female admitted 6/26/2019 with recent hospitalization at [facility], at the time it was felt the patient might have a combination of COPD, amiodarone lung toxicity and pulmonary hypertension leading to dyspnea, the patient was discharged and returns with increased respiratory difficulty.  The patient has extensive history of tobacco abuse.  Patient was admitted with hypoxia, respiratory failure.    Hospital Course    Admission with respiratory failure, hypoxia, likely amiodarone lung toxicity    Interval Problem Update  Patient seen and examined today. ICU Care  Care and plan discussed in IDT/Hot rounds.  Lines and assistive devices reviewed.    Patient tolerating treatment and therapies.  All Data, Medication data reviewed.  Case discussed with nursing as available.  Plan of Care reviewed with patient and notified of changes.  6/27 the patient remains in ICU, on high flow nasal cannula, alert and oriented x4, and chronic A. fib, rate controlled, blood pressure normal, lower extremity edema 1-2, received Lasix with additional urine output, CT chest today pending  6/28 patient is afebrile, alert and oriented x4, blood pressure in the 100s, and A. fib heart rate 70- 110, positive lower extremity edema, Lasix resulting in good urine output, the patient is on 6 L nasal cannula she uses high flow during eating, she had a bowel movement, CT reported and continued concern for lung toxicity changes.  Consultants/Specialty  Pulmonary critical care    Code Status  DNR/DNI    Disposition  ICU with stabilization now for telemetry transfer    Review of Systems  Review of Systems   Constitutional: Positive for malaise/fatigue. Negative for chills and fever.   HENT: Negative.    Eyes: Negative.    Respiratory: Positive for cough and shortness of breath.    Cardiovascular: Positive for leg swelling. Negative for chest pain and  palpitations.   Gastrointestinal: Negative.  Negative for heartburn, nausea and vomiting.   Genitourinary: Negative.  Negative for dysuria and frequency.   Musculoskeletal: Negative.  Negative for back pain and neck pain.   Skin: Negative.  Negative for itching and rash.   Neurological: Positive for weakness. Negative for dizziness, focal weakness and headaches.   Endo/Heme/Allergies: Negative.  Negative for polydipsia. Does not bruise/bleed easily.   Psychiatric/Behavioral: Negative for depression. The patient is nervous/anxious.         Physical Exam  Temp:  [36.1 °C (97 °F)-36.3 °C (97.4 °F)] 36.1 °C (97 °F)  Pulse:  [] 95  Resp:  [16-45] 20  SpO2:  [85 %-98 %] 90 %    Physical Exam   Constitutional: She is oriented to person, place, and time. She appears well-developed and well-nourished.   HENT:   Head: Normocephalic and atraumatic.   Nose: Nose normal.   Mouth/Throat: Oropharynx is clear and moist.   Eyes: Pupils are equal, round, and reactive to light. Conjunctivae and EOM are normal.   Neck: Normal range of motion. Neck supple. No JVD present. No thyromegaly present.   Cardiovascular: Normal rate, regular rhythm, normal heart sounds and intact distal pulses.  Exam reveals no gallop and no friction rub.    Capillary refill <3 secs   Pulmonary/Chest: Effort normal. No respiratory distress. She has no wheezes. She has rales.   Abdominal: Soft. Bowel sounds are normal. She exhibits no distension and no mass. There is no tenderness. There is no rebound and no guarding.   Musculoskeletal: Normal range of motion. She exhibits no edema or tenderness.   Lymphadenopathy:     She has no cervical adenopathy.   Neurological: She is alert and oriented to person, place, and time. No cranial nerve deficit.   Skin: Skin is warm and dry. She is not diaphoretic. No cyanosis.   Psychiatric: She has a normal mood and affect. Her behavior is normal.   Nursing note and vitals reviewed.      Fluids    Intake/Output Summary  (Last 24 hours) at 06/28/19 0753  Last data filed at 06/28/19 0600   Gross per 24 hour   Intake             1460 ml   Output             1250 ml   Net              210 ml       Laboratory  Recent Labs      06/26/19   0530  06/27/19   1130  06/28/19   0427   WBC  10.2  8.3  11.5*   RBC  3.22*  3.43*  3.14*   HEMOGLOBIN  9.2*  9.8*  9.3*   HEMATOCRIT  30.4*  32.2*  28.8*   MCV  94.1  93.9  91.7   MCH  28.3  28.6  29.6   MCHC  30.0*  30.4*  32.3*   RDW  52.2*  51.8*  50.1*   PLATELETCT  160*  186  189   MPV  10.5  10.5  10.4     Recent Labs      06/26/19   0530  06/27/19   1130  06/28/19   0427   SODIUM  135  136  137   POTASSIUM  3.9  4.0  4.1   CHLORIDE  101  98  101   CO2  27 26 26   GLUCOSE  114*  307*  183*   BUN  21  29*  31*   CREATININE  1.37  1.40  1.35   CALCIUM  8.6  8.2*  8.4*         Recent Labs      06/27/19   0257  06/28/19   0427   BNPBTYPENAT  402*  275*           Imaging  CT-CHEST (THORAX) W/O   Final Result      Extensive airspace opacities on the right are mildly increased in the right upper lobe and slightly improved in the right lower lobe. There is some cavitation.      Airspace opacities on the left are increased in the lingula and mildly improved in the left upper lobe.      Bilateral underlying fibrotic changes and emphysematous changes are noted.      Subpleural nodule in the left lower lobe measures 6 mm.      Follow-up to radiographic resolution recommended.      Small bilateral pleural effusions, right greater than left.      Mediastinal lymphadenopathy is not significantly changed.      Atherosclerotic plaque.      Cardiomegaly.      Pancreatic calcifications likely sequelae of chronic pancreatitis.      Cholelithiasis.      US-EXTREMITY VENOUS LOWER BILAT   Final Result      DX-CHEST-PORTABLE (1 VIEW)   Final Result      Pneumothorax is not identified      Stable pulmonary scarring with right volume loss, right worse than left consolidation      US-THORACENTESIS PUNCTURE RIGHT   Final  Result      1. Ultrasound guided right sided diagnostic and therapeutic thoracentesis.      2. 700 mL of fluid withdrawn.      Findings were discussed with Juan on 6/26/2019 12:40 PM.      DX-CHEST-PORTABLE (1 VIEW)   Final Result      1.  Tiny RIGHT pneumothorax   2.  Decreased RIGHT pleural fluid   3.  Otherwise no significant interval change      Findings were discussed with MARSHALL FOX on 6/26/2019 12:33 PM.      DX-CHEST-PORTABLE (1 VIEW)   Final Result      1.  Worsening hypoinflation and multifocal interstitial and alveolar opacities particularly involving the RIGHT lung, favoring pneumonia over edema.  Probable background of interstitial lung disease.   2.  RIGHT pleural fluid collection, apparently increased.           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 Normal regional   wall motion. Left ventricular ejection fraction is visually estimated   to be 70%. Diastolic function is difficult to assess with atrial   Fibrillation. Normal right ventricular size and systolic function.  Mildly dilated left atrium. Mild mitral regurgitation. Moderate aortic insufficiency.   Moderate tricuspid regurgitation. Right atrial pressure is estimated to   be 15 mmHg. Estimated right ventricular systolic pressure  is 55 mmHg     Stage 3 chronic kidney disease (HCC)- (present on admission)   Assessment & Plan    At baseline  kidney function  Avoiding nephrotoxics: NSAIDs etc  Monitor BMP         GERD (gastroesophageal reflux disease)- (present on admission)   Assessment & Plan    Continue omeprazole     Pulmonary hypertension (HCC)- (present on admission)   Assessment & Plan    Continue supplemental oxygen and diuresis     Chronic atrial fibrillation (HCC)- (present on admission)   Assessment & Plan    Restart home medication regimen, holding Coreg for this time  Restarting anticoagulation, given renal insufficiency changed to Eliquis     COPD (chronic obstructive pulmonary disease) (HCC)- (present on admission)   Assessment & Plan    No active wheezing at this time  Continue Spiriva and Symbicort  DuoNeb with RT protocol     Pleural effusion on right- (present on admission)   Assessment & Plan    Status post thoracentesis, yielding 700 cc of fluid       Swelling of lower leg- (present on admission)   Assessment & Plan    Negative for DVT  Elevate extremities  Started on diuresis as blood pressure allows        Plan   CT chest findings communicated  Continue steroids  Diuresis as tolerated  Restart anticoagulation with Eliquis  Respiratory care and titration of high flow nasal cannula  Medically complex and high risk  See orders  Supportive care  VTE prophylaxis: Eliquis  I have performed a physical exam and reviewed and updated ROS and Plan today . In review of yesterday's note , there are no changes except as documented above.

## 2019-06-28 NOTE — CARE PLAN
Problem: Respiratory:  Goal: Respiratory status will improve  Pt titrated down to 6L oxymask from 10L oxymask    Problem: Urinary Elimination:  Goal: Ability to reestablish a normal urinary elimination pattern will improve  [place]iza catheter removed.

## 2019-06-29 LAB
ANION GAP SERPL CALC-SCNC: 11 MMOL/L (ref 0–11.9)
BNP SERPL-MCNC: 218 PG/ML (ref 0–100)
BUN SERPL-MCNC: 40 MG/DL (ref 8–22)
CALCIUM SERPL-MCNC: 8.2 MG/DL (ref 8.5–10.5)
CHLORIDE SERPL-SCNC: 99 MMOL/L (ref 96–112)
CO2 SERPL-SCNC: 27 MMOL/L (ref 20–33)
CREAT SERPL-MCNC: 1.57 MG/DL (ref 0.5–1.4)
ERYTHROCYTE [DISTWIDTH] IN BLOOD BY AUTOMATED COUNT: 50.1 FL (ref 35.9–50)
GLUCOSE BLD-MCNC: 137 MG/DL (ref 65–99)
GLUCOSE BLD-MCNC: 146 MG/DL (ref 65–99)
GLUCOSE BLD-MCNC: 189 MG/DL (ref 65–99)
GLUCOSE BLD-MCNC: 198 MG/DL (ref 65–99)
GLUCOSE SERPL-MCNC: 167 MG/DL (ref 65–99)
HCT VFR BLD AUTO: 28.9 % (ref 37–47)
HGB BLD-MCNC: 9.2 G/DL (ref 12–16)
MAGNESIUM SERPL-MCNC: 2.1 MG/DL (ref 1.5–2.5)
MCH RBC QN AUTO: 29.1 PG (ref 27–33)
MCHC RBC AUTO-ENTMCNC: 31.8 G/DL (ref 33.6–35)
MCV RBC AUTO: 91.5 FL (ref 81.4–97.8)
PHOSPHATE SERPL-MCNC: 4.2 MG/DL (ref 2.5–4.5)
PLATELET # BLD AUTO: 220 K/UL (ref 164–446)
PMV BLD AUTO: 10.1 FL (ref 9–12.9)
POTASSIUM SERPL-SCNC: 4.5 MMOL/L (ref 3.6–5.5)
RBC # BLD AUTO: 3.16 M/UL (ref 4.2–5.4)
SODIUM SERPL-SCNC: 137 MMOL/L (ref 135–145)
WBC # BLD AUTO: 10.5 K/UL (ref 4.8–10.8)

## 2019-06-29 PROCEDURE — 80048 BASIC METABOLIC PNL TOTAL CA: CPT

## 2019-06-29 PROCEDURE — 99407 BEHAV CHNG SMOKING > 10 MIN: CPT

## 2019-06-29 PROCEDURE — 99232 SBSQ HOSP IP/OBS MODERATE 35: CPT | Performed by: INTERNAL MEDICINE

## 2019-06-29 PROCEDURE — 82962 GLUCOSE BLOOD TEST: CPT | Mod: 91

## 2019-06-29 PROCEDURE — 700102 HCHG RX REV CODE 250 W/ 637 OVERRIDE(OP): Performed by: HOSPITALIST

## 2019-06-29 PROCEDURE — 83735 ASSAY OF MAGNESIUM: CPT

## 2019-06-29 PROCEDURE — A9270 NON-COVERED ITEM OR SERVICE: HCPCS | Performed by: HOSPITALIST

## 2019-06-29 PROCEDURE — 83880 ASSAY OF NATRIURETIC PEPTIDE: CPT

## 2019-06-29 PROCEDURE — 36415 COLL VENOUS BLD VENIPUNCTURE: CPT

## 2019-06-29 PROCEDURE — 99233 SBSQ HOSP IP/OBS HIGH 50: CPT | Performed by: HOSPITALIST

## 2019-06-29 PROCEDURE — 700111 HCHG RX REV CODE 636 W/ 250 OVERRIDE (IP): Performed by: INTERNAL MEDICINE

## 2019-06-29 PROCEDURE — 94640 AIRWAY INHALATION TREATMENT: CPT

## 2019-06-29 PROCEDURE — 85027 COMPLETE CBC AUTOMATED: CPT

## 2019-06-29 PROCEDURE — 94664 DEMO&/EVAL PT USE INHALER: CPT

## 2019-06-29 PROCEDURE — 92610 EVALUATE SWALLOWING FUNCTION: CPT

## 2019-06-29 PROCEDURE — A9270 NON-COVERED ITEM OR SERVICE: HCPCS | Performed by: INTERNAL MEDICINE

## 2019-06-29 PROCEDURE — 770020 HCHG ROOM/CARE - TELE (206)

## 2019-06-29 PROCEDURE — 84100 ASSAY OF PHOSPHORUS: CPT

## 2019-06-29 PROCEDURE — 700101 HCHG RX REV CODE 250: Performed by: INTERNAL MEDICINE

## 2019-06-29 PROCEDURE — 700102 HCHG RX REV CODE 250 W/ 637 OVERRIDE(OP): Performed by: INTERNAL MEDICINE

## 2019-06-29 RX ADMIN — FUROSEMIDE 40 MG: 10 INJECTION, SOLUTION INTRAVENOUS at 06:06

## 2019-06-29 RX ADMIN — INSULIN HUMAN 2 UNITS: 100 INJECTION, SOLUTION PARENTERAL at 17:20

## 2019-06-29 RX ADMIN — BUPROPION HYDROCHLORIDE 150 MG: 150 TABLET, EXTENDED RELEASE ORAL at 06:06

## 2019-06-29 RX ADMIN — APIXABAN 5 MG: 5 TABLET, FILM COATED ORAL at 06:06

## 2019-06-29 RX ADMIN — DILTIAZEM HYDROCHLORIDE 30 MG: 30 TABLET, FILM COATED ORAL at 20:34

## 2019-06-29 RX ADMIN — IPRATROPIUM BROMIDE AND ALBUTEROL SULFATE 3 ML: .5; 3 SOLUTION RESPIRATORY (INHALATION) at 14:10

## 2019-06-29 RX ADMIN — BUDESONIDE AND FORMOTEROL FUMARATE DIHYDRATE 2 PUFF: 80; 4.5 AEROSOL RESPIRATORY (INHALATION) at 06:06

## 2019-06-29 RX ADMIN — IPRATROPIUM BROMIDE AND ALBUTEROL SULFATE 3 ML: .5; 3 SOLUTION RESPIRATORY (INHALATION) at 08:01

## 2019-06-29 RX ADMIN — BUDESONIDE AND FORMOTEROL FUMARATE DIHYDRATE 2 PUFF: 80; 4.5 AEROSOL RESPIRATORY (INHALATION) at 17:19

## 2019-06-29 RX ADMIN — IPRATROPIUM BROMIDE AND ALBUTEROL SULFATE 3 ML: .5; 3 SOLUTION RESPIRATORY (INHALATION) at 19:34

## 2019-06-29 RX ADMIN — APIXABAN 5 MG: 5 TABLET, FILM COATED ORAL at 17:18

## 2019-06-29 RX ADMIN — INSULIN HUMAN 2 UNITS: 100 INJECTION, SOLUTION PARENTERAL at 11:49

## 2019-06-29 RX ADMIN — OMEPRAZOLE 20 MG: 20 CAPSULE, DELAYED RELEASE ORAL at 06:05

## 2019-06-29 RX ADMIN — TIOTROPIUM BROMIDE 1 CAPSULE: 18 CAPSULE ORAL; RESPIRATORY (INHALATION) at 06:06

## 2019-06-29 RX ADMIN — SENNOSIDES AND DOCUSATE SODIUM 2 TABLET: 8.6; 5 TABLET ORAL at 17:18

## 2019-06-29 RX ADMIN — PREDNISONE 40 MG: 10 TABLET ORAL at 06:06

## 2019-06-29 ASSESSMENT — ENCOUNTER SYMPTOMS
COUGH: 1
NERVOUS/ANXIOUS: 1
DEPRESSION: 0
NECK PAIN: 0
GASTROINTESTINAL NEGATIVE: 1
SHORTNESS OF BREATH: 1
BRUISES/BLEEDS EASILY: 0
HEADACHES: 0
PALPITATIONS: 0
HEARTBURN: 0
HEMOPTYSIS: 0
POLYDIPSIA: 0
MYALGIAS: 0
WEAKNESS: 1
NAUSEA: 0
MUSCULOSKELETAL NEGATIVE: 1
EYES NEGATIVE: 1
STRIDOR: 0
BLURRED VISION: 0
FOCAL WEAKNESS: 0
DIZZINESS: 0
FEVER: 0
BACK PAIN: 0
VOMITING: 0
CHILLS: 0

## 2019-06-29 ASSESSMENT — CHA2DS2 SCORE
PRIOR STROKE OR TIA OR THROMBOEMBOLISM: NO
VASCULAR DISEASE: NO
AGE 75 OR GREATER: YES
SEX: FEMALE
AGE 65 TO 74: NO
CHF OR LEFT VENTRICULAR DYSFUNCTION: NO
DIABETES: NO
CHA2DS2 VASC SCORE: 4
HYPERTENSION: YES

## 2019-06-29 NOTE — PROGRESS NOTES
Handoff report received. Assumed patient care. Patient up to commode.  Denied pain. Patient not in distress, AAOX 4. Safety precautions in place. Call light and personal belongings within reach. Educated to call for assistance if needed.

## 2019-06-29 NOTE — CARE PLAN
Problem: Bronchoconstriction:  Goal: Improve in air movement and diminished wheezing  Outcome: PROGRESSING AS EXPECTED  duoneb TID per home routine

## 2019-06-29 NOTE — THERAPY
Speech Language Therapy Clinical Swallow Evaluation completed.    Functional Status: Pt was seen at bedside for CSE. Per RN, Pt reported recent choking with meals, and MD placed Pt on NTFL diet pending swallow eval. Pt previously was consuming Regular/thin textures this AM. Pt currently receiving HiFlow O2 of 30LPM/60% FiO2 with O2 SATS >90% throughout session. Pt endorsed hx of PNA x1 within the last two years, as well as hx of xerostomia and hx of heartburn. Pt reported that she chokes spontaneously, sometimes with dry textures, sometimes with liquids; however, it is not consistent. Oral mech was grossly intact, with no observed deficits. Pt with reduced laryngeal elevation per palpation, which is likely due to normal age-related swallowing changes. Pt was agreeable to PO trials.     Pt was administered PO trials of thins (single sips, serial sips), puree, soft/mixed solids and dry/regular solids. Pt demonstrated no overt clinical s/sx of aspiration/penetration across trials/textures, except for x1 serial sip of thin liquids (which Pt coughed after the swallow.) With adherence to single, small sips no further coughing was noted. SLP provided dysphagia education, including safe swallow precautions. Pt verbalized understanding. Given the aforementioned information, Pt is recommended for PO diet of Regular solids, Thin liquids and Meds whole with liquid wash or floated in puree. RN aware. SLP will f/u to ensure diet tolerance; however, if Pt's HFNC requirements are increased to >40LPM, recommend re-consultation to SLP services (given possible increased risk of aspiration secondary to high flow needs.) Please hold PO if any coughing/choking or swallowing difficulties are noted with Pt's diet. May consider diagnostic swallow eval as warranted; however, will defer to primary SLP. Thank you for the consult.    Recommendations - Diet: Regular, Thin Liquid                          Strategies: Monitor during meals, Head of  "Bed at 90 Degrees, Alternate bites/sips                          Medication Administration: Whole with Liquid Wash    Plan of Care: Will benefit from Speech Therapy 3 times per week  Post-Acute Therapy: Recommend outpatient or home health transitional care services for continued speech therapy services    See \"Rehab Therapy-Acute\" Patient Summary Report for complete documentation.   "

## 2019-06-29 NOTE — PROGRESS NOTES
Report received at bedside from day shift RN, pt care assumed, tele box on. Pt aaox4, sitting up at edge of bed, on 10L oxymask with O2 sat 89-92%. POC discussed with pt and verbalizes no questions. Pt c/o of no pain. Pt denies any additional needs at this time. Bed in lowest position, pt educated on fall risk and verbalized understanding, call light within reach, bed alarm plugged in and on.

## 2019-06-29 NOTE — PROGRESS NOTES
2 RN skin check complete with KIMBERLEY Odonnell.   Devices in place SCD's, oxymask.  Skin assessed under devices Yes.  Confirmed pressure ulcers found on N/A.  New potential pressure ulcers noted on N/A.   The following interventions in place patient turns self, encouraged to reposition frequently, linens kept dry.     Bruising to bilat upper ext, bruising to sacrum pink/red and blanching, callouses to bilat feet and redness to bilat lower ext with discoloration to rt leg, picture taken, pt has PVD. Thoracentesis puncture site to rt upper back, bruising and intact.

## 2019-06-29 NOTE — CARE PLAN
Problem: Safety  Goal: Will remain free from injury    Intervention: Provide assistance with mobility  Pt educated on safety precautions and safety equipment. Bed in low position, call light within reach, and hourly rounding conducted.       Problem: Respiratory:  Goal: Respiratory status will improve    Intervention: Assess and monitor pulmonary status  Pt currently on high flow oxygen, but previously on 10L. O2 saturation will stay between 88% and 92 %

## 2019-06-29 NOTE — PROGRESS NOTES
Pt transported to [facility]. Pt placed on tele monitor, monitor room notified. Pt helped to bed from wheelchair. Pt on 6L 02 with oxymask. Pt sitting up at edge of bed. Vitals signs taken.

## 2019-06-29 NOTE — CARE PLAN
Problem: Oxygenation:  Goal: Maintain adequate oxygenation dependent on patient condition  Outcome: PROGRESSING AS EXPECTED  Pt on HHFNC 30L/60%

## 2019-06-29 NOTE — THERAPY
"Physical Therapy Evaluation completed.   Bed Mobility:  Supine to Sit: Minimal Assist  Transfers: Sit to Stand: Minimal Assist  Gait: Level Of Assist: Minimal Assist with 3 wheeled walker   Plan of Care: Will benefit from Physical Therapy 3 times per week  Discharge Recommendations: Equipment: Will Continue to Assess for Equipment Needs.     Pt presents with impaired activity tolerance related to COPD exacerbation; pt most limited by hypoxia, down to 79% with slight dizziness on hiflow. Pt mobilizing quite well and anticipate steady progression; in current condition would recommend placement, however once off hiflow anticipate quick progress. Will follow.         See \"Rehab Therapy-Acute\" Patient Summary Report for complete documentation.     "

## 2019-06-29 NOTE — PROGRESS NOTES
Patient unable to tolerate weaning oxygen overnight, remains on 10L via Oxymask with O2 sats low 90's. Presents with dyspnea on exertion getting up to commode and tires quickly.

## 2019-06-29 NOTE — PROGRESS NOTES
Paged speech to call back in regards to seeing patient. Paged back and stated they will try to assess later this afternoon.

## 2019-06-29 NOTE — PROGRESS NOTES
"Prior to patient discharging, RN spoke with raimundo Alaniz about transfer to telemetry unit. Corbin voiced concerns for transfer to telemetry unit and said, \"My sister was told by the doctor that my mom would stay in the ICU over the weekend.\" RN spoke with both Dr. Martinez and Dr. Gaona and both stated that patient is okay to transfer to telemetry (order for transfer placed by MD during hot rounds). RN translated this information to Corbin and stated that patient is meeting criteria per MDs to transfer to telemetry unit.   "

## 2019-06-29 NOTE — RESPIRATORY CARE
COPD EDUCATION by COPD CLINICAL EDUCATOR  6/29/2019 at 2:04 PM by Siena Rey     COPD Education provided?  Yes    Does patient currently use inhalers/nebulizer at home? Yes    Additional medication/equipment recommendations? No    Is all Respiratory DME in place? Yes                   If yes, has patient been educated on use of DME?   Yes    Have all necessary follow up appointments been scheduled?   PCP or D/C clinic Yes   Specialty- Yes    Has the patient been referred to Pulmonary Rehab? Yes     Has the patient been referred to the [facility] COPD Program? No, pt lives in [facility] and that is out of [facility]'s coverage.

## 2019-06-30 PROBLEM — I95.9 SEPSIS ASSOCIATED HYPOTENSION (HCC): Status: ACTIVE | Noted: 2019-06-30

## 2019-06-30 PROBLEM — A41.9 SEPSIS ASSOCIATED HYPOTENSION (HCC): Status: ACTIVE | Noted: 2019-06-30

## 2019-06-30 LAB
ANION GAP SERPL CALC-SCNC: 12 MMOL/L (ref 0–11.9)
APTT PPP: 28.6 SEC (ref 24.7–36)
BASOPHILS # BLD AUTO: 0.1 % (ref 0–1.8)
BASOPHILS # BLD: 0.01 K/UL (ref 0–0.12)
BUN SERPL-MCNC: 41 MG/DL (ref 8–22)
CALCIUM SERPL-MCNC: 8.6 MG/DL (ref 8.5–10.5)
CHLORIDE SERPL-SCNC: 96 MMOL/L (ref 96–112)
CO2 SERPL-SCNC: 31 MMOL/L (ref 20–33)
CREAT SERPL-MCNC: 1.6 MG/DL (ref 0.5–1.4)
EOSINOPHIL # BLD AUTO: 0.05 K/UL (ref 0–0.51)
EOSINOPHIL NFR BLD: 0.5 % (ref 0–6.9)
ERYTHROCYTE [DISTWIDTH] IN BLOOD BY AUTOMATED COUNT: 52.8 FL (ref 35.9–50)
GLUCOSE BLD-MCNC: 135 MG/DL (ref 65–99)
GLUCOSE BLD-MCNC: 139 MG/DL (ref 65–99)
GLUCOSE BLD-MCNC: 171 MG/DL (ref 65–99)
GLUCOSE BLD-MCNC: 206 MG/DL (ref 65–99)
GLUCOSE SERPL-MCNC: 194 MG/DL (ref 65–99)
HCT VFR BLD AUTO: 34.7 % (ref 37–47)
HGB BLD-MCNC: 10.9 G/DL (ref 12–16)
IMM GRANULOCYTES # BLD AUTO: 0.09 K/UL (ref 0–0.11)
IMM GRANULOCYTES NFR BLD AUTO: 0.9 % (ref 0–0.9)
INR PPP: 1.65 (ref 0.87–1.13)
LACTATE BLD-SCNC: 2 MMOL/L (ref 0.5–2)
LACTATE BLD-SCNC: 2.2 MMOL/L (ref 0.5–2)
LACTATE BLD-SCNC: 2.7 MMOL/L (ref 0.5–2)
LACTATE BLD-SCNC: 3 MMOL/L (ref 0.5–2)
LYMPHOCYTES # BLD AUTO: 0.49 K/UL (ref 1–4.8)
LYMPHOCYTES NFR BLD: 4.7 % (ref 22–41)
MCH RBC QN AUTO: 29.3 PG (ref 27–33)
MCHC RBC AUTO-ENTMCNC: 31.4 G/DL (ref 33.6–35)
MCV RBC AUTO: 93.3 FL (ref 81.4–97.8)
MONOCYTES # BLD AUTO: 0.52 K/UL (ref 0–0.85)
MONOCYTES NFR BLD AUTO: 5 % (ref 0–13.4)
NEUTROPHILS # BLD AUTO: 9.26 K/UL (ref 2–7.15)
NEUTROPHILS NFR BLD: 88.8 % (ref 44–72)
NRBC # BLD AUTO: 0 K/UL
NRBC BLD-RTO: 0 /100 WBC
PLATELET # BLD AUTO: 294 K/UL (ref 164–446)
PMV BLD AUTO: 10.1 FL (ref 9–12.9)
POTASSIUM SERPL-SCNC: 4.3 MMOL/L (ref 3.6–5.5)
PROCALCITONIN SERPL-MCNC: 0.05 NG/ML
PROTHROMBIN TIME: 20 SEC (ref 12–14.6)
RBC # BLD AUTO: 3.72 M/UL (ref 4.2–5.4)
SODIUM SERPL-SCNC: 139 MMOL/L (ref 135–145)
WBC # BLD AUTO: 10.4 K/UL (ref 4.8–10.8)

## 2019-06-30 PROCEDURE — 700102 HCHG RX REV CODE 250 W/ 637 OVERRIDE(OP): Performed by: HOSPITALIST

## 2019-06-30 PROCEDURE — 99233 SBSQ HOSP IP/OBS HIGH 50: CPT | Performed by: HOSPITALIST

## 2019-06-30 PROCEDURE — A9270 NON-COVERED ITEM OR SERVICE: HCPCS | Performed by: INTERNAL MEDICINE

## 2019-06-30 PROCEDURE — A9270 NON-COVERED ITEM OR SERVICE: HCPCS | Performed by: HOSPITALIST

## 2019-06-30 PROCEDURE — 36415 COLL VENOUS BLD VENIPUNCTURE: CPT

## 2019-06-30 PROCEDURE — 84145 PROCALCITONIN (PCT): CPT

## 2019-06-30 PROCEDURE — 80048 BASIC METABOLIC PNL TOTAL CA: CPT

## 2019-06-30 PROCEDURE — 770020 HCHG ROOM/CARE - TELE (206)

## 2019-06-30 PROCEDURE — 87040 BLOOD CULTURE FOR BACTERIA: CPT | Mod: 91

## 2019-06-30 PROCEDURE — 700111 HCHG RX REV CODE 636 W/ 250 OVERRIDE (IP): Performed by: INTERNAL MEDICINE

## 2019-06-30 PROCEDURE — 700102 HCHG RX REV CODE 250 W/ 637 OVERRIDE(OP): Performed by: INTERNAL MEDICINE

## 2019-06-30 PROCEDURE — 85730 THROMBOPLASTIN TIME PARTIAL: CPT

## 2019-06-30 PROCEDURE — 82962 GLUCOSE BLOOD TEST: CPT

## 2019-06-30 PROCEDURE — 94640 AIRWAY INHALATION TREATMENT: CPT

## 2019-06-30 PROCEDURE — 99232 SBSQ HOSP IP/OBS MODERATE 35: CPT | Performed by: INTERNAL MEDICINE

## 2019-06-30 PROCEDURE — 83605 ASSAY OF LACTIC ACID: CPT | Mod: 91

## 2019-06-30 PROCEDURE — 700105 HCHG RX REV CODE 258: Performed by: HOSPITALIST

## 2019-06-30 PROCEDURE — 87205 SMEAR GRAM STAIN: CPT

## 2019-06-30 PROCEDURE — 85610 PROTHROMBIN TIME: CPT

## 2019-06-30 PROCEDURE — 85025 COMPLETE CBC W/AUTO DIFF WBC: CPT

## 2019-06-30 PROCEDURE — 700111 HCHG RX REV CODE 636 W/ 250 OVERRIDE (IP): Performed by: HOSPITALIST

## 2019-06-30 PROCEDURE — 700101 HCHG RX REV CODE 250: Performed by: INTERNAL MEDICINE

## 2019-06-30 RX ORDER — DOXYCYCLINE 100 MG/1
100 TABLET ORAL EVERY 12 HOURS
Status: COMPLETED | OUTPATIENT
Start: 2019-06-30 | End: 2019-07-04

## 2019-06-30 RX ORDER — SODIUM CHLORIDE 9 MG/ML
500 INJECTION, SOLUTION INTRAVENOUS
Status: DISCONTINUED | OUTPATIENT
Start: 2019-06-30 | End: 2019-07-10 | Stop reason: HOSPADM

## 2019-06-30 RX ORDER — SODIUM CHLORIDE 9 MG/ML
INJECTION, SOLUTION INTRAVENOUS
Status: ACTIVE
Start: 2019-06-30 | End: 2019-06-30

## 2019-06-30 RX ORDER — SODIUM CHLORIDE 9 MG/ML
30 INJECTION, SOLUTION INTRAVENOUS
Status: COMPLETED | OUTPATIENT
Start: 2019-06-30 | End: 2019-06-30

## 2019-06-30 RX ADMIN — IPRATROPIUM BROMIDE AND ALBUTEROL SULFATE 3 ML: .5; 3 SOLUTION RESPIRATORY (INHALATION) at 15:17

## 2019-06-30 RX ADMIN — SENNOSIDES AND DOCUSATE SODIUM 2 TABLET: 8.6; 5 TABLET ORAL at 06:22

## 2019-06-30 RX ADMIN — TIOTROPIUM BROMIDE 1 CAPSULE: 18 CAPSULE ORAL; RESPIRATORY (INHALATION) at 06:17

## 2019-06-30 RX ADMIN — BUDESONIDE AND FORMOTEROL FUMARATE DIHYDRATE 2 PUFF: 80; 4.5 AEROSOL RESPIRATORY (INHALATION) at 17:08

## 2019-06-30 RX ADMIN — BUPROPION HYDROCHLORIDE 150 MG: 150 TABLET, EXTENDED RELEASE ORAL at 06:16

## 2019-06-30 RX ADMIN — FUROSEMIDE 40 MG: 10 INJECTION, SOLUTION INTRAVENOUS at 06:17

## 2019-06-30 RX ADMIN — SODIUM CHLORIDE 1000 ML: 9 INJECTION, SOLUTION INTRAVENOUS at 09:02

## 2019-06-30 RX ADMIN — DOXYCYCLINE 100 MG: 100 TABLET, FILM COATED ORAL at 17:07

## 2019-06-30 RX ADMIN — DILTIAZEM HYDROCHLORIDE 30 MG: 30 TABLET, FILM COATED ORAL at 06:16

## 2019-06-30 RX ADMIN — APIXABAN 2.5 MG: 2.5 TABLET, FILM COATED ORAL at 17:07

## 2019-06-30 RX ADMIN — CEFTRIAXONE SODIUM 1 G: 1 INJECTION, POWDER, FOR SOLUTION INTRAMUSCULAR; INTRAVENOUS at 10:08

## 2019-06-30 RX ADMIN — INSULIN HUMAN 2 UNITS: 100 INJECTION, SOLUTION PARENTERAL at 11:44

## 2019-06-30 RX ADMIN — DILTIAZEM HYDROCHLORIDE 30 MG: 30 TABLET, FILM COATED ORAL at 00:37

## 2019-06-30 RX ADMIN — IPRATROPIUM BROMIDE AND ALBUTEROL SULFATE 3 ML: .5; 3 SOLUTION RESPIRATORY (INHALATION) at 07:09

## 2019-06-30 RX ADMIN — INSULIN HUMAN 3 UNITS: 100 INJECTION, SOLUTION PARENTERAL at 21:01

## 2019-06-30 RX ADMIN — DOXYCYCLINE 100 MG: 100 TABLET, FILM COATED ORAL at 09:07

## 2019-06-30 RX ADMIN — APIXABAN 5 MG: 5 TABLET, FILM COATED ORAL at 06:16

## 2019-06-30 RX ADMIN — PREDNISONE 40 MG: 10 TABLET ORAL at 06:17

## 2019-06-30 RX ADMIN — BUDESONIDE AND FORMOTEROL FUMARATE DIHYDRATE 2 PUFF: 80; 4.5 AEROSOL RESPIRATORY (INHALATION) at 06:17

## 2019-06-30 RX ADMIN — OMEPRAZOLE 20 MG: 20 CAPSULE, DELAYED RELEASE ORAL at 06:16

## 2019-06-30 RX ADMIN — IPRATROPIUM BROMIDE AND ALBUTEROL SULFATE 3 ML: .5; 3 SOLUTION RESPIRATORY (INHALATION) at 19:09

## 2019-06-30 ASSESSMENT — ENCOUNTER SYMPTOMS
COUGH: 1
PALPITATIONS: 0
EYES NEGATIVE: 1
NAUSEA: 0
MYALGIAS: 0
GASTROINTESTINAL NEGATIVE: 1
DIZZINESS: 0
SHORTNESS OF BREATH: 1
HEMOPTYSIS: 0
VOMITING: 0
STRIDOR: 0
BLURRED VISION: 0
FEVER: 0
CHILLS: 0
BACK PAIN: 0
BRUISES/BLEEDS EASILY: 0
MUSCULOSKELETAL NEGATIVE: 1
NECK PAIN: 0
HEADACHES: 0
FOCAL WEAKNESS: 0
POLYDIPSIA: 0
HEARTBURN: 0
NERVOUS/ANXIOUS: 1
WEAKNESS: 1
DEPRESSION: 0

## 2019-06-30 NOTE — PROGRESS NOTES
[facility] Medicine Daily Progress Note    Date of Service  6/29/2019    Chief Complaint  84 y.o. female admitted 6/26/2019 with recent hospitalization at [facility], at the time it was felt the patient might have a combination of COPD, amiodarone lung toxicity and pulmonary hypertension leading to dyspnea, the patient was discharged and returns with increased respiratory difficulty.  The patient has extensive history of tobacco abuse.  Patient was admitted with hypoxia, respiratory failure.    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 fib, rate controlled, blood pressure normal, lower extremity edema 1-2, received Lasix with additional urine output, CT chest today pending  6/28 patient is afebrile, alert and oriented x4, blood pressure in the 100s, and A. fib heart rate 70- 110, positive lower extremity edema, Lasix resulting in good urine output, the patient is on 6 L nasal cannula she uses high flow during eating, she had a bowel movement, CT reported and continued concern for lung toxicity changes.  6/29: patient back on high flow Oxygen today.  She states she had coughing and difficulty eating this a.m.  Ordered SLP juan josé who did not find any aspiration issues.  Crackles heard right posterior lung base, no wheeze.  Remains afib  on eliquis.    Consultants/Specialty  Pulmonary critical care    Code Status  DNR/DNI    Disposition  Son at bedside, lives alone in [place].  OT rec SNF, PT continue assessment.  SNF order placed.      Review of Systems  Review of Systems   Constitutional: Positive for malaise/fatigue. Negative for chills and fever.   HENT: Negative.    Eyes: Negative.    Respiratory: Positive for cough and shortness of breath.    Cardiovascular: Positive for leg swelling. Negative for chest pain and palpitations.    Gastrointestinal: Negative.  Negative for heartburn, nausea and vomiting.   Genitourinary: Negative.  Negative for dysuria and frequency.   Musculoskeletal: Negative.  Negative for back pain and neck pain.   Skin: Negative.  Negative for itching and rash.   Neurological: Positive for weakness. Negative for dizziness, focal weakness and headaches.   Endo/Heme/Allergies: Negative.  Negative for polydipsia. Does not bruise/bleed easily.   Psychiatric/Behavioral: Negative for depression. The patient is nervous/anxious.         Physical Exam  Temp:  [35.9 °C (96.7 °F)-37 °C (98.6 °F)] 37 °C (98.6 °F)  Pulse:  [] 111  Resp:  [18-22] 19  BP: ()/(47-69) 103/58  SpO2:  [90 %-93 %] 91 %    Physical Exam   Constitutional: She is oriented to person, place, and time. She appears well-developed and well-nourished.   HENT:   Head: Normocephalic and atraumatic.   Nose: Nose normal.   Mouth/Throat: Oropharynx is clear and moist.   Eyes: Pupils are equal, round, and reactive to light. Conjunctivae and EOM are normal.   Neck: Normal range of motion. Neck supple. No JVD present. No thyromegaly present.   Cardiovascular: Normal rate, regular rhythm, normal heart sounds and intact distal pulses.  Exam reveals no gallop and no friction rub.    Capillary refill <3 secs   Pulmonary/Chest: Effort normal. No respiratory distress. She has no wheezes. She has rales.   Abdominal: Soft. Bowel sounds are normal. She exhibits no distension and no mass. There is no tenderness. There is no rebound and no guarding.   Musculoskeletal: Normal range of motion. She exhibits no edema or tenderness.   Lymphadenopathy:     She has no cervical adenopathy.   Neurological: She is alert and oriented to person, place, and time. No cranial nerve deficit.   Skin: Skin is warm and dry. She is not diaphoretic. No cyanosis.   Psychiatric: She has a normal mood and affect. Her behavior is normal.   Nursing note and vitals  reviewed.      Fluids    Intake/Output Summary (Last 24 hours) at 06/29/19 1834  Last data filed at 06/29/19 0830   Gross per 24 hour   Intake              120 ml   Output              720 ml   Net             -600 ml       Laboratory  Recent Labs      06/27/19   1130  06/28/19   0427  06/29/19   0345   WBC  8.3  11.5*  10.5   RBC  3.43*  3.14*  3.16*   HEMOGLOBIN  9.8*  9.3*  9.2*   HEMATOCRIT  32.2*  28.8*  28.9*   MCV  93.9  91.7  91.5   MCH  28.6  29.6  29.1   MCHC  30.4*  32.3*  31.8*   RDW  51.8*  50.1*  50.1*   PLATELETCT  186  189  220   MPV  10.5  10.4  10.1     Recent Labs      06/27/19   1130  06/28/19   0427  06/29/19   0345   SODIUM  136  137  137   POTASSIUM  4.0  4.1  4.5   CHLORIDE  98  101  99   CO2  26  26  27   GLUCOSE  307*  183*  167*   BUN  29*  31*  40*   CREATININE  1.40  1.35  1.57*   CALCIUM  8.2*  8.4*  8.2*         Recent Labs      06/27/19   0257  06/28/19   0427  06/29/19   0345   BNPBTYPENAT  402*  275*  218*           Imaging  CT-CHEST (THORAX) W/O   Final Result      Extensive airspace opacities on the right are mildly increased in the right upper lobe and slightly improved in the right lower lobe. There is some cavitation.      Airspace opacities on the left are increased in the lingula and mildly improved in the left upper lobe.      Bilateral underlying fibrotic changes and emphysematous changes are noted.      Subpleural nodule in the left lower lobe measures 6 mm.      Follow-up to radiographic resolution recommended.      Small bilateral pleural effusions, right greater than left.      Mediastinal lymphadenopathy is not significantly changed.      Atherosclerotic plaque.      Cardiomegaly.      Pancreatic calcifications likely sequelae of chronic pancreatitis.      Cholelithiasis.      US-EXTREMITY VENOUS LOWER BILAT   Final Result      DX-CHEST-PORTABLE (1 VIEW)   Final Result      Pneumothorax is not identified      Stable pulmonary scarring with right volume loss, right  worse than left consolidation      US-THORACENTESIS PUNCTURE RIGHT   Final Result      1. Ultrasound guided right sided diagnostic and therapeutic thoracentesis.      2. 700 mL of fluid withdrawn.      Findings were discussed with Juan on 6/26/2019 12:40 PM.      DX-CHEST-PORTABLE (1 VIEW)   Final Result      1.  Tiny RIGHT pneumothorax   2.  Decreased RIGHT pleural fluid   3.  Otherwise no significant interval change      Findings were discussed with MARSHALL FOX on 6/26/2019 12:33 PM.      DX-CHEST-PORTABLE (1 VIEW)   Final Result      1.  Worsening hypoinflation and multifocal interstitial and alveolar opacities particularly involving the RIGHT lung, favoring pneumonia over edema.  Probable background of interstitial lung disease.   2.  RIGHT pleural fluid collection, apparently increased.           Assessment/Plan  Acute on chronic respiratory failure with hypoxia (HCC)- (present on admission)   Assessment & Plan    Secondary to pulmonary edema and right sided pleural effusion  Underlying COPD  Likely underlying amiodarone toxicity,  Continue supplemental oxygen with RT protocol, high flow nasal cannula  Doubt infectious process  Diuresis to continue  Likely prolonged steroid taper  CT with consistent findings, somewhat worsened  6/29 no aspiration found with SLP eval.     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 Normal regional   wall motion. Left ventricular ejection fraction is visually estimated   to be 70%. Diastolic function is difficult to assess with atrial   Fibrillation. Normal right ventricular size and systolic function.  Mildly dilated left atrium. Mild mitral regurgitation. Moderate aortic insufficiency.   Moderate tricuspid regurgitation. Right atrial pressure is estimated to   be 15 mmHg. Estimated right ventricular systolic  pressure  is 55 mmHg     Stage 3 chronic kidney disease (HCC)- (present on admission)   Assessment & Plan    At baseline kidney function  Avoiding nephrotoxics: NSAIDs etc  Monitor BMP         GERD (gastroesophageal reflux disease)- (present on admission)   Assessment & Plan    Continue omeprazole     Pulmonary hypertension (HCC)- (present on admission)   Assessment & Plan    Continue supplemental oxygen and diuresis     Chronic atrial fibrillation (HCC)- (present on admission)   Assessment & Plan     holding home Coreg since admission for hypotension.  Restarting anticoagulation, given renal insufficiency changed to Eliquis.  6/29:  afib remains rate 111.  Started cardizem 30mg po q 6 hours with holding parameters.  /60.     COPD (chronic obstructive pulmonary disease) (HCC)- (present on admission)   Assessment & Plan    No active wheezing at this time  Continue Spiriva and Symbicort  DuoNeb with RT protocol     Pleural effusion on right- (present on admission)   Assessment & Plan    Status post thoracentesis, yielding 700 cc of fluid       Swelling of lower leg- (present on admission)   Assessment & Plan    Negative for DVT  Elevate extremities  Started on diuresis as blood pressure allows        VTE prophylaxis: Eliquis

## 2019-06-30 NOTE — DISCHARGE PLANNING
Anticipated Discharge Disposition: Transitional care facility for therapies then home with live-in caregiver and support of children.     Action: RN CM met with pt and son at bedside to discuss dc plan. Pt wishes to go to [facility] in [place], [place], on same property as Elvin Coombs. Choice obtained and faxed to [facility] at ext. [phone]. Pt's son states family/pt can pay private if need be.    Barriers to Discharge: Medical clearance, accepting transitional care facility.    Plan: Keep pt and team updated.

## 2019-06-30 NOTE — PROGRESS NOTES
Dr. García updated on pt's SBP being 70- 80's this AM. Pt is not symptomatic. MD to order sepsis protocol now. Will administer 1L bolus and reassess BP.

## 2019-06-30 NOTE — ASSESSMENT & PLAN NOTE
Reviewed CTA chest:  Increased consolidation seen throughout the right upper, middle and lower lung fields.  Some infiltrates seen LLL as well.  She was treated with a course of cefepime and doxycycline

## 2019-06-30 NOTE — ASSESSMENT & PLAN NOTE
"This is severe sepsis with the following associated acute organ dysfunction(s): acute respiratory failure.   6/30:  Sepsis protocol initiated for BP 74/47 responded to fluid bolus.  I examined the patient 6/30/2019 4:51 PM  Vital Signs:/56   Pulse 75   Temp 37 °C (98.6 °F) (Temporal)   Resp 19   Ht 1.651 m (5' 5\")   Wt 68.4 kg (150 lb 12.7 oz)   SpO2 90%   Breastfeeding? No   BMI 25.09 kg/m²   Cardiac examination significant for Regular rate and rhythm  Pulmonary examination significant for Crackles  Capillary refill is brisk  Peripheral Pulse is 2+   Skin is normal     Source Rt lung pneumonia.    "

## 2019-06-30 NOTE — CARE PLAN
Problem: Venous Thromboembolism (VTW)/Deep Vein Thrombosis (DVT) Prevention:  Goal: Patient will participate in Venous Thrombosis (VTE)/Deep Vein Thrombosis (DVT)Prevention Measures  Outcome: PROGRESSING AS EXPECTED  Patient educated on VTE prophylaxis including mechanical and pharmacologic methods. SCDs on and medication orders followed. Educated on the importance of VTE prophylaxis due to decreased mobility while in the hospital. Verbalized understanding and no additional questions at this time.     Problem: Skin Integrity  Goal: Risk for impaired skin integrity will decrease  Outcome: PROGRESSING AS EXPECTED  Pt mobility assessed at beginning of shift. Educated patient on the importance of repositioning and pt on q2h turns. Linens kept clean and dry. Heels floated.

## 2019-06-30 NOTE — PROGRESS NOTES
Critical Care Progress Note    Date of admission  6/26/2019    Chief Complaint  84 y.o. female admitted 6/26/2019 with ground-level fall, hypoxia and abnormal chest x-ray    Hospital Course    84 y.o. female who presented 6/26/2019 with recent hospitalization at [facility] 6/8-6/17 for abnormal chest x-ray and hypoxia.  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  Amiodarone was discontinued.  She was treated with IV corticosteroids during that hospitalization including Solu-Medrol 62.5 mg every 6 hours on 6 8, decreased to 40 mg every 8 hours from 6 9 through 6/12.  This was then lowered to prednisone 20 mg daily 6/12 through 6/15 then discontinued on 617 at discharge.  Amiodarone was not resumed.  After discharge home she was initially doing well on 2 to 3 L oxygen via nasal cannula.  She lives in a [facility] near [place] and her daughter checks on her frequently and checks her pulse oximetry.  This is been adequate with a goal between 88 and 92%.  She is a lifelong smoker a pack a day for most of her adult life but quit smoking at the time of her recent hospitalization this month.  She been feeling relatively well until the day of admission here.  She was apparently going to use the bathroom using her walker but her legs gave out and she fell to the ground and apparently did hit her the right side of her head.  There is no loss of consciousness and she denied presyncopal symptoms.  Her family called EMS she was found to be hypoxemic with room air saturation of 62% and was placed on nonrebreather and transferred to [facility].  Chest x-ray was again very abnormal with diffuse right greater than left alveolar infiltrates.  CT scan of the head was negative.  She remained at high oxygen requiring 10 L and was transferred to [facility] for further evaluation.  I reviewed her prior CT scan from 6/10 which showed diffuse multifocal parenchymal  "consolidation most notably in the right lung and emphysematous change.  Chest x-ray here shows worsening multifocal airspace disease on the right and some left lower lobe airspace disease as well as effusion.  Today she is undergoing a right thoracentesis and ongoing diuretic therapy.  She has history of chronic atrial fibrillation on home\" therapy with Xarelto, this is been temporarily held for thoracentesis.      Interval Problem Update  Reviewed last 24 hour events:   Transferred from ICU yesterday   Cont to need HFNC at 60% FiO2 and 30 liter/M flow   Denies any SOB or CP      Review of Systems  Review of Systems   Constitutional: Negative for chills and fever.   Eyes: Negative for blurred vision.   Respiratory: Positive for cough. Negative for hemoptysis and stridor.    Cardiovascular: Negative for chest pain and palpitations.   Gastrointestinal: Negative for heartburn, nausea and vomiting.   Genitourinary: Negative for dysuria.   Musculoskeletal: Negative for myalgias.        Vital Signs for last 24 hours   Temp:  [36.3 °C (97.4 °F)-37 °C (98.6 °F)] 36.7 °C (98.1 °F)  Pulse:  [] 113  Resp:  [18-22] 20  BP: ()/(56-69) 126/66  SpO2:  [88 %-95 %] 92 %    Hemodynamic parameters for last 24 hours       Respiratory Information for the last 24 hours       Physical Exam   Physical Exam   Constitutional: She is oriented to person, place, and time. She appears well-developed.   HENT:   Head: Atraumatic.   Eyes: Pupils are equal, round, and reactive to light.   Neck: Neck supple. No tracheal deviation present.   Cardiovascular: Normal rate and intact distal pulses.    No murmur heard.  Atrial fibrillation   Pulmonary/Chest: No respiratory distress. She has no wheezes. She has rales.   Abdominal: She exhibits no distension.   Musculoskeletal: She exhibits no edema or tenderness.   Neurological: She is alert and oriented to person, place, and time. No cranial nerve deficit.   Skin: Skin is dry.   Updated, " unchanged    Medications  Current Facility-Administered Medications   Medication Dose Route Frequency Provider Last Rate Last Dose   • DILTIAZem (CARDIZEM) tablet 30 mg  30 mg Oral Q6HRS Anastasia García M.D.   30 mg at 06/29/19 2034   • predniSONE (DELTASONE) tablet 40 mg  40 mg Oral DAILY Pablo Gaona M.D.   40 mg at 06/29/19 0606   • ipratropium-albuterol (DUONEB) nebulizer solution  3 mL Nebulization TID (RT) Pablo Gaona M.D.   3 mL at 06/29/19 1934   • budesonide-formoterol (SYMBICORT) 80-4.5 MCG/ACT inhaler 2 Puff  2 Puff Inhalation BID Pablo Gaona M.D.   2 Puff at 06/29/19 1719   • tiotropium (SPIRIVA) 18 MCG inhalation capsule 1 Cap  1 Cap Inhalation DAILY Pablo Gaona M.D.   1 Cap at 06/29/19 0606   • insulin regular (HUMULIN R) injection 2-9 Units  2-9 Units Subcutaneous 4X/DAY ACHS Pablo Gaona M.D.   Stopped at 06/29/19 2100    And   • glucose 4 g chewable tablet 16 g  16 g Oral Q15 MIN PRN Pablo Gaona M.D.        And   • DEXTROSE 10% BOLUS 250 mL  250 mL Intravenous Q15 MIN PRN Pablo Gaona M.D.       • apixaban (ELIQUIS) tablet 5 mg  5 mg Oral BID Raphael Martinez M.D.   5 mg at 06/29/19 1718   • acetaminophen (TYLENOL) tablet 650 mg  650 mg Oral Q6HRS PRN Johann Montero M.D.       • senna-docusate (PERICOLACE or SENOKOT S) 8.6-50 MG per tablet 2 Tab  2 Tab Oral BID Santosh Elena M.D.   2 Tab at 06/29/19 1718    And   • polyethylene glycol/lytes (MIRALAX) PACKET 1 Packet  1 Packet Oral QDAY PRN Santosh Elena M.D.        And   • magnesium hydroxide (MILK OF MAGNESIA) suspension 30 mL  30 mL Oral QDAY PRN Santosh Elena M.D.        And   • bisacodyl (DULCOLAX) suppository 10 mg  10 mg Rectal QDAY PRN Santosh Elena M.D.       • Respiratory Care per Protocol   Nebulization Continuous RT Santosh Elena M.D.       • acetaminophen (TYLENOL) tablet 650 mg  650 mg Oral Q6HRS PRN Santosh Elena M.D.       • ondansetron (ZOFRAN) syringe/vial injection 4 mg  4 mg Intravenous Q4HRS PRN Santosh Elena,  M.D.       • ondansetron (ZOFRAN ODT) dispertab 4 mg  4 mg Oral Q4HRS PRN Santosh Elena M.D.       • buPROPion SR (WELLBUTRIN-SR) tablet 150 mg  150 mg Oral DAILY Santosh Elena M.D.   150 mg at 06/29/19 0606   • omeprazole (PRILOSEC) capsule 20 mg  20 mg Oral DAILY Santosh Elena M.D.   20 mg at 06/29/19 0605   • furosemide (LASIX) injection 40 mg  40 mg Intravenous Q DAY Santosh Elena M.D.   40 mg at 06/29/19 0606       Fluids    Intake/Output Summary (Last 24 hours) at 06/29/19 2301  Last data filed at 06/29/19 2000   Gross per 24 hour   Intake              100 ml   Output              660 ml   Net             -560 ml       Laboratory      Recent Labs      06/27/19   0257  06/28/19 0427 06/29/19   0345   BNPBTYPENAT  402*  275*  218*     Recent Labs      06/27/19   1130  06/28/19   0427 06/29/19   0345   SODIUM  136  137  137   POTASSIUM  4.0  4.1  4.5   CHLORIDE  98  101  99   CO2  26  26  27   BUN  29*  31*  40*   CREATININE  1.40  1.35  1.57*   MAGNESIUM  2.3  2.1  2.1   PHOSPHORUS  3.7  4.2  4.2   CALCIUM  8.2*  8.4*  8.2*     Recent Labs      06/27/19   1130  06/28/19   0427  06/29/19   0345   ALTSGPT  14   --    --    ASTSGOT  12   --    --    ALKPHOSPHAT  63   --    --    TBILIRUBIN  0.7   --    --    GLUCOSE  307*  183*  167*     Recent Labs      06/27/19   1130  06/28/19   0427  06/29/19   0345   WBC  8.3  11.5*  10.5   NEUTSPOLYS  92.00*   --    --    LYMPHOCYTES  4.00*   --    --    MONOCYTES  3.30   --    --    EOSINOPHILS  0.00   --    --    BASOPHILS  0.10   --    --    ASTSGOT  12   --    --    ALTSGPT  14   --    --    ALKPHOSPHAT  63   --    --    TBILIRUBIN  0.7   --    --      Recent Labs      06/27/19   1130  06/28/19   0427  06/29/19   0345   RBC  3.43*  3.14*  3.16*   HEMOGLOBIN  9.8*  9.3*  9.2*   HEMATOCRIT  32.2*  28.8*  28.9*   PLATELETCT  186  189  220       Imaging  X-Ray:  I have personally reviewed the images and compared with prior images. 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)   Assessment & Plan    Cont steroids, she will need to be d/dharmesh on prednisone to take for 2-3 months post d.c   Amiodarone was stopped earlier this month when she was admitted with similar complaints   She will need pulmonary follow up after d/c to supervise her steroids treatment    Pulmonary hypertension (HCC)- (present on admission)   Assessment & Plan    Follow-up echocardiogram after appropriate treatment     Chronic atrial fibrillation (HCC)- (present on admission)   Assessment & Plan    Continue rate control as needed  Cont apixaban       COPD (chronic obstructive pulmonary disease) (HCC)- (present on admission)   Assessment & Plan    , Continue RT protocols, bronchodilators and titrated oxygen, goal SPO2 88-92%     Pleural effusion on right- (present on admission)   Assessment & Plan    S/p thoracentesis on 6/26. Transudative, cont lasix      Swelling of lower leg- (present on admission)   Assessment & Plan    Gentle diuresis            VTE:  NOAC  Ulcer: PPI  Lines: None    I have performed a physical exam and reviewed and updated ROS and Plan today (6/29/2019). In review of yesterday's note (6/28/2019), there are no changes except as documented above.

## 2019-06-30 NOTE — CARE PLAN
Problem: Safety  Goal: Will remain free from falls  Outcome: PROGRESSING AS EXPECTED  Pt mobility assessed at beginning of shift. Pt is a one assist. Fall precautions in place. Non-slip socks on. Bed in lowest locked position. Bed alarm on. Call light within reach. Pt educated to call for assistance and verbalizes understanding.    Problem: Knowledge Deficit  Goal: Knowledge of disease process/condition, treatment plan, diagnostic tests, and medications will improve  Outcome: PROGRESSING AS EXPECTED  Pt educated about disease process. Reason why medications are taken. And informed about treatment plan.

## 2019-06-30 NOTE — PROGRESS NOTES
Report received at bedside from Autumn CHU, pt care assumed, tele box on. Pt aaox4, no signs of distress noted at this time. Patient on high flow and purewick in place. POC discussed with pt and verbalizes no questions. Pt c/o of no pain. Pt denies any additional needs at this time. Bed in lowest position, pt educated on fall risk and verbalized understanding, call light within reach, bed alarm plugged in and on.

## 2019-06-30 NOTE — PROGRESS NOTES
Pt resting in bed at this time. Even visible chest rise. No signs of distress. Bed alarm on. Call light in place.  Bed in low and locked position. High flow on. Son at bedside.

## 2019-06-30 NOTE — DISCHARGE PLANNING
Received Choice form at 1200  Agency/Facility Name: [facility]. [facility] (the patient's first choice) is the same as [facility].   Referral sent per Choice form @ 1200

## 2019-06-30 NOTE — PROGRESS NOTES
Assumed care of PT A&O 4. Pt resting in bed with no signs of labored breathing. On highflow,  machine reading 93%. Tele monitor in place, cardiac rhythm being monitored. Call light within reach, bed in lowest position, upper bed rails up, bed alarm on. Pt was updated on plan of care for the day . Will continue to monitor.

## 2019-06-30 NOTE — PROGRESS NOTES
[facility] Medicine Daily Progress Note    Date of Service  6/30/2019    Chief Complaint  84 y.o. female admitted 6/26/2019 with recent hospitalization at [facility], at the time it was felt the patient might have a combination of COPD, amiodarone lung toxicity and pulmonary hypertension leading to dyspnea, the patient was discharged and returns with increased respiratory difficulty.  The patient has extensive history of tobacco abuse.  Patient was admitted with hypoxia, respiratory failure.    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 fib, rate controlled, blood pressure normal, lower extremity edema 1-2, received Lasix with additional urine output, CT chest today pending  6/28 patient is afebrile, alert and oriented x4, blood pressure in the 100s, and A. fib heart rate 70- 110, positive lower extremity edema, Lasix resulting in good urine output, the patient is on 6 L nasal cannula she uses high flow during eating, she had a bowel movement, CT reported and continued concern for lung toxicity changes.  6/29: patient back on high flow Oxygen today.  She states she had coughing and difficulty eating this a.m.  Ordered SLP eval who did not find any aspiration issues.  Crackles heard right posterior lung base, no wheeze.  Remains afib  on eliquis.     6/30:  BP dropped to 74/47.  Reviewed CT chest with worsening consolidation entire right lung.  Sepsis protocol initiated source pneumonia multifocal.  Recent treatment for pneumonia at [facility] ICU.  No abx given since 6/26 admission.  Lactic acid 3.Patient has yellow thick productive sputum.  BP improved with 1000ml fluid bolus.  Therefore not given full sepsis 2000ml bolus.  Stopped lasix 40mg IV daily, dc'd cardizem 30 q 6 hours.  Remains in afib.       Consultants/Specialty  Pulmonary critical care    Code Status  DNR/DNI    Disposition  Son at bedside, lives alone in [place].  OT rec SNF, PT continue assessment.  SNF order placed.      Review of Systems  Review of Systems   Constitutional: Positive for malaise/fatigue. Negative for chills and fever.   HENT: Negative.    Eyes: Negative.    Respiratory: Positive for cough and shortness of breath.    Cardiovascular: Positive for leg swelling. Negative for chest pain and palpitations.   Gastrointestinal: Negative.  Negative for heartburn, nausea and vomiting.   Genitourinary: Negative.  Negative for dysuria and frequency.   Musculoskeletal: Negative.  Negative for back pain and neck pain.   Skin: Negative.  Negative for itching and rash.   Neurological: Positive for weakness. Negative for dizziness, focal weakness and headaches.   Endo/Heme/Allergies: Negative.  Negative for polydipsia. Does not bruise/bleed easily.   Psychiatric/Behavioral: Negative for depression. The patient is nervous/anxious.         Physical Exam  Temp:  [36.4 °C (97.5 °F)-37 °C (98.6 °F)] 37 °C (98.6 °F)  Pulse:  [] 75  Resp:  [16-22] 19  BP: ()/(48-66) 103/56  SpO2:  [88 %-95 %] 90 %    Physical Exam   Constitutional: She is oriented to person, place, and time. She appears well-developed and well-nourished.   HENT:   Head: Normocephalic and atraumatic.   Nose: Nose normal.   Mouth/Throat: Oropharynx is clear and moist.   Eyes: Pupils are equal, round, and reactive to light. Conjunctivae and EOM are normal.   Neck: Normal range of motion. Neck supple. No JVD present. No thyromegaly present.   Cardiovascular: Normal rate, regular rhythm, normal heart sounds and intact distal pulses.  Exam reveals no gallop and no friction rub.    Capillary refill <3 secs   Pulmonary/Chest: Effort normal. No respiratory distress. She has no wheezes. She has rales.   Abdominal: Soft. Bowel sounds are normal. She exhibits no distension and no mass.  There is no tenderness. There is no rebound and no guarding.   Musculoskeletal: Normal range of motion. She exhibits no edema or tenderness.   Lymphadenopathy:     She has no cervical adenopathy.   Neurological: She is alert and oriented to person, place, and time. No cranial nerve deficit.   Skin: Skin is warm and dry. She is not diaphoretic. No cyanosis.   Psychiatric: She has a normal mood and affect. Her behavior is normal.   Nursing note and vitals reviewed.      Fluids    Intake/Output Summary (Last 24 hours) at 06/30/19 1652  Last data filed at 06/30/19 1000   Gross per 24 hour   Intake              100 ml   Output             1050 ml   Net             -950 ml       Laboratory  Recent Labs      06/28/19 0427 06/29/19 0345 06/30/19   0900   WBC  11.5*  10.5  10.4   RBC  3.14*  3.16*  3.72*   HEMOGLOBIN  9.3*  9.2*  10.9*   HEMATOCRIT  28.8*  28.9*  34.7*   MCV  91.7  91.5  93.3   MCH  29.6  29.1  29.3   MCHC  32.3*  31.8*  31.4*   RDW  50.1*  50.1*  52.8*   PLATELETCT  189  220  294   MPV  10.4  10.1  10.1     Recent Labs      06/28/19 0427 06/29/19   0345  06/30/19   0900   SODIUM  137  137  139   POTASSIUM  4.1  4.5  4.3   CHLORIDE  101  99  96   CO2  26  27  31   GLUCOSE  183*  167*  194*   BUN  31*  40*  41*   CREATININE  1.35  1.57*  1.60*   CALCIUM  8.4*  8.2*  8.6     Recent Labs      06/30/19   0900   APTT  28.6   INR  1.65*     Recent Labs      06/28/19 0427 06/29/19   0345   BNPBTYPENAT  275*  218*           Imaging  CT-CHEST (THORAX) W/O   Final Result      Extensive airspace opacities on the right are mildly increased in the right upper lobe and slightly improved in the right lower lobe. There is some cavitation.      Airspace opacities on the left are increased in the lingula and mildly improved in the left upper lobe.      Bilateral underlying fibrotic changes and emphysematous changes are noted.      Subpleural nodule in the left lower lobe measures 6 mm.      Follow-up to  radiographic resolution recommended.      Small bilateral pleural effusions, right greater than left.      Mediastinal lymphadenopathy is not significantly changed.      Atherosclerotic plaque.      Cardiomegaly.      Pancreatic calcifications likely sequelae of chronic pancreatitis.      Cholelithiasis.      US-EXTREMITY VENOUS LOWER BILAT   Final Result      DX-CHEST-PORTABLE (1 VIEW)   Final Result      Pneumothorax is not identified      Stable pulmonary scarring with right volume loss, right worse than left consolidation      US-THORACENTESIS PUNCTURE RIGHT   Final Result      1. Ultrasound guided right sided diagnostic and therapeutic thoracentesis.      2. 700 mL of fluid withdrawn.      Findings were discussed with Juan on 6/26/2019 12:40 PM.      DX-CHEST-PORTABLE (1 VIEW)   Final Result      1.  Tiny RIGHT pneumothorax   2.  Decreased RIGHT pleural fluid   3.  Otherwise no significant interval change      Findings were discussed with MARSHALL FOX on 6/26/2019 12:33 PM.      DX-CHEST-PORTABLE (1 VIEW)   Final Result      1.  Worsening hypoinflation and multifocal interstitial and alveolar opacities particularly involving the RIGHT lung, favoring pneumonia over edema.  Probable background of interstitial lung disease.   2.  RIGHT pleural fluid collection, apparently increased.           Assessment/Plan  Acute on chronic respiratory failure with hypoxia (HCC)- (present on admission)   Assessment & Plan    Secondary to pulmonary edema and right sided pleural effusion  Underlying COPD  Likely underlying amiodarone toxicity,  Continue supplemental oxygen with RT protocol, high flow nasal cannula  Doubt infectious process  Diuresis to continue  Likely prolonged steroid taper  CT with consistent findings, somewhat worsened  6/29 no aspiration found with SLP eval.  6/30 started rocephin, doxy for multifocal pneumonia, but mostly entire right lung.  Infiltrates on 6/26 CT noted to be worse than prior CT.    "  Sepsis associated hypotension (HCC)   Assessment & Plan    This is severe sepsis with the following associated acute organ dysfunction(s): acute respiratory failure.   6/30:  Sepsis protocol initiated for BP 74/47 responded to fluid bolus.  I examined the patient 6/30/2019 4:51 PM  Vital Signs:/56   Pulse 75   Temp 37 °C (98.6 °F) (Temporal)   Resp 19   Ht 1.651 m (5' 5\")   Wt 68.4 kg (150 lb 12.7 oz)   SpO2 90%   Breastfeeding? No   BMI 25.09 kg/m²    Cardiac examination significant for Regular rate and rhythm  Pulmonary examination significant for Crackles  Capillary refill is brisk  Peripheral Pulse is 2+   Skin is normal     Source Rt lung pneumonia.       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 Normal regional   wall motion. Left ventricular ejection fraction is visually estimated   to be 70%. Diastolic function is difficult to assess with atrial   Fibrillation. Normal right ventricular size and systolic function.  Mildly dilated left atrium. Mild mitral regurgitation. Moderate aortic insufficiency.   Moderate tricuspid regurgitation. Right atrial pressure is estimated to   be 15 mmHg. Estimated right ventricular systolic pressure  is 55 mmHg     Stage 3 chronic kidney disease (HCC)- (present on admission)   Assessment & Plan    At baseline kidney function  Avoiding nephrotoxics: NSAIDs etc  Monitor BMP         GERD (gastroesophageal reflux disease)- (present on admission)   Assessment & Plan    Continue omeprazole     Pulmonary hypertension (HCC)- (present on admission)   Assessment & Plan    Continue supplemental oxygen and diuresis     Multifocal pneumonia- (present on admission)   Assessment & Plan    Reviewed CTA chest:  Increased consolidation seen throughout the right upper, middle and lower lung fields.  Some infiltrates seen " LLL as well.  Sputum cultures ordered  Rocephin, doxycycline.       Chronic atrial fibrillation (HCC)- (present on admission)   Assessment & Plan     holding home Coreg since admission for hypotension.  Restarting anticoagulation, given renal insufficiency changed to Eliquis.  6/29:  afib remains rate 111.  Started cardizem 30mg po q 6 hours with holding parameters.  /60.     COPD (chronic obstructive pulmonary disease) (HCC)- (present on admission)   Assessment & Plan    No active wheezing at this time  Continue Spiriva and Symbicort  DuoNeb with RT protocol     Pleural effusion on right- (present on admission)   Assessment & Plan    Status post thoracentesis, yielding 700 cc of fluid       Swelling of lower leg- (present on admission)   Assessment & Plan    Negative for DVT  Elevate extremities  Started on diuresis as blood pressure allows        VTE prophylaxis: Eliquis

## 2019-07-01 ENCOUNTER — APPOINTMENT (OUTPATIENT)
Dept: RADIOLOGY | Facility: MEDICAL CENTER | Age: 84
DRG: 205 | End: 2019-07-01
Attending: STUDENT IN AN ORGANIZED HEALTH CARE EDUCATION/TRAINING PROGRAM
Payer: MEDICARE

## 2019-07-01 LAB
ANION GAP SERPL CALC-SCNC: 9 MMOL/L (ref 0–11.9)
BACTERIA FLD AEROBE CULT: NORMAL
BASOPHILS # BLD AUTO: 0 % (ref 0–1.8)
BASOPHILS # BLD: 0 K/UL (ref 0–0.12)
BUN SERPL-MCNC: 39 MG/DL (ref 8–22)
CALCIUM SERPL-MCNC: 8.1 MG/DL (ref 8.5–10.5)
CHLORIDE SERPL-SCNC: 101 MMOL/L (ref 96–112)
CO2 SERPL-SCNC: 30 MMOL/L (ref 20–33)
CREAT SERPL-MCNC: 1.44 MG/DL (ref 0.5–1.4)
EOSINOPHIL # BLD AUTO: 0.07 K/UL (ref 0–0.51)
EOSINOPHIL NFR BLD: 1 % (ref 0–6.9)
ERYTHROCYTE [DISTWIDTH] IN BLOOD BY AUTOMATED COUNT: 51.3 FL (ref 35.9–50)
GLUCOSE BLD-MCNC: 131 MG/DL (ref 65–99)
GLUCOSE BLD-MCNC: 168 MG/DL (ref 65–99)
GLUCOSE BLD-MCNC: 173 MG/DL (ref 65–99)
GLUCOSE BLD-MCNC: 234 MG/DL (ref 65–99)
GLUCOSE BLD-MCNC: 309 MG/DL (ref 65–99)
GLUCOSE SERPL-MCNC: 131 MG/DL (ref 65–99)
GRAM STN SPEC: NORMAL
GRAM STN SPEC: NORMAL
HCT VFR BLD AUTO: 29.9 % (ref 37–47)
HGB BLD-MCNC: 9.3 G/DL (ref 12–16)
IMM GRANULOCYTES # BLD AUTO: 0.16 K/UL (ref 0–0.11)
IMM GRANULOCYTES NFR BLD AUTO: 2.4 % (ref 0–0.9)
LYMPHOCYTES # BLD AUTO: 0.77 K/UL (ref 1–4.8)
LYMPHOCYTES NFR BLD: 11.5 % (ref 22–41)
MCH RBC QN AUTO: 28.9 PG (ref 27–33)
MCHC RBC AUTO-ENTMCNC: 31.1 G/DL (ref 33.6–35)
MCV RBC AUTO: 92.9 FL (ref 81.4–97.8)
MONOCYTES # BLD AUTO: 0.45 K/UL (ref 0–0.85)
MONOCYTES NFR BLD AUTO: 6.7 % (ref 0–13.4)
NEUTROPHILS # BLD AUTO: 5.26 K/UL (ref 2–7.15)
NEUTROPHILS NFR BLD: 78.4 % (ref 44–72)
NRBC # BLD AUTO: 0 K/UL
NRBC BLD-RTO: 0 /100 WBC
PLATELET # BLD AUTO: 260 K/UL (ref 164–446)
PMV BLD AUTO: 10 FL (ref 9–12.9)
POTASSIUM SERPL-SCNC: 3.5 MMOL/L (ref 3.6–5.5)
RBC # BLD AUTO: 3.22 M/UL (ref 4.2–5.4)
SIGNIFICANT IND 70042: NORMAL
SIGNIFICANT IND 70042: NORMAL
SITE SITE: NORMAL
SITE SITE: NORMAL
SODIUM SERPL-SCNC: 140 MMOL/L (ref 135–145)
SOURCE SOURCE: NORMAL
SOURCE SOURCE: NORMAL
WBC # BLD AUTO: 6.7 K/UL (ref 4.8–10.8)

## 2019-07-01 PROCEDURE — 700111 HCHG RX REV CODE 636 W/ 250 OVERRIDE (IP): Performed by: HOSPITALIST

## 2019-07-01 PROCEDURE — A9270 NON-COVERED ITEM OR SERVICE: HCPCS | Performed by: HOSPITALIST

## 2019-07-01 PROCEDURE — A9270 NON-COVERED ITEM OR SERVICE: HCPCS | Performed by: INTERNAL MEDICINE

## 2019-07-01 PROCEDURE — 700111 HCHG RX REV CODE 636 W/ 250 OVERRIDE (IP): Performed by: INTERNAL MEDICINE

## 2019-07-01 PROCEDURE — 700102 HCHG RX REV CODE 250 W/ 637 OVERRIDE(OP): Performed by: INTERNAL MEDICINE

## 2019-07-01 PROCEDURE — 85025 COMPLETE CBC W/AUTO DIFF WBC: CPT

## 2019-07-01 PROCEDURE — 36415 COLL VENOUS BLD VENIPUNCTURE: CPT

## 2019-07-01 PROCEDURE — 94760 N-INVAS EAR/PLS OXIMETRY 1: CPT

## 2019-07-01 PROCEDURE — 700102 HCHG RX REV CODE 250 W/ 637 OVERRIDE(OP): Performed by: HOSPITALIST

## 2019-07-01 PROCEDURE — 99233 SBSQ HOSP IP/OBS HIGH 50: CPT | Mod: GC | Performed by: INTERNAL MEDICINE

## 2019-07-01 PROCEDURE — 700101 HCHG RX REV CODE 250: Performed by: INTERNAL MEDICINE

## 2019-07-01 PROCEDURE — 770020 HCHG ROOM/CARE - TELE (206)

## 2019-07-01 PROCEDURE — 99233 SBSQ HOSP IP/OBS HIGH 50: CPT | Performed by: HOSPITALIST

## 2019-07-01 PROCEDURE — 71045 X-RAY EXAM CHEST 1 VIEW: CPT

## 2019-07-01 PROCEDURE — 700105 HCHG RX REV CODE 258: Performed by: HOSPITALIST

## 2019-07-01 PROCEDURE — 94640 AIRWAY INHALATION TREATMENT: CPT

## 2019-07-01 PROCEDURE — 700105 HCHG RX REV CODE 258: Performed by: INTERNAL MEDICINE

## 2019-07-01 PROCEDURE — 82962 GLUCOSE BLOOD TEST: CPT | Mod: 91

## 2019-07-01 PROCEDURE — 700101 HCHG RX REV CODE 250: Performed by: HOSPITALIST

## 2019-07-01 PROCEDURE — 80048 BASIC METABOLIC PNL TOTAL CA: CPT

## 2019-07-01 RX ORDER — IPRATROPIUM BROMIDE AND ALBUTEROL SULFATE 2.5; .5 MG/3ML; MG/3ML
SOLUTION RESPIRATORY (INHALATION)
Status: COMPLETED
Start: 2019-07-01 | End: 2019-07-02

## 2019-07-01 RX ORDER — ACETYLCYSTEINE 200 MG/ML
3 SOLUTION ORAL; RESPIRATORY (INHALATION)
Status: DISCONTINUED | OUTPATIENT
Start: 2019-07-01 | End: 2019-07-03

## 2019-07-01 RX ORDER — FUROSEMIDE 10 MG/ML
80 INJECTION INTRAMUSCULAR; INTRAVENOUS
Status: DISCONTINUED | OUTPATIENT
Start: 2019-07-01 | End: 2019-07-03

## 2019-07-01 RX ORDER — POTASSIUM CHLORIDE 20 MEQ/1
40 TABLET, EXTENDED RELEASE ORAL DAILY
Status: DISCONTINUED | OUTPATIENT
Start: 2019-07-02 | End: 2019-07-02

## 2019-07-01 RX ORDER — METHYLPREDNISOLONE SODIUM SUCCINATE 125 MG/2ML
125 INJECTION, POWDER, LYOPHILIZED, FOR SOLUTION INTRAMUSCULAR; INTRAVENOUS EVERY 6 HOURS
Status: COMPLETED | OUTPATIENT
Start: 2019-07-01 | End: 2019-07-02

## 2019-07-01 RX ORDER — METHYLPREDNISOLONE SODIUM SUCCINATE 125 MG/2ML
125 INJECTION, POWDER, LYOPHILIZED, FOR SOLUTION INTRAMUSCULAR; INTRAVENOUS EVERY 6 HOURS
Status: DISCONTINUED | OUTPATIENT
Start: 2019-07-01 | End: 2019-07-01

## 2019-07-01 RX ORDER — IPRATROPIUM BROMIDE AND ALBUTEROL SULFATE 2.5; .5 MG/3ML; MG/3ML
3 SOLUTION RESPIRATORY (INHALATION)
Status: DISCONTINUED | OUTPATIENT
Start: 2019-07-01 | End: 2019-07-10 | Stop reason: HOSPADM

## 2019-07-01 RX ADMIN — DOXYCYCLINE 100 MG: 100 TABLET, FILM COATED ORAL at 17:12

## 2019-07-01 RX ADMIN — PREDNISONE 40 MG: 10 TABLET ORAL at 05:07

## 2019-07-01 RX ADMIN — BUPROPION HYDROCHLORIDE 150 MG: 150 TABLET, EXTENDED RELEASE ORAL at 05:07

## 2019-07-01 RX ADMIN — CEFTRIAXONE SODIUM 1 G: 1 INJECTION, POWDER, FOR SOLUTION INTRAMUSCULAR; INTRAVENOUS at 05:08

## 2019-07-01 RX ADMIN — OMEPRAZOLE 20 MG: 20 CAPSULE, DELAYED RELEASE ORAL at 05:07

## 2019-07-01 RX ADMIN — FUROSEMIDE 80 MG: 10 INJECTION, SOLUTION INTRAMUSCULAR; INTRAVENOUS at 15:18

## 2019-07-01 RX ADMIN — ACETYLCYSTEINE 3 ML: 200 SOLUTION ORAL; RESPIRATORY (INHALATION) at 23:00

## 2019-07-01 RX ADMIN — IPRATROPIUM BROMIDE AND ALBUTEROL SULFATE 3 ML: .5; 3 SOLUTION RESPIRATORY (INHALATION) at 15:14

## 2019-07-01 RX ADMIN — INSULIN HUMAN 2 UNITS: 100 INJECTION, SOLUTION PARENTERAL at 12:23

## 2019-07-01 RX ADMIN — APIXABAN 2.5 MG: 2.5 TABLET, FILM COATED ORAL at 17:12

## 2019-07-01 RX ADMIN — METHYLPREDNISOLONE SODIUM SUCCINATE 125 MG: 125 INJECTION, POWDER, FOR SOLUTION INTRAMUSCULAR; INTRAVENOUS at 12:26

## 2019-07-01 RX ADMIN — IPRATROPIUM BROMIDE AND ALBUTEROL SULFATE 3 ML: .5; 3 SOLUTION RESPIRATORY (INHALATION) at 21:05

## 2019-07-01 RX ADMIN — IPRATROPIUM BROMIDE AND ALBUTEROL SULFATE 3 ML: .5; 3 SOLUTION RESPIRATORY (INHALATION) at 07:26

## 2019-07-01 RX ADMIN — DOXYCYCLINE 100 MG: 100 TABLET, FILM COATED ORAL at 05:07

## 2019-07-01 RX ADMIN — APIXABAN 2.5 MG: 2.5 TABLET, FILM COATED ORAL at 05:07

## 2019-07-01 RX ADMIN — CEFEPIME 2 G: 2 INJECTION, POWDER, FOR SOLUTION INTRAVENOUS at 15:17

## 2019-07-01 RX ADMIN — SENNOSIDES AND DOCUSATE SODIUM 2 TABLET: 8.6; 5 TABLET ORAL at 05:07

## 2019-07-01 RX ADMIN — BUDESONIDE AND FORMOTEROL FUMARATE DIHYDRATE 2 PUFF: 80; 4.5 AEROSOL RESPIRATORY (INHALATION) at 07:32

## 2019-07-01 RX ADMIN — INSULIN HUMAN 2 UNITS: 100 INJECTION, SOLUTION PARENTERAL at 17:19

## 2019-07-01 RX ADMIN — INSULIN HUMAN 3 UNITS: 100 INJECTION, SOLUTION PARENTERAL at 23:33

## 2019-07-01 RX ADMIN — METHYLPREDNISOLONE SODIUM SUCCINATE 125 MG: 125 INJECTION, POWDER, FOR SOLUTION INTRAMUSCULAR; INTRAVENOUS at 17:13

## 2019-07-01 ASSESSMENT — ENCOUNTER SYMPTOMS
MUSCULOSKELETAL NEGATIVE: 1
DIZZINESS: 0
BACK PAIN: 0
HEADACHES: 0
EYES NEGATIVE: 1
VOMITING: 0
PALPITATIONS: 0
NECK PAIN: 0
NERVOUS/ANXIOUS: 1
CHILLS: 0
BRUISES/BLEEDS EASILY: 0
DEPRESSION: 0
HEARTBURN: 0
WEAKNESS: 1
POLYDIPSIA: 0
GASTROINTESTINAL NEGATIVE: 1
FOCAL WEAKNESS: 0
SHORTNESS OF BREATH: 1
FEVER: 0
NAUSEA: 0
COUGH: 1
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
## 2019-07-01 NOTE — DISCHARGE PLANNING
"Anticipated Disposition:  [facility]    Action:   I TTed  to request the reason why patient is on antidepressant. Pt states she takes for depression.   Provided ICD-10 code F33 to [facility].    CCA called [employer] to find out more information regarding transportation.   Called Son regarding transportation, He expressed his concerns,stated\" Mother is on 45L HFNC , I don't feel comfortable taking her on a 4 hours ride, I would love to, But I am afraid, she needs medical professional assistance\"        Barriers to Discharge:   Medical clearance.       Plan:  Follow up with [facility] for [employer] info. Then updated the family. Family is willing to take Pt there but would like to try [employer] first due to oxygen needs.   Follow up [facility] , It went Manual Review.   "

## 2019-07-01 NOTE — FLOWSHEET NOTE
07/01/19 0303   Heated Hi Flow Nasal Cannula   Heated Hi Flow Nasal Cannula (HHFNC) Yes   FiO2 (FNC) 45   Flowrate (FNC) 35   Humidifier Temperature (FNC) 31   Respiratory WDL   Respiratory (WDL) X   Chest Exam   Work Of Breathing / Effort Mild   Respiration 20   Pulse 95   Breath Sounds   RUL Breath Sounds Clear   RML Breath Sounds Diminished   RLL Breath Sounds Diminished   GINA Breath Sounds Clear   LLL Breath Sounds Diminished   Oximetry   Continuous Oximetry Yes   Oxygen   Pulse Oximetry 91 %   O2 (LPM) 35   FiO2% 45 %   O2 Daily Delivery Respiratory  Highflow Nasal Cannula

## 2019-07-01 NOTE — PROGRESS NOTES
Pt sitting up in bed at this time. Even visible chest rise. No signs of distress. Bed alarm on. Call light in place.  Bed in low and locked position. Son at bedside. Hourly rounding in place.

## 2019-07-01 NOTE — PROGRESS NOTES
Assumed care of PT A&O 4. Pt resting in bed with no signs of labored breathing. On high flow,  machine in use. Tele monitor in place, cardiac rhythm being monitored. Call light within reach, bed in lowest position, upper bed rails up, bed alarm on. Pt was updated on plan of care for the day.

## 2019-07-01 NOTE — PROGRESS NOTES
Critical Care Progress Note    Date of admission  6/26/2019    Chief Complaint  84 y.o. female admitted 6/26/2019 with ground-level fall, hypoxia and abnormal chest x-ray    Hospital Course    84 y.o. female who presented 6/26/2019 with recent hospitalization at [facility] 6/8-6/17 for abnormal chest x-ray and hypoxia.  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  Amiodarone was discontinued.  She was treated with IV corticosteroids during that hospitalization including Solu-Medrol 62.5 mg every 6 hours on 6 8, decreased to 40 mg every 8 hours from 6 9 through 6/12.  This was then lowered to prednisone 20 mg daily 6/12 through 6/15 then discontinued on 617 at discharge.  Amiodarone was not resumed.  After discharge home she was initially doing well on 2 to 3 L oxygen via nasal cannula.  She lives in a [facility] near [place] and her daughter checks on her frequently and checks her pulse oximetry.  This is been adequate with a goal between 88 and 92%.  She is a lifelong smoker a pack a day for most of her adult life but quit smoking at the time of her recent hospitalization this month.  She been feeling relatively well until the day of admission here.  She was apparently going to use the bathroom using her walker but her legs gave out and she fell to the ground and apparently did hit her the right side of her head.  There is no loss of consciousness and she denied presyncopal symptoms.  Her family called EMS she was found to be hypoxemic with room air saturation of 62% and was placed on nonrebreather and transferred to [place] [facility].  Chest x-ray was again very abnormal with diffuse right greater than left alveolar infiltrates.  CT scan of the head was negative.  She remained at high oxygen requiring 10 L and was transferred to [facility] for further evaluation.  I reviewed her prior CT scan from 6/10 which showed diffuse multifocal parenchymal  "consolidation most notably in the right lung and emphysematous change.  Chest x-ray here shows worsening multifocal airspace disease on the right and some left lower lobe airspace disease as well as effusion.  Today she is undergoing a right thoracentesis and ongoing diuretic therapy.  She has history of chronic atrial fibrillation on home\" therapy with Xarelto, this is been temporarily held for thoracentesis.      Interval Problem Update  Reviewed last 24 hour events:   Started on abx today  Lasix and Cardizem held due to low BP  Given one liter of IVF   Doing the same clinically. Still on HFNC 60% and 30 L/M flow        Review of Systems  Review of Systems   Constitutional: Negative for chills and fever.   Eyes: Negative for blurred vision.   Respiratory: Positive for cough. Negative for hemoptysis and stridor.    Cardiovascular: Negative for chest pain and palpitations.   Gastrointestinal: Negative for heartburn, nausea and vomiting.   Genitourinary: Negative for dysuria.   Musculoskeletal: Negative for myalgias.        Vital Signs for last 24 hours   Temp:  [36.4 °C (97.5 °F)-37 °C (98.6 °F)] 36.5 °C (97.7 °F)  Pulse:  [] 98  Resp:  [16-24] 20  BP: ()/(45-61) 106/45  SpO2:  [90 %-93 %] 92 %    Hemodynamic parameters for last 24 hours       Respiratory Information for the last 24 hours       Physical Exam   Physical Exam   Constitutional: She is oriented to person, place, and time. She appears well-developed.   HENT:   Head: Atraumatic.   Eyes: Pupils are equal, round, and reactive to light.   Neck: Neck supple. No tracheal deviation present.   Cardiovascular: Normal rate and intact distal pulses.    No murmur heard.  Atrial fibrillation   Pulmonary/Chest: No respiratory distress. She has no wheezes. She has rales.   Abdominal: She exhibits no distension.   Musculoskeletal: She exhibits no edema or tenderness.   Neurological: She is alert and oriented to person, place, and time. No cranial nerve " deficit.   Skin: Skin is dry.   Updated, unchanged    Medications  Current Facility-Administered Medications   Medication Dose Route Frequency Provider Last Rate Last Dose   • cefTRIAXone (ROCEPHIN) 1 g in  mL IVPB  1 g Intravenous Q24HRS Anastasia García M.D.   Stopped at 06/30/19 1038   • doxycycline monohydrate (ADOXA) tablet 100 mg  100 mg Oral Q12HRS Anastasia García M.D.   100 mg at 06/30/19 1707   • NS (BOLUS) infusion 500 mL  500 mL Intravenous Once PRN Anastasia García M.D.       • apixaban (ELIQUIS) 2.5mg tablet 2.5 mg  2.5 mg Oral BID Anastasia García M.D.   2.5 mg at 06/30/19 1707   • predniSONE (DELTASONE) tablet 40 mg  40 mg Oral DAILY Pablo Gaona M.D.   40 mg at 06/30/19 0617   • ipratropium-albuterol (DUONEB) nebulizer solution  3 mL Nebulization TID (RT) Pablo Gaona M.D.   3 mL at 06/30/19 1909   • budesonide-formoterol (SYMBICORT) 80-4.5 MCG/ACT inhaler 2 Puff  2 Puff Inhalation BID Pablo Gaona M.D.   2 Puff at 06/30/19 1708   • tiotropium (SPIRIVA) 18 MCG inhalation capsule 1 Cap  1 Cap Inhalation DAILY Pablo Gaona M.D.   1 Cap at 06/30/19 0617   • insulin regular (HUMULIN R) injection 2-9 Units  2-9 Units Subcutaneous 4X/DAY ACHS Pablo Gaona M.D.   3 Units at 06/30/19 2101    And   • glucose 4 g chewable tablet 16 g  16 g Oral Q15 MIN PRN Pablo Gaona M.D.        And   • DEXTROSE 10% BOLUS 250 mL  250 mL Intravenous Q15 MIN PRN Pablo Gaona M.D.       • acetaminophen (TYLENOL) tablet 650 mg  650 mg Oral Q6HRS PRN Johann Montero M.D.       • senna-docusate (PERICOLACE or SENOKOT S) 8.6-50 MG per tablet 2 Tab  2 Tab Oral BID Santosh Elena M.D.   2 Tab at 06/30/19 0622    And   • polyethylene glycol/lytes (MIRALAX) PACKET 1 Packet  1 Packet Oral QDAY PRN Santosh Elena M.D.        And   • magnesium hydroxide (MILK OF MAGNESIA) suspension 30 mL  30 mL Oral QDAY PRN Santosh Elena M.D.        And   • bisacodyl (DULCOLAX) suppository 10 mg  10 mg Rectal QDAY PRN Santosh Elena M.D.        • Respiratory Care per Protocol   Nebulization Continuous RT Santosh Elena M.D.       • acetaminophen (TYLENOL) tablet 650 mg  650 mg Oral Q6HRS PRN Santosh Elena M.D.       • ondansetron (ZOFRAN) syringe/vial injection 4 mg  4 mg Intravenous Q4HRS PRN Santosh Elena M.D.       • ondansetron (ZOFRAN ODT) dispertab 4 mg  4 mg Oral Q4HRS PRN Santosh Elena M.D.       • buPROPion SR (WELLBUTRIN-SR) tablet 150 mg  150 mg Oral DAILY Santosh Elena M.D.   150 mg at 06/30/19 0616   • omeprazole (PRILOSEC) capsule 20 mg  20 mg Oral DAILY Santosh Elena M.D.   20 mg at 06/30/19 0616       Fluids    Intake/Output Summary (Last 24 hours) at 06/30/19 2315  Last data filed at 06/30/19 1000   Gross per 24 hour   Intake                0 ml   Output             1050 ml   Net            -1050 ml       Laboratory      Recent Labs      06/28/19 0427 06/29/19 0345   BNPBTYPENAT  275*  218*     Recent Labs      06/28/19 0427 06/29/19 0345 06/30/19   0900   SODIUM  137  137  139   POTASSIUM  4.1  4.5  4.3   CHLORIDE  101  99  96   CO2  26  27  31   BUN  31*  40*  41*   CREATININE  1.35  1.57*  1.60*   MAGNESIUM  2.1  2.1   --    PHOSPHORUS  4.2  4.2   --    CALCIUM  8.4*  8.2*  8.6     Recent Labs      06/28/19 0427 06/29/19 0345 06/30/19   0900   GLUCOSE  183*  167*  194*     Recent Labs      06/28/19 0427 06/29/19 0345 06/30/19   0900   WBC  11.5*  10.5  10.4   NEUTSPOLYS   --    --   88.80*   LYMPHOCYTES   --    --   4.70*   MONOCYTES   --    --   5.00   EOSINOPHILS   --    --   0.50   BASOPHILS   --    --   0.10     Recent Labs      06/28/19 0427 06/29/19 0345 06/30/19   0900   RBC  3.14*  3.16*  3.72*   HEMOGLOBIN  9.3*  9.2*  10.9*   HEMATOCRIT  28.8*  28.9*  34.7*   PLATELETCT  189  220  294   PROTHROMBTM   --    --   20.0*   APTT   --    --   28.6   INR   --    --   1.65*       Imaging  X-Ray:  I have personally reviewed the images and compared with prior images. 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)   Assessment & Plan    Cont steroids, she will need to be d/dharmesh on prednisone to take for 2-3 months post d.c   Amiodarone was stopped earlier this month when she was admitted with similar complaints   She will need pulmonary follow up after d/c to supervise her steroids treatment    Pulmonary hypertension (HCC)- (present on admission)   Assessment & Plan    Follow-up echocardiogram after appropriate treatment     Chronic atrial fibrillation (HCC)- (present on admission)   Assessment & Plan    Continue rate control as needed  Cont apixaban       COPD (chronic obstructive pulmonary disease) (HCC)- (present on admission)   Assessment & Plan    , Continue RT protocols, bronchodilators and titrated oxygen, goal SPO2 88-92%     Pleural effusion on right- (present on admission)   Assessment & Plan    S/p thoracentesis on 6/26. Transudative, cont lasix      Swelling of lower leg- (present on admission)   Assessment & Plan    Gentle diuresis        Pneumonia   Agree with Restarting on abx  Lasix held due to low BP, I think patient should be discharged on low dose lasix given her CHF and pulmonary htn           VTE:  NOAC  Ulcer: PPI  Lines: None    I have performed a physical exam and reviewed and updated ROS and Plan today (6/30/2019). In review of yesterday's note (6/29/2019), there are no changes except as documented above.

## 2019-07-01 NOTE — PROGRESS NOTES
Pulmonary Progress Note        Chief Complaint: This is an 84 year old female who was admitted on 6/26/2019 with a ground level fall, hypoxia and an abnormal chest x-ray    History of Present Illness:   84 y.o. female who presented on 6/26/2019 after a recent hospitalization at [facility] on 6/8 - 6/17 for an abnormal chest x-ray and hypoxia. 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 At that time Amiodarone was discontinued and she was given a steroid taper, down to prednisone 20mg PO which was discontinued at discharge on 6/17/2019. After her discharge she was initially doing well on 2-3 L 02 via NC (basline is 3L). She was a lifetime smoker, but states she quit after her last discharge. On 6/26/2019 she was going to use the bathroom with her walker, but her legs gave out and she fell to the ground hitting the right side of her head. There was no LOC or presyncopal symptoms. Her family called EMS and she was found to be 62% on room air. She was placed on a non-rebreahter then transferred to [facility]. Chest x-ray showed r>l alveolar infiltrates, CT head was negative. She continued to require 10L of oxygen, so she was transferred to [facility] for further evaluation.     Interval Problem Update:   Reviewed in the last 24 hours:    -Patient with increased 02 requirements over the past 48 hours, now on 45L HFNC    -Switched from Rocephin and Doxycycline to Cefepime and Doxycycline for suspected HAP   -Switched from Prednisone to Solu-Medrol due to worsening respiratory status    -Lasix reinitiated   -Palliative care consult placed due to worsening respiratory status and dx of non-curable disease    ROS:    Respiratory: positive cough and negative hemoptysis,   Cardiac: negative chest pain and negative palpitations,   GI: negative heartburn, negative nausea and negative vomiting.   :  Negative for Dysuria  MSK: Negative for myalgias    All other systems negative.    Vital Signs for last 24 hours:  Temp:  [36.3 °C (97.4 °F)-37 °C (98.6 °F)] 36.8 °C (98.3 °F)  Pulse:  [] 107  Resp:  [18-24] 18  BP: ()/(38-69) 126/69  SpO2:  [89 %-93 %] 93 %  Weight: 67.9 kg (149 lb 11.1 oz)       Physical Exam:  Physical Exam   Constitutional: She is oriented to person, place, and time.   HENT:   Head: Normocephalic and atraumatic.   Eyes: Right eye exhibits no discharge. Left eye exhibits no discharge.   Neck: No tracheal deviation present. No thyromegaly present.   Cardiovascular: Normal rate and intact distal pulses.    No murmur heard.  Patient with atrial fibrillation   Pulmonary/Chest: No respiratory distress. She has no wheezes. She has rales.   Abdominal: She exhibits no distension.   Musculoskeletal: She exhibits edema. She exhibits no tenderness.   Neurological: She is alert and oriented to person, place, and time.   Skin: Skin is warm.     Fluids:    Intake/Output Summary (Last 24 hours) at 07/01/19 1331  Last data filed at 07/01/19 1000   Gross per 24 hour   Intake              360 ml   Output             1200 ml   Net             -840 ml     Laboratory:     Recent Labs      06/29/19 0345 06/30/19 0900 07/01/19   0231   SODIUM  137  139  140   POTASSIUM  4.5  4.3  3.5*   CHLORIDE  99  96  101   CO2  27  31  30   BUN  40*  41*  39*   CREATININE  1.57*  1.60*  1.44*   MAGNESIUM  2.1   --    --    PHOSPHORUS  4.2   --    --    CALCIUM  8.2*  8.6  8.1*       Recent Labs      06/29/19 0345 06/30/19   0900 07/01/19   0231   GLUCOSE  167*  194*  131*       Recent Labs      06/29/19 0345 06/30/19 0900 07/01/19   0231   RBC  3.16*  3.72*  3.22*   HEMOGLOBIN  9.2*  10.9*  9.3*   HEMATOCRIT  28.9*  34.7*  29.9*   PLATELETCT  220  294  260   PROTHROMBTM   --   20.0*   --    APTT   --   28.6   --    INR   --   1.65*   --      Recent Labs      06/29/19   0345  06/30/19   0900  07/01/19    0231   WBC  10.5  10.4  6.7   NEUTSPOLYS   --   88.80*  78.40*   LYMPHOCYTES   --   4.70*  11.50*   MONOCYTES   --   5.00  6.70   EOSINOPHILS   --   0.50  1.00   BASOPHILS   --   0.10  0.00       Medications:     Current Facility-Administered Medications   Medication Dose Frequency Provider Last Rate Last Dose   • furosemide (LASIX) injection 80 mg  80 mg Q DAY Jericho Doss M.D.       • methylPREDNISolone sod succ (SOLU-MEDROL) 125 MG injection 125 mg  125 mg Q6HRS Jericho Doss M.D.   125 mg at 07/01/19 1226   • cefepime (MAXIPIME) 2 g in  mL IVPB  2 g Q24HRS Jericho Doss M.D.       • doxycycline monohydrate (ADOXA) tablet 100 mg  100 mg Q12HRS Jericho Doss M.D.   100 mg at 07/01/19 0507   • NS (BOLUS) infusion 500 mL  500 mL Once PRN Anastasia García M.D.       • apixaban (ELIQUIS) 2.5mg tablet 2.5 mg  2.5 mg BID Anastasia García M.D.   2.5 mg at 07/01/19 0507   • ipratropium-albuterol (DUONEB) nebulizer solution  3 mL TID (RT) Pablo Gaona M.D.   3 mL at 07/01/19 0726   • tiotropium (SPIRIVA) 18 MCG inhalation capsule 1 Cap  1 Cap DAILY Pablo Gaona M.D.   1 Cap at 06/30/19 0617   • insulin regular (HUMULIN R) injection 2-9 Units  2-9 Units 4X/DAY ACHS Pablo Gaona M.D.   2 Units at 07/01/19 1223    And   • glucose 4 g chewable tablet 16 g  16 g Q15 MIN PRN Pablo Gaona M.D.        And   • DEXTROSE 10% BOLUS 250 mL  250 mL Q15 MIN PRN Pablo Gaona M.D.       • acetaminophen (TYLENOL) tablet 650 mg  650 mg Q6HRS PRN Johann Montero M.D.       • senna-docusate (PERICOLACE or SENOKOT S) 8.6-50 MG per tablet 2 Tab  2 Tab BID Santosh Elena M.D.   2 Tab at 07/01/19 0507    And   • polyethylene glycol/lytes (MIRALAX) PACKET 1 Packet  1 Packet QDAY PRN Santosh Elena M.D.        And   • magnesium hydroxide (MILK OF MAGNESIA) suspension 30 mL  30 mL QDAY PRN Santohs Elena M.D.        And   • bisacodyl (DULCOLAX) suppository 10 mg  10 mg QDAY PRN Santosh Elena M.D.       • Respiratory Care per Protocol    Continuous RT Santosh Elena M.D.       • acetaminophen (TYLENOL) tablet 650 mg  650 mg Q6HRS PRN Santosh Elena M.D.       • ondansetron (ZOFRAN) syringe/vial injection 4 mg  4 mg Q4HRS PRN Santosh Elena M.D.       • ondansetron (ZOFRAN ODT) dispertab 4 mg  4 mg Q4HRS PRN Santosh Elena M.D.       • buPROPion SR (WELLBUTRIN-SR) tablet 150 mg  150 mg DAILY Santosh Elena M.D.   150 mg at 07/01/19 0507   • omeprazole (PRILOSEC) capsule 20 mg  20 mg DAILY Santosh Elena M.D.   20 mg at 07/01/19 0507     Last reviewed on 6/30/2019  7:12 PM by Kg Mariscal R.N.    Quality  Measures:  Labs reviewed, Medications reviewed and Radiology images reviewed  Loaiza catheter: No Loaiza      DVT: NOAC.    Ulcer Prophylaxis: PPI.  : Treating for suspected HAP.        Assessment / Plan  Acute on chronic respiratory failure with hypoxia (HCC)- (present on admission)   Assessment & Plan    Multifactorial with underlying COPD and ongoing tobacco use recent cessation  Diffuse right greater than left multifocal airspace infiltrates suspect related to amiodarone lung toxicity  Continue titrated oxygen, high flow nasal cannula as needed, goal SPO2 88-92%    Over past 48 hours patient has hadincreased 02 requirements, now on 45L HFNC    -Switched from Rocephin and Doxycycline to Cefapime and Doxycycline for suspected HAP (5 day course), MRSA nares is pending    -Switched from Prednisone to Solu-Medrol due to worsening respiratory status    -Lasix reinitiated at 80mg at renal dosing      Amiodarone pulmonary toxicity   Assessment & Plan    Amiodarone was stopped earlier this month when she was admitted with similar complaints.   She has had increased 02 requirements over the past 48 hours, now on 45L HFNC    -Switched from Prednisone to Solu-Medrol    -Lasix reinitiated  Due to this uncurable condition and worsening respiratory status, Palliative care has been consulted to discuss options with the patient    Will continue to monitor after above measures  to see if respiratory status improves       Stage 3 chronic kidney disease (HCC)   Assessment & Plan    Follow renal function, avoid nephrotoxic agents     Pulmonary hypertension (HCC)- (present on admission)   Assessment & Plan    Follow-up echocardiogram after appropriate treatment     Multifocal pneumonia- (present on admission)   Assessment & Plan    Currently being treated for suspected HAP with Cefepime and Doxycyline (5 day course)     Chronic atrial fibrillation (HCC)- (present on admission)   Assessment & Plan    Continue rate control as needed  Cont apixaban      COPD (chronic obstructive pulmonary disease) (HCC)- (present on admission)   Assessment & Plan    Continue RT protocols, bronchodilators and titrated oxygen, goal SPO2 88-92%     Pleural effusion on right- (present on admission)   Assessment & Plan    S/p thoracentesis on 6/26. Transudative, continue with Lasix 80mg for renal dosing      Swelling of lower leg- (present on admission)   Assessment & Plan    Lasix reinitiated at 80mg for renal dosing        I have preformed a physical exam and reviewed and updated ROS and Plan today (7/1/19). In review of yesterday's note (6/30/19) there are no changes except as documented above.

## 2019-07-01 NOTE — DISCHARGE PLANNING
Agency/Facility Name: [facility]  Spoke To: Horace  Outcome: Pending review.    @1300  Agency[facility]Facility Name: [facility]  Spoke To: Horace  Outcome: Accepted just needs ICD 10 codes and diagnosis why she is on antidepressant. Wondering how she is getting to them (Family or other mode of transport) and when.    @153  [facility]Facility Name: [facility]  Spoke To: Horace  Outcome: Accepted.

## 2019-07-01 NOTE — PROGRESS NOTES
Bedside report received from day RN. Assumed care of pt at 1900. Pt is on 35L @50% of O2 via hiflow NC. Pt has tele box in place. No s/s of pain or discomfort. Educated to call light. Bed is in low and locked position. Will continue to monitor.

## 2019-07-02 LAB
ANION GAP SERPL CALC-SCNC: 11 MMOL/L (ref 0–11.9)
BASE EXCESS BLDA CALC-SCNC: 2 MMOL/L (ref -4–3)
BASOPHILS # BLD AUTO: 0.2 % (ref 0–1.8)
BASOPHILS # BLD: 0.01 K/UL (ref 0–0.12)
BODY TEMPERATURE: 36.2 CENTIGRADE
BUN SERPL-MCNC: 37 MG/DL (ref 8–22)
CALCIUM SERPL-MCNC: 8.4 MG/DL (ref 8.5–10.5)
CHLORIDE SERPL-SCNC: 99 MMOL/L (ref 96–112)
CO2 SERPL-SCNC: 32 MMOL/L (ref 20–33)
CREAT SERPL-MCNC: 1.47 MG/DL (ref 0.5–1.4)
EOSINOPHIL # BLD AUTO: 0 K/UL (ref 0–0.51)
EOSINOPHIL NFR BLD: 0 % (ref 0–6.9)
ERYTHROCYTE [DISTWIDTH] IN BLOOD BY AUTOMATED COUNT: 50.7 FL (ref 35.9–50)
GLUCOSE BLD-MCNC: 159 MG/DL (ref 65–99)
GLUCOSE BLD-MCNC: 197 MG/DL (ref 65–99)
GLUCOSE BLD-MCNC: 243 MG/DL (ref 65–99)
GLUCOSE BLD-MCNC: 267 MG/DL (ref 65–99)
GLUCOSE SERPL-MCNC: 202 MG/DL (ref 65–99)
HCO3 BLDA-SCNC: 26 MMOL/L (ref 17–25)
HCT VFR BLD AUTO: 32.4 % (ref 37–47)
HGB BLD-MCNC: 10.2 G/DL (ref 12–16)
IMM GRANULOCYTES # BLD AUTO: 0.12 K/UL (ref 0–0.11)
IMM GRANULOCYTES NFR BLD AUTO: 2.5 % (ref 0–0.9)
LYMPHOCYTES # BLD AUTO: 0.23 K/UL (ref 1–4.8)
LYMPHOCYTES NFR BLD: 4.8 % (ref 22–41)
MCH RBC QN AUTO: 29 PG (ref 27–33)
MCHC RBC AUTO-ENTMCNC: 31.5 G/DL (ref 33.6–35)
MCV RBC AUTO: 92 FL (ref 81.4–97.8)
MONOCYTES # BLD AUTO: 0.14 K/UL (ref 0–0.85)
MONOCYTES NFR BLD AUTO: 2.9 % (ref 0–13.4)
NEUTROPHILS # BLD AUTO: 4.32 K/UL (ref 2–7.15)
NEUTROPHILS NFR BLD: 89.6 % (ref 44–72)
NRBC # BLD AUTO: 0 K/UL
NRBC BLD-RTO: 0 /100 WBC
O2/TOTAL GAS SETTING VFR VENT: 45 % (ref 30–60)
PCO2 BLDA: 36.2 MMHG (ref 26–37)
PCO2 TEMP ADJ BLDA: 35 MMHG (ref 26–37)
PH BLDA: 7.47 [PH] (ref 7.4–7.5)
PH TEMP ADJ BLDA: 7.48 [PH] (ref 7.4–7.5)
PLATELET # BLD AUTO: 320 K/UL (ref 164–446)
PMV BLD AUTO: 10.3 FL (ref 9–12.9)
PO2 BLDA: 60.8 MMHG (ref 64–87)
PO2 TEMP ADJ BLDA: 57.5 MMHG (ref 64–87)
POTASSIUM SERPL-SCNC: 3.4 MMOL/L (ref 3.6–5.5)
RBC # BLD AUTO: 3.52 M/UL (ref 4.2–5.4)
SAO2 % BLDA: 90.1 % (ref 93–99)
SODIUM SERPL-SCNC: 142 MMOL/L (ref 135–145)
WBC # BLD AUTO: 4.8 K/UL (ref 4.8–10.8)

## 2019-07-02 PROCEDURE — 94640 AIRWAY INHALATION TREATMENT: CPT

## 2019-07-02 PROCEDURE — 99233 SBSQ HOSP IP/OBS HIGH 50: CPT | Performed by: INTERNAL MEDICINE

## 2019-07-02 PROCEDURE — 36415 COLL VENOUS BLD VENIPUNCTURE: CPT

## 2019-07-02 PROCEDURE — 80048 BASIC METABOLIC PNL TOTAL CA: CPT

## 2019-07-02 PROCEDURE — 700102 HCHG RX REV CODE 250 W/ 637 OVERRIDE(OP): Performed by: INTERNAL MEDICINE

## 2019-07-02 PROCEDURE — 700105 HCHG RX REV CODE 258: Performed by: INTERNAL MEDICINE

## 2019-07-02 PROCEDURE — A9270 NON-COVERED ITEM OR SERVICE: HCPCS | Performed by: INTERNAL MEDICINE

## 2019-07-02 PROCEDURE — 700111 HCHG RX REV CODE 636 W/ 250 OVERRIDE (IP): Performed by: INTERNAL MEDICINE

## 2019-07-02 PROCEDURE — 82803 BLOOD GASES ANY COMBINATION: CPT

## 2019-07-02 PROCEDURE — A9270 NON-COVERED ITEM OR SERVICE: HCPCS | Performed by: HOSPITALIST

## 2019-07-02 PROCEDURE — 94668 MNPJ CHEST WALL SBSQ: CPT

## 2019-07-02 PROCEDURE — 700102 HCHG RX REV CODE 250 W/ 637 OVERRIDE(OP): Performed by: HOSPITALIST

## 2019-07-02 PROCEDURE — 85025 COMPLETE CBC W/AUTO DIFF WBC: CPT

## 2019-07-02 PROCEDURE — 700101 HCHG RX REV CODE 250

## 2019-07-02 PROCEDURE — 700101 HCHG RX REV CODE 250: Performed by: INTERNAL MEDICINE

## 2019-07-02 PROCEDURE — 770020 HCHG ROOM/CARE - TELE (206)

## 2019-07-02 PROCEDURE — 99291 CRITICAL CARE FIRST HOUR: CPT | Performed by: INTERNAL MEDICINE

## 2019-07-02 PROCEDURE — 82962 GLUCOSE BLOOD TEST: CPT | Mod: 91

## 2019-07-02 PROCEDURE — 700101 HCHG RX REV CODE 250: Performed by: HOSPITALIST

## 2019-07-02 RX ORDER — METHYLPREDNISOLONE SODIUM SUCCINATE 125 MG/2ML
125 INJECTION, POWDER, LYOPHILIZED, FOR SOLUTION INTRAMUSCULAR; INTRAVENOUS EVERY 6 HOURS
Status: DISCONTINUED | OUTPATIENT
Start: 2019-07-02 | End: 2019-07-03

## 2019-07-02 RX ORDER — POTASSIUM CHLORIDE 20 MEQ/1
40 TABLET, EXTENDED RELEASE ORAL 2 TIMES DAILY
Status: DISCONTINUED | OUTPATIENT
Start: 2019-07-02 | End: 2019-07-03

## 2019-07-02 RX ORDER — CARVEDILOL 6.25 MG/1
6.25 TABLET ORAL 2 TIMES DAILY WITH MEALS
Status: DISCONTINUED | OUTPATIENT
Start: 2019-07-02 | End: 2019-07-10 | Stop reason: HOSPADM

## 2019-07-02 RX ADMIN — INSULIN HUMAN 2 UNITS: 100 INJECTION, SOLUTION PARENTERAL at 17:14

## 2019-07-02 RX ADMIN — POTASSIUM CHLORIDE 40 MEQ: 20 TABLET, EXTENDED RELEASE ORAL at 05:02

## 2019-07-02 RX ADMIN — INSULIN HUMAN 3 UNITS: 100 INJECTION, SOLUTION PARENTERAL at 22:25

## 2019-07-02 RX ADMIN — ACETYLCYSTEINE 3 ML: 200 SOLUTION ORAL; RESPIRATORY (INHALATION) at 22:12

## 2019-07-02 RX ADMIN — CARVEDILOL 6.25 MG: 6.25 TABLET, FILM COATED ORAL at 17:40

## 2019-07-02 RX ADMIN — SENNOSIDES AND DOCUSATE SODIUM 2 TABLET: 8.6; 5 TABLET ORAL at 05:02

## 2019-07-02 RX ADMIN — METHYLPREDNISOLONE SODIUM SUCCINATE 125 MG: 125 INJECTION, POWDER, FOR SOLUTION INTRAMUSCULAR; INTRAVENOUS at 15:56

## 2019-07-02 RX ADMIN — POTASSIUM CHLORIDE 40 MEQ: 20 TABLET, EXTENDED RELEASE ORAL at 17:38

## 2019-07-02 RX ADMIN — METHYLPREDNISOLONE SODIUM SUCCINATE 125 MG: 125 INJECTION, POWDER, FOR SOLUTION INTRAMUSCULAR; INTRAVENOUS at 00:00

## 2019-07-02 RX ADMIN — IPRATROPIUM BROMIDE AND ALBUTEROL SULFATE 3 ML: .5; 3 SOLUTION RESPIRATORY (INHALATION) at 22:12

## 2019-07-02 RX ADMIN — ACETYLCYSTEINE 3 ML: 200 SOLUTION ORAL; RESPIRATORY (INHALATION) at 15:00

## 2019-07-02 RX ADMIN — INSULIN HUMAN 2 UNITS: 100 INJECTION, SOLUTION PARENTERAL at 12:14

## 2019-07-02 RX ADMIN — IPRATROPIUM BROMIDE AND ALBUTEROL SULFATE 3 ML: .5; 3 SOLUTION RESPIRATORY (INHALATION) at 02:42

## 2019-07-02 RX ADMIN — APIXABAN 5 MG: 5 TABLET, FILM COATED ORAL at 17:10

## 2019-07-02 RX ADMIN — DOXYCYCLINE 100 MG: 100 TABLET, FILM COATED ORAL at 17:10

## 2019-07-02 RX ADMIN — IPRATROPIUM BROMIDE AND ALBUTEROL SULFATE 3 ML: .5; 3 SOLUTION RESPIRATORY (INHALATION) at 18:06

## 2019-07-02 RX ADMIN — ACETYLCYSTEINE 3 ML: 200 SOLUTION ORAL; RESPIRATORY (INHALATION) at 18:08

## 2019-07-02 RX ADMIN — OMEPRAZOLE 20 MG: 20 CAPSULE, DELAYED RELEASE ORAL at 05:02

## 2019-07-02 RX ADMIN — CEFEPIME 2 G: 2 INJECTION, POWDER, FOR SOLUTION INTRAVENOUS at 05:01

## 2019-07-02 RX ADMIN — INSULIN HUMAN 5 UNITS: 100 INJECTION, SOLUTION PARENTERAL at 08:32

## 2019-07-02 RX ADMIN — METHYLPREDNISOLONE SODIUM SUCCINATE 125 MG: 125 INJECTION, POWDER, FOR SOLUTION INTRAMUSCULAR; INTRAVENOUS at 08:28

## 2019-07-02 RX ADMIN — FUROSEMIDE 80 MG: 10 INJECTION, SOLUTION INTRAMUSCULAR; INTRAVENOUS at 05:02

## 2019-07-02 RX ADMIN — METHYLPREDNISOLONE SODIUM SUCCINATE 125 MG: 125 INJECTION, POWDER, FOR SOLUTION INTRAMUSCULAR; INTRAVENOUS at 05:02

## 2019-07-02 RX ADMIN — BUPROPION HYDROCHLORIDE 150 MG: 150 TABLET, EXTENDED RELEASE ORAL at 05:02

## 2019-07-02 RX ADMIN — ACETYLCYSTEINE 3 ML: 200 SOLUTION ORAL; RESPIRATORY (INHALATION) at 03:00

## 2019-07-02 RX ADMIN — IPRATROPIUM BROMIDE AND ALBUTEROL SULFATE 3 ML: .5; 3 SOLUTION RESPIRATORY (INHALATION) at 14:30

## 2019-07-02 RX ADMIN — IPRATROPIUM BROMIDE AND ALBUTEROL SULFATE 3 ML: .5; 3 SOLUTION RESPIRATORY (INHALATION) at 06:24

## 2019-07-02 RX ADMIN — DOXYCYCLINE 100 MG: 100 TABLET, FILM COATED ORAL at 05:02

## 2019-07-02 RX ADMIN — APIXABAN 2.5 MG: 2.5 TABLET, FILM COATED ORAL at 05:02

## 2019-07-02 RX ADMIN — METHYLPREDNISOLONE SODIUM SUCCINATE 125 MG: 125 INJECTION, POWDER, FOR SOLUTION INTRAMUSCULAR; INTRAVENOUS at 22:25

## 2019-07-02 ASSESSMENT — ENCOUNTER SYMPTOMS
SHORTNESS OF BREATH: 1
MYALGIAS: 0
SHORTNESS OF BREATH: 0
ABDOMINAL PAIN: 0
FEVER: 0
NAUSEA: 0
HEMOPTYSIS: 0
VOMITING: 0
SPUTUM PRODUCTION: 0
HEADACHES: 0
WEAKNESS: 1
COUGH: 1

## 2019-07-02 NOTE — PROGRESS NOTES
Hospital Medicine Daily Progress Note    Date of Service  7/2/2019    Chief Complaint  84 y.o. female admitted 6/26/2019 with SOB and fall.    Hospital Course    PMH COPD on 3L O2, afib, pulm HTN, GERD who presented with fall and SOB. She was found with hypoxia. CXR showed multifocal airspace disease. She underwent right thoracentesis 6/26. 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        Interval Problem Update  Continues to have high needs of HFNC. Says her SOB is unchanged, has not really ambulated.  I discussed hospice with her  afib 110s  HypoK, replete  UOP 2800cc, weight 65kg, continue lasix  Continue solumedrol, cefepime    Consultants/Specialty  Pulm  Critical care  Pall care    Code Status  DNR    Disposition  Lives in [place]    Review of Systems  Review of Systems   Constitutional: Positive for malaise/fatigue.   HENT: Negative for congestion.    Respiratory: Positive for shortness of breath.    Cardiovascular: Negative for chest pain.   Gastrointestinal: Negative for abdominal pain and nausea.   Musculoskeletal: Negative for myalgias.   Skin: Negative for itching.   Neurological: Positive for weakness. Negative for headaches.        Physical Exam  Temp:  [36.2 °C (97.2 °F)-36.9 °C (98.4 °F)] 36.9 °C (98.4 °F)  Pulse:  [] 108  Resp:  [16-22] 18  BP: (102-132)/(54-76) 121/64  SpO2:  [90 %-97 %] 94 %    Physical Exam   Constitutional: She is oriented to person, place, and time. She appears well-developed and well-nourished.   Appears fatigued   HENT:   Head: Normocephalic.   Dry mucus membranes   Cardiovascular:   Irregular, tachycardic   Pulmonary/Chest: She has no wheezes. She has rales.   tachypneic   Abdominal: Soft. There is no tenderness. There is no rebound and no guarding.   Musculoskeletal: She exhibits edema.   Neurological: She is alert and oriented to person, place, and time.   Skin: Skin is warm and dry.   Nursing note and vitals reviewed.      Fluids    Intake/Output  Summary (Last 24 hours) at 07/02/19 1735  Last data filed at 07/02/19 1100   Gross per 24 hour   Intake                0 ml   Output             2000 ml   Net            -2000 ml       Laboratory  Recent Labs      06/30/19   0900 07/01/19   0231  07/02/19   0246   WBC  10.4  6.7  4.8   RBC  3.72*  3.22*  3.52*   HEMOGLOBIN  10.9*  9.3*  10.2*   HEMATOCRIT  34.7*  29.9*  32.4*   MCV  93.3  92.9  92.0   MCH  29.3  28.9  29.0   MCHC  31.4*  31.1*  31.5*   RDW  52.8*  51.3*  50.7*   PLATELETCT  294  260  320   MPV  10.1  10.0  10.3     Recent Labs      06/30/19   0900 07/01/19   0231 07/02/19   0246   SODIUM  139  140  142   POTASSIUM  4.3  3.5*  3.4*   CHLORIDE  96  101  99   CO2  31  30  32   GLUCOSE  194*  131*  202*   BUN  41*  39*  37*   CREATININE  1.60*  1.44*  1.47*   CALCIUM  8.6  8.1*  8.4*     Recent Labs      06/30/19   0900   APTT  28.6   INR  1.65*               Imaging  DX-CHEST-PORTABLE (1 VIEW)   Final Result      Stable right worse than left pulmonary opacities possibly infection/pneumonia superimposed on fibrosis/interstitial lung disease.      CT-CHEST (THORAX) W/O   Final Result      Extensive airspace opacities on the right are mildly increased in the right upper lobe and slightly improved in the right lower lobe. There is some cavitation.      Airspace opacities on the left are increased in the lingula and mildly improved in the left upper lobe.      Bilateral underlying fibrotic changes and emphysematous changes are noted.      Subpleural nodule in the left lower lobe measures 6 mm.      Follow-up to radiographic resolution recommended.      Small bilateral pleural effusions, right greater than left.      Mediastinal lymphadenopathy is not significantly changed.      Atherosclerotic plaque.      Cardiomegaly.      Pancreatic calcifications likely sequelae of chronic pancreatitis.      Cholelithiasis.      US-EXTREMITY VENOUS LOWER BILAT   Final Result      DX-CHEST-PORTABLE (1 VIEW)   Final  Result      Pneumothorax is not identified      Stable pulmonary scarring with right volume loss, right worse than left consolidation      US-THORACENTESIS PUNCTURE RIGHT   Final Result      1. Ultrasound guided right sided diagnostic and therapeutic thoracentesis.      2. 700 mL of fluid withdrawn.      Findings were discussed with Juan on 6/26/2019 12:40 PM.      DX-CHEST-PORTABLE (1 VIEW)   Final Result      1.  Tiny RIGHT pneumothorax   2.  Decreased RIGHT pleural fluid   3.  Otherwise no significant interval change      Findings were discussed with MARSHALL FOX on 6/26/2019 12:33 PM.      DX-CHEST-PORTABLE (1 VIEW)   Final Result      1.  Worsening hypoinflation and multifocal interstitial and alveolar opacities particularly involving the RIGHT lung, favoring pneumonia over edema.  Probable background of interstitial lung disease.   2.  RIGHT pleural fluid collection, apparently increased.           Assessment/Plan  Acute on chronic respiratory failure with hypoxia (HCC)- (present on admission)   Assessment & Plan    Secondary to pulmonary edema and right sided pleural effusion  Underlying COPD  Likely underlying amiodarone toxicity,  Continue supplemental oxygen with RT protocol, high flow nasal cannula  Doubt infectious process  Diuresis to continue  Likely prolonged steroid taper  CT with consistent findings, somewhat worsened  6/29 no aspiration found with SLP eval.  6/30 started rocephin, doxy for multifocal pneumonia, but mostly entire right lung.  Infiltrates on 6/26 CT noted to be worse than prior CT.  Pulm consulted  - continue solumedrol, cefepime, diuresis  -wean HFNC as tolerated  -pall care and hospice discussion     Sepsis associated hypotension (HCC)   Assessment & Plan    This is severe sepsis with the following associated acute organ dysfunction(s): acute respiratory failure.   6/30:  Sepsis protocol initiated for BP 74/47 responded to fluid bolus.  I examined the patient 6/30/2019 4:51  "PM  Vital Signs:/56   Pulse 75   Temp 37 °C (98.6 °F) (Temporal)   Resp 19   Ht 1.651 m (5' 5\")   Wt 68.4 kg (150 lb 12.7 oz)   SpO2 90%   Breastfeeding? No   BMI 25.09 kg/m²    Cardiac examination significant for Regular rate and rhythm  Pulmonary examination significant for Crackles  Capillary refill is brisk  Peripheral Pulse is 2+   Skin is normal     Source Rt lung pneumonia.       Amiodarone pulmonary toxicity   Assessment & Plan    Long course of steroids projected  CT noted    2D echo on 6/9/2019 reveals  Normal left ventricular size and systolic function. Normal regional   wall motion. Left ventricular ejection fraction is visually estimated   to be 70%. Diastolic function is difficult to assess with atrial   Fibrillation. Normal right ventricular size and systolic function.  Mildly dilated left atrium. Mild mitral regurgitation. Moderate aortic insufficiency.   Moderate tricuspid regurgitation. Right atrial pressure is estimated to   be 15 mmHg. Estimated right ventricular systolic pressure  is 55 mmHg     Stage 3 chronic kidney disease (HCC)   Assessment & Plan    At baseline kidney function  Avoiding nephrotoxics: NSAIDs etc  Monitor BMP         GERD (gastroesophageal reflux disease)   Assessment & Plan    Continue omeprazole     Pulmonary hypertension (HCC)- (present on admission)   Assessment & Plan    Continue supplemental oxygen and diuresis     Multifocal pneumonia- (present on admission)   Assessment & Plan    Reviewed CTA chest:  Increased consolidation seen throughout the right upper, middle and lower lung fields.  Some infiltrates seen LLL as well.  Sputum cultures ordered  6/30 started rocephin, doxycycline.  7/1: pulm changed rocephin to cefepime IV, continue doxy.  Ordered induced sputum culture.  Mucomyst neb q 6 hours ordered.       Chronic atrial fibrillation (HCC)- (present on admission)   Assessment & Plan     holding home Coreg since admission for hypotension.  Restarting " anticoagulation, given renal insufficiency changed to Eliquis.  Mildly tachycardic  BP improved, restart coreg     COPD (chronic obstructive pulmonary disease) (HCC)- (present on admission)   Assessment & Plan    Continue IV solumedrol  Continue Spiriva and Symbicort  DuoNeb with RT protocol     Pleural effusion on right- (present on admission)   Assessment & Plan    Status post thoracentesis 6/26, yielding 700 cc of fluid  Fluid cx have been neg       Swelling of lower leg- (present on admission)   Assessment & Plan    Negative for DVT  Elevate extremities  Started on diuresis as blood pressure allows          VTE prophylaxis: eliquis

## 2019-07-02 NOTE — PROGRESS NOTES
Pulmonary Progress Note        Chief Complaint: This is an 84 year old female who was admitted on 6/26/2019 with a ground level fall, hypoxia and an abnormal chest x-ray    History of Present Illness: 84 y.o. female who presented on 6/26/2019 after a recent hospitalization at [facility] on 6/8 - 6/17 for an abnormal chest x-ray and hypoxia. 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 At that time Amiodarone was discontinued and she was given a steroid taper, down to prednisone 20mg PO which was discontinued at discharge on 6/17/2019. After her discharge she was initially doing well on 2-3 L 02 via NC (basline is 3L). She was a lifetime smoker, but states she quit after her last discharge. On 6/26/2019 she was going to use the bathroom with her walker, but her legs gave out and she fell to the ground hitting the right side of her head. There was no LOC or presyncopal symptoms. Her family called EMS and she was found to be 62% on room air. She was placed on a non-rebreahter then transferred to [facility]. Chest x-ray showed r>l alveolar infiltrates, CT head was negative. She continued to require 10L of oxygen, so she was transferred to [facility] for further evaluation.      Interval Problem Update:   -02 requirements remain stable from yesterday with mild improvement, at HFNC 50% with 45L from 60% with 45L    -Continue on Cefepime and Doxycyline for HAP (day 2/5)     -80mg Lasix given yesterday and this AM with -2600mL in the past 24 hours   -Patient will be continued on IV Solumedrol 125mg q6 hours    -CXR remains stable and unchanged from previous    ROS:    Review of Systems   Constitutional: Negative for fever.   Respiratory: Positive for cough (dry). Negative for hemoptysis, sputum production and shortness of breath.    Cardiovascular: Negative for chest pain.   Gastrointestinal:  Negative for nausea and vomiting.   Genitourinary: Positive for frequency (due to diuretic administration yesterday).   Musculoskeletal: Negative for myalgias.   All other systems reviewed and are negative.     Vital Signs for past 24 hours:  Temp:  [36.2 °C (97.2 °F)-36.8 °C (98.3 °F)] 36.2 °C (97.2 °F)  Pulse:  [] 107  Resp:  [16-22] 16  BP: (106-132)/(58-76) 132/65  SpO2:  [90 %-97 %] 90 %      Physical Exam   Constitutional: She is oriented to person, place, and time. No distress.   HENT:   Head: Normocephalic.   Eyes: Right eye exhibits no discharge. Left eye exhibits no discharge.   Neck: No tracheal deviation present. No thyromegaly present.   Cardiovascular: Intact distal pulses.    Irregularly irregular    Pulmonary/Chest: No respiratory distress. She has no wheezes. She has rales.   Abdominal: She exhibits no distension.   Musculoskeletal: She exhibits edema (Patient has pressure stockings in place).   Neurological: She is alert and oriented to person, place, and time.   Skin: Skin is warm.     Fluids:    Intake/Output Summary (Last 24 hours) at 07/02/19 1336  Last data filed at 07/02/19 1100   Gross per 24 hour   Intake                0 ml   Output             2600 ml   Net            -2600 ml     Laboratory:   Recent Labs      06/30/19   0900 07/01/19   0231  07/02/19   0246   SODIUM  139  140  142   POTASSIUM  4.3  3.5*  3.4*   CHLORIDE  96  101  99   CO2  31  30  32   BUN  41*  39*  37*   CREATININE  1.60*  1.44*  1.47*   CALCIUM  8.6  8.1*  8.4*     Recent Labs      06/30/19   0900 07/01/19   0231  07/02/19   0246   GLUCOSE  194*  131*  202*     Recent Labs      06/30/19 0900 07/01/19   0231  07/02/19   0246   RBC  3.72*  3.22*  3.52*   HEMOGLOBIN  10.9*  9.3*  10.2*   HEMATOCRIT  34.7*  29.9*  32.4*   PLATELETCT  294  260  320   PROTHROMBTM  20.0*   --    --    APTT  28.6   --    --    INR  1.65*   --    --      Recent Labs      06/30/19   0900  07/01/19   0231  07/02/19   0246   WBC  10.4   6.7  4.8   NEUTSPOLYS  88.80*  78.40*  89.60*   LYMPHOCYTES  4.70*  11.50*  4.80*   MONOCYTES  5.00  6.70  2.90   EOSINOPHILS  0.50  1.00  0.00   BASOPHILS  0.10  0.00  0.20     Medications:   Current Facility-Administered Medications   Medication Dose Frequency Provider Last Rate Last Dose   • methylPREDNISolone sod succ (SOLU-MEDROL) 125 MG injection 125 mg  125 mg Q6HRS Bijan Temple M.D.   125 mg at 07/02/19 0828   • furosemide (LASIX) injection 80 mg  80 mg Q DAY [facility] MIKEL Doss   80 mg at 07/02/19 0502   • cefepime (MAXIPIME) 2 g in  mL IVPB  2 g Q24HRS Jericho Doss M.D.   Stopped at 07/02/19 0531   • potassium chloride SA (Kdur) tablet 40 mEq  40 mEq DAILY Anastasia García M.D.   40 mEq at 07/02/19 0502   • acetylcysteine (MUCOMYST) 20 % solution 3 mL  3 mL Q4HRS (RT) Anastasia García M.D.   Stopped at 07/02/19 0700   • ipratropium-albuterol (DUONEB) nebulizer solution  3 mL Q4H PRN (RT) Pablo Gaona M.D.   3 mL at 07/02/19 0242   • doxycycline monohydrate (ADOXA) tablet 100 mg  100 mg Q12HRS [facility] MIKEL Doss   100 mg at 07/02/19 0502   • NS (BOLUS) infusion 500 mL  500 mL Once PRN Anasatsia García M.D.       • apixaban (ELIQUIS) 2.5mg tablet 2.5 mg  2.5 mg BID Anastasia García M.D.   2.5 mg at 07/02/19 0502   • ipratropium-albuterol (DUONEB) nebulizer solution  3 mL TID (RT) Pablo Gaona M.D.   3 mL at 07/02/19 0624   • tiotropium (SPIRIVA) 18 MCG inhalation capsule 1 Cap  1 Cap DAILY Pablo Gaona M.D.   Stopped at 07/02/19 0600   • insulin regular (HUMULIN R) injection 2-9 Units  2-9 Units 4X/DAY ACHS Pablo Gaona M.D.   5 Units at 07/02/19 0832    And   • glucose 4 g chewable tablet 16 g  16 g Q15 MIN PRN Pablo Gaona M.D.        And   • DEXTROSE 10% BOLUS 250 mL  250 mL Q15 MIN PRN Pablo Gaona M.D.       • acetaminophen (TYLENOL) tablet 650 mg  650 mg Q6HRS PRN Johann Montero M.D.       • senna-docusate (PERICOLACE or SENOKOT S) 8.6-50 MG per tablet 2 Tab  2 Tab BID Santosh Elean M.D.    2 Tab at 07/02/19 0502    And   • polyethylene glycol/lytes (MIRALAX) PACKET 1 Packet  1 Packet QDAY PRN Santosh Elena M.D.        And   • magnesium hydroxide (MILK OF MAGNESIA) suspension 30 mL  30 mL QDAY PRN Santosh Elena M.D.        And   • bisacodyl (DULCOLAX) suppository 10 mg  10 mg QDAY PRN Santosh Elena M.D.       • Respiratory Care per Protocol   Continuous RT Santosh Elena M.D.       • acetaminophen (TYLENOL) tablet 650 mg  650 mg Q6HRS PRN Santosh Elena M.D.       • ondansetron (ZOFRAN) syringe/vial injection 4 mg  4 mg Q4HRS PRN Santosh Elena M.D.       • ondansetron (ZOFRAN ODT) dispertab 4 mg  4 mg Q4HRS PRN Santosh Elena M.D.       • buPROPion SR (WELLBUTRIN-SR) tablet 150 mg  150 mg DAILY Santosh Elena M.D.   150 mg at 07/02/19 0502   • omeprazole (PRILOSEC) capsule 20 mg  20 mg DAILY Santosh Elena M.D.   20 mg at 07/02/19 0502     Last reviewed on 6/30/2019  7:12 PM by Kg Mariscal R.N.    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   Iatrogenic volume overload plus possible diastolic dysfunction   Continue cefepime + doxycycline  F/u MRSA swab   PCT 0.05, atypical infection possible  Titrate HFNC for SpO2 88-92%, currently 50% with 45 LPM   Continue IV solumedrol 125 q6 for 24 additional hours  ABG now for PF ratio calculation; resulted 7.47/36/61/26  Mixed alkalosis likely secondary to hypoxia, steroids and diuresis   PFT ratio 121, will repeat ABG tomorrow AM for prognosis along with clinical picture and for assessment of response to medical therapy   Re-explained poor prognosis to patient and son, I doubt will be able to improve her lung disease to where she can be discharged on a reasonable O2 of 4-6 LPM. F/u on palliative care consult input.        Amiodarone pulmonary toxicity   Assessment & Plan    Amiodarone was  stopped earlier this month when she was admitted with similar complaints.   Stable and mildly improved 02 at 45L HFNC at 50%     -Continue high dose solumedrol l 125mg q6 hours    -Continue Lasix at 80mg    -Continue on Cefepime and Doxycyline for HAP (day 2/5)    --P/f ratio is 121.6, will trend this tomorrow   Due to this uncurable condition and worsening respiratory status, Palliative care has been consulted to discuss options with the patient    Will continue to monitor after above measures to see if respiratory status improves       Stage 3 chronic kidney disease (HCC)   Assessment & Plan    Follow renal function, avoid nephrotoxic agents     Pulmonary hypertension (HCC)- (present on admission)   Assessment & Plan    Follow-up echocardiogram after appropriate treatment     Multifocal pneumonia- (present on admission)   Assessment & Plan    Currently being treated for suspected HAP with Cefepime and Doxycyline (5 day course)     Chronic atrial fibrillation (HCC)- (present on admission)   Assessment & Plan    Continue rate control as needed  Cont apixaban      COPD (chronic obstructive pulmonary disease) (HCC)- (present on admission)   Assessment & Plan    Continue RT protocols, bronchodilators and titrated oxygen, goal SPO2 88-92%  On high dose IV steroids  On doxycycline        Pleural effusion on right- (present on admission)   Assessment & Plan    S/p thoracentesis on 6/26. Transudative, mostly from volume overload, pulmonary hypertension can be contributing via bronchial venous congestion. continue with Lasix 80mg for renal dosing   Repeat CXR tomorrow      Swelling of lower leg- (present on admission)   Assessment & Plan    Compressive stocking   This is secondary to pulm HTN          Plan:      I have preformed a physical exam and reviewed and updated ROS and Plan today (7/2/19). In review of yesterday's note (7/1/19) there are no changes except as documented above.

## 2019-07-02 NOTE — PROGRESS NOTES
Bedside report received from day RN. Assumed care of pt at 1900. Pt is on 45L @ 60% of O2 hiflow NC. Pt has tele box in place. No s/s of pain or discomfort. Educated to call light. Bed is in low and locked position. Will continue to monitor.

## 2019-07-02 NOTE — THERAPY
Attempted PT treatment. Pt refusing stating she had been sitting up in the chair and is now tired with a stuffy nose. Will re-attempt as able and appropriate.

## 2019-07-02 NOTE — PROGRESS NOTES
Critical Care Progress Note    Date of admission  6/26/2019    Chief Complaint  84 y.o. female admitted 6/26/2019 with severe dyspnea and hypoxia     Hospital Course  84 y.o. female who presented on 6/26/2019 after a recent hospitalization at [facility] on 6/8 - 6/17 for an abnormal chest x-ray and hypoxia. 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 At that time Amiodarone was discontinued and she was given a steroid taper, down to prednisone 20mg PO which was discontinued at discharge on 6/17/2019. After her discharge she was initially doing well on 2-3 L 02 via NC (basline is 3L). She was a lifetime smoker, but states she quit after her last discharge. On 6/26/2019 she was going to use the bathroom with her walker, but her legs gave out and she fell to the ground hitting the right side of her head. There was no LOC or presyncopal symptoms. Her family called EMS and she was found to be 62% on room air. She was placed on a non-rebreahter then transferred to [facility]. Chest x-ray showed r>l alveolar infiltrates, CT head was negative. She continued to require 10L of oxygen, so she was transferred to [facility] for further evaluation.     Interval Problem Update  Reviewed last 24 hour events:  HFNC slightly better, down to 50% but flow still at 45 LPM   Pt's subjectively around the same, mild dyspnea at rest  Stable hemodynamics   Received solumedrol 125 q6  Diurising well with lasix 80 mg iv bid  Receiving cefepime + doxycycline IV for HAP   Anticoagulated with eliquis   Afebrile x24 hrs    Review of Systems  Review of Systems   Unable to perform ROS: Critical illness        Vital Signs for last 24 hours   Temp:  [36.2 °C (97.2 °F)-36.8 °C (98.2 °F)] 36.8 °C (98.2 °F)  Pulse:  [] 102  Resp:  [16-22] 20  BP: (102-132)/(54-76) 102/54  SpO2:  [90 %-97 %] 92 %      Physical Exam    Physical Exam   Constitutional: She is oriented to person, place, and time. No distress.   HENT:   Head: Normocephalic and atraumatic.   Eyes: Right eye exhibits no discharge. Left eye exhibits no discharge. No scleral icterus.   Neck: No tracheal deviation present. No thyromegaly present.   Cardiovascular: Normal rate, regular rhythm, normal heart sounds and intact distal pulses.  Exam reveals no gallop and no friction rub.    No murmur heard.  Pulmonary/Chest: No stridor. She is in respiratory distress. She has no wheezes. She has rales. She exhibits no tenderness.   Musculoskeletal: She exhibits no edema, tenderness or deformity.   Lymphadenopathy:     She has no cervical adenopathy.   Neurological: She is alert and oriented to person, place, and time.   Skin: Skin is warm. No rash noted. She is not diaphoretic. No erythema. No pallor.   Psychiatric: She has a normal mood and affect. Her behavior is normal. Judgment and thought content normal.   Nursing note and vitals reviewed.      Medications  Current Facility-Administered Medications   Medication Dose Route Frequency Provider Last Rate Last Dose   • methylPREDNISolone sod succ (SOLU-MEDROL) 125 MG injection 125 mg  125 mg Intravenous Q6HRS Bijan Temple M.D.   125 mg at 07/02/19 0828   • apixaban (ELIQUIS) tablet 5 mg  5 mg Oral BID Bijan Temple M.D.       • furosemide (LASIX) injection 80 mg  80 mg Intravenous Q DAY Jericho Doss M.D.   80 mg at 07/02/19 0502   • cefepime (MAXIPIME) 2 g in  mL IVPB  2 g Intravenous Q24HRS Jericho Doss M.D.   Stopped at 07/02/19 0531   • potassium chloride SA (Kdur) tablet 40 mEq  40 mEq Oral DAILY Anastasia García M.D.   40 mEq at 07/02/19 0502   • acetylcysteine (MUCOMYST) 20 % solution 3 mL  3 mL Inhalation Q4HRS (RT) Anastasia García M.D.   3 mL at 07/02/19 1500   • ipratropium-albuterol (DUONEB) nebulizer solution  3 mL Nebulization Q4H PRN (RT) Pablo Gaona M.D.   3 mL at 07/02/19 0242   • doxycycline monohydrate  (ADOXA) tablet 100 mg  100 mg Oral Q12HRS Jericho Doss M.D.   100 mg at 07/02/19 0502   • NS (BOLUS) infusion 500 mL  500 mL Intravenous Once PRN Anastasia García M.D.       • ipratropium-albuterol (DUONEB) nebulizer solution  3 mL Nebulization TID (RT) Pablo Gaona M.D.   3 mL at 07/02/19 1430   • tiotropium (SPIRIVA) 18 MCG inhalation capsule 1 Cap  1 Cap Inhalation DAILY Pablo Gaona M.D.   Stopped at 07/02/19 0600   • insulin regular (HUMULIN R) injection 2-9 Units  2-9 Units Subcutaneous 4X/DAY ACHS Pablo Gaona M.D.   2 Units at 07/02/19 1214    And   • glucose 4 g chewable tablet 16 g  16 g Oral Q15 MIN PRN Pablo Gaona M.D.        And   • DEXTROSE 10% BOLUS 250 mL  250 mL Intravenous Q15 MIN PRN Pablo Gaona M.D.       • acetaminophen (TYLENOL) tablet 650 mg  650 mg Oral Q6HRS PRN Johann Montero M.D.       • senna-docusate (PERICOLACE or SENOKOT S) 8.6-50 MG per tablet 2 Tab  2 Tab Oral BID Santosh Elena M.D.   2 Tab at 07/02/19 0502    And   • polyethylene glycol/lytes (MIRALAX) PACKET 1 Packet  1 Packet Oral QDAY PRN Santosh Elena M.D.        And   • magnesium hydroxide (MILK OF MAGNESIA) suspension 30 mL  30 mL Oral QDAY PRN Santosh Elena M.D.        And   • bisacodyl (DULCOLAX) suppository 10 mg  10 mg Rectal QDAY PRN Santosh Elena M.D.       • Respiratory Care per Protocol   Nebulization Continuous RT Santosh Elena M.D.       • acetaminophen (TYLENOL) tablet 650 mg  650 mg Oral Q6HRS PRN Santosh Elena M.D.       • ondansetron (ZOFRAN) syringe/vial injection 4 mg  4 mg Intravenous Q4HRS PRN Santosh Elena M.D.       • ondansetron (ZOFRAN ODT) dispertab 4 mg  4 mg Oral Q4HRS PRN Santosh Elena M.D.       • buPROPion SR (WELLBUTRIN-SR) tablet 150 mg  150 mg Oral DAILY Santosh Elena M.D.   150 mg at 07/02/19 0502   • omeprazole (PRILOSEC) capsule 20 mg  20 mg Oral DAILY Santosh Elena M.D.   20 mg at 07/02/19 0502       Fluids    Intake/Output Summary (Last 24 hours) at 07/02/19 1448  Last data filed at  07/02/19 1100   Gross per 24 hour   Intake                0 ml   Output             2600 ml   Net            -2600 ml       Laboratory  Recent Labs      07/02/19   1101   IMYAQ42T  7.47   [id number]  36.2   [id number]  60.8*   [id number]  90.1*   ARTHCO3  26*   FIO2  45   ARTBE  2         Recent Labs      06/30/19   0900 07/01/19   0231  07/02/19   0246   SODIUM  139  140  142   POTASSIUM  4.3  3.5*  3.4*   CHLORIDE  96  101  99   CO2  31  30  32   BUN  41*  39*  37*   CREATININE  1.60*  1.44*  1.47*   CALCIUM  8.6  8.1*  8.4*     Recent Labs      06/30/19   0900 07/01/19   0231 07/02/19   0246   GLUCOSE  194*  131*  202*     Recent Labs      06/30/19   0900 07/01/19   0231  07/02/19   0246   WBC  10.4  6.7  4.8   NEUTSPOLYS  88.80*  78.40*  89.60*   LYMPHOCYTES  4.70*  11.50*  4.80*   MONOCYTES  5.00  6.70  2.90   EOSINOPHILS  0.50  1.00  0.00   BASOPHILS  0.10  0.00  0.20     Recent Labs      06/30/19   0900 07/01/19 0231 07/02/19   0246   RBC  3.72*  3.22*  3.52*   HEMOGLOBIN  10.9*  9.3*  10.2*   HEMATOCRIT  34.7*  29.9*  32.4*   PLATELETCT  294  260  320   PROTHROMBTM  20.0*   --    --    APTT  28.6   --    --    INR  1.65*   --    --        Imaging  X-Ray:  I have personally reviewed the images and compared with prior images.  CT:    Reviewed    Assessment/Plan  Acute on chronic respiratory failure with hypoxia (HCC)- (present on admission)   Assessment & Plan    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   Iatrogenic volume overload plus possible diastolic dysfunction   Continue cefepime + doxycycline  F/u MRSA swab   PCT 0.05, atypical infection possible  Titrate HFNC for SpO2 88-92%, currently 50% with 45 LPM   Continue IV solumedrol 125 q6 for 24 additional hours  ABG now for PF ratio calculation; resulted 7.47/36/61/26  Mixed alkalosis likely secondary to hypoxia, steroids and diuresis   PFT ratio 121, will repeat ABG tomorrow AM for prognosis along with clinical picture and for  assessment of response to medical therapy   Re-explained poor prognosis to patient and son, I doubt will be able to improve her lung disease to where she can be discharged on a reasonable O2 of 4-6 LPM. F/u on palliative care consult input.        Amiodarone pulmonary toxicity   Assessment & Plan    Amiodarone was stopped earlier this month when she was admitted with similar complaints.   Stable and mildly improved 02 at 45L HFNC at 50%     -Continue high dose solumedrol l 125mg q6 hours    -Continue Lasix at 80mg    -Continue on Cefepime and Doxycyline for HAP (day 2/5)    --P/f ratio is 121.6, will trend this tomorrow   Due to this uncurable condition and worsening respiratory status, Palliative care has been consulted to discuss options with the patient    Will continue to monitor after above measures to see if respiratory status improves       Stage 3 chronic kidney disease (HCC)   Assessment & Plan    Follow renal function, avoid nephrotoxic agents     Pulmonary hypertension (HCC)- (present on admission)   Assessment & Plan    Follow-up echocardiogram after appropriate treatment     Multifocal pneumonia- (present on admission)   Assessment & Plan    Currently being treated for suspected HAP with Cefepime and Doxycyline (5 day course)     Chronic atrial fibrillation (HCC)- (present on admission)   Assessment & Plan    Continue rate control as needed  Cont apixaban      COPD (chronic obstructive pulmonary disease) (HCC)- (present on admission)   Assessment & Plan    Continue RT protocols, bronchodilators and titrated oxygen, goal SPO2 88-92%  On high dose IV steroids  On doxycycline        Pleural effusion on right- (present on admission)   Assessment & Plan    S/p thoracentesis on 6/26. Transudative, mostly from volume overload, pulmonary hypertension can be contributing via bronchial venous congestion. continue with Lasix 80mg for renal dosing   Repeat CXR tomorrow      Swelling of lower leg- (present on  admission)   Assessment & Plan    Compressive stocking   This is secondary to pulm HTN           VTE:  NOAC  Ulcer: Not Indicated  Lines: None    I have performed a physical exam and reviewed and updated ROS and Plan today (7/2/2019). In review of yesterday's note (7/1/2019), there are no changes except as documented above.     Discussed patient condition and risk of morbidity and/or mortality with Hospitalist, Family and Patient  The patient remains critically ill.  Critical care time = 34 minutes in directly providing and coordinating critical care and extensive data review.  No time overlap and excludes procedures.

## 2019-07-02 NOTE — DOCUMENTATION QUERY
[facility]                                                                       Query Response Note      PATIENT:               AURY NG  ACCT #:                  [id number]  MRN:                     [id number]  :                      1935  ADMIT DATE:       2019 2:37 AM  DISCH DATE:          RESPONDING  PROVIDER #:        [id number]           QUERY TEXT:    Sepsis and pneumonia are documented in the Medical Record.  It is unclear if these diagnoses were present on admission.  Please clarify the POA status.     Note: if an appropriate response is not listed below, please respond with a new note.    The patient's Clinical Indicators include:  Per  Progress Notes: Severe sepsis with acute respiratory failure, source: rt lung pneumonia.  Increased consolidation seen throughout the RUL, FML, LL fields. Some infiltrates seen LLL a well.     WBC 10.2,  WBC 11.5   115, 55, 103, 26, 97.3F, 96.6F, 93% 12L   lactic acid 3.0   CXR: worsening hypoinflation and multifocal interstitial and alveolar opacities particularly involving the right lung, favoring pneumonia over edema   CT Chest: extensive airspace opacities on the right are mildly increased in the RUL and slightly improved in the RLL  Risk Factors: COPD, amiodarone toxicity, history of pna, history of tobacco use  Treatment: sepsis protocol, rocephin, doxycycline    Query created by: Antione Greenberg on 2019 9:09 AM    RESPONSE TEXT:    Unable to determine          Electronically signed by:  EVA PULIDO MD 2019 4:45 AM

## 2019-07-02 NOTE — DOCUMENTATION QUERY
[facility]                                                                       Query Response Note      PATIENT:               AURY NG  ACCT #:                  [id number]  MRN:                     [id number]  :                      1935  ADMIT DATE:       2019 2:37 AM  DISCH DATE:          RESPONDING  PROVIDER #:        [id number]           QUERY TEXT:    Pulmonary edema is documented in the Medical Record. Please further specify the chronicity and type:    NOTE:  If an appropriate response is not listed below, please respond with a new note.          The patient's Clinical Indicators include:  Acute on chronic respiratory failure secondary to pulmonary edema    Echo: EF 70%, normal left ventricular size and systolic function, diastolic function difficult to assess w/ atrial fibrillation     Risk Factors: COPD, amiodarone toxicity, atrial fibrillation, pulmonary htn  Treatment: IV Lasix    Query created by: Antione Greenberg on 2019 8:57 AM    RESPONSE TEXT:    Acute Pulmonary Edema, Non-Cardiogenic          Electronically signed by:  JAIRO SALAS MD 2019 6:51 PM

## 2019-07-02 NOTE — CARE PLAN
Problem: Oxygenation:  Goal: Maintain adequate oxygenation dependent on patient condition    Intervention: Levels of oxygenation will improve to baseline  HHFNC 45L 60%      Problem: Bronchoconstriction:  Goal: Improve in air movement and diminished wheezing    Intervention: Implement inhaled treatments  Duo TID  Sym  Spir

## 2019-07-02 NOTE — PROGRESS NOTES
[facility] Medicine Daily Progress Note    Date of Service  7/1/2019    Chief Complaint  84 y.o. female admitted 6/26/2019 with recent hospitalization at [facility], at the time it was felt the patient might have a combination of COPD, amiodarone lung toxicity and pulmonary hypertension leading to dyspnea, the patient was discharged and returns with increased respiratory difficulty.  The patient has extensive history of tobacco abuse.  Patient was admitted with hypoxia, respiratory failure.    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 fib, rate controlled, blood pressure normal, lower extremity edema 1-2, received Lasix with additional urine output, CT chest today pending  6/28 patient is afebrile, alert and oriented x4, blood pressure in the 100s, and A. fib heart rate 70- 110, positive lower extremity edema, Lasix resulting in good urine output, the patient is on 6 L nasal cannula she uses high flow during eating, she had a bowel movement, CT reported and continued concern for lung toxicity changes.  6/29: patient back on high flow Oxygen today.  She states she had coughing and difficulty eating this a.m.  Ordered SLP eval who did not find any aspiration issues.  Crackles heard right posterior lung base, no wheeze.  Remains afib  on eliquis.     6/30:  BP dropped to 74/47.  Reviewed CT chest with worsening consolidation entire right lung.  Sepsis protocol initiated source pneumonia multifocal.  Recent treatment for pneumonia at [facility] ICU.  No abx given since 6/26 admission.  Lactic acid 3.Patient has yellow thick productive sputum.  BP improved with 1000ml fluid bolus.  Therefore not given full sepsis 2000ml bolus.  Stopped lasix 40mg IV daily, dc'd cardizem 30 q 6 hours.  Remains in afib.    7/1: d/w Dr. Doss,  pulmonologist since worsening O2 demand to 45 high flow O2.  BP improved, restarted lasix 80mg IV x1 per pulmology, increased solumedrol 125 IV q 6, changed rocephin to cefepime IV for HAP.  Remain on doxycycline.  Sputum culture with mixed oral larry.  Ordered sputum induction.  Lungs with fine crackles noted right posterior lung field.  Talking full sentences, NAD, no wheeze on exam.  Added mucomyst neb q 6 hours.  D/w pulmonologist, bronchoscopy too risky for intubation, vent and difficulty getting off vent.    Consultants/Specialty  Pulmonary critical care  Palliative care    Code Status  DNR/DNI    Disposition  Son at bedside, lives alone in [place].  OT rec SNF, PT continue assessment.  SNF order placed.      Review of Systems  Review of Systems   Constitutional: Positive for malaise/fatigue. Negative for chills and fever.   HENT: Negative.    Eyes: Negative.    Respiratory: Positive for cough and shortness of breath.    Cardiovascular: Positive for leg swelling. Negative for chest pain and palpitations.   Gastrointestinal: Negative.  Negative for heartburn, nausea and vomiting.   Genitourinary: Negative.  Negative for dysuria and frequency.   Musculoskeletal: Negative.  Negative for back pain and neck pain.   Skin: Negative.  Negative for itching and rash.   Neurological: Positive for weakness. Negative for dizziness, focal weakness and headaches.   Endo/Heme/Allergies: Negative.  Negative for polydipsia. Does not bruise/bleed easily.   Psychiatric/Behavioral: Negative for depression. The patient is nervous/anxious.         Physical Exam  Temp:  [36.3 °C (97.4 °F)-36.8 °C (98.3 °F)] 36.5 °C (97.7 °F)  Pulse:  [] 107  Resp:  [18-24] 18  BP: ()/(38-69) 106/68  SpO2:  [89 %-93 %] 90 %    Physical Exam   Constitutional: She is oriented to person, place, and time. She appears well-developed and well-nourished.   HENT:   Head: Normocephalic and atraumatic.   Nose: Nose normal.   Mouth/Throat: Oropharynx is  clear and moist.   Eyes: Pupils are equal, round, and reactive to light. Conjunctivae and EOM are normal.   Neck: Normal range of motion. Neck supple. No JVD present. No thyromegaly present.   Cardiovascular: Normal rate, regular rhythm, normal heart sounds and intact distal pulses.  Exam reveals no gallop and no friction rub.    Capillary refill <3 secs   Pulmonary/Chest: Effort normal. No respiratory distress. She has no wheezes. She has rales.   Abdominal: Soft. Bowel sounds are normal. She exhibits no distension and no mass. There is no tenderness. There is no rebound and no guarding.   Musculoskeletal: Normal range of motion. She exhibits no edema or tenderness.   Lymphadenopathy:     She has no cervical adenopathy.   Neurological: She is alert and oriented to person, place, and time. No cranial nerve deficit.   Skin: Skin is warm and dry. She is not diaphoretic. No cyanosis.   Psychiatric: She has a normal mood and affect. Her behavior is normal.   Nursing note and vitals reviewed.      Fluids    Intake/Output Summary (Last 24 hours) at 07/01/19 1758  Last data filed at 07/01/19 1700   Gross per 24 hour   Intake              360 ml   Output             1800 ml   Net            -1440 ml       Laboratory  Recent Labs      06/29/19   0345  06/30/19   0900  07/01/19   0231   WBC  10.5  10.4  6.7   RBC  3.16*  3.72*  3.22*   HEMOGLOBIN  9.2*  10.9*  9.3*   HEMATOCRIT  28.9*  34.7*  29.9*   MCV  91.5  93.3  92.9   MCH  29.1  29.3  28.9   MCHC  31.8*  31.4*  31.1*   RDW  50.1*  52.8*  51.3*   PLATELETCT  220  294  260   MPV  10.1  10.1  10.0     Recent Labs      06/29/19   0345  06/30/19   0900  07/01/19   0231   SODIUM  137  139  140   POTASSIUM  4.5  4.3  3.5*   CHLORIDE  99  96  101   CO2  27  31  30   GLUCOSE  167*  194*  131*   BUN  40*  41*  39*   CREATININE  1.57*  1.60*  1.44*   CALCIUM  8.2*  8.6  8.1*     Recent Labs      06/30/19   0900   APTT  28.6   INR  1.65*     Recent Labs      06/29/19   0345    BNPBTYPENAT  218*           Imaging  DX-CHEST-PORTABLE (1 VIEW)   Final Result      Stable right worse than left pulmonary opacities possibly infection/pneumonia superimposed on fibrosis/interstitial lung disease.      CT-CHEST (THORAX) W/O   Final Result      Extensive airspace opacities on the right are mildly increased in the right upper lobe and slightly improved in the right lower lobe. There is some cavitation.      Airspace opacities on the left are increased in the lingula and mildly improved in the left upper lobe.      Bilateral underlying fibrotic changes and emphysematous changes are noted.      Subpleural nodule in the left lower lobe measures 6 mm.      Follow-up to radiographic resolution recommended.      Small bilateral pleural effusions, right greater than left.      Mediastinal lymphadenopathy is not significantly changed.      Atherosclerotic plaque.      Cardiomegaly.      Pancreatic calcifications likely sequelae of chronic pancreatitis.      Cholelithiasis.      US-EXTREMITY VENOUS LOWER BILAT   Final Result      DX-CHEST-PORTABLE (1 VIEW)   Final Result      Pneumothorax is not identified      Stable pulmonary scarring with right volume loss, right worse than left consolidation      US-THORACENTESIS PUNCTURE RIGHT   Final Result      1. Ultrasound guided right sided diagnostic and therapeutic thoracentesis.      2. 700 mL of fluid withdrawn.      Findings were discussed with Juan on 6/26/2019 12:40 PM.      DX-CHEST-PORTABLE (1 VIEW)   Final Result      1.  Tiny RIGHT pneumothorax   2.  Decreased RIGHT pleural fluid   3.  Otherwise no significant interval change      Findings were discussed with MARSHALL FOX on 6/26/2019 12:33 PM.      DX-CHEST-PORTABLE (1 VIEW)   Final Result      1.  Worsening hypoinflation and multifocal interstitial and alveolar opacities particularly involving the RIGHT lung, favoring pneumonia over edema.  Probable background of interstitial lung disease.   2.   "RIGHT pleural fluid collection, apparently increased.           Assessment/Plan  Acute on chronic respiratory failure with hypoxia (HCC)- (present on admission)   Assessment & Plan    Secondary to pulmonary edema and right sided pleural effusion  Underlying COPD  Likely underlying amiodarone toxicity,  Continue supplemental oxygen with RT protocol, high flow nasal cannula  Doubt infectious process  Diuresis to continue  Likely prolonged steroid taper  CT with consistent findings, somewhat worsened  6/29 no aspiration found with SLP eval.  6/30 started rocephin, doxy for multifocal pneumonia, but mostly entire right lung.  Infiltrates on 6/26 CT noted to be worse than prior CT.  7/1: d/w Dr. Doss, pulmonologist since worsening O2 demand to 45 high flow O2.  BP improved, restarted lasix 80mg IV x1 per pulmology, increased solumedrol 125 IV q 6, changed rocephin to cefepime IV for HAP.  Remain on doxycycline.  Sputum culture with mixed oral larry.  Ordered sputum induction.  Lungs with fine crackles noted right posterior lung field.  Talking full sentences, NAD, no wheeze on exam.     Sepsis associated hypotension (HCC)   Assessment & Plan    This is severe sepsis with the following associated acute organ dysfunction(s): acute respiratory failure.   6/30:  Sepsis protocol initiated for BP 74/47 responded to fluid bolus.  I examined the patient 6/30/2019 4:51 PM  Vital Signs:/56   Pulse 75   Temp 37 °C (98.6 °F) (Temporal)   Resp 19   Ht 1.651 m (5' 5\")   Wt 68.4 kg (150 lb 12.7 oz)   SpO2 90%   Breastfeeding? No   BMI 25.09 kg/m²    Cardiac examination significant for Regular rate and rhythm  Pulmonary examination significant for Crackles  Capillary refill is brisk  Peripheral Pulse is 2+   Skin is normal     Source Rt lung pneumonia.       Amiodarone pulmonary toxicity   Assessment & Plan    Long course of steroids projected  CT noted    2D echo on 6/9/2019 reveals  Normal left ventricular size and " systolic function. Normal regional   wall motion. Left ventricular ejection fraction is visually estimated   to be 70%. Diastolic function is difficult to assess with atrial   Fibrillation. Normal right ventricular size and systolic function.  Mildly dilated left atrium. Mild mitral regurgitation. Moderate aortic insufficiency.   Moderate tricuspid regurgitation. Right atrial pressure is estimated to   be 15 mmHg. Estimated right ventricular systolic pressure  is 55 mmHg     Stage 3 chronic kidney disease (HCC)   Assessment & Plan    At baseline kidney function  Avoiding nephrotoxics: NSAIDs etc  Monitor BMP         GERD (gastroesophageal reflux disease)   Assessment & Plan    Continue omeprazole     Pulmonary hypertension (HCC)- (present on admission)   Assessment & Plan    Continue supplemental oxygen and diuresis     Multifocal pneumonia- (present on admission)   Assessment & Plan    Reviewed CTA chest:  Increased consolidation seen throughout the right upper, middle and lower lung fields.  Some infiltrates seen LLL as well.  Sputum cultures ordered  6/30 started rocephin, doxycycline.  7/1: pulm changed rocephin to cefepime IV, continue doxy.  Ordered induced sputum culture.  Mucomyst neb q 6 hours ordered.       Chronic atrial fibrillation (HCC)- (present on admission)   Assessment & Plan     holding home Coreg since admission for hypotension.  Restarting anticoagulation, given renal insufficiency changed to Eliquis.  6/29:  afib remains rate 111.  Had started and dc'd cardizem 30mg po q 6 hours dur to hypotension 70/40.  7/1:  Monitor rate, 117 range, no amiodarone due to toxicity.  Unable to do digoxin due to renal insufficiency, now on lasix 80 IVx 1.     COPD (chronic obstructive pulmonary disease) (HCC)- (present on admission)   Assessment & Plan    No active wheezing at this time  Continue Spiriva and Symbicort  DuoNeb with RT protocol     Pleural effusion on right- (present on admission)   Assessment &  Plan    Status post thoracentesis, yielding 700 cc of fluid       Swelling of lower leg- (present on admission)   Assessment & Plan    Negative for DVT  Elevate extremities  Started on diuresis as blood pressure allows        VTE prophylaxis: Eliquis

## 2019-07-02 NOTE — PROGRESS NOTES
Assumed care of PT A&O 4. Pt resting in bed with no signs of labored breathing. On high flow. Tele monitor in place, cardiac rhythm being monitored. Call light within reach, bed in lowest position, upper bed rails up, bed alarm on. Pt was updated on plan of care for the day. Will continue to monitor.

## 2019-07-02 NOTE — CARE PLAN
Problem: Oxygenation:  Goal: Maintain adequate oxygenation dependent on patient condition  Outcome: PROGRESSING AS EXPECTED    Intervention: Manage oxygen therapy by monitoring pulse oximetry and/or ABG values  Pt on HHFNC sating 92% on 50%/40L; will continue to monitor and titrate as pt needs      Problem: Bronchoconstriction:  Goal: Improve in air movement and diminished wheezing  Outcome: PROGRESSING AS EXPECTED    Intervention: Implement inhaled treatments  Pt receiving DUO TID and Muco QID; clear to diminished BS

## 2019-07-03 ENCOUNTER — APPOINTMENT (OUTPATIENT)
Dept: RADIOLOGY | Facility: MEDICAL CENTER | Age: 84
DRG: 205 | End: 2019-07-03
Attending: STUDENT IN AN ORGANIZED HEALTH CARE EDUCATION/TRAINING PROGRAM
Payer: MEDICARE

## 2019-07-03 LAB
ANION GAP SERPL CALC-SCNC: 10 MMOL/L (ref 0–11.9)
BASE EXCESS BLDA CALC-SCNC: 6 MMOL/L (ref -4–3)
BASOPHILS # BLD AUTO: 0.1 % (ref 0–1.8)
BASOPHILS # BLD: 0.01 K/UL (ref 0–0.12)
BODY TEMPERATURE: ABNORMAL CENTIGRADE
BUN SERPL-MCNC: 46 MG/DL (ref 8–22)
CALCIUM SERPL-MCNC: 8.4 MG/DL (ref 8.5–10.5)
CHLORIDE SERPL-SCNC: 99 MMOL/L (ref 96–112)
CO2 SERPL-SCNC: 31 MMOL/L (ref 20–33)
CREAT SERPL-MCNC: 1.73 MG/DL (ref 0.5–1.4)
EOSINOPHIL # BLD AUTO: 0 K/UL (ref 0–0.51)
EOSINOPHIL NFR BLD: 0 % (ref 0–6.9)
ERYTHROCYTE [DISTWIDTH] IN BLOOD BY AUTOMATED COUNT: 50.5 FL (ref 35.9–50)
GLUCOSE BLD-MCNC: 159 MG/DL (ref 65–99)
GLUCOSE BLD-MCNC: 198 MG/DL (ref 65–99)
GLUCOSE BLD-MCNC: 204 MG/DL (ref 65–99)
GLUCOSE BLD-MCNC: 225 MG/DL (ref 65–99)
GLUCOSE SERPL-MCNC: 137 MG/DL (ref 65–99)
HCO3 BLDA-SCNC: 30 MMOL/L (ref 17–25)
HCT VFR BLD AUTO: 31.3 % (ref 37–47)
HGB BLD-MCNC: 9.7 G/DL (ref 12–16)
IMM GRANULOCYTES # BLD AUTO: 0.21 K/UL (ref 0–0.11)
IMM GRANULOCYTES NFR BLD AUTO: 2.3 % (ref 0–0.9)
LYMPHOCYTES # BLD AUTO: 0.37 K/UL (ref 1–4.8)
LYMPHOCYTES NFR BLD: 4.1 % (ref 22–41)
MAGNESIUM SERPL-MCNC: 1.9 MG/DL (ref 1.5–2.5)
MCH RBC QN AUTO: 28.2 PG (ref 27–33)
MCHC RBC AUTO-ENTMCNC: 31 G/DL (ref 33.6–35)
MCV RBC AUTO: 91 FL (ref 81.4–97.8)
MONOCYTES # BLD AUTO: 0.34 K/UL (ref 0–0.85)
MONOCYTES NFR BLD AUTO: 3.8 % (ref 0–13.4)
NEUTROPHILS # BLD AUTO: 8.07 K/UL (ref 2–7.15)
NEUTROPHILS NFR BLD: 89.7 % (ref 44–72)
NRBC # BLD AUTO: 0 K/UL
NRBC BLD-RTO: 0 /100 WBC
PCO2 BLDA: 40.3 MMHG (ref 26–37)
PH BLDA: 7.49 [PH] (ref 7.4–7.5)
PHOSPHATE SERPL-MCNC: 3.7 MG/DL (ref 2.5–4.5)
PLATELET # BLD AUTO: 364 K/UL (ref 164–446)
PMV BLD AUTO: 9.8 FL (ref 9–12.9)
PO2 BLDA: 74.1 MMHG (ref 64–87)
POTASSIUM SERPL-SCNC: 4.4 MMOL/L (ref 3.6–5.5)
RBC # BLD AUTO: 3.44 M/UL (ref 4.2–5.4)
SAO2 % BLDA: 94 % (ref 93–99)
SODIUM SERPL-SCNC: 140 MMOL/L (ref 135–145)
WBC # BLD AUTO: 9 K/UL (ref 4.8–10.8)

## 2019-07-03 PROCEDURE — 700111 HCHG RX REV CODE 636 W/ 250 OVERRIDE (IP): Performed by: INTERNAL MEDICINE

## 2019-07-03 PROCEDURE — 700101 HCHG RX REV CODE 250: Performed by: HOSPITALIST

## 2019-07-03 PROCEDURE — 99233 SBSQ HOSP IP/OBS HIGH 50: CPT | Performed by: INTERNAL MEDICINE

## 2019-07-03 PROCEDURE — 36415 COLL VENOUS BLD VENIPUNCTURE: CPT

## 2019-07-03 PROCEDURE — A9270 NON-COVERED ITEM OR SERVICE: HCPCS | Performed by: INTERNAL MEDICINE

## 2019-07-03 PROCEDURE — 770020 HCHG ROOM/CARE - TELE (206)

## 2019-07-03 PROCEDURE — 99291 CRITICAL CARE FIRST HOUR: CPT | Performed by: INTERNAL MEDICINE

## 2019-07-03 PROCEDURE — 82962 GLUCOSE BLOOD TEST: CPT

## 2019-07-03 PROCEDURE — 700105 HCHG RX REV CODE 258: Performed by: INTERNAL MEDICINE

## 2019-07-03 PROCEDURE — 71045 X-RAY EXAM CHEST 1 VIEW: CPT

## 2019-07-03 PROCEDURE — 82803 BLOOD GASES ANY COMBINATION: CPT

## 2019-07-03 PROCEDURE — 700101 HCHG RX REV CODE 250: Performed by: INTERNAL MEDICINE

## 2019-07-03 PROCEDURE — 94640 AIRWAY INHALATION TREATMENT: CPT

## 2019-07-03 PROCEDURE — 700102 HCHG RX REV CODE 250 W/ 637 OVERRIDE(OP): Performed by: INTERNAL MEDICINE

## 2019-07-03 PROCEDURE — 83735 ASSAY OF MAGNESIUM: CPT

## 2019-07-03 PROCEDURE — 80048 BASIC METABOLIC PNL TOTAL CA: CPT

## 2019-07-03 PROCEDURE — 94664 DEMO&/EVAL PT USE INHALER: CPT

## 2019-07-03 PROCEDURE — 84100 ASSAY OF PHOSPHORUS: CPT

## 2019-07-03 PROCEDURE — 85025 COMPLETE CBC W/AUTO DIFF WBC: CPT

## 2019-07-03 RX ORDER — METHYLPREDNISOLONE SODIUM SUCCINATE 125 MG/2ML
125 INJECTION, POWDER, LYOPHILIZED, FOR SOLUTION INTRAMUSCULAR; INTRAVENOUS EVERY 6 HOURS
Status: DISCONTINUED | OUTPATIENT
Start: 2019-07-03 | End: 2019-07-04

## 2019-07-03 RX ORDER — POTASSIUM CHLORIDE 1.5 G/1.58G
20 POWDER, FOR SOLUTION ORAL 2 TIMES DAILY
Status: DISCONTINUED | OUTPATIENT
Start: 2019-07-03 | End: 2019-07-03

## 2019-07-03 RX ORDER — METHYLPREDNISOLONE SODIUM SUCCINATE 40 MG/ML
40 INJECTION, POWDER, LYOPHILIZED, FOR SOLUTION INTRAMUSCULAR; INTRAVENOUS EVERY 8 HOURS
Status: DISCONTINUED | OUTPATIENT
Start: 2019-07-03 | End: 2019-07-03

## 2019-07-03 RX ORDER — IPRATROPIUM BROMIDE AND ALBUTEROL SULFATE 2.5; .5 MG/3ML; MG/3ML
3 SOLUTION RESPIRATORY (INHALATION)
Status: DISCONTINUED | OUTPATIENT
Start: 2019-07-03 | End: 2019-07-04

## 2019-07-03 RX ORDER — ACETYLCYSTEINE 200 MG/ML
3 SOLUTION ORAL; RESPIRATORY (INHALATION)
Status: DISCONTINUED | OUTPATIENT
Start: 2019-07-03 | End: 2019-07-04

## 2019-07-03 RX ADMIN — IPRATROPIUM BROMIDE AND ALBUTEROL SULFATE 3 ML: .5; 3 SOLUTION RESPIRATORY (INHALATION) at 14:27

## 2019-07-03 RX ADMIN — INSULIN HUMAN 2 UNITS: 100 INJECTION, SOLUTION PARENTERAL at 21:13

## 2019-07-03 RX ADMIN — POTASSIUM CHLORIDE 40 MEQ: 20 TABLET, EXTENDED RELEASE ORAL at 05:16

## 2019-07-03 RX ADMIN — CARVEDILOL 6.25 MG: 6.25 TABLET, FILM COATED ORAL at 17:30

## 2019-07-03 RX ADMIN — DOXYCYCLINE 100 MG: 100 TABLET, FILM COATED ORAL at 05:16

## 2019-07-03 RX ADMIN — ACETYLCYSTEINE 3 ML: 200 SOLUTION ORAL; RESPIRATORY (INHALATION) at 07:00

## 2019-07-03 RX ADMIN — BUPROPION HYDROCHLORIDE 150 MG: 150 TABLET, EXTENDED RELEASE ORAL at 05:16

## 2019-07-03 RX ADMIN — CEFEPIME 2 G: 2 INJECTION, POWDER, FOR SOLUTION INTRAVENOUS at 05:15

## 2019-07-03 RX ADMIN — INSULIN HUMAN 2 UNITS: 100 INJECTION, SOLUTION PARENTERAL at 08:23

## 2019-07-03 RX ADMIN — METHYLPREDNISOLONE SODIUM SUCCINATE 125 MG: 125 INJECTION, POWDER, FOR SOLUTION INTRAMUSCULAR; INTRAVENOUS at 05:15

## 2019-07-03 RX ADMIN — SENNOSIDES AND DOCUSATE SODIUM 2 TABLET: 8.6; 5 TABLET ORAL at 05:16

## 2019-07-03 RX ADMIN — IPRATROPIUM BROMIDE AND ALBUTEROL SULFATE 3 ML: .5; 3 SOLUTION RESPIRATORY (INHALATION) at 22:52

## 2019-07-03 RX ADMIN — METHYLPREDNISOLONE SODIUM SUCCINATE 125 MG: 125 INJECTION, POWDER, FOR SOLUTION INTRAMUSCULAR; INTRAVENOUS at 14:01

## 2019-07-03 RX ADMIN — ACETYLCYSTEINE 3 ML: 200 SOLUTION ORAL; RESPIRATORY (INHALATION) at 15:00

## 2019-07-03 RX ADMIN — CARVEDILOL 6.25 MG: 6.25 TABLET, FILM COATED ORAL at 08:22

## 2019-07-03 RX ADMIN — ACETYLCYSTEINE 3 ML: 200 SOLUTION ORAL; RESPIRATORY (INHALATION) at 02:18

## 2019-07-03 RX ADMIN — METHYLPREDNISOLONE SODIUM SUCCINATE 125 MG: 125 INJECTION, POWDER, FOR SOLUTION INTRAMUSCULAR; INTRAVENOUS at 21:13

## 2019-07-03 RX ADMIN — IPRATROPIUM BROMIDE AND ALBUTEROL SULFATE 3 ML: .5; 3 SOLUTION RESPIRATORY (INHALATION) at 06:27

## 2019-07-03 RX ADMIN — INSULIN HUMAN 3 UNITS: 100 INJECTION, SOLUTION PARENTERAL at 17:34

## 2019-07-03 RX ADMIN — INSULIN HUMAN 3 UNITS: 100 INJECTION, SOLUTION PARENTERAL at 11:47

## 2019-07-03 RX ADMIN — APIXABAN 2.5 MG: 2.5 TABLET, FILM COATED ORAL at 17:30

## 2019-07-03 RX ADMIN — OMEPRAZOLE 20 MG: 20 CAPSULE, DELAYED RELEASE ORAL at 05:16

## 2019-07-03 RX ADMIN — IPRATROPIUM BROMIDE AND ALBUTEROL SULFATE 3 ML: .5; 3 SOLUTION RESPIRATORY (INHALATION) at 10:41

## 2019-07-03 RX ADMIN — DOXYCYCLINE 100 MG: 100 TABLET, FILM COATED ORAL at 17:30

## 2019-07-03 RX ADMIN — ACETYLCYSTEINE 3 ML: 200 SOLUTION ORAL; RESPIRATORY (INHALATION) at 11:00

## 2019-07-03 RX ADMIN — POTASSIUM CHLORIDE 40 MEQ: 20 TABLET, EXTENDED RELEASE ORAL at 17:30

## 2019-07-03 RX ADMIN — FUROSEMIDE 80 MG: 10 INJECTION, SOLUTION INTRAMUSCULAR; INTRAVENOUS at 05:15

## 2019-07-03 RX ADMIN — IPRATROPIUM BROMIDE AND ALBUTEROL SULFATE 3 ML: .5; 3 SOLUTION RESPIRATORY (INHALATION) at 02:18

## 2019-07-03 RX ADMIN — APIXABAN 5 MG: 5 TABLET, FILM COATED ORAL at 05:16

## 2019-07-03 RX ADMIN — SENNOSIDES AND DOCUSATE SODIUM 2 TABLET: 8.6; 5 TABLET ORAL at 17:30

## 2019-07-03 ASSESSMENT — ENCOUNTER SYMPTOMS
WEAKNESS: 1
COUGH: 1
SPUTUM PRODUCTION: 0
SHORTNESS OF BREATH: 0
VOMITING: 0
HEMOPTYSIS: 0
NAUSEA: 0
CHILLS: 0
ORTHOPNEA: 0
FEVER: 0
HEADACHES: 0
PALPITATIONS: 0
MYALGIAS: 0
ABDOMINAL PAIN: 0
SHORTNESS OF BREATH: 1

## 2019-07-03 NOTE — DISCHARGE PLANNING
CCA confirmed with staff at [facility] that the maximum liter flow oxygen they can accept is 10 liters.

## 2019-07-03 NOTE — DISCHARGE PLANNING
Received Choice form at 1030  Agency/Facility Name: [facility]  Referral sent per Choice form @ 1030   Phone [phone]  Fax [phone]    @[phone]  Agency/Facility Name: [facility]  Spoke To: Amalia  Outcome: Accepted.

## 2019-07-03 NOTE — PROGRESS NOTES
Hospital Medicine Daily Progress Note    Date of Service  7/3/2019    Chief Complaint  84 y.o. female admitted 6/26/2019 with SOB and fall.    Hospital Course    PMH COPD on 3L O2, afib, pulm HTN, GERD who presented with fall and SOB. She was found with hypoxia. CXR showed multifocal airspace disease. She underwent right thoracentesis 6/26. 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        Interval Problem Update  She feels better and less SOB, but unable to wean HFNC  Rise in BUN/creat, sotp lasix  She met with pall care and will continue treatment for 2 more days, but if unable to wean O2 she would like placement with hospice near her family    Consultants/Specialty  Pulm  Critical care  Pall care    Code Status  DNR    Disposition  Lives in [place]    Review of Systems  Review of Systems   Constitutional: Positive for malaise/fatigue.   HENT: Positive for congestion.    Respiratory: Positive for shortness of breath (improved).    Cardiovascular: Negative for chest pain.   Gastrointestinal: Negative for abdominal pain and nausea.   Musculoskeletal: Negative for myalgias.   Skin: Negative for itching.   Neurological: Positive for weakness. Negative for headaches.        Physical Exam  Temp:  [36 °C (96.8 °F)-36.9 °C (98.4 °F)] 36.2 °C (97.2 °F)  Pulse:  [] 94  Resp:  [16-20] 18  BP: ()/(48-65) 112/65  SpO2:  [90 %-94 %] 91 %    Physical Exam   Constitutional: She is oriented to person, place, and time. She appears well-developed and well-nourished.   Appears fatigued   HENT:   Head: Normocephalic.   Dry mucus membranes   Eyes: Conjunctivae are normal.   Cardiovascular:   Irregular, tachycardic   Pulmonary/Chest: She has no wheezes. She has rales.   tachypneic   Abdominal: Soft. There is no tenderness.   Musculoskeletal: She exhibits edema (trace).   Neurological: She is alert and oriented to person, place, and time.   Skin: Skin is warm and dry.   Nursing note and vitals  reviewed.      Fluids    Intake/Output Summary (Last 24 hours) at 07/03/19 1204  Last data filed at 07/03/19 0900   Gross per 24 hour   Intake                0 ml   Output             1200 ml   Net            -1200 ml       Laboratory  Recent Labs      07/01/19   0231  07/02/19   0246  07/03/19   0250   WBC  6.7  4.8  9.0   RBC  3.22*  3.52*  3.44*   HEMOGLOBIN  9.3*  10.2*  9.7*   HEMATOCRIT  29.9*  32.4*  31.3*   MCV  92.9  92.0  91.0   MCH  28.9  29.0  28.2   MCHC  31.1*  31.5*  31.0*   RDW  51.3*  50.7*  50.5*   PLATELETCT  260  320  364   MPV  10.0  10.3  9.8     Recent Labs      07/01/19 0231 07/02/19   0246  07/03/19   0250   SODIUM  140  142  140   POTASSIUM  3.5*  3.4*  4.4   CHLORIDE  101  99  99   CO2  30  32  31   GLUCOSE  131*  202*  137*   BUN  39*  37*  46*   CREATININE  1.44*  1.47*  1.73*   CALCIUM  8.1*  8.4*  8.4*                   Imaging  DX-CHEST-PORTABLE (1 VIEW)   Final Result      Unchanged BILATERAL lung disease, RIGHT-sided volume loss and small RIGHT pleural effusion      DX-CHEST-PORTABLE (1 VIEW)   Final Result      Stable right worse than left pulmonary opacities possibly infection/pneumonia superimposed on fibrosis/interstitial lung disease.      CT-CHEST (THORAX) W/O   Final Result      Extensive airspace opacities on the right are mildly increased in the right upper lobe and slightly improved in the right lower lobe. There is some cavitation.      Airspace opacities on the left are increased in the lingula and mildly improved in the left upper lobe.      Bilateral underlying fibrotic changes and emphysematous changes are noted.      Subpleural nodule in the left lower lobe measures 6 mm.      Follow-up to radiographic resolution recommended.      Small bilateral pleural effusions, right greater than left.      Mediastinal lymphadenopathy is not significantly changed.      Atherosclerotic plaque.      Cardiomegaly.      Pancreatic calcifications likely sequelae of chronic  pancreatitis.      Cholelithiasis.      US-EXTREMITY VENOUS LOWER BILAT   Final Result      DX-CHEST-PORTABLE (1 VIEW)   Final Result      Pneumothorax is not identified      Stable pulmonary scarring with right volume loss, right worse than left consolidation      US-THORACENTESIS PUNCTURE RIGHT   Final Result      1. Ultrasound guided right sided diagnostic and therapeutic thoracentesis.      2. 700 mL of fluid withdrawn.      Findings were discussed with Juan on 6/26/2019 12:40 PM.      DX-CHEST-PORTABLE (1 VIEW)   Final Result      1.  Tiny RIGHT pneumothorax   2.  Decreased RIGHT pleural fluid   3.  Otherwise no significant interval change      Findings were discussed with MARSHALL FOX on 6/26/2019 12:33 PM.      DX-CHEST-PORTABLE (1 VIEW)   Final Result      1.  Worsening hypoinflation and multifocal interstitial and alveolar opacities particularly involving the RIGHT lung, favoring pneumonia over edema.  Probable background of interstitial lung disease.   2.  RIGHT pleural fluid collection, apparently increased.           Assessment/Plan  * Acute on chronic respiratory failure with hypoxia (HCC)- (present on admission)   Assessment & Plan    Secondary to pulmonary edema and right sided pleural effusion  Underlying COPD  Likely underlying amiodarone toxicity,  Continue supplemental oxygen with RT protocol, high flow nasal cannula  Doubt infectious process  Diuresis to continue  Likely prolonged steroid taper  CT with consistent findings, somewhat worsened  6/29 no aspiration found with SLP eval.  6/30 started rocephin, doxy for multifocal pneumonia, but mostly entire right lung.  Infiltrates on 6/26 CT noted to be worse than prior CT.  Pulm consulted  - continue solumedrol, cefepime  -wean HFNC as tolerated  -pall care and hospice discussion     Sepsis associated hypotension (HCC)   Assessment & Plan    This is severe sepsis with the following associated acute organ dysfunction(s): acute respiratory  "failure.   6/30:  Sepsis protocol initiated for BP 74/47 responded to fluid bolus.  I examined the patient 6/30/2019 4:51 PM  Vital Signs:/56   Pulse 75   Temp 37 °C (98.6 °F) (Temporal)   Resp 19   Ht 1.651 m (5' 5\")   Wt 68.4 kg (150 lb 12.7 oz)   SpO2 90%   Breastfeeding? No   BMI 25.09 kg/m²    Cardiac examination significant for Regular rate and rhythm  Pulmonary examination significant for Crackles  Capillary refill is brisk  Peripheral Pulse is 2+   Skin is normal     Source Rt lung pneumonia.       Amiodarone pulmonary toxicity   Assessment & Plan    Long course of steroids projected  CT noted    2D echo on 6/9/2019 reveals  Normal left ventricular size and systolic function. Normal regional   wall motion. Left ventricular ejection fraction is visually estimated   to be 70%. Diastolic function is difficult to assess with atrial   Fibrillation. Normal right ventricular size and systolic function.  Mildly dilated left atrium. Mild mitral regurgitation. Moderate aortic insufficiency.   Moderate tricuspid regurgitation. Right atrial pressure is estimated to   be 15 mmHg. Estimated right ventricular systolic pressure  is 55 mmHg     Stage 3 chronic kidney disease (HCC)   Assessment & Plan    At baseline kidney function  Avoiding nephrotoxics: NSAIDs etc  Monitor BMP         GERD (gastroesophageal reflux disease)   Assessment & Plan    Continue omeprazole     Pulmonary hypertension (HCC)- (present on admission)   Assessment & Plan    Continue supplemental oxygen and diuresis     Multifocal pneumonia- (present on admission)   Assessment & Plan    Reviewed CTA chest:  Increased consolidation seen throughout the right upper, middle and lower lung fields.  Some infiltrates seen LLL as well.  Sputum cultures ordered  6/30 started rocephin, doxycycline.  7/1: pulm changed rocephin to cefepime IV, continue doxy.    Mucomyst neb q 6 hours ordered.       Chronic atrial fibrillation (HCC)- (present on " admission)   Assessment & Plan      Restarting anticoagulation, given renal insufficiency changed to Eliquis.  Controlled on coreg     COPD (chronic obstructive pulmonary disease) (HCC)- (present on admission)   Assessment & Plan    Continue IV solumedrol  Continue Spiriva and Symbicort  DuoNeb with RT protocol     Pleural effusion on right- (present on admission)   Assessment & Plan    Status post thoracentesis 6/26, yielding 700 cc of fluid  Fluid cx have been neg       Swelling of lower leg- (present on admission)   Assessment & Plan    Negative for DVT  Elevate extremities  Received diuresis          VTE prophylaxis: eliquis

## 2019-07-03 NOTE — CONSULTS
Reason for PC Consult: Advance Care Planning    Consulted by: Dr Doss    Assessment:  General: 85 yo female admitted on 6/26/2019 for Respiratory failure, Acute respiratory failure, Amiodarone pulmonary toxicity. PMH: Arrhythmia, GERD, Heart burn, HTN, Indigestion, Other specified disorder of intestines, Pain, Renal disorder.     Pt had recent admit at [facility] 6/8/2019 through 6/17/2019 for Acute on chronic respiratory failure with hypoxia, pt was diuresed, treated for COPD flare, pneumonia, and right pleural effusion which was tapped showing a transudate. Pt was DC to home on 3 Liters NC continuous home O2.     Pt had GLF at home striking right occipital region, presented to the ED at [facility] and shown to be hypoxic at 62% on room air, pt transferred to [facility],  pt also reports bilat LE swelling and orthopnea.     Dyspnea: Yes- resp rate 18, 91% on 35 Liters FiO2 40% via High Flow NC  Last BM: 07/03/19  Pain: No, currently denies  Depression: Mood appropriate for situation   Dementia: No       Spiritual:  Is Episcopalian or spirituality important for coping with this illness? Yes   Has a  or spiritual provider visit been requested? Yes    Palliative Performance Scale: 50%    Advance Directive: None, pt completed AD naming her son Corbin Castillo her DPOA, awaiting to be notarized.   DPOA: No, NOJIGNESH Alaniz Anna   POLST:  Polst completed during consult, reviewed by MD and scanned into EMR.     Code Status: DNR/DNI    Outcome:  Met with pt, son Corbin, Palliative SW Natalie and myself at the bedside. Introduced selves and the role of Palliative care/support. Spoke about pts current o2 needs and previous COPD excerebration. Discussed pts lack of improvement at this point and spoke about EOL. Discussed inability to be on High Flow outside of the hospital setting. Spoke about quality of life and pts expectations.   Discussed MDs thoughts on possible slight fluid reduction but also on pts poor kidney  "function and lack of meaningful recovery. Spoke about home O2 levels that are at the max 10 L NC usually. Discussed hospice and weaning process from high flow with medications to relieve SOB and anxiety.   Pt voiced wish that she could \"Just die without having to make the decision\". Supported pt in her struggles. Pts son asked if the pt would be able to DC to the SNF in [place] with Hospice, explained that the pt does not have the type of insurance that allows payment to two entities, as in SNF and Hospice. Corbin stated that he and his sisters would be able to self pay and they only want their mother to be comfortable and feel safe.   Discussed [facility] transportation with High Flow to home in [place] or [place] and then a transition with Hospice to a lower level of O2 support.     Spoke about pts support system. Pt has two adult daughters who live in [place] with their children and lots of other relatives. Pt does live in [place] and is not sure if she would like to DC to home or to [facility] in [place].      After much discussion, pt and son, elected to send referral to Hospice in [place] to begin process while the pt remains in current treatment to determine if she could possibly recover to a level of O2 that is supported outside of the Hospital.     If the pt is unable to wean off the high flow, she will proceed with plans to DC to either her home in [place] with [facility] or to the SNF in [place] with [facility].     Pt wishes to complete POLST and AD. Went over POLST and AD with pts son and pt. Pt completed POLST for DNR/DNI with Comfort Measures in preparation for hospice as likely DC plan. Pt completed AD naming her son, Corbin Castillo as her DPOA, with care preferences of 2,3 and 5-relating to removal of medical support if terminal and no TFs.     Updated: MD, BS RN, Unit CM KIMBERLEY Cortez    Plan: If pt improves, DC to SNF, if pt does not, DC to home or SNF with Hospice support.     Recommendations: I recommend a " hospice consult.    Thank you for allowing Palliative Care to participate in this patient's care. Please feel free to call [phone] with any questions or concerns.

## 2019-07-03 NOTE — DISCHARGE PLANNING
Agency/Facility Name: [facility]  Spoke To: Lisa  Outcome: Max O2 flow rate they can take is 10 lpm.    @1025  Agency/Facility Name: [facility]  Spoke To: Lisa  Outcome: Private pay for hospice with them is $230-260.

## 2019-07-03 NOTE — THERAPY
Attempted PT treatment. Pt declined due to fatigue and visiting with family. Pt does state however that she wishes to continue working with therapy while in acute setting. Will continue to follow.

## 2019-07-03 NOTE — PROGRESS NOTES
Bedside report received from day RN. Assumed care of pt at 1900. Pt is on hiflow NC. Pt has tele box in place. No s/s of pain or discomfort. Educated to call light. Bed is in low and locked position. Will continue to monitor.2

## 2019-07-03 NOTE — PROGRESS NOTES
Critical Care Progress Note    Date of admission  6/26/2019    Chief Complaint  84 y.o. female admitted 6/26/2019 with severe dyspnea and hypoxia      Hospital Course  84 y.o. female who presented on 6/26/2019 after a recent hospitalization at [facility] on 6/8 - 6/17 for an abnormal chest x-ray and hypoxia. 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 At that time Amiodarone was discontinued and she was given a steroid taper, down to prednisone 20mg PO which was discontinued at discharge on 6/17/2019. After her discharge she was initially doing well on 2-3 L 02 via NC (basline is 3L). She was a lifetime smoker, but states she quit after her last discharge. On 6/26/2019 she was going to use the bathroom with her walker, but her legs gave out and she fell to the ground hitting the right side of her head. There was no LOC or presyncopal symptoms. Her family called EMS and she was found to be 62% on room air. She was placed on a non-rebreahter then transferred to [facility]. Chest x-ray showed r>l alveolar infiltrates, CT head was negative. She continued to require 10L of oxygen, so she was transferred to [facility] for further evaluation.      Interval Problem Update  Reviewed last 24 hour events:  HFNC slightly better, down to 40% and 30 LPM  ABG this AM showed 7.49/40/74/30  PF ratio slightly improved to approx 150   Remains critically ill  Afebrile x24 hrs   Stable hemodynamics  Responding to lasix 80 mg    Review of Systems  Review of Systems   Unable to perform ROS: Critical illness        Vital Signs for last 24 hours   Temp:  [36 °C (96.8 °F)-36.9 °C (98.4 °F)] 36.2 °C (97.2 °F)  Pulse:  [] 94  Resp:  [16-20] 18  BP: ()/(48-65) 112/65  SpO2:  [90 %-94 %] 91 %    Hemodynamic parameters for last 24 hours       Respiratory Information for the last 24 hours       Physical Exam    Physical Exam   Constitutional: She is oriented to person, place, and time. No distress.   HENT:   Head: Normocephalic and atraumatic.   Eyes: Right eye exhibits no discharge. Left eye exhibits no discharge. No scleral icterus.   Neck: No tracheal deviation present. No thyromegaly present.   Cardiovascular: Normal rate, regular rhythm, normal heart sounds and intact distal pulses.  Exam reveals no gallop and no friction rub.    No murmur heard.  Pulmonary/Chest: Effort normal. No stridor. No respiratory distress. She has no wheezes. She has rales. She exhibits no tenderness.   Reduced AE BL with bibasilar rales    Abdominal: Soft. She exhibits no distension. There is no tenderness.   Musculoskeletal: She exhibits no edema, tenderness or deformity.   Lymphadenopathy:     She has no cervical adenopathy.   Neurological: She is alert and oriented to person, place, and time.   Skin: Skin is warm. No rash noted. She is not diaphoretic. No erythema. No pallor.   Psychiatric: She has a normal mood and affect. Her behavior is normal. Judgment and thought content normal.   Nursing note and vitals reviewed.      Medications  Current Facility-Administered Medications   Medication Dose Route Frequency Provider Last Rate Last Dose   • ipratropium-albuterol (DUONEB) nebulizer solution  3 mL Nebulization 4X/DAY (RT) Pablo Gaona M.D.   3 mL at 07/03/19 1041   • methylPREDNISolone sod succ (SOLU-MEDROL) 125 MG injection 125 mg  125 mg Intravenous Q6HRS Jericho Doss M.D.       • apixaban (ELIQUIS) tablet 5 mg  5 mg Oral BID Bijan Temple M.D.   5 mg at 07/03/19 0516   • potassium chloride SA (Kdur) tablet 40 mEq  40 mEq Oral BID Bijan Temple M.D.   40 mEq at 07/03/19 0516   • carvedilol (COREG) tablet 6.25 mg  6.25 mg Oral BID WITH MEALS Bijan Temple M.D.   6.25 mg at 07/03/19 0822   • cefepime (MAXIPIME) 2 g in  mL IVPB  2 g Intravenous Q24HRS Jericho Doss M.D.   Stopped at 07/03/19 0545   • acetylcysteine (MUCOMYST) 20 %  solution 3 mL  3 mL Inhalation Q4HRS (RT) Anastasia García M.D.   3 mL at 07/03/19 1100   • ipratropium-albuterol (DUONEB) nebulizer solution  3 mL Nebulization Q4H PRN (RT) Pablo Gaona M.D.   3 mL at 07/03/19 0218   • doxycycline monohydrate (ADOXA) tablet 100 mg  100 mg Oral Q12HRS Jericho Doss M.D.   100 mg at 07/03/19 0516   • NS (BOLUS) infusion 500 mL  500 mL Intravenous Once PRN Anastasia García M.D.       • tiotropium (SPIRIVA) 18 MCG inhalation capsule 1 Cap  1 Cap Inhalation DAILY Pablo Gaona M.D.   Stopped at 07/02/19 0600   • insulin regular (HUMULIN R) injection 2-9 Units  2-9 Units Subcutaneous 4X/DAY ACHS Pablo Gaona M.D.   3 Units at 07/03/19 1147    And   • glucose 4 g chewable tablet 16 g  16 g Oral Q15 MIN PRN Pablo Gaona M.D.        And   • DEXTROSE 10% BOLUS 250 mL  250 mL Intravenous Q15 MIN PRN Pablo Gaona M.D.       • acetaminophen (TYLENOL) tablet 650 mg  650 mg Oral Q6HRS PRN Johann Montero M.D.       • senna-docusate (PERICOLACE or SENOKOT S) 8.6-50 MG per tablet 2 Tab  2 Tab Oral BID Santosh Elena M.D.   2 Tab at 07/03/19 0516    And   • polyethylene glycol/lytes (MIRALAX) PACKET 1 Packet  1 Packet Oral QDAY PRN Santosh Elena M.D.        And   • magnesium hydroxide (MILK OF MAGNESIA) suspension 30 mL  30 mL Oral QDAY PRN Santosh Elena M.D.        And   • bisacodyl (DULCOLAX) suppository 10 mg  10 mg Rectal QDAY PRN Santosh Elena M.D.       • Respiratory Care per Protocol   Nebulization Continuous RT Santosh Elena M.D.       • acetaminophen (TYLENOL) tablet 650 mg  650 mg Oral Q6HRS PRN Santosh Elena M.D.       • ondansetron (ZOFRAN) syringe/vial injection 4 mg  4 mg Intravenous Q4HRS PRN Santosh Elena M.D.       • ondansetron (ZOFRAN ODT) dispertab 4 mg  4 mg Oral Q4HRS PRN Santosh Elena M.D.       • buPROPion SR (WELLBUTRIN-SR) tablet 150 mg  150 mg Oral DAILY Santosh Elena M.D.   150 mg at 07/03/19 0516   • omeprazole (PRILOSEC) capsule 20 mg  20 mg Oral DAILY Santosh Elena M.D.    20 mg at 07/03/19 0516       Fluids    Intake/Output Summary (Last 24 hours) at 07/03/19 1306  Last data filed at 07/03/19 0900   Gross per 24 hour   Intake                0 ml   Output             1200 ml   Net            -1200 ml       Laboratory  Recent Labs      07/02/19   1101  07/03/19   0250   BRNET19A  7.47  7.49   [id number]  36.2  40.3*   [id number]  60.8*  74.1   OCGT3SXB  90.1*  94.0   ARTHCO3  26*  30*   FIO2  45   --    ARTBE  2  6*         Recent Labs      07/01/19   0231  07/02/19   0246  07/03/19   0250   SODIUM  140  142  140   POTASSIUM  3.5*  3.4*  4.4   CHLORIDE  101  99  99   CO2  30  32  31   BUN  39*  37*  46*   CREATININE  1.44*  1.47*  1.73*   CALCIUM  8.1*  8.4*  8.4*     Recent Labs      07/01/19   0231  07/02/19   0246  07/03/19   0250   GLUCOSE  131*  202*  137*     Recent Labs      07/01/19   0231  07/02/19   0246  07/03/19   0250   WBC  6.7  4.8  9.0   NEUTSPOLYS  78.40*  89.60*  89.70*   LYMPHOCYTES  11.50*  4.80*  4.10*   MONOCYTES  6.70  2.90  3.80   EOSINOPHILS  1.00  0.00  0.00   BASOPHILS  0.00  0.20  0.10     Recent Labs      07/01/19   0231 07/02/19   0246  07/03/19   0250   RBC  3.22*  3.52*  3.44*   HEMOGLOBIN  9.3*  10.2*  9.7*   HEMATOCRIT  29.9*  32.4*  31.3*   PLATELETCT  260  320  364       Imaging  X-Ray:  I have personally reviewed the images and compared with prior images.  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   Iatrogenic volume overload plus possible diastolic dysfunction   Continue cefepime + doxycycline  F/u MRSA swab   PCT 0.05, atypical infection possible  Titrate HFNC for SpO2 88-92%, currently 40% with 30 LPM - making some improvement   Continue IV solumedrol 125 q6 for total of 48 hours given oxygenation improvement   ABG today showed slightly improving PF ratio   Mixed alkalosis likely secondary to  hypoxia, steroids and diuresis   D/c lasix as Cr started to increase, pt looks euvolemic   D/w son in detail, palliative is seeing pt today   I explained that getting her home is unlikely as she continues to require high flow oxygen, we might be able to get her to 6 LPM but cannot guarantee, in addition, if she does get down to 6 LPM NC, she is probably going to fail again off high dose steroids/diuretics   Son wants to know if family should come visit, which I agreed with and supported  It seems patient and son are gradually accepting the poor prognosis and irreversiblity of her lung fibrosis      Amiodarone pulmonary toxicity   Assessment & Plan    Amiodarone was stopped earlier in June she was admitted with similar complaints.   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          Stage 3 chronic kidney disease (HCC)   Assessment & Plan    Follow renal function, avoid nephrotoxic agents     Pulmonary hypertension (HCC)   Assessment & Plan    Follow-up echocardiogram after appropriate treatment     Multifocal pneumonia- (present on admission)   Assessment & Plan    Currently being treated for suspected HAP with Cefepime and Doxycyline (5 day course)     Chronic atrial fibrillation (HCC)- (present on admission)   Assessment & Plan    Continue rate control as needed  Cont apixaban      COPD (chronic obstructive pulmonary disease) (HCC)- (present on admission)   Assessment & Plan    Continue RT protocols, bronchodilators and titrated oxygen, goal SPO2 88-92%  On high dose IV steroids  On doxycycline        Pleural effusion on right- (present on admission)   Assessment & Plan    S/p thoracentesis on 6/26. Transudative, mostly from volume overload, pulmonary hypertension can be contributing via bronchial venous congestion.  Chest x-ray show that the pleural effusion has re-accumlated, but is stable for now      Swelling of lower leg-  (present on admission)   Assessment & Plan    Compressive stocking   This is secondary to pulm HTN           VTE:  NOAC  Ulcer: Not Indicated  Lines: None    I have performed a physical exam and reviewed and updated ROS and Plan today (7/3/2019). In review of yesterday's note (7/2/2019), there are no changes except as documented above.     Discussed patient condition and risk of morbidity and/or mortality with Family, [employer] [facility] resident and Patient  The patient remains critically ill.  Critical care time = 36 minutes in directly providing and coordinating critical care and extensive data review.  No time overlap and excludes procedures.

## 2019-07-03 NOTE — PROGRESS NOTES
Pulmonary Critical Care Progress Note        Chief Complaint: This is an 84 year old female who was admitted on 6/26/2019 with a ground level fall, hypoxia and an abnormal chest x-ray    History of Present Illness: 84 y.o. female who presented on 6/26/2019 after a recent hospitalization at [facility] on 6/8 - 6/17 for an abnormal chest x-ray and hypoxia. 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 At that time Amiodarone was discontinued and she was given a steroid taper, down to prednisone 20mg PO which was discontinued at discharge on 6/17/2019. After her discharge she was initially doing well on 2-3 L 02 via NC (basline is 3L). She was a lifetime smoker, but states she quit after her last discharge. On 6/26/2019 she was going to use the bathroom with her walker, but her legs gave out and she fell to the ground hitting the right side of her head. There was no LOC or presyncopal symptoms. Her family called EMS and she was found to be 62% on room air. She was placed on a non-rebreahter then transferred to [facility]. Chest x-ray showed r>l alveolar infiltrates, CT head was negative. She continued to require 10L of oxygen, so she was transferred to [facility] for further evaluation.      Interval Problem Update:   -Improvement in oxygen requirements: decrease to 20L HFNC at 30%   -P/f ratio increased from 121 -> 148.2    -Palliative care has met with the patient, pending possible transfer home Friday or Saturday      ROS:    Review of Systems   Constitutional: Negative for chills and fever.   Respiratory: Positive for cough (dry). Negative for hemoptysis, sputum production and shortness of breath.    Cardiovascular: Positive for leg swelling. Negative for chest pain, palpitations and orthopnea.   Gastrointestinal: Negative for abdominal pain, nausea and vomiting.   Genitourinary: Positive  for frequency (due to diureitcs).   Musculoskeletal: Negative for myalgias.   All other systems reviewed and are negative.    Vital Signs:   Temp:  [36 °C (96.8 °F)-36.9 °C (98.4 °F)] 36 °C (96.8 °F)  Pulse:  [] 95  Resp:  [16-20] 16  BP: ()/(48-64) 115/64  SpO2:  [90 %-94 %] 92 %    Physical Exam  Physical Exam   Constitutional: She is oriented to person, place, and time. No distress.   HENT:   Head: Normocephalic.   Neck: No tracheal deviation present. No thyromegaly present.   Cardiovascular: Intact distal pulses.    No murmur heard.  Irregularly Irregular    Pulmonary/Chest: No respiratory distress. She has rales.   Abdominal: Soft. There is no tenderness.   Musculoskeletal: She exhibits edema (LE Edema, compression stockings in place).   Neurological: She is alert and oriented to person, place, and time.   Skin: Skin is warm and dry.       Intake/Output Summary (Last 24 hours) at 07/03/19 0851  Last data filed at 07/02/19 1100   Gross per 24 hour   Intake                0 ml   Output              800 ml   Net             -800 ml     Laboratory:   Recent Labs      07/02/19   1101  07/03/19   0250   [id number]  7.47  7.49   [id number]  36.2  40.3*   [id number]  60.8*  74.1   [id number]  90.1*  94.0   ARTHCO3  26*  30*   FIO2  45   --    ARTBE  2  6*     Recent Labs      07/01/19   0231  07/02/19   0246  07/03/19   0250   SODIUM  140  142  140   POTASSIUM  3.5*  3.4*  4.4   CHLORIDE  101  99  99   CO2  30  32  31   BUN  39*  37*  46*   CREATININE  1.44*  1.47*  1.73*   CALCIUM  8.1*  8.4*  8.4*     Recent Labs      07/01/19   0231  07/02/19   0246  07/03/19   0250   GLUCOSE  131*  202*  137*     Recent Labs      06/30/19   0900  07/01/19   0231  07/02/19   0246  07/03/19   0250   RBC  3.72*  3.22*  3.52*  3.44*   HEMOGLOBIN  10.9*  9.3*  10.2*  9.7*   HEMATOCRIT  34.7*  29.9*  32.4*  31.3*   PLATELETCT  294  260  320  364   PROTHROMBTM  20.0*   --    --    --    APTT  28.6   --    --    --    INR  1.65*    --    --    --      Recent Labs      07/01/19   0231  07/02/19   0246  07/03/19   0250   WBC  6.7  4.8  9.0   NEUTSPOLYS  78.40*  89.60*  89.70*   LYMPHOCYTES  11.50*  4.80*  4.10*   MONOCYTES  6.70  2.90  3.80   EOSINOPHILS  1.00  0.00  0.00   BASOPHILS  0.00  0.20  0.10     Medications:  Current Facility-Administered Medications   Medication Dose Frequency Provider Last Rate Last Dose   • methylPREDNISolone sod succ (SOLU-MEDROL) 125 MG injection 125 mg  125 mg Q6HRS Bijan Temple M.D.   125 mg at 07/03/19 0515   • apixaban (ELIQUIS) tablet 5 mg  5 mg BID Bijan Temple M.D.   5 mg at 07/03/19 0516   • potassium chloride SA (Kdur) tablet 40 mEq  40 mEq BID Bijan Temple M.D.   40 mEq at 07/03/19 0516   • carvedilol (COREG) tablet 6.25 mg  6.25 mg BID WITH MEALS Bijan Temple M.D.   6.25 mg at 07/03/19 0822   • furosemide (LASIX) injection 80 mg  80 mg Q DAY Ryan MIKEL Doss   80 mg at 07/03/19 0515   • cefepime (MAXIPIME) 2 g in  mL IVPB  2 g Q24HRS Jericho Doss M.D.   Stopped at 07/03/19 0545   • acetylcysteine (MUCOMYST) 20 % solution 3 mL  3 mL Q4HRS (RT) Anastasia García M.D.   3 mL at 07/03/19 0700   • ipratropium-albuterol (DUONEB) nebulizer solution  3 mL Q4H PRN (RT) Pablo Gaona M.D.   3 mL at 07/03/19 0218   • doxycycline monohydrate (ADOXA) tablet 100 mg  100 mg Q12HRS sherry Doss M.D.   100 mg at 07/03/19 0516   • NS (BOLUS) infusion 500 mL  500 mL Once PRN Anastasia García M.D.       • ipratropium-albuterol (DUONEB) nebulizer solution  3 mL TID (RT) Pablo Gaona M.D.   3 mL at 07/03/19 0627   • tiotropium (SPIRIVA) 18 MCG inhalation capsule 1 Cap  1 Cap DAILY Pablo Gaona M.D.   Stopped at 07/02/19 0600   • insulin regular (HUMULIN R) injection 2-9 Units  2-9 Units 4X/DAY ACHS Pablo Gaona M.D.   2 Units at 07/03/19 0823    And   • glucose 4 g chewable tablet 16 g  16 g Q15 MIN PRN Pablo Gaona M.D.        And   • DEXTROSE 10% BOLUS 250 mL  250 mL Q15 MIN PRN Pablo Gaona M.D.       •  acetaminophen (TYLENOL) tablet 650 mg  650 mg Q6HRS PRN Johann Montero M.D.       • senna-docusate (PERICOLACE or SENOKOT S) 8.6-50 MG per tablet 2 Tab  2 Tab BID Santosh Elena M.D.   2 Tab at 07/03/19 0516    And   • polyethylene glycol/lytes (MIRALAX) PACKET 1 Packet  1 Packet QDAY PRN Santosh Elena M.D.        And   • magnesium hydroxide (MILK OF MAGNESIA) suspension 30 mL  30 mL QDAY PRN Santosh Elena M.D.        And   • bisacodyl (DULCOLAX) suppository 10 mg  10 mg QDAY PRN Santosh Elena M.D.       • Respiratory Care per Protocol   Continuous RT Santosh Elena M.D.       • acetaminophen (TYLENOL) tablet 650 mg  650 mg Q6HRS PRN Santosh Elena M.D.       • ondansetron (ZOFRAN) syringe/vial injection 4 mg  4 mg Q4HRS PRN Santosh Elena M.D.       • ondansetron (ZOFRAN ODT) dispertab 4 mg  4 mg Q4HRS PRN Santosh Elena M.D.       • buPROPion SR (WELLBUTRIN-SR) tablet 150 mg  150 mg DAILY Santosh Elena M.D.   150 mg at 07/03/19 0516   • omeprazole (PRILOSEC) capsule 20 mg  20 mg DAILY Santosh Elena M.D.   20 mg at 07/03/19 0516     Last reviewed on 6/30/2019  7:12 PM by Kg Mariscal R.N.    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 Tentative discharge home with comfort measures Friday or Saturday. We will continue treating her with the current regimen in the meantime.      Amiodarone pulmonary toxicity   Assessment & Plan    Amiodarone was stopped earlier  this month when she was admitted with similar complaints.   This is likely the cause of her decline in respiratory status   Palliative care has been in to see the patient and we have discussed the poor prognosis of this condition with her and her son. Currently there is a tentative discharge home with comfort measures Friday or Saturday. We will continue treating her with the current regimen in the meantime.      Multifocal pneumonia- (present on admission)   Assessment & Plan    Currently being treated for suspected HAP with Cefepime and Doxycyline (5 day course)     Chronic atrial fibrillation (HCC)- (present on admission)   Assessment & Plan    Continue rate control as needed  Cont apixaban      COPD (chronic obstructive pulmonary disease) (HCC)- (present on admission)   Assessment & Plan    Continue RT protocols, bronchodilators and titrated oxygen, goal SPO2 88-92%  On high dose IV steroids  On doxycycline        Pleural effusion on right- (present on admission)   Assessment & Plan    S/p thoracentesis on 6/26. Transudative, mostly from volume overload, pulmonary hypertension can be contributing via bronchial venous congestion.  Chest x-ray show that the pleural effusion has re-accumlated, but is stable for now. No plan for repeat thoracentesis      Swelling of lower leg- (present on admission)   Assessment & Plan    Compressive stocking   This is secondary to pulm HTN

## 2019-07-03 NOTE — FLOWSHEET NOTE
07/03/19 1035   Events/Summary/Plan   Events/Summary/Plan pt titrated down on 02; 40%/35L sating 93% will monitor continue to monitor pt   Interdisciplinary Plan of Care-Goals (Indications)   Bronchodilator Indications History / Diagnosis   Bronchopulmonary Hygiene Indications Difficulty with Secretion Clearance   Interdisciplinary Plan of Care-Outcomes    Bronchodilator Outcome Patient at Stable Baseline   Bronchopulmonary Hygiene Outcome Patient at Stable Baseline   Education   Education Yes - Pt. / Family has been Instructed in use of Respiratory Equipment   RT Assessment of Delivered Medications   Evaluation of Medication Delivery Daily Yes-- Pt /Family has been Instructed in use of Respiratory Medications and Adverse Reactions   #Demo/Eval outside of SVN, MDI'S Yes   SVN Group   #SVN Performed Yes   Given By: (aerogen)   Heated Hi Flow Nasal Cannula   Heated Hi Flow Nasal Cannula (HHFNC) Yes   FiO2 (HHFNC) 40   Flowrate (HHFNC) 35   Humidifier Temperature (HHFNC) 31   Respiratory WDL   Respiratory (WDL) X   Chest Exam   Work Of Breathing / Effort Mild   Respiration 18   Pulse 94   Breath Sounds   Pre/Post Intervention Pre Intervention Assessment   RUL Breath Sounds Diminished   RML Breath Sounds Diminished   RLL Breath Sounds Diminished   GINA Breath Sounds Diminished   LLL Breath Sounds Diminished   Oximetry   Continuous Oximetry Yes   Oxygen   Pulse Oximetry 91 %   O2 (LPM) 35   FiO2% 40 %   O2 Daily Delivery Respiratory  Highflow Nasal Cannula

## 2019-07-03 NOTE — PROGRESS NOTES
Assumed care of PT A&O 4. Pt resting in bed with no signs of labored breathing. On high flow,  machine in use. Tele monitor in place, cardiac rhythm being monitored. Call light within reach, bed in lowest position, upper bed rails up, bed alarm on. Pt was updated on plan of care for the day. Will continue to monitor.

## 2019-07-03 NOTE — PROGRESS NOTES
Pt sitting up in the chair at this time. Chair alarm on. Hourly rounding in place. Son at bedside. Will continue to monitor.

## 2019-07-04 LAB
ANION GAP SERPL CALC-SCNC: 10 MMOL/L (ref 0–11.9)
BASOPHILS # BLD AUTO: 0.2 % (ref 0–1.8)
BASOPHILS # BLD: 0.02 K/UL (ref 0–0.12)
BUN SERPL-MCNC: 63 MG/DL (ref 8–22)
CALCIUM SERPL-MCNC: 8.5 MG/DL (ref 8.5–10.5)
CHLORIDE SERPL-SCNC: 96 MMOL/L (ref 96–112)
CO2 SERPL-SCNC: 30 MMOL/L (ref 20–33)
CREAT SERPL-MCNC: 2.16 MG/DL (ref 0.5–1.4)
EOSINOPHIL # BLD AUTO: 0 K/UL (ref 0–0.51)
EOSINOPHIL NFR BLD: 0 % (ref 0–6.9)
ERYTHROCYTE [DISTWIDTH] IN BLOOD BY AUTOMATED COUNT: 51.5 FL (ref 35.9–50)
GLUCOSE BLD-MCNC: 175 MG/DL (ref 65–99)
GLUCOSE BLD-MCNC: 195 MG/DL (ref 65–99)
GLUCOSE BLD-MCNC: 273 MG/DL (ref 65–99)
GLUCOSE BLD-MCNC: 324 MG/DL (ref 65–99)
GLUCOSE SERPL-MCNC: 177 MG/DL (ref 65–99)
HCT VFR BLD AUTO: 32.2 % (ref 37–47)
HGB BLD-MCNC: 10.3 G/DL (ref 12–16)
IMM GRANULOCYTES # BLD AUTO: 0.3 K/UL (ref 0–0.11)
IMM GRANULOCYTES NFR BLD AUTO: 3.3 % (ref 0–0.9)
LYMPHOCYTES # BLD AUTO: 0.46 K/UL (ref 1–4.8)
LYMPHOCYTES NFR BLD: 5.1 % (ref 22–41)
MCH RBC QN AUTO: 29 PG (ref 27–33)
MCHC RBC AUTO-ENTMCNC: 32 G/DL (ref 33.6–35)
MCV RBC AUTO: 90.7 FL (ref 81.4–97.8)
MONOCYTES # BLD AUTO: 0.32 K/UL (ref 0–0.85)
MONOCYTES NFR BLD AUTO: 3.6 % (ref 0–13.4)
NEUTROPHILS # BLD AUTO: 7.89 K/UL (ref 2–7.15)
NEUTROPHILS NFR BLD: 87.8 % (ref 44–72)
NRBC # BLD AUTO: 0 K/UL
NRBC BLD-RTO: 0 /100 WBC
PLATELET # BLD AUTO: 378 K/UL (ref 164–446)
PMV BLD AUTO: 9.9 FL (ref 9–12.9)
POTASSIUM SERPL-SCNC: 4.8 MMOL/L (ref 3.6–5.5)
RBC # BLD AUTO: 3.55 M/UL (ref 4.2–5.4)
SODIUM SERPL-SCNC: 136 MMOL/L (ref 135–145)
WBC # BLD AUTO: 9 K/UL (ref 4.8–10.8)

## 2019-07-04 PROCEDURE — 99232 SBSQ HOSP IP/OBS MODERATE 35: CPT | Performed by: INTERNAL MEDICINE

## 2019-07-04 PROCEDURE — 700102 HCHG RX REV CODE 250 W/ 637 OVERRIDE(OP): Performed by: INTERNAL MEDICINE

## 2019-07-04 PROCEDURE — 770020 HCHG ROOM/CARE - TELE (206)

## 2019-07-04 PROCEDURE — A9270 NON-COVERED ITEM OR SERVICE: HCPCS | Performed by: INTERNAL MEDICINE

## 2019-07-04 PROCEDURE — 36415 COLL VENOUS BLD VENIPUNCTURE: CPT

## 2019-07-04 PROCEDURE — 82962 GLUCOSE BLOOD TEST: CPT | Mod: 91

## 2019-07-04 PROCEDURE — 80048 BASIC METABOLIC PNL TOTAL CA: CPT

## 2019-07-04 PROCEDURE — 92526 ORAL FUNCTION THERAPY: CPT

## 2019-07-04 PROCEDURE — 99233 SBSQ HOSP IP/OBS HIGH 50: CPT | Mod: GC | Performed by: INTERNAL MEDICINE

## 2019-07-04 PROCEDURE — 700111 HCHG RX REV CODE 636 W/ 250 OVERRIDE (IP): Performed by: INTERNAL MEDICINE

## 2019-07-04 PROCEDURE — 700101 HCHG RX REV CODE 250: Performed by: INTERNAL MEDICINE

## 2019-07-04 PROCEDURE — 94640 AIRWAY INHALATION TREATMENT: CPT

## 2019-07-04 PROCEDURE — 700105 HCHG RX REV CODE 258: Performed by: INTERNAL MEDICINE

## 2019-07-04 PROCEDURE — 85025 COMPLETE CBC W/AUTO DIFF WBC: CPT

## 2019-07-04 RX ORDER — TIOTROPIUM BROMIDE 18 UG/1
1 CAPSULE ORAL; RESPIRATORY (INHALATION)
Status: DISCONTINUED | OUTPATIENT
Start: 2019-07-05 | End: 2019-07-10 | Stop reason: HOSPADM

## 2019-07-04 RX ORDER — SODIUM CHLORIDE FOR INHALATION 3 %
3 VIAL, NEBULIZER (ML) INHALATION
Status: COMPLETED | OUTPATIENT
Start: 2019-07-04 | End: 2019-07-04

## 2019-07-04 RX ORDER — IPRATROPIUM BROMIDE AND ALBUTEROL SULFATE 2.5; .5 MG/3ML; MG/3ML
3 SOLUTION RESPIRATORY (INHALATION)
Status: DISCONTINUED | OUTPATIENT
Start: 2019-07-04 | End: 2019-07-10 | Stop reason: HOSPADM

## 2019-07-04 RX ORDER — METHYLPREDNISOLONE SODIUM SUCCINATE 40 MG/ML
40 INJECTION, POWDER, LYOPHILIZED, FOR SOLUTION INTRAMUSCULAR; INTRAVENOUS EVERY 6 HOURS
Status: DISCONTINUED | OUTPATIENT
Start: 2019-07-04 | End: 2019-07-05

## 2019-07-04 RX ADMIN — APIXABAN 2.5 MG: 2.5 TABLET, FILM COATED ORAL at 17:01

## 2019-07-04 RX ADMIN — IPRATROPIUM BROMIDE AND ALBUTEROL SULFATE 3 ML: .5; 3 SOLUTION RESPIRATORY (INHALATION) at 20:06

## 2019-07-04 RX ADMIN — INSULIN HUMAN 2 UNITS: 100 INJECTION, SOLUTION PARENTERAL at 08:14

## 2019-07-04 RX ADMIN — INSULIN HUMAN 5 UNITS: 100 INJECTION, SOLUTION PARENTERAL at 12:05

## 2019-07-04 RX ADMIN — ACETYLCYSTEINE 3 ML: 200 SOLUTION ORAL; RESPIRATORY (INHALATION) at 07:00

## 2019-07-04 RX ADMIN — INSULIN HUMAN 2 UNITS: 100 INJECTION, SOLUTION PARENTERAL at 16:42

## 2019-07-04 RX ADMIN — IPRATROPIUM BROMIDE AND ALBUTEROL SULFATE 3 ML: .5; 3 SOLUTION RESPIRATORY (INHALATION) at 06:27

## 2019-07-04 RX ADMIN — INSULIN HUMAN 6 UNITS: 100 INJECTION, SOLUTION PARENTERAL at 21:08

## 2019-07-04 RX ADMIN — IPRATROPIUM BROMIDE AND ALBUTEROL SULFATE 3 ML: .5; 3 SOLUTION RESPIRATORY (INHALATION) at 14:49

## 2019-07-04 RX ADMIN — MAGNESIUM OXIDE TAB 400 MG (241.3 MG ELEMENTAL MG) 400 MG: 400 (241.3 MG) TAB at 08:06

## 2019-07-04 RX ADMIN — CARVEDILOL 6.25 MG: 6.25 TABLET, FILM COATED ORAL at 17:51

## 2019-07-04 RX ADMIN — SENNOSIDES AND DOCUSATE SODIUM 2 TABLET: 8.6; 5 TABLET ORAL at 17:01

## 2019-07-04 RX ADMIN — OMEPRAZOLE 20 MG: 20 CAPSULE, DELAYED RELEASE ORAL at 05:13

## 2019-07-04 RX ADMIN — APIXABAN 2.5 MG: 2.5 TABLET, FILM COATED ORAL at 05:13

## 2019-07-04 RX ADMIN — TIOTROPIUM BROMIDE 1 CAPSULE: 18 CAPSULE ORAL; RESPIRATORY (INHALATION) at 06:27

## 2019-07-04 RX ADMIN — METHYLPREDNISOLONE SODIUM SUCCINATE 40 MG: 40 INJECTION, POWDER, FOR SOLUTION INTRAMUSCULAR; INTRAVENOUS at 17:02

## 2019-07-04 RX ADMIN — METHYLPREDNISOLONE SODIUM SUCCINATE 125 MG: 125 INJECTION, POWDER, FOR SOLUTION INTRAMUSCULAR; INTRAVENOUS at 02:01

## 2019-07-04 RX ADMIN — CARVEDILOL 6.25 MG: 6.25 TABLET, FILM COATED ORAL at 08:06

## 2019-07-04 RX ADMIN — METHYLPREDNISOLONE SODIUM SUCCINATE 40 MG: 40 INJECTION, POWDER, FOR SOLUTION INTRAMUSCULAR; INTRAVENOUS at 23:37

## 2019-07-04 RX ADMIN — DOXYCYCLINE 100 MG: 100 TABLET, FILM COATED ORAL at 05:13

## 2019-07-04 RX ADMIN — METHYLPREDNISOLONE SODIUM SUCCINATE 125 MG: 125 INJECTION, POWDER, FOR SOLUTION INTRAMUSCULAR; INTRAVENOUS at 08:06

## 2019-07-04 RX ADMIN — SODIUM CHLORIDE SOLN NEBU 3% 3 ML: 3 NEBU SOLN at 14:49

## 2019-07-04 RX ADMIN — BUPROPION HYDROCHLORIDE 150 MG: 150 TABLET, EXTENDED RELEASE ORAL at 05:13

## 2019-07-04 RX ADMIN — METHYLPREDNISOLONE SODIUM SUCCINATE 40 MG: 40 INJECTION, POWDER, FOR SOLUTION INTRAMUSCULAR; INTRAVENOUS at 12:02

## 2019-07-04 RX ADMIN — CEFEPIME 2 G: 2 INJECTION, POWDER, FOR SOLUTION INTRAVENOUS at 05:14

## 2019-07-04 RX ADMIN — SENNOSIDES AND DOCUSATE SODIUM 2 TABLET: 8.6; 5 TABLET ORAL at 05:13

## 2019-07-04 RX ADMIN — DOXYCYCLINE 100 MG: 100 TABLET, FILM COATED ORAL at 17:01

## 2019-07-04 ASSESSMENT — ENCOUNTER SYMPTOMS
CHILLS: 0
VOMITING: 0
HEADACHES: 0
PALPITATIONS: 0
FEVER: 0
MYALGIAS: 0
ABDOMINAL PAIN: 0
COUGH: 1
WEAKNESS: 1
NAUSEA: 0
SHORTNESS OF BREATH: 1

## 2019-07-04 NOTE — CARE PLAN
Problem: Oxygenation:  Goal: Maintain adequate oxygenation dependent on patient condition  Outcome: PROGRESSING AS EXPECTED    Intervention: Manage oxygen therapy by monitoring pulse oximetry and/or ABG values  Pt off off high flow device now on 3lpm.oxymask sating 94%; baseline is 3lpm/nc @night; pt does better on mask compared to nasal cannula; will continue to monitor and titrate as pt needs        Problem: Bronchoconstriction:  Goal: Improve in air movement and diminished wheezing  Outcome: MET Date Met: 07/04/19    Intervention: Implement inhaled treatments  Pt on home regimen of DUO-BID

## 2019-07-04 NOTE — PROGRESS NOTES
Pulmonary Progress Note        Chief Complaint: This is an 84 year old female who was admitted on 6/26/2019 with a ground level fall, hypoxia and an abnormal chest x-ray    History of Present Illness: 84 y.o. female who presented on 6/26/2019 after a recent hospitalization at [facility] on 6/8 - 6/17 for an abnormal chest x-ray and hypoxia. 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 At that time Amiodarone was discontinued and she was given a steroid taper, down to prednisone 20mg PO which was discontinued at discharge on 6/17/2019. After her discharge she was initially doing well on 2-3 L 02 via NC (basline is 3L). She was a lifetime smoker, but states she quit after her last discharge. On 6/26/2019 she was going to use the bathroom with her walker, but her legs gave out and she fell to the ground hitting the right side of her head. There was no LOC or presyncopal symptoms. Her family called EMS and she was found to be 62% on room air. She was placed on a non-rebreahter then transferred to [facility]. Chest x-ray showed r>l alveolar infiltrates, CT head was negative. She continued to require 10L of oxygen, so she was transferred to [facility] for further evaluation.      Interval Problem Update:   -The patient continues to respond well to steroids and has been weaned from HFNC now to 2.5L NC currently   -Patient and family have been discussing with Palliative care team about Hospice at home in [place] vs. Staying in the hospital    ROS:    Review of Systems   Constitutional: Negative for chills and fever.   Respiratory: Positive for cough.    Cardiovascular: Positive for leg swelling. Negative for chest pain and palpitations.   Gastrointestinal: Negative for abdominal pain, nausea and vomiting.   Genitourinary: Negative for frequency.   Musculoskeletal: Negative for myalgias.    Neurological: Negative for headaches.     Vital Signs:   Temp:  [36.1 °C (97 °F)-36.8 °C (98.2 °F)] 36.3 °C (97.4 °F)  Pulse:  [] 91  Resp:  [16-20] 18  BP: (102-138)/(53-73) 108/53  SpO2:  [92 %-99 %] 95 %    Physical Exam   Constitutional: She is oriented to person, place, and time. No distress.   Neck: Neck supple. No tracheal deviation present.   Cardiovascular:   No murmur heard.  Irregularly Irregular Rhythm    Pulmonary/Chest: Effort normal. She has rales.   Abdominal: Soft. There is no tenderness.   Musculoskeletal: She exhibits edema (There is BLLE edema).   Neurological: She is alert and oriented to person, place, and time.   Skin: Skin is warm and dry. No rash noted.     PFSH:  No change.      Intake/Output Summary (Last 24 hours) at 07/04/19 1202  Last data filed at 07/04/19 0628   Gross per 24 hour   Intake                0 ml   Output             1060 ml   Net            -1060 ml     Laboratory:  Recent Labs      07/02/19   1101  07/03/19   0250   [id number]  7.47  7.49   [id number]  36.2  40.3*   [id number]  60.8*  74.1   [id number]  90.1*  94.0   ARTHCO3  26*  30*   FIO2  45   --    ARTBE  2  6*     Recent Labs      07/02/19   0246  07/03/19   0250  07/03/19   1815  07/04/19   0249   SODIUM  142  140   --   136   POTASSIUM  3.4*  4.4   --   4.8   CHLORIDE  99  99   --   96   CO2  32  31   --   30   BUN  37*  46*   --   63*   CREATININE  1.47*  1.73*   --   2.16*   MAGNESIUM   --    --   1.9   --    PHOSPHORUS   --    --   3.7   --    CALCIUM  8.4*  8.4*   --   8.5     Recent Labs      07/02/19   0246  07/03/19   0250  07/04/19   0249   GLUCOSE  202*  137*  177*     Recent Labs      07/02/19   0246  07/03/19   0250  07/04/19   0249   RBC  3.52*  3.44*  3.55*   HEMOGLOBIN  10.2*  9.7*  10.3*   HEMATOCRIT  32.4*  31.3*  32.2*   PLATELETCT  320  364  378     Recent Labs      07/02/19   0246  07/03/19   0250  07/04/19   0249   WBC  4.8  9.0  9.0   NEUTSPOLYS  89.60*  89.70*  87.80*   LYMPHOCYTES  4.80*   4.10*  5.10*   MONOCYTES  2.90  3.80  3.60   EOSINOPHILS  0.00  0.00  0.00   BASOPHILS  0.20  0.10  0.20     Medications:  Current Facility-Administered Medications   Medication Dose Frequency Provider Last Rate Last Dose   • ipratropium-albuterol (DUONEB) nebulizer solution  3 mL BID (RT) Pablo Gaona M.D.       • [START ON 7/5/2019] tiotropium (SPIRIVA) 18 MCG inhalation capsule 1 Cap  1 Cap QDAILY (RT) Pablo Gaona M.D.       • methylPREDNISolone (SOLU-MEDROL) 40 MG injection 40 mg  40 mg Q6HRS Jericho Doss M.D.       • apixaban (ELIQUIS) 2.5mg tablet 2.5 mg  2.5 mg BID Bijan Temple M.D.   2.5 mg at 07/04/19 0513   • carvedilol (COREG) tablet 6.25 mg  6.25 mg BID WITH MEALS Bijan Temple M.D.   6.25 mg at 07/04/19 0806   • cefepime (MAXIPIME) 2 g in  mL IVPB  2 g Q24HRS Jericho Doss M.D.   Stopped at 07/04/19 0544   • ipratropium-albuterol (DUONEB) nebulizer solution  3 mL Q4H PRN (RT) Pablo Gaona M.D.   3 mL at 07/03/19 0218   • doxycycline monohydrate (ADOXA) tablet 100 mg  100 mg Q12HRS Jericho Doss M.D.   100 mg at 07/04/19 0513   • NS (BOLUS) infusion 500 mL  500 mL Once PRN Anastasia García M.D.       • insulin regular (HUMULIN R) injection 2-9 Units  2-9 Units 4X/DAY ACHS Pablo Gaona M.D.   2 Units at 07/04/19 0814    And   • glucose 4 g chewable tablet 16 g  16 g Q15 MIN PRN Pablo Gaona M.D.        And   • DEXTROSE 10% BOLUS 250 mL  250 mL Q15 MIN PRN Pablo Gaona M.D.       • acetaminophen (TYLENOL) tablet 650 mg  650 mg Q6HRS PRN Johann A. [place], M.D.       • senna-docusate (PERICOLACE or SENOKOT S) 8.6-50 MG per tablet 2 Tab  2 Tab BID Santosh Elena M.D.   2 Tab at 07/04/19 0513    And   • polyethylene glycol/lytes (MIRALAX) PACKET 1 Packet  1 Packet QDAY PRN Santosh Elena M.D.        And   • magnesium hydroxide (MILK OF MAGNESIA) suspension 30 mL  30 mL QDAY PRN Santosh Elena M.D.        And   • bisacodyl (DULCOLAX) suppository 10 mg  10 mg QDAY PRN Santosh Elena M.D.       •  Respiratory Care per Protocol   Continuous RT Santosh Elena M.D.       • acetaminophen (TYLENOL) tablet 650 mg  650 mg Q6HRS PRN Santosh Elena M.D.       • ondansetron (ZOFRAN) syringe/vial injection 4 mg  4 mg Q4HRS PRN Santosh Elena M.D.       • ondansetron (ZOFRAN ODT) dispertab 4 mg  4 mg Q4HRS PRN Santosh Elena M.D.       • buPROPion SR (WELLBUTRIN-SR) tablet 150 mg  150 mg DAILY Santosh Elena M.D.   150 mg at 07/04/19 0513   • omeprazole (PRILOSEC) capsule 20 mg  20 mg DAILY Santosh Elena M.D.   20 mg at 07/04/19 0513     Last reviewed on 6/30/2019  7:12 PM by Kg Mariscal R.N.    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 They are being very receptive to the help and the patient's poor prognosis. Currently the family is deciding on weather to have Hospice at home or keep the patient in the hospital in order to visit with family more.   We will continue to treat her in the meantime.         Amiodarone pulmonary toxicity   Assessment & Plan    Amiodarone was stopped earlier in June she was admitted with similar complaints.   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     Stage 3 chronic kidney disease (HCC)- (present on admission)   Assessment & Plan    Follow renal function, avoid nephrotoxic agents     Multifocal pneumonia- (present on admission)   Assessment & Plan     Currently being treated for suspected HAP with Cefepime and Doxycyline (day 4/5)     Chronic atrial fibrillation (HCC)- (present on admission)   Assessment & Plan    Continue rate control as needed  Cont apixaban      COPD (chronic obstructive pulmonary disease) (HCC)- (present on admission)   Assessment & Plan    Continue RT protocols, bronchodilators and titrated oxygen, goal SPO2 88-92%  On high dose IV steroids  On doxycycline      Pleural effusion on right- (present on admission)   Assessment & Plan    S/p thoracentesis on 6/26. Transudative, mostly from volume overload, pulmonary hypertension can be contributing via bronchial venous congestion.  Chest x-ray show that the pleural effusion has re-accumlated, but is stable for now. Will continue to monitor with no current plans for further thoracentesis.      Swelling of lower leg- (present on admission)   Assessment & Plan    Compressive stocking   This is secondary to pulm HTN

## 2019-07-04 NOTE — CARE PLAN
Problem: Oxygenation:  Goal: Maintain adequate oxygenation dependent on patient condition  Outcome: PROGRESSING AS EXPECTED    Intervention: Manage oxygen therapy by monitoring pulse oximetry and/or ABG values  Pt on 3lpm/oxymask sating 97%; will continue to monitor and titrate to pts needs

## 2019-07-04 NOTE — PROGRESS NOTES
Unable to obtain sputum after induction; pt states she normally coughs mucous up in the morning, specimen cup is at bedside for pt

## 2019-07-04 NOTE — PROGRESS NOTES
Hospital Medicine Daily Progress Note    Date of Service  7/4/2019    Chief Complaint  84 y.o. female admitted 6/26/2019 with SOB and fall.    Hospital Course    PMH COPD on 3L O2, afib, pulm HTN, GERD who presented with fall and SOB. She was found with hypoxia. CXR showed multifocal airspace disease. She underwent right thoracentesis 6/26.  She continued to have worsening respiratory failure, placed on high flow nasal cannula.  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  Her antibiotics were broadened to cefepime and doxycycline, she was placed on high-dose steroids and given Lasix for diuresis.  She had minimal improvement with concerns of poor prognosis.  Palliative care was consulted and patient decided on a hospice referral.        Interval Problem Update  8L O2 oxymask  Creat rising, monitor off lasix  She says her breathing feels about the same.  She feels short of breath with exertion.    Consultants/Specialty  Pulm  Critical care  Pall care    Code Status  DNR    Disposition  Lives in [facility] pending    Review of Systems  Review of Systems   Constitutional: Positive for malaise/fatigue.   Respiratory: Positive for shortness of breath (improved).    Cardiovascular: Negative for chest pain.   Gastrointestinal: Negative for abdominal pain and nausea.   Musculoskeletal: Negative for myalgias.   Skin: Negative for itching.   Neurological: Positive for weakness. Negative for headaches.        Physical Exam  Temp:  [36.1 °C (97 °F)-36.8 °C (98.2 °F)] 36.3 °C (97.4 °F)  Pulse:  [] 91  Resp:  [16-20] 18  BP: (102-138)/(53-73) 108/53  SpO2:  [92 %-99 %] 95 %    Physical Exam   Constitutional: She is oriented to person, place, and time. She appears well-developed and well-nourished.   Appears fatigued   HENT:   Head: Normocephalic.   Dry mucus membranes   Eyes: Conjunctivae are normal.   Cardiovascular:   Irregular, tachycardic   Pulmonary/Chest: She has no wheezes. She has rales.    tachypneic   Abdominal: Soft. There is no tenderness.   Musculoskeletal: She exhibits edema (trace).   Neurological: She is alert and oriented to person, place, and time.   Skin: Skin is warm and dry.   Nursing note and vitals reviewed.      Fluids    Intake/Output Summary (Last 24 hours) at 07/04/19 1154  Last data filed at 07/04/19 0628   Gross per 24 hour   Intake                0 ml   Output             1060 ml   Net            -1060 ml       Laboratory  Recent Labs      07/02/19   0246  07/03/19   0250  07/04/19   0249   WBC  4.8  9.0  9.0   RBC  3.52*  3.44*  3.55*   HEMOGLOBIN  10.2*  9.7*  10.3*   HEMATOCRIT  32.4*  31.3*  32.2*   MCV  92.0  91.0  90.7   MCH  29.0  28.2  29.0   MCHC  31.5*  31.0*  32.0*   RDW  50.7*  50.5*  51.5*   PLATELETCT  320  364  378   MPV  10.3  9.8  9.9     Recent Labs      07/02/19   0246  07/03/19   0250  07/04/19   0249   SODIUM  142  140  136   POTASSIUM  3.4*  4.4  4.8   CHLORIDE  99  99  96   CO2  32  31  30   GLUCOSE  202*  137*  177*   BUN  37*  46*  63*   CREATININE  1.47*  1.73*  2.16*   CALCIUM  8.4*  8.4*  8.5                   Imaging  DX-CHEST-PORTABLE (1 VIEW)   Final Result      Unchanged BILATERAL lung disease, RIGHT-sided volume loss and small RIGHT pleural effusion      DX-CHEST-PORTABLE (1 VIEW)   Final Result      Stable right worse than left pulmonary opacities possibly infection/pneumonia superimposed on fibrosis/interstitial lung disease.      CT-CHEST (THORAX) W/O   Final Result      Extensive airspace opacities on the right are mildly increased in the right upper lobe and slightly improved in the right lower lobe. There is some cavitation.      Airspace opacities on the left are increased in the lingula and mildly improved in the left upper lobe.      Bilateral underlying fibrotic changes and emphysematous changes are noted.      Subpleural nodule in the left lower lobe measures 6 mm.      Follow-up to radiographic resolution recommended.      Small  bilateral pleural effusions, right greater than left.      Mediastinal lymphadenopathy is not significantly changed.      Atherosclerotic plaque.      Cardiomegaly.      Pancreatic calcifications likely sequelae of chronic pancreatitis.      Cholelithiasis.      US-EXTREMITY VENOUS LOWER BILAT   Final Result      DX-CHEST-PORTABLE (1 VIEW)   Final Result      Pneumothorax is not identified      Stable pulmonary scarring with right volume loss, right worse than left consolidation      US-THORACENTESIS PUNCTURE RIGHT   Final Result      1. Ultrasound guided right sided diagnostic and therapeutic thoracentesis.      2. 700 mL of fluid withdrawn.      Findings were discussed with Juan on 6/26/2019 12:40 PM.      DX-CHEST-PORTABLE (1 VIEW)   Final Result      1.  Tiny RIGHT pneumothorax   2.  Decreased RIGHT pleural fluid   3.  Otherwise no significant interval change      Findings were discussed with MARSHALL FOX on 6/26/2019 12:33 PM.      DX-CHEST-PORTABLE (1 VIEW)   Final Result      1.  Worsening hypoinflation and multifocal interstitial and alveolar opacities particularly involving the RIGHT lung, favoring pneumonia over edema.  Probable background of interstitial lung disease.   2.  RIGHT pleural fluid collection, apparently increased.           Assessment/Plan  * Acute on chronic respiratory failure with hypoxia (HCC)- (present on admission)   Assessment & Plan    Secondary to pulmonary edema and right sided pleural effusion  Underlying COPD  Likely underlying amiodarone toxicity,  Continue supplemental oxygen with RT protocol, high flow nasal cannula  Doubt infectious process  Diuresis to continue  Likely prolonged steroid taper  CT with consistent findings, somewhat worsened  6/29 no aspiration found with SLP eval.  6/30 started rocephin, doxy for multifocal pneumonia, but mostly entire right lung.  Infiltrates on 6/26 CT noted to be worse than prior CT.  Pulm consulted  -Weaning Solu-Medrol  -Continue  "cefepime and doxycycline  -She was weaned off high flow nasal cannula today  -pall care and hospice discussion     Amiodarone pulmonary toxicity   Assessment & Plan    Long course of steroids projected  CT noted    2D echo on 6/9/2019 reveals  Normal left ventricular size and systolic function. Normal regional   wall motion. Left ventricular ejection fraction is visually estimated   to be 70%. Diastolic function is difficult to assess with atrial   Fibrillation. Normal right ventricular size and systolic function.  Mildly dilated left atrium. Mild mitral regurgitation. Moderate aortic insufficiency.   Moderate tricuspid regurgitation. Right atrial pressure is estimated to   be 15 mmHg. Estimated right ventricular systolic pressure  is 55 mmHg     Sepsis associated hypotension (HCC)   Assessment & Plan    This is severe sepsis with the following associated acute organ dysfunction(s): acute respiratory failure.   6/30:  Sepsis protocol initiated for BP 74/47 responded to fluid bolus.  I examined the patient 6/30/2019 4:51 PM  Vital Signs:/56   Pulse 75   Temp 37 °C (98.6 °F) (Temporal)   Resp 19   Ht 1.651 m (5' 5\")   Wt 68.4 kg (150 lb 12.7 oz)   SpO2 90%   Breastfeeding? No   BMI 25.09 kg/m²    Cardiac examination significant for Regular rate and rhythm  Pulmonary examination significant for Crackles  Capillary refill is brisk  Peripheral Pulse is 2+   Skin is normal     Source Rt lung pneumonia.       Stage 3 chronic kidney disease (HCC)- (present on admission)   Assessment & Plan    Creatinine rising, Lasix was stopped  Avoiding nephrotoxics  Monitor BMP         GERD (gastroesophageal reflux disease)   Assessment & Plan    Continue omeprazole     Pulmonary hypertension (HCC)- (present on admission)   Assessment & Plan    Continue supplemental oxygen   She received diuresis     Multifocal pneumonia- (present on admission)   Assessment & Plan    Reviewed CTA chest:  Increased consolidation seen " throughout the right upper, middle and lower lung fields.  Some infiltrates seen LLL as well.  Sputum cultures ordered  6/30 started rocephin, doxycycline.  7/1: pulm changed rocephin to cefepime IV, continue doxy.    Mucomyst neb q 6 hours ordered.       Chronic atrial fibrillation (HCC)- (present on admission)   Assessment & Plan      Restarting anticoagulation, given renal insufficiency changed to Eliquis.  Controlled on coreg     COPD (chronic obstructive pulmonary disease) (HCC)- (present on admission)   Assessment & Plan    Continue IV solumedrol  Continue Spiriva and Symbicort  DuoNeb with RT protocol     Pleural effusion on right- (present on admission)   Assessment & Plan    Status post thoracentesis 6/26, yielding 700 cc of fluid  Fluid cx have been neg       Swelling of lower leg- (present on admission)   Assessment & Plan    Negative for DVT  Elevate extremities  Received diuresis          VTE prophylaxis: eliquis

## 2019-07-04 NOTE — CARE PLAN
Problem: Safety  Goal: Will remain free from falls  Outcome: PROGRESSING AS EXPECTED  Patient's risk for injury and falls assessed. Appropriate safety precautions in place. Patient educated to utilize call light for needs. Patient verbalizes understanding.    Problem: Respiratory:  Goal: Respiratory status will improve  Outcome: PROGRESSING AS EXPECTED  Patient respiratory status assessed. Patient educated on importance of coughing and deep breathing. Deep breaths are encouraged. Educated on how ambulation and movement can affect respiratory status. Patient indicates understanding.

## 2019-07-05 ENCOUNTER — APPOINTMENT (OUTPATIENT)
Dept: RADIOLOGY | Facility: MEDICAL CENTER | Age: 84
DRG: 205 | End: 2019-07-05
Attending: INTERNAL MEDICINE
Payer: MEDICARE

## 2019-07-05 LAB
ANION GAP SERPL CALC-SCNC: 12 MMOL/L (ref 0–11.9)
ANISOCYTOSIS BLD QL SMEAR: ABNORMAL
BACTERIA BLD CULT: NORMAL
BACTERIA BLD CULT: NORMAL
BASOPHILS # BLD AUTO: 0 % (ref 0–1.8)
BASOPHILS # BLD: 0 K/UL (ref 0–0.12)
BUN SERPL-MCNC: 64 MG/DL (ref 8–22)
BURR CELLS BLD QL SMEAR: NORMAL
CALCIUM SERPL-MCNC: 8.5 MG/DL (ref 8.5–10.5)
CHLORIDE SERPL-SCNC: 96 MMOL/L (ref 96–112)
CO2 SERPL-SCNC: 29 MMOL/L (ref 20–33)
CREAT SERPL-MCNC: 1.92 MG/DL (ref 0.5–1.4)
EOSINOPHIL # BLD AUTO: 0 K/UL (ref 0–0.51)
EOSINOPHIL NFR BLD: 0 % (ref 0–6.9)
ERYTHROCYTE [DISTWIDTH] IN BLOOD BY AUTOMATED COUNT: 54.2 FL (ref 35.9–50)
GLUCOSE BLD-MCNC: 140 MG/DL (ref 65–99)
GLUCOSE BLD-MCNC: 170 MG/DL (ref 65–99)
GLUCOSE BLD-MCNC: 238 MG/DL (ref 65–99)
GLUCOSE BLD-MCNC: 259 MG/DL (ref 65–99)
GLUCOSE SERPL-MCNC: 173 MG/DL (ref 65–99)
HCT VFR BLD AUTO: 33.4 % (ref 37–47)
HGB BLD-MCNC: 10.1 G/DL (ref 12–16)
HYPOCHROMIA BLD QL SMEAR: ABNORMAL
LYMPHOCYTES # BLD AUTO: 0.5 K/UL (ref 1–4.8)
LYMPHOCYTES NFR BLD: 6 % (ref 22–41)
MACROCYTES BLD QL SMEAR: ABNORMAL
MANUAL DIFF BLD: NORMAL
MCH RBC QN AUTO: 28.5 PG (ref 27–33)
MCHC RBC AUTO-ENTMCNC: 30.2 G/DL (ref 33.6–35)
MCV RBC AUTO: 94.4 FL (ref 81.4–97.8)
MONOCYTES # BLD AUTO: 0.5 K/UL (ref 0–0.85)
MONOCYTES NFR BLD AUTO: 6 % (ref 0–13.4)
MORPHOLOGY BLD-IMP: NORMAL
NEUTROPHILS # BLD AUTO: 7.3 K/UL (ref 2–7.15)
NEUTROPHILS NFR BLD: 88 % (ref 44–72)
NRBC # BLD AUTO: 0 K/UL
NRBC BLD-RTO: 0 /100 WBC
PLATELET # BLD AUTO: 334 K/UL (ref 164–446)
PLATELET BLD QL SMEAR: NORMAL
PMV BLD AUTO: 9.8 FL (ref 9–12.9)
POIKILOCYTOSIS BLD QL SMEAR: NORMAL
POTASSIUM SERPL-SCNC: 4.9 MMOL/L (ref 3.6–5.5)
RBC # BLD AUTO: 3.54 M/UL (ref 4.2–5.4)
RBC BLD AUTO: PRESENT
SIGNIFICANT IND 70042: NORMAL
SIGNIFICANT IND 70042: NORMAL
SITE SITE: NORMAL
SITE SITE: NORMAL
SODIUM SERPL-SCNC: 137 MMOL/L (ref 135–145)
SOURCE SOURCE: NORMAL
SOURCE SOURCE: NORMAL
WBC # BLD AUTO: 8.3 K/UL (ref 4.8–10.8)

## 2019-07-05 PROCEDURE — 99233 SBSQ HOSP IP/OBS HIGH 50: CPT | Mod: GC | Performed by: INTERNAL MEDICINE

## 2019-07-05 PROCEDURE — 97535 SELF CARE MNGMENT TRAINING: CPT

## 2019-07-05 PROCEDURE — A9270 NON-COVERED ITEM OR SERVICE: HCPCS | Performed by: INTERNAL MEDICINE

## 2019-07-05 PROCEDURE — 94640 AIRWAY INHALATION TREATMENT: CPT

## 2019-07-05 PROCEDURE — 770020 HCHG ROOM/CARE - TELE (206)

## 2019-07-05 PROCEDURE — 700102 HCHG RX REV CODE 250 W/ 637 OVERRIDE(OP): Performed by: INTERNAL MEDICINE

## 2019-07-05 PROCEDURE — 36415 COLL VENOUS BLD VENIPUNCTURE: CPT

## 2019-07-05 PROCEDURE — 700101 HCHG RX REV CODE 250: Performed by: INTERNAL MEDICINE

## 2019-07-05 PROCEDURE — 700111 HCHG RX REV CODE 636 W/ 250 OVERRIDE (IP): Performed by: INTERNAL MEDICINE

## 2019-07-05 PROCEDURE — A9270 NON-COVERED ITEM OR SERVICE: HCPCS | Performed by: STUDENT IN AN ORGANIZED HEALTH CARE EDUCATION/TRAINING PROGRAM

## 2019-07-05 PROCEDURE — 85027 COMPLETE CBC AUTOMATED: CPT

## 2019-07-05 PROCEDURE — 85007 BL SMEAR W/DIFF WBC COUNT: CPT

## 2019-07-05 PROCEDURE — 99232 SBSQ HOSP IP/OBS MODERATE 35: CPT | Performed by: INTERNAL MEDICINE

## 2019-07-05 PROCEDURE — 700105 HCHG RX REV CODE 258: Performed by: INTERNAL MEDICINE

## 2019-07-05 PROCEDURE — 71045 X-RAY EXAM CHEST 1 VIEW: CPT

## 2019-07-05 PROCEDURE — 700102 HCHG RX REV CODE 250 W/ 637 OVERRIDE(OP): Performed by: STUDENT IN AN ORGANIZED HEALTH CARE EDUCATION/TRAINING PROGRAM

## 2019-07-05 PROCEDURE — 80048 BASIC METABOLIC PNL TOTAL CA: CPT

## 2019-07-05 PROCEDURE — 82962 GLUCOSE BLOOD TEST: CPT | Mod: 91

## 2019-07-05 RX ORDER — SULFAMETHOXAZOLE AND TRIMETHOPRIM 800; 160 MG/1; MG/1
1 TABLET ORAL DAILY
Status: DISCONTINUED | OUTPATIENT
Start: 2019-07-07 | End: 2019-07-05

## 2019-07-05 RX ORDER — SULFAMETHOXAZOLE AND TRIMETHOPRIM 800; 160 MG/1; MG/1
1 TABLET ORAL
Status: DISCONTINUED | OUTPATIENT
Start: 2019-07-05 | End: 2019-07-10 | Stop reason: HOSPADM

## 2019-07-05 RX ORDER — INSULIN GLARGINE 100 [IU]/ML
5 INJECTION, SOLUTION SUBCUTANEOUS EVERY EVENING
Status: DISCONTINUED | OUTPATIENT
Start: 2019-07-05 | End: 2019-07-10 | Stop reason: HOSPADM

## 2019-07-05 RX ORDER — PREDNISONE 20 MG/1
40 TABLET ORAL 2 TIMES DAILY
Status: DISCONTINUED | OUTPATIENT
Start: 2019-07-05 | End: 2019-07-10 | Stop reason: HOSPADM

## 2019-07-05 RX ORDER — SULFAMETHOXAZOLE AND TRIMETHOPRIM 800; 160 MG/1; MG/1
1 TABLET ORAL DAILY
Status: DISCONTINUED | OUTPATIENT
Start: 2019-07-10 | End: 2019-07-05

## 2019-07-05 RX ORDER — SULFAMETHOXAZOLE AND TRIMETHOPRIM 800; 160 MG/1; MG/1
1 TABLET ORAL DAILY
Status: DISCONTINUED | OUTPATIENT
Start: 2019-07-12 | End: 2019-07-05

## 2019-07-05 RX ADMIN — INSULIN HUMAN 3 UNITS: 100 INJECTION, SOLUTION PARENTERAL at 21:31

## 2019-07-05 RX ADMIN — SENNOSIDES AND DOCUSATE SODIUM 2 TABLET: 8.6; 5 TABLET ORAL at 05:39

## 2019-07-05 RX ADMIN — IPRATROPIUM BROMIDE AND ALBUTEROL SULFATE 3 ML: .5; 3 SOLUTION RESPIRATORY (INHALATION) at 06:31

## 2019-07-05 RX ADMIN — INSULIN HUMAN 5 UNITS: 100 INJECTION, SOLUTION PARENTERAL at 12:36

## 2019-07-05 RX ADMIN — IPRATROPIUM BROMIDE AND ALBUTEROL SULFATE 3 ML: .5; 3 SOLUTION RESPIRATORY (INHALATION) at 19:00

## 2019-07-05 RX ADMIN — INSULIN HUMAN 2 UNITS: 100 INJECTION, SOLUTION PARENTERAL at 07:49

## 2019-07-05 RX ADMIN — SENNOSIDES AND DOCUSATE SODIUM 2 TABLET: 8.6; 5 TABLET ORAL at 17:42

## 2019-07-05 RX ADMIN — PREDNISONE 40 MG: 20 TABLET ORAL at 21:29

## 2019-07-05 RX ADMIN — APIXABAN 2.5 MG: 2.5 TABLET, FILM COATED ORAL at 17:42

## 2019-07-05 RX ADMIN — METHYLPREDNISOLONE SODIUM SUCCINATE 40 MG: 40 INJECTION, POWDER, FOR SOLUTION INTRAMUSCULAR; INTRAVENOUS at 05:39

## 2019-07-05 RX ADMIN — APIXABAN 2.5 MG: 2.5 TABLET, FILM COATED ORAL at 05:39

## 2019-07-05 RX ADMIN — TIOTROPIUM BROMIDE 1 CAPSULE: 18 CAPSULE ORAL; RESPIRATORY (INHALATION) at 06:31

## 2019-07-05 RX ADMIN — CEFEPIME 2 G: 2 INJECTION, POWDER, FOR SOLUTION INTRAVENOUS at 05:39

## 2019-07-05 RX ADMIN — CARVEDILOL 6.25 MG: 6.25 TABLET, FILM COATED ORAL at 17:42

## 2019-07-05 RX ADMIN — BUPROPION HYDROCHLORIDE 150 MG: 150 TABLET, EXTENDED RELEASE ORAL at 05:39

## 2019-07-05 RX ADMIN — OMEPRAZOLE 20 MG: 20 CAPSULE, DELAYED RELEASE ORAL at 05:39

## 2019-07-05 RX ADMIN — INSULIN GLARGINE 5 UNITS: 100 INJECTION, SOLUTION SUBCUTANEOUS at 17:42

## 2019-07-05 RX ADMIN — PREDNISONE 40 MG: 20 TABLET ORAL at 14:45

## 2019-07-05 RX ADMIN — SULFAMETHOXAZOLE AND TRIMETHOPRIM 1 TABLET: 800; 160 TABLET ORAL at 14:45

## 2019-07-05 ASSESSMENT — CHA2DS2 SCORE
PRIOR STROKE OR TIA OR THROMBOEMBOLISM: NO
CHA2DS2 VASC SCORE: 4
CHF OR LEFT VENTRICULAR DYSFUNCTION: NO
AGE 75 OR GREATER: YES
VASCULAR DISEASE: NO
HYPERTENSION: YES
DIABETES: NO
AGE 65 TO 74: NO
SEX: FEMALE

## 2019-07-05 ASSESSMENT — ENCOUNTER SYMPTOMS
SHORTNESS OF BREATH: 1
VOMITING: 0
FEVER: 0
CHILLS: 0
HEARTBURN: 0
COUGH: 1
ABDOMINAL PAIN: 0
COUGH: 0
MYALGIAS: 0
SPUTUM PRODUCTION: 0
HEADACHES: 0
NAUSEA: 0
PALPITATIONS: 0
WEAKNESS: 1

## 2019-07-05 ASSESSMENT — COGNITIVE AND FUNCTIONAL STATUS - GENERAL
DRESSING REGULAR UPPER BODY CLOTHING: A LITTLE
HELP NEEDED FOR BATHING: A LITTLE
DAILY ACTIVITIY SCORE: 20
TOILETING: A LITTLE
SUGGESTED CMS G CODE MODIFIER DAILY ACTIVITY: CJ
DRESSING REGULAR LOWER BODY CLOTHING: A LITTLE

## 2019-07-05 NOTE — DISCHARGE PLANNING
"KIMBERLEY HARDY met with son, Corbin, and pt to see what pt decided- home with hospice, or snf with hospice. Son states he and pt want to see if pt would qualify for therapies at acute inpatient rehab \"next door\". Dr. Temple notified. KIMBERLEY HARDY to choice pt when order is received.  "

## 2019-07-05 NOTE — DISCHARGE PLANNING
[facility] notified KIMBERLEY HARDY that Elvin Coombs, cannot take pt until Monday as their medication  Is based out of [facility] and won't be available until Monday. KIMBERLEY HARDY phoned son, Corbin, to get update on if pt has decided where to dc to- home with [facility] or [facility] with hospice. Corbin will be at [facility] in about an hour to see pt and discuss.

## 2019-07-05 NOTE — THERAPY
"Speech Language Therapy dysphagia treatment completed.   Functional Status:  The patient was seen for dysphagia therapy this date. The patient was awake, alert and oriented to self, location and date. The patient was seen during her regular/thin liquid meal items. The patient consumed regular/thin liquid meal items with no overt s/s of aspiration but required min A for breathing strategies to maintain SpO2 during PO intake. Patient followed simple breathing strategies appropriately following education. At this time, recommend patient remain on regular/thin liquid meal items with swallow strategies. SLP following 1-2 more session to ensure tolerance and follow through with strategies.     Recommendations: 1) remain on regular/thin liquid meal items with swallow strategies. SLP following 1-2 more session to ensure tolerance and follow through with strategies    Plan of Care: Will benefit from Speech Therapy 2 times per week    Post-Acute Therapy: Anticipate that the patient will have no further speech therapy needs after discharge from the hospital.    See \"Rehab Therapy-Acute\" Patient Summary Report for complete documentation.     "

## 2019-07-05 NOTE — CARE PLAN
Problem: Bronchoconstriction:  Goal: Improve in air movement and diminished wheezing    Intervention: Implement inhaled treatments  Respiratory Therapy Update    Interdisciplinary Plan of Care-Goals (Indications)  Bronchodilator Indications: History / Diagnosis (07/04/19 2007)  Bronchopulmonary Hygiene Indications: Difficulty with Secretion Clearance (07/03/19 1427)  Hyperinflation Protocol Indications: Atelectasis Documented by Chest X-Ray (07/04/19 2007)  Interdisciplinary Plan of Care-Outcomes   Bronchodilator Outcome: Patient at Stable Baseline (07/04/19 2007)  Bronchopulmonary Hygiene Outcome: Patient at Stable Baseline (07/03/19 1427)  Hyperinflation Protocol Goals/Outcome: Stable Vital Capacity x24 hrs and Patient Understands / uses I.S. (07/04/19 2007)          #SVN Performed: Yes (07/04/19 2007)    Cough: Non Productive;Strong (07/04/19 2007)  Sputum Amount: Unable to Evaluate (07/04/19 2007)  Sputum Color: Unable to Evaluate (07/04/19 2007)  Sputum Consistency: Unable to Evaluate (07/04/19 2007)    Heated Hi Flow Nasal Cannula (HHFNC):  (Standby) (07/04/19 0300)  FiO2 (HHFNC): 30 (07/03/19 1427)  Flowrate (HHFNC): 20 (07/03/19 1427)    FiO2%: 30 % (07/03/19 1427)  O2 (LPM): 3 (07/05/19 0400)  O2 Daily Delivery Respiratory : Silicone Nasal Cannula (07/04/19 2007)    Breath Sounds  Pre/Post Intervention: Pre Intervention Assessment (07/04/19 2007)  RUL Breath Sounds: Crackles (07/04/19 2007)  RML Breath Sounds: Diminished (07/04/19 2007)  RLL Breath Sounds: Diminished (07/04/19 2007)  GINA Breath Sounds: Clear (07/04/19 2007)  LLL Breath Sounds: Diminished (07/04/19 2007)    Events/Summary/Plan: Pt compliant with therapy. Continue IS and BDs per protocol.

## 2019-07-05 NOTE — PROGRESS NOTES
Report received at bedside from Klever RN, pt care assumed, tele box on. Pt aaox4, pt sitting up in chair on 3L NC. POC discussed with pt and verbalizes no questions. Pt denies any additional needs at this time. Bed in lowest position, pt educated on fall risk and verbalized understanding, call light within reach, bed alarm plugged in and on.

## 2019-07-05 NOTE — PROGRESS NOTES
Report received from Priyanka CHU. Patient asleep in bed. NAD noted. Patient breathing regular and unlabored. Tele box in place. Bed low and locked with calarm on and audible. Call light in reach. Will continue to monitor.

## 2019-07-05 NOTE — DISCHARGE PLANNING
Choice for physiatry obtained from pt and faxed to [facility] at ext. [phone].

## 2019-07-05 NOTE — CARE PLAN
Problem: Safety  Goal: Will remain free from injury    Intervention: Provide assistance with mobility  Patient ambulated with RN to [facility]. Patient tolerated well.       Problem: Skin Integrity  Goal: Risk for impaired skin integrity will decrease    Intervention: Assess risk factors for impaired skin integrity and/or pressure ulcers  Patient will be free of any new skin breakdown.

## 2019-07-05 NOTE — DISCHARGE PLANNING
Received Choice form at 7880  Agency/Facility Name: [facility]   Referral sent per Choice form @ 1602

## 2019-07-05 NOTE — PROGRESS NOTES
Pulmonary Progress Note        Chief Complaint: This is an 84 year old female who was admitted on 6/26/2019 with a ground level fall, hypoxia and an abnormal chest x-ray    History of Present Illness: 84 y.o. female who presented on 6/26/2019 after a recent hospitalization at [facility] on 6/8 - 6/17 for an abnormal chest x-ray and hypoxia. 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 At that time Amiodarone was discontinued and she was given a steroid taper, down to prednisone 20mg PO which was discontinued at discharge on 6/17/2019. After her discharge she was initially doing well on 2-3 L 02 via NC (basline is 3L). She was a lifetime smoker, but states she quit after her last discharge. On 6/26/2019 she was going to use the bathroom with her walker, but her legs gave out and she fell to the ground hitting the right side of her head. There was no LOC or presyncopal symptoms. Her family called EMS and she was found to be 62% on room air. She was placed on a non-rebreahter then transferred to [facility]. Chest x-ray showed r>l alveolar infiltrates, CT head was negative. She continued to require 10L of oxygen, so she was transferred to [facility] for further evaluation.      Interval Problem Update:   -The patient continues to respond well to steroids and is now on 3L NC  -Last day of antibiotics for HAP  -Steroids are currently being weaned to PO Prednisone     -Patient and family continue to work with the Palliative care team. The potential plan now is for discharge home with hospice vs. Discharge to local SNF with hospice.     ROS:    Review of Systems   Constitutional: Negative for chills and fever.   Respiratory: Positive for cough. Negative for sputum production.    Cardiovascular: Positive for leg swelling. Negative for chest pain and palpitations.   Gastrointestinal: Negative for  abdominal pain, heartburn, nausea and vomiting.   Skin: Negative for rash.   Neurological: Negative for headaches.   All other systems reviewed and are negative.    Vital Signs  Temp:  [36.4 °C (97.6 °F)-37 °C (98.6 °F)] 36.9 °C (98.4 °F)  Pulse:  [70-93] 73  Resp:  [18-22] 18  BP: ()/(51-76) 136/72  SpO2:  [92 %-97 %] 92 %    Physical Exam   Constitutional: She is oriented to person, place, and time. No distress.   HENT:   Head: Normocephalic and atraumatic.   Eyes: Right eye exhibits no discharge. Left eye exhibits no discharge.   Neck: No tracheal deviation present.   Cardiovascular:   No murmur heard.  Irregularly Irregular   Pulmonary/Chest: Effort normal. No respiratory distress. She has rales.   Abdominal: Soft. There is no tenderness.   Musculoskeletal: She exhibits edema (BLLE edema).   Neurological: She is alert and oriented to person, place, and time.   Skin: Skin is warm and dry.   Psychiatric: Affect normal.   Nursing note and vitals reviewed.    Intake/Output Summary (Last 24 hours) at 07/05/19 1538  Last data filed at 07/05/19 1300   Gross per 24 hour   Intake              600 ml   Output              950 ml   Net             -350 ml     Laboratory:   Recent Labs      07/03/19 0250   [id number]  7.49   [id number]  40.3*   [id number]  74.1   [id number]  94.0   ARTHCO3  30*   ARTBE  6*     Recent Labs      07/03/19 0250 07/03/19   1815 07/04/19 0249 07/05/19   0344   SODIUM  140   --   136  137   POTASSIUM  4.4   --   4.8  4.9   CHLORIDE  99   --   96  96   CO2  31   --   30  29   BUN  46*   --   63*  64*   CREATININE  1.73*   --   2.16*  1.92*   MAGNESIUM   --   1.9   --    --    PHOSPHORUS   --   3.7   --    --    CALCIUM  8.4*   --   8.5  8.5     Recent Labs      07/03/19   0250 07/04/19 0249 07/05/19   0344   GLUCOSE  137*  177*  173*     Recent Labs      07/03/19 0250 07/04/19 0249 07/05/19   0344   RBC  3.44*  3.55*  3.54*   HEMOGLOBIN  9.7*  10.3*  10.1*   HEMATOCRIT  31.3*   32.2*  33.4*   PLATELETCT  364  378  334     Recent Labs      07/03/19   0250  07/04/19   0249  07/05/19   0344   WBC  9.0  9.0  8.3   NEUTSPOLYS  89.70*  87.80*  88.00*   LYMPHOCYTES  4.10*  5.10*  6.00*   MONOCYTES  3.80  3.60  6.00   EOSINOPHILS  0.00  0.00  0.00   BASOPHILS  0.10  0.20  0.00     Medications:   Current Facility-Administered Medications   Medication Dose Frequency Provider Last Rate Last Dose   • predniSONE (DELTASONE) tablet 40 mg  40 mg BID [facility] MIKEL Doss   40 mg at 07/05/19 1445   • sulfamethoxazole-trimethoprim (BACTRIM DS) 800-160 MG tablet 1 Tab  1 Tab MO, WE + FR Jaclyn Avila M.D.   1 Tab at 07/05/19 1445   • insulin glargine (LANTUS) injection 5 Units  5 Units Q EVENING Bijan Temple M.D.       • ipratropium-albuterol (DUONEB) nebulizer solution  3 mL BID (RT) Pablo Gaona M.D.   3 mL at 07/05/19 0631   • tiotropium (SPIRIVA) 18 MCG inhalation capsule 1 Cap  1 Cap QDAILY (RT) Pablo Gaona M.D.   1 Cap at 07/05/19 0631   • apixaban (ELIQUIS) 2.5mg tablet 2.5 mg  2.5 mg BID Bijan Temple M.D.   2.5 mg at 07/05/19 0539   • carvedilol (COREG) tablet 6.25 mg  6.25 mg BID WITH MEALS Bijan Temple M.D.   Stopped at 07/05/19 0730   • ipratropium-albuterol (DUONEB) nebulizer solution  3 mL Q4H PRN (RT) Pablo Gaona M.D.   3 mL at 07/04/19 1449   • NS (BOLUS) infusion 500 mL  500 mL Once PRN Anastasia García M.D.       • insulin regular (HUMULIN R) injection 2-9 Units  2-9 Units 4X/DAY ACHS Pablo Gaona M.D.   5 Units at 07/05/19 1236    And   • glucose 4 g chewable tablet 16 g  16 g Q15 MIN PRN Pablo Gaona M.D.        And   • DEXTROSE 10% BOLUS 250 mL  250 mL Q15 MIN PRN Pablo Gaona M.D.       • acetaminophen (TYLENOL) tablet 650 mg  650 mg Q6HRS PRN Johann Montero M.D.       • senna-docusate (PERICOLACE or SENOKOT S) 8.6-50 MG per tablet 2 Tab  2 Tab BID Santosh Elena M.D.   2 Tab at 07/05/19 0539    And   • polyethylene glycol/lytes (MIRALAX) PACKET 1 Packet  1 Packet QDAY PRN  Santosh Elena M.D.        And   • magnesium hydroxide (MILK OF MAGNESIA) suspension 30 mL  30 mL QDAY PRN Santosh Elena M.D.        And   • bisacodyl (DULCOLAX) suppository 10 mg  10 mg QDAY PRN Santosh Elena M.D.       • Respiratory Care per Protocol   Continuous RT Santosh Elena M.D.       • acetaminophen (TYLENOL) tablet 650 mg  650 mg Q6HRS PRN Santosh Elena M.D.       • ondansetron (ZOFRAN) syringe/vial injection 4 mg  4 mg Q4HRS PRN Santosh Elena M.D.       • ondansetron (ZOFRAN ODT) dispertab 4 mg  4 mg Q4HRS PRN Santosh Elena M.D.       • buPROPion SR (WELLBUTRIN-SR) tablet 150 mg  150 mg DAILY Santosh Elena M.D.   150 mg at 07/05/19 0539   • omeprazole (PRILOSEC) capsule 20 mg  20 mg DAILY Santosh Elena M.D.   20 mg at 07/05/19 0539     Last reviewed on 6/30/2019  7:12 PM by Kg Mariscal R.N.    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               -Continue cefepime + doxycycline (day 5/5)              -Bactrim DS for prophylaxis while on steroids F    Palliative care has been working with the patient and her family. They are being very receptive to the help and the patient's poor prognosis. Currently the family is deciding on weather to have Hospice at home or go to a SNF with hospice      Amiodarone pulmonary toxicity   Assessment & Plan    Amiodarone was stopped earlier in June she was admitted with similar complaints.   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      Stage 3 chronic kidney disease (HCC)- (present on admission)   Assessment & Plan    Follow renal function, avoid nephrotoxic agents     Multifocal pneumonia- (present on admission)   Assessment & Plan    Currently being treated for suspected HAP with Cefepime and Doxycyline (day 5/5)     Chronic atrial fibrillation (HCC)- (present on admission)   Assessment & Plan    Continue rate control as needed  Cont apixaban      COPD (chronic obstructive pulmonary disease) (HCC)- (present on admission)   Assessment & Plan    Continue RT protocols, bronchodilators and titrated oxygen, goal SPO2 88-92%  On high dose IV steroids  On doxycycline      Pleural effusion on right- (present on admission)   Assessment & Plan    S/p thoracentesis on 6/26. Transudative, mostly from volume overload, pulmonary hypertension can be contributing via bronchial venous congestion.  Chest x-ray show that the pleural effusion has re-accumlated, but is stable for now. Will continue to monitor with no current plans for further thoracentesis.      Swelling of lower leg- (present on admission)   Assessment & Plan    Compressive stocking   This is secondary to pulm HTN

## 2019-07-05 NOTE — PROGRESS NOTES
[facility] Medicine Daily Progress Note    Date of Service  7/5/2019    Chief Complaint  84 y.o. female admitted 6/26/2019 with SOB and fall.    Hospital Course    PMH COPD on 3L O2, afib, pulm HTN, GERD who presented with fall and SOB. She was found with hypoxia. CXR showed multifocal airspace disease. She underwent right thoracentesis 6/26.  She continued to have worsening respiratory failure, placed on high flow nasal cannula.  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  Her antibiotics were broadened to cefepime and doxycycline, she was placed on high-dose steroids and given Lasix for diuresis.  She had minimal improvement with concerns of poor prognosis.  Palliative care was consulted and patient decided on a hospice referral.        Interval Problem Update  3L O2, she feels SOB is improved  Hyperglycemic, lantus ordered  Cefepime 5 days, transitioned to bactrim  Transitioned to oral steroids  Creat improved  Family would like referral to acute rehab    Consultants/Specialty  Pulm  Critical care  Pall care    Code Status  DNR    Disposition  Lives in [place]  Hospice placement pending if she does not improve    Review of Systems  Review of Systems   Constitutional: Negative for chills.   HENT: Negative for congestion.    Respiratory: Positive for shortness of breath (improved). Negative for cough.    Cardiovascular: Negative for chest pain.   Gastrointestinal: Negative for abdominal pain and nausea.   Musculoskeletal: Negative for myalgias.   Skin: Negative for itching.   Neurological: Positive for weakness. Negative for headaches.        Physical Exam  Temp:  [36.4 °C (97.6 °F)-37 °C (98.6 °F)] 36.9 °C (98.4 °F)  Pulse:  [70-93] 73  Resp:  [18-22] 18  BP: ()/(51-76) 136/72  SpO2:  [92 %-97 %] 92 %    Physical Exam   Constitutional: She is oriented to person, place, and time. She appears well-developed and well-nourished.   HENT:   Head: Normocephalic.   Mouth/Throat: Oropharynx is clear  and moist.   Eyes: Conjunctivae are normal.   Cardiovascular:   Irregular, tachycardic   Pulmonary/Chest: She has no wheezes. She has rales.   tachypneic   Abdominal: Soft. There is no tenderness.   Musculoskeletal: She exhibits edema (trace).   Neurological: She is alert and oriented to person, place, and time.   Skin: Skin is warm and dry.   Nursing note and vitals reviewed.      Fluids    Intake/Output Summary (Last 24 hours) at 07/05/19 1532  Last data filed at 07/05/19 1300   Gross per 24 hour   Intake              600 ml   Output              950 ml   Net             -350 ml       Laboratory  Recent Labs      07/03/19   0250  07/04/19   0249  07/05/19   0344   WBC  9.0  9.0  8.3   RBC  3.44*  3.55*  3.54*   HEMOGLOBIN  9.7*  10.3*  10.1*   HEMATOCRIT  31.3*  32.2*  33.4*   MCV  91.0  90.7  94.4   MCH  28.2  29.0  28.5   MCHC  31.0*  32.0*  30.2*   RDW  50.5*  51.5*  54.2*   PLATELETCT  364  378  334   MPV  9.8  9.9  9.8     Recent Labs      07/03/19   0250  07/04/19   0249  07/05/19   0344   SODIUM  140  136  137   POTASSIUM  4.4  4.8  4.9   CHLORIDE  99  96  96   CO2  31  30  29   GLUCOSE  137*  177*  173*   BUN  46*  63*  64*   CREATININE  1.73*  2.16*  1.92*   CALCIUM  8.4*  8.5  8.5                   Imaging  DX-CHEST-PORTABLE (1 VIEW)   Final Result      No significant change from prior exam.      DX-CHEST-PORTABLE (1 VIEW)   Final Result      Unchanged BILATERAL lung disease, RIGHT-sided volume loss and small RIGHT pleural effusion      DX-CHEST-PORTABLE (1 VIEW)   Final Result      Stable right worse than left pulmonary opacities possibly infection/pneumonia superimposed on fibrosis/interstitial lung disease.      CT-CHEST (THORAX) W/O   Final Result      Extensive airspace opacities on the right are mildly increased in the right upper lobe and slightly improved in the right lower lobe. There is some cavitation.      Airspace opacities on the left are increased in the lingula and mildly improved in the  left upper lobe.      Bilateral underlying fibrotic changes and emphysematous changes are noted.      Subpleural nodule in the left lower lobe measures 6 mm.      Follow-up to radiographic resolution recommended.      Small bilateral pleural effusions, right greater than left.      Mediastinal lymphadenopathy is not significantly changed.      Atherosclerotic plaque.      Cardiomegaly.      Pancreatic calcifications likely sequelae of chronic pancreatitis.      Cholelithiasis.      US-EXTREMITY VENOUS LOWER BILAT   Final Result      DX-CHEST-PORTABLE (1 VIEW)   Final Result      Pneumothorax is not identified      Stable pulmonary scarring with right volume loss, right worse than left consolidation      US-THORACENTESIS PUNCTURE RIGHT   Final Result      1. Ultrasound guided right sided diagnostic and therapeutic thoracentesis.      2. 700 mL of fluid withdrawn.      Findings were discussed with Juan on 6/26/2019 12:40 PM.      DX-CHEST-PORTABLE (1 VIEW)   Final Result      1.  Tiny RIGHT pneumothorax   2.  Decreased RIGHT pleural fluid   3.  Otherwise no significant interval change      Findings were discussed with MARSHALL FOX on 6/26/2019 12:33 PM.      DX-CHEST-PORTABLE (1 VIEW)   Final Result      1.  Worsening hypoinflation and multifocal interstitial and alveolar opacities particularly involving the RIGHT lung, favoring pneumonia over edema.  Probable background of interstitial lung disease.   2.  RIGHT pleural fluid collection, apparently increased.           Assessment/Plan  * Acute on chronic respiratory failure with hypoxia (HCC)- (present on admission)   Assessment & Plan    Secondary to pulmonary edema and right sided pleural effusion  Underlying COPD  Likely underlying amiodarone toxicity,  Continue supplemental oxygen with RT protocol, high flow nasal cannula  Doubt infectious process  Diuresis to continue  Likely prolonged steroid taper  CT with consistent findings, somewhat worsened  6/29 no  "aspiration found with SLP eval.  6/30 started rocephin, doxy for multifocal pneumonia, but mostly entire right lung.  Infiltrates on 6/26 CT noted to be worse than prior CT.  Pulm consulted  -transition to oral steroids and antibiotic  -pall care and hospice discussion     Amiodarone pulmonary toxicity   Assessment & Plan    Long course of steroids projected  CT noted    2D echo on 6/9/2019 reveals  Normal left ventricular size and systolic function. Normal regional   wall motion. Left ventricular ejection fraction is visually estimated   to be 70%. Diastolic function is difficult to assess with atrial   Fibrillation. Normal right ventricular size and systolic function.  Mildly dilated left atrium. Mild mitral regurgitation. Moderate aortic insufficiency.   Moderate tricuspid regurgitation. Right atrial pressure is estimated to   be 15 mmHg. Estimated right ventricular systolic pressure  is 55 mmHg     Sepsis associated hypotension (HCC)   Assessment & Plan    This is severe sepsis with the following associated acute organ dysfunction(s): acute respiratory failure.   6/30:  Sepsis protocol initiated for BP 74/47 responded to fluid bolus.  I examined the patient 6/30/2019 4:51 PM  Vital Signs:/56   Pulse 75   Temp 37 °C (98.6 °F) (Temporal)   Resp 19   Ht 1.651 m (5' 5\")   Wt 68.4 kg (150 lb 12.7 oz)   SpO2 90%   Breastfeeding? No   BMI 25.09 kg/m²    Cardiac examination significant for Regular rate and rhythm  Pulmonary examination significant for Crackles  Capillary refill is brisk  Peripheral Pulse is 2+   Skin is normal     Source Rt lung pneumonia.       Stage 3 chronic kidney disease (HCC)- (present on admission)   Assessment & Plan    Creatinine rising, Lasix was stopped  Avoiding nephrotoxics  Monitor BMP         GERD (gastroesophageal reflux disease)   Assessment & Plan    Continue omeprazole     Pulmonary hypertension (HCC)- (present on admission)   Assessment & Plan    Continue supplemental " oxygen   She received diuresis     Multifocal pneumonia- (present on admission)   Assessment & Plan    Reviewed CTA chest:  Increased consolidation seen throughout the right upper, middle and lower lung fields.  Some infiltrates seen LLL as well.  Sputum cultures ordered  6/30 started rocephin, doxycycline.  7/1: pulm changed rocephin to cefepime IV, continue doxy.    7/5: she is improving, changed to bactrim  Mucomyst neb q 6 hours ordered.       Chronic atrial fibrillation (HCC)- (present on admission)   Assessment & Plan      Restarting anticoagulation, given renal insufficiency changed to Eliquis.  May need to stop Eliquis if renal function declines  Controlled on coreg     COPD (chronic obstructive pulmonary disease) (HCC)- (present on admission)   Assessment & Plan    Continue IV solumedrol  Continue Spiriva and Symbicort  DuoNeb with RT protocol     Pleural effusion on right- (present on admission)   Assessment & Plan    Status post thoracentesis 6/26, yielding 700 cc of fluid  Fluid cx have been neg       Swelling of lower leg- (present on admission)   Assessment & Plan    Negative for DVT  Elevate extremities  Received diuresis          VTE prophylaxis: eliquis

## 2019-07-05 NOTE — DISCHARGE PLANNING
PMR referral from Dr. Temple     Debility ongoing medical management as well as therapy need. Anticipate post acute services to facilitate a successful transition to community home with outpatient follow up RPG to consult per protocol.

## 2019-07-05 NOTE — CARE PLAN
Problem: Oxygenation:  Goal: Maintain adequate oxygenation dependent on patient condition    Intervention: Levels of oxygenation will improve to baseline  2.5L NC      Problem: Bronchoconstriction:  Goal: Improve in air movement and diminished wheezing    Intervention: Implement inhaled treatments  Spiriva  Haja BID  Home

## 2019-07-05 NOTE — THERAPY
"Occupational Therapy Treatment completed with focus on ADLs, ADL transfers and patient education.  Functional Status:  SPV sit>stand, Rajni functional mobility chair to sink, SPV standing grooming, Rajni whole body dress (spv with socks, Rajni with underwear, Rajni with gown) Rajni for safety with cues for functional transfer   Plan of Care: Will benefit from Occupational Therapy 3 times per week  Discharge Recommendations:  Equipment Will Continue to Assess for Equipment Needs. Post-acute therapy Recommend post-acute placement for additional occupational therapy services prior to discharge home. Patient can tolerate post-acute therapies at a 5x/week frequency.    See \"Rehab Therapy-Acute\" Patient Summary Report for complete documentation.     Pt seen for OT tx, participated in functional activities with improved activity tolerance however remains limited by O2 sats dropping with minimal exertion. Pt completed whole body dressing, required brief rest breaks between each tasks 2/2 increased WOB when donning socks. Pt stood at sink to groom (face wash, hair brush, clean underarms), tolerated well. Pt required cues for safe hand placement during functional transfers, receptive to education and demonstrated retention. Pt completed functional sit>stand transfers x6 throughout session with SPV. Acute OT to follow while in-house, continue to recommend post-acute placement for continued therapies upon d/c from acute.  "

## 2019-07-05 NOTE — PROGRESS NOTES
Patient ambulated on 3 LNC with RN to BSC. Patient desatted to 78%. Patient turned up to 5 lnc. Patient now satting 98% on 5 LNC. SOB on exertion noted. Will continue to monitor.

## 2019-07-06 LAB
ANION GAP SERPL CALC-SCNC: 10 MMOL/L (ref 0–11.9)
APPEARANCE UR: CLEAR
BACTERIA #/AREA URNS HPF: NEGATIVE /HPF
BASOPHILS # BLD AUTO: 0 % (ref 0–1.8)
BASOPHILS # BLD: 0 K/UL (ref 0–0.12)
BILIRUB UR QL STRIP.AUTO: NEGATIVE
BUN SERPL-MCNC: 63 MG/DL (ref 8–22)
CALCIUM SERPL-MCNC: 8.2 MG/DL (ref 8.5–10.5)
CHLORIDE SERPL-SCNC: 100 MMOL/L (ref 96–112)
CO2 SERPL-SCNC: 29 MMOL/L (ref 20–33)
COLOR UR: YELLOW
CREAT SERPL-MCNC: 1.93 MG/DL (ref 0.5–1.4)
EOSINOPHIL # BLD AUTO: 0 K/UL (ref 0–0.51)
EOSINOPHIL NFR BLD: 0 % (ref 0–6.9)
EPI CELLS #/AREA URNS HPF: NEGATIVE /HPF
ERYTHROCYTE [DISTWIDTH] IN BLOOD BY AUTOMATED COUNT: 52.3 FL (ref 35.9–50)
GLUCOSE BLD-MCNC: 156 MG/DL (ref 65–99)
GLUCOSE BLD-MCNC: 159 MG/DL (ref 65–99)
GLUCOSE BLD-MCNC: 190 MG/DL (ref 65–99)
GLUCOSE BLD-MCNC: 191 MG/DL (ref 65–99)
GLUCOSE SERPL-MCNC: 180 MG/DL (ref 65–99)
GLUCOSE UR STRIP.AUTO-MCNC: 100 MG/DL
HCT VFR BLD AUTO: 32 % (ref 37–47)
HGB BLD-MCNC: 9.9 G/DL (ref 12–16)
HYALINE CASTS #/AREA URNS LPF: ABNORMAL /LPF
KETONES UR STRIP.AUTO-MCNC: NEGATIVE MG/DL
LEUKOCYTE ESTERASE UR QL STRIP.AUTO: ABNORMAL
LYMPHOCYTES # BLD AUTO: 0.08 K/UL (ref 1–4.8)
LYMPHOCYTES NFR BLD: 0.8 % (ref 22–41)
MANUAL DIFF BLD: NORMAL
MCH RBC QN AUTO: 28.4 PG (ref 27–33)
MCHC RBC AUTO-ENTMCNC: 30.9 G/DL (ref 33.6–35)
MCV RBC AUTO: 91.7 FL (ref 81.4–97.8)
MICRO URNS: ABNORMAL
MONOCYTES # BLD AUTO: 0.35 K/UL (ref 0–0.85)
MONOCYTES NFR BLD AUTO: 3.5 % (ref 0–13.4)
MORPHOLOGY BLD-IMP: NORMAL
NEUTROPHILS # BLD AUTO: 9.48 K/UL (ref 2–7.15)
NEUTROPHILS NFR BLD: 94.8 % (ref 44–72)
NITRITE UR QL STRIP.AUTO: NEGATIVE
NRBC # BLD AUTO: 0 K/UL
NRBC BLD-RTO: 0 /100 WBC
OVALOCYTES BLD QL SMEAR: NORMAL
PH UR STRIP.AUTO: 6 [PH]
PLATELET # BLD AUTO: 345 K/UL (ref 164–446)
PLATELET BLD QL SMEAR: NORMAL
PMV BLD AUTO: 10.2 FL (ref 9–12.9)
POIKILOCYTOSIS BLD QL SMEAR: NORMAL
POTASSIUM SERPL-SCNC: 4.9 MMOL/L (ref 3.6–5.5)
PROMYELOCYTES NFR BLD MANUAL: 0.9 %
PROT UR QL STRIP: NEGATIVE MG/DL
RBC # BLD AUTO: 3.49 M/UL (ref 4.2–5.4)
RBC # URNS HPF: ABNORMAL /HPF
RBC BLD AUTO: PRESENT
RBC UR QL AUTO: ABNORMAL
SODIUM SERPL-SCNC: 139 MMOL/L (ref 135–145)
SP GR UR STRIP.AUTO: 1.01
UROBILINOGEN UR STRIP.AUTO-MCNC: 0.2 MG/DL
WBC # BLD AUTO: 10 K/UL (ref 4.8–10.8)
WBC #/AREA URNS HPF: ABNORMAL /HPF
YEAST BUDDING URNS QL: PRESENT /HPF

## 2019-07-06 PROCEDURE — 80048 BASIC METABOLIC PNL TOTAL CA: CPT

## 2019-07-06 PROCEDURE — A9270 NON-COVERED ITEM OR SERVICE: HCPCS | Performed by: INTERNAL MEDICINE

## 2019-07-06 PROCEDURE — 700102 HCHG RX REV CODE 250 W/ 637 OVERRIDE(OP): Performed by: INTERNAL MEDICINE

## 2019-07-06 PROCEDURE — 99232 SBSQ HOSP IP/OBS MODERATE 35: CPT | Performed by: INTERNAL MEDICINE

## 2019-07-06 PROCEDURE — 85027 COMPLETE CBC AUTOMATED: CPT

## 2019-07-06 PROCEDURE — 770006 HCHG ROOM/CARE - MED/SURG/GYN SEMI*

## 2019-07-06 PROCEDURE — 36415 COLL VENOUS BLD VENIPUNCTURE: CPT

## 2019-07-06 PROCEDURE — 85007 BL SMEAR W/DIFF WBC COUNT: CPT

## 2019-07-06 PROCEDURE — 81001 URINALYSIS AUTO W/SCOPE: CPT

## 2019-07-06 PROCEDURE — 82962 GLUCOSE BLOOD TEST: CPT

## 2019-07-06 PROCEDURE — 700111 HCHG RX REV CODE 636 W/ 250 OVERRIDE (IP): Performed by: INTERNAL MEDICINE

## 2019-07-06 RX ADMIN — PREDNISONE 40 MG: 20 TABLET ORAL at 17:38

## 2019-07-06 RX ADMIN — INSULIN HUMAN 2 UNITS: 100 INJECTION, SOLUTION PARENTERAL at 17:40

## 2019-07-06 RX ADMIN — INSULIN HUMAN 2 UNITS: 100 INJECTION, SOLUTION PARENTERAL at 08:13

## 2019-07-06 RX ADMIN — APIXABAN 2.5 MG: 2.5 TABLET, FILM COATED ORAL at 06:05

## 2019-07-06 RX ADMIN — INSULIN HUMAN 2 UNITS: 100 INJECTION, SOLUTION PARENTERAL at 21:47

## 2019-07-06 RX ADMIN — BUPROPION HYDROCHLORIDE 150 MG: 150 TABLET, EXTENDED RELEASE ORAL at 06:05

## 2019-07-06 RX ADMIN — CARVEDILOL 6.25 MG: 6.25 TABLET, FILM COATED ORAL at 17:38

## 2019-07-06 RX ADMIN — PREDNISONE 40 MG: 20 TABLET ORAL at 06:05

## 2019-07-06 RX ADMIN — APIXABAN 2.5 MG: 2.5 TABLET, FILM COATED ORAL at 17:39

## 2019-07-06 RX ADMIN — INSULIN HUMAN 2 UNITS: 100 INJECTION, SOLUTION PARENTERAL at 12:15

## 2019-07-06 RX ADMIN — OMEPRAZOLE 20 MG: 20 CAPSULE, DELAYED RELEASE ORAL at 06:05

## 2019-07-06 RX ADMIN — INSULIN GLARGINE 5 UNITS: 100 INJECTION, SOLUTION SUBCUTANEOUS at 17:41

## 2019-07-06 RX ADMIN — CARVEDILOL 6.25 MG: 6.25 TABLET, FILM COATED ORAL at 08:14

## 2019-07-06 RX ADMIN — SENNOSIDES AND DOCUSATE SODIUM 2 TABLET: 8.6; 5 TABLET ORAL at 17:40

## 2019-07-06 ASSESSMENT — ENCOUNTER SYMPTOMS
MYALGIAS: 0
WEAKNESS: 1
COUGH: 0
ABDOMINAL PAIN: 0
HEADACHES: 0
SHORTNESS OF BREATH: 1
NAUSEA: 0

## 2019-07-06 NOTE — PROGRESS NOTES
NAD noted this shift. VSS. Patient sitting in chair. Remains in a fib. Chair alarm on and audible with call light in reach. Denies any needs at this time. Will report to oncoming shift.

## 2019-07-06 NOTE — CARE PLAN
Problem: Discharge Barriers/Planning  Goal: Patient's continuum of care needs will be met    Intervention: Assess potential discharge barriers on admission and throughout hospital stay  RN will assess possible discharge barriers on admission and throughout hospital stay

## 2019-07-06 NOTE — PROGRESS NOTES
Bedside report received from night shift. Pt resting in bed comfortably with no complaints of pain and no signs or symptoms of resp distress noted or repotted on 4 L/min via NC. Bed in low and locked position, call button within reach and fall precautions are in place

## 2019-07-06 NOTE — DISCHARGE PLANNING
Follow up for rehab Dr. Simpson recommending skilled nursing versus home with hospice for post acute services.

## 2019-07-06 NOTE — CARE PLAN
Problem: Knowledge Deficit  Goal: Knowledge of disease process/condition, treatment plan, diagnostic tests, and medications will improve    Intervention: Assess knowledge level of disease process/condition, treatment plan, diagnostic tests, and medications  Pt updated on POC, tests, and medications. Pt verbalizes understanding and has no further questions at this time. Pt educated on calling for any more questions.

## 2019-07-06 NOTE — PROGRESS NOTES
Bedside report received, pt care assumed, tele box on.  Pt is A&Ox4, sitting up in chair, and denies any pain or  additional needs at this time. Bed in lowest position, chair alarm on pt educated on fall risk and verbalized understanding, call light within reach.

## 2019-07-06 NOTE — CONSULTS
"Medical chart review completed.       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  Patient has poor endurance and has been refusing therapy sessions.  Pulmonology is recommending hospice or SNF with hospice.    Recommendations:  · The patient is a poor candidate for inpatient rehabilitation given poor endurance and that she is unlikely to be able to tolerate 3 hours of therapy.  Up with the patient's current condition most of these are chronic irreversible conditions and the patient is unlikely to be early acute debilitation.  I recommend SNF transfer.  Hospice is also reasonable as the recommendation of pulmonology.  The patient would likely benefit from physical therapy and Occupational Therapy as tolerated in a lower level which can be accomplished at a SNF.          \[facility]\""  Pulmonology note 7/5/2019  Agreed with resident's documentation   Case discussed during rounds  Pt seen and examined independently     Steroid responsive severe ILD, but mainly irreversible  O2 at 3-4 lpm today   Change IV steroids to prednisone 40 po q12   Bactrim for PCP prophylaxis \"3 times per week  Pulm appointment 7/19  Recommend home with hospice or SNF with hospice, high dose steroids is for improved quality of life rather than a long term treatment  Signing off, please call us back if needed     Per hospitalist  Hospital Course    PMH COPD on 3L O2, afib, pulm HTN, GERD who presented with fall and SOB. She was found with hypoxia. CXR showed multifocal airspace disease. She underwent right thoracentesis 6/26.  She continued to have worsening respiratory failure, placed on high flow nasal cannula.  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  Her antibiotics were broadened to cefepime and doxycycline, she was placed on high-dose steroids and given Lasix for diuresis.  She had minimal improvement with concerns of poor prognosis.  Palliative care was consulted and patient decided " on a hospice referral.         Interval Problem Update  3L O2, she feels SOB is improved  Hyperglycemic, lantus ordered  Cefepime 5 days, transitioned to bactrim  Transitioned to oral steroids  Creat improved  Family would like referral to acute rehab    Visit Diagnoses     ICD-10-CM   1. Acute on chronic respiratory failure with hypoxia ([facility]) J96.21   2. Swelling of lower leg M79.89   3. Pleural effusion on right J90   4. Chronic obstructive pulmonary disease, unspecified COPD type ([facility]) J44.9   5. Chronic atrial fibrillation ([facility]) I48.2   6. Pulmonary hypertension ([facility]) I27.20   7. Gastroesophageal reflux disease without esophagitis K21.9   8. Stage 3 chronic kidney disease ([facility]) N18.3   9. 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 Acquired spondylolisthesis M43.10   11. Flash pulmonary edema ([facility]) J81.0   12. Cardiorenal syndrome with renal failure, stage 1-4 or unspecified chronic kidney disease, without heart failure I13.10   13. Multifocal pneumonia J18.9           PMH:  Past Medical History:   Diagnosis Date   • Arrhythmia     transient a fib   • GERD (gastroesophageal reflux disease)    • Heart burn    • Hypertension    • Indigestion    • Other specified disorder of intestines     occasional constipation   • Pain     back, with sciatic pain   • Renal disorder     followed by dr clifford, states 40% function       PSH:  Past Surgical History:   Procedure Laterality Date   • LUMBAR FUSION POSTERIOR  1/29/2014    Performed by Brendon Juarez M.D. at SURGERY [facility]   • OTHER NEUROLOGICAL SURG  2013    repair of dura to spine   • KNEE REPLACEMENT, TOTAL  2003    frieda knees       FAMILY HISTORY:  Family History   Problem Relation Age of Onset   • Cancer Mother         Breast Cancer   • Cancer Sister         lukemia   • Cancer Brother         Pancreatic   • Alcohol/Drug Brother        MEDICATIONS:  Current Facility-Administered Medications   Medication Dose   • predniSONE (DELTASONE) tablet 40  mg  40 mg   • sulfamethoxazole-trimethoprim (BACTRIM DS) 800-160 MG tablet 1 Tab  1 Tab   • insulin glargine (LANTUS) injection 5 Units  5 Units   • ipratropium-albuterol (DUONEB) nebulizer solution  3 mL   • tiotropium (SPIRIVA) 18 MCG inhalation capsule 1 Cap  1 Cap   • apixaban (ELIQUIS) 2.5mg tablet 2.5 mg  2.5 mg   • carvedilol (COREG) tablet 6.25 mg  6.25 mg   • ipratropium-albuterol (DUONEB) nebulizer solution  3 mL   • NS (BOLUS) infusion 500 mL  500 mL   • insulin regular (HUMULIN R) injection 2-9 Units  2-9 Units    And   • glucose 4 g chewable tablet 16 g  16 g    And   • DEXTROSE 10% BOLUS 250 mL  250 mL   • acetaminophen (TYLENOL) tablet 650 mg  650 mg   • senna-docusate (PERICOLACE or SENOKOT S) 8.6-50 MG per tablet 2 Tab  2 Tab    And   • polyethylene glycol/lytes (MIRALAX) PACKET 1 Packet  1 Packet    And   • magnesium hydroxide (MILK OF MAGNESIA) suspension 30 mL  30 mL    And   • bisacodyl (DULCOLAX) suppository 10 mg  10 mg   • Respiratory Care per Protocol     • acetaminophen (TYLENOL) tablet 650 mg  650 mg   • ondansetron (ZOFRAN) syringe/vial injection 4 mg  4 mg   • ondansetron (ZOFRAN ODT) dispertab 4 mg  4 mg   • buPROPion SR (WELLBUTRIN-SR) tablet 150 mg  150 mg   • omeprazole (PRILOSEC) capsule 20 mg  20 mg       ALLERGIES:  Pcn [penicillins]    PSYCHOSOCIAL HISTORY:  Living Site:  Per [facility]  Living With:  Per [facility]  Caregiver's availability:  Per [facility]  Number of stairs:  Per [facility]  Substance use history:  Per [facility]    IMAGING                                         Results for orders placed during the hospital encounter of 01/26/15   MR-LUMBAR SPINE-W/O    Impression 1.  Postsurgical and degenerative changes in the lumbar spine as described above.  2.  There is right foraminal disc protrusion at L3-4 causing severe right L3 neural foraminal stenosis.  3.  Moderate central canal stenosis at L4-5 and L3-4.  4.  There has been no significant interval change.                  Results for orders placed  during the hospital encounter of 01/26/15   MR-LUMBAR SPINE-W/O    Impression 1.  Postsurgical and degenerative changes in the lumbar spine as described above.  2.  There is right foraminal disc protrusion at L3-4 causing severe right L3 neural foraminal stenosis.  3.  Moderate central canal stenosis at L4-5 and L3-4.  4.  There has been no significant interval change.                                                                                               Results for orders placed during the hospital encounter of 06/26/19   CT-CHEST (THORAX) W/O    Impression Extensive airspace opacities on the right are mildly increased in the right upper lobe and slightly improved in the right lower lobe. There is some cavitation.    Airspace opacities on the left are increased in the lingula and mildly improved in the left upper lobe.    Bilateral underlying fibrotic changes and emphysematous changes are noted.    Subpleural nodule in the left lower lobe measures 6 mm.    Follow-up to radiographic resolution recommended.    Small bilateral pleural effusions, right greater than left.    Mediastinal lymphadenopathy is not significantly changed.    Atherosclerotic plaque.    Cardiomegaly.    Pancreatic calcifications likely sequelae of chronic pancreatitis.    Cholelithiasis.                                                          Therapy notes  Therapy by Cass Serrano OT at 7/5/2019 11:42 AM  Version 1 of 1    Author:  Cass Serrano OT Service:  (none) Author Type:  Occupational Therapist    Filed:  7/5/2019 12:03 PM Date of Service:  7/5/2019 11:42 AM Status:  Signed    :  Cass Serrano OT (Occupational Therapist)       Occupational Therapy Treatment completed with focus on ADLs, ADL transfers and patient education.  Functional Status:  SPV sit>stand, Rajni functional mobility chair to sink, SPV standing grooming, Rajni whole body dress (spv with socks, Rajni with underwear, Rajni with gown) Rajni for safety  "with cues for functional transfer   Plan of Care: Will benefit from Occupational Therapy 3 times per week  Discharge Recommendations:  Equipment Will Continue to Assess for Equipment Needs. Post-acute therapy Recommend post-acute placement for additional occupational therapy services prior to discharge home. Patient can tolerate post-acute therapies at a 5x/week frequency.    See \"Rehab Therapy-Acute\" Patient Summary Report for complete documentation.     Pt seen for OT tx, participated in functional activities with improved activity tolerance however remains limited by O2 sats dropping with minimal exertion. Pt completed whole body dressing, required brief rest breaks between each tasks 2/2 increased WOB when donning socks. Pt stood at sink to groom (face wash, hair brush, clean underarms), tolerated well. Pt required cues for safe hand placement during functional transfers, receptive to education and demonstrated retention. Pt completed functional sit>stand transfers x6 throughout session with SPV. Acute OT to follow while in-house, continue to recommend post-acute placement for continued therapies upon d/c from acute.[CH.1]     Attribution Carroll     CH.1 - Cass Serrano OT on 7/5/2019 11:42 AM                  Therapy by BRIGITTE Palacios at 7/4/2019 12:45 PM  Version 1 of 1    Author:  BRIGITTE Palacios Service:  (none) Author Type:  Speech Language Pathologist    Filed:  7/4/2019  5:20 PM Date of Service:  7/4/2019 12:45 PM Status:  Signed    :  BRIGITTE Palacios (Speech Language Pathologist)       Speech Language Therapy dysphagia treatment completed.   Functional Status:  The patient was seen for dysphagia therapy this date. The patient was awake, alert and oriented to self, location and date. The patient was seen during her regular/thin liquid meal items. The patient consumed regular/thin liquid meal items with no overt s/s of aspiration but required min A for breathing strategies to " "maintain SpO2 during PO intake. Patient followed simple breathing strategies appropriately following education. At this time, recommend patient remain on regular/thin liquid meal items with swallow strategies. SLP following 1-2 more session to ensure tolerance and follow through with strategies.     Recommendations: 1) remain on regular/thin liquid meal items with swallow strategies. SLP following 1-2 more session to ensure tolerance and follow through with strategies    Plan of Care: Will benefit from Speech Therapy 2 times per week    Post-Acute Therapy: Anticipate that the patient will have no further speech therapy needs after discharge from the hospital.    See \"Rehab Therapy-Acute\" Patient Summary Report for complete documentation.[SO.1]        Attribution Key     SO.1 - BRIGITTE Palacios on 7/4/2019  5:20 PM                  Physical therapy 7/3/2019  Attempted PT treatment. Pt declined due to fatigue and visiting with family. Pt does state however that she wishes to continue working with therapy while in acute setting. Will continue to follow.     physical therapy 7/2/2019  Attempted PT treatment. Pt declined due to fatigue and visiting with family. Pt does state however that she wishes to continue working with therapy while in acute setting. Will continue to follow.     Prolonged non-face-to-face time was spent on 7/6/2019 from 0650 to 0719 by this reviewer.  This time included medical chart review, medication review, and decision making for the appropriateness of transfer to inpatient rehabilitation.  Please see PM&R Chart Review Consult above by this provider for detailed summation of the medical chart and the reasoning for current recommendations. In addition to the above, time was spent coordinating care with case management as well as transitional care coordination team.      Pedro Simpson MD  Physical Medicine and Rehabilitation  [facility]       "

## 2019-07-06 NOTE — PROGRESS NOTES
Pt sitting up in chair with son present. Pt is alert with no complaints of pain and mild dyspnea while moving on 4 L/min via face mask. Pt reports shortness of breath is relieved with ret and deep breaths. Call button within reach and fall precautions are in place

## 2019-07-06 NOTE — PROGRESS NOTES
[facility] Medicine Daily Progress Note    Date of Service  7/6/2019    Chief Complaint  84 y.o. female admitted 6/26/2019 with SOB and fall.    Hospital Course    PMH COPD on 3L O2, afib, pulm HTN, GERD who presented with fall and SOB. She was found with hypoxia. CXR showed multifocal airspace disease. She underwent right thoracentesis 6/26.  She continued to have worsening respiratory failure, placed on high flow nasal cannula.  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  Her antibiotics were broadened to cefepime and doxycycline, she was placed on high-dose steroids and given Lasix for diuresis.  She had poor prognosis palliative care was consulted and patient decided on a hospice referral.  She will be maintained on oral steroids.  Antibiotics and diuresis were stopped.  Placement with hospice is pending.        Interval Problem Update  Creat 1.93, plateaued  She feels weak and tired.  She is anxious about her discharge plan  Her shortness of breath is stable  Her A. fib remains well controlled, will stop telemetry    Consultants/Specialty  Pulm  Critical care  Pall care    Code Status  DNR    Disposition  Lives in [place]  Hospice placement pending     Review of Systems  Review of Systems   Constitutional: Positive for malaise/fatigue.   HENT: Negative for congestion.    Respiratory: Positive for shortness of breath (improved). Negative for cough.    Cardiovascular: Negative for chest pain.   Gastrointestinal: Negative for abdominal pain and nausea.   Musculoskeletal: Negative for myalgias.   Neurological: Positive for weakness. Negative for headaches.        Physical Exam  Temp:  [35.9 °C (96.7 °F)-37 °C (98.6 °F)] 37 °C (98.6 °F)  Pulse:  [73-96] 77  Resp:  [17-20] 17  BP: ()/(51-66) 119/59  SpO2:  [92 %-97 %] 97 %    Physical Exam   Constitutional: She is oriented to person, place, and time. She appears well-developed and well-nourished.   HENT:   Head: Normocephalic.   Mouth/Throat:  Oropharynx is clear and moist.   Eyes: Conjunctivae are normal.   Cardiovascular:   Irregular   Pulmonary/Chest: She has no wheezes. She has rales.   Mild tachypnea   Abdominal: Soft. There is no tenderness.   Musculoskeletal: She exhibits edema (trace).   Neurological: She is alert and oriented to person, place, and time.   Skin: Skin is warm and dry.   Nursing note and vitals reviewed.      Fluids    Intake/Output Summary (Last 24 hours) at 07/06/19 1257  Last data filed at 07/06/19 1000   Gross per 24 hour   Intake             1510 ml   Output              600 ml   Net              910 ml       Laboratory  Recent Labs      07/04/19   0249  07/05/19   0344  07/06/19   0205   WBC  9.0  8.3  10.0   RBC  3.55*  3.54*  3.49*   HEMOGLOBIN  10.3*  10.1*  9.9*   HEMATOCRIT  32.2*  33.4*  32.0*   MCV  90.7  94.4  91.7   MCH  29.0  28.5  28.4   MCHC  32.0*  30.2*  30.9*   RDW  51.5*  54.2*  52.3*   PLATELETCT  378  334  345   MPV  9.9  9.8  10.2     Recent Labs      07/04/19   0249  07/05/19   0344  07/06/19   0205   SODIUM  136  137  139   POTASSIUM  4.8  4.9  4.9   CHLORIDE  96  96  100   CO2  30  29  29   GLUCOSE  177*  173*  180*   BUN  63*  64*  63*   CREATININE  2.16*  1.92*  1.93*   CALCIUM  8.5  8.5  8.2*                   Imaging  DX-CHEST-PORTABLE (1 VIEW)   Final Result      No significant change from prior exam.      DX-CHEST-PORTABLE (1 VIEW)   Final Result      Unchanged BILATERAL lung disease, RIGHT-sided volume loss and small RIGHT pleural effusion      DX-CHEST-PORTABLE (1 VIEW)   Final Result      Stable right worse than left pulmonary opacities possibly infection/pneumonia superimposed on fibrosis/interstitial lung disease.      CT-CHEST (THORAX) W/O   Final Result      Extensive airspace opacities on the right are mildly increased in the right upper lobe and slightly improved in the right lower lobe. There is some cavitation.      Airspace opacities on the left are increased in the lingula and mildly  improved in the left upper lobe.      Bilateral underlying fibrotic changes and emphysematous changes are noted.      Subpleural nodule in the left lower lobe measures 6 mm.      Follow-up to radiographic resolution recommended.      Small bilateral pleural effusions, right greater than left.      Mediastinal lymphadenopathy is not significantly changed.      Atherosclerotic plaque.      Cardiomegaly.      Pancreatic calcifications likely sequelae of chronic pancreatitis.      Cholelithiasis.      US-EXTREMITY VENOUS LOWER BILAT   Final Result      DX-CHEST-PORTABLE (1 VIEW)   Final Result      Pneumothorax is not identified      Stable pulmonary scarring with right volume loss, right worse than left consolidation      US-THORACENTESIS PUNCTURE RIGHT   Final Result      1. Ultrasound guided right sided diagnostic and therapeutic thoracentesis.      2. 700 mL of fluid withdrawn.      Findings were discussed with Juan on 6/26/2019 12:40 PM.      DX-CHEST-PORTABLE (1 VIEW)   Final Result      1.  Tiny RIGHT pneumothorax   2.  Decreased RIGHT pleural fluid   3.  Otherwise no significant interval change      Findings were discussed with MARSHALL FOX on 6/26/2019 12:33 PM.      DX-CHEST-PORTABLE (1 VIEW)   Final Result      1.  Worsening hypoinflation and multifocal interstitial and alveolar opacities particularly involving the RIGHT lung, favoring pneumonia over edema.  Probable background of interstitial lung disease.   2.  RIGHT pleural fluid collection, apparently increased.           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  Patient elects for hospice  Transition  "to oral steroids which she will continue on discharge with prophylactic Bactrim  Continue supportive O2     Amiodarone pulmonary toxicity   Assessment & Plan    Long course of steroids projected  CT noted    2D echo on 6/9/2019 reveals  Normal left ventricular size and systolic function. Normal regional   wall motion. Left ventricular ejection fraction is visually estimated   to be 70%. Diastolic function is difficult to assess with atrial   Fibrillation. Normal right ventricular size and systolic function.  Mildly dilated left atrium. Mild mitral regurgitation. Moderate aortic insufficiency.   Moderate tricuspid regurgitation. Right atrial pressure is estimated to   be 15 mmHg. Estimated right ventricular systolic pressure  is 55 mmHg     Sepsis associated hypotension (HCC)   Assessment & Plan    This is severe sepsis with the following associated acute organ dysfunction(s): acute respiratory failure.   6/30:  Sepsis protocol initiated for BP 74/47 responded to fluid bolus.  I examined the patient 6/30/2019 4:51 PM  Vital Signs:/56   Pulse 75   Temp 37 °C (98.6 °F) (Temporal)   Resp 19   Ht 1.651 m (5' 5\")   Wt 68.4 kg (150 lb 12.7 oz)   SpO2 90%   Breastfeeding? No   BMI 25.09 kg/m²    Cardiac examination significant for Regular rate and rhythm  Pulmonary examination significant for Crackles  Capillary refill is brisk  Peripheral Pulse is 2+   Skin is normal     Source Rt lung pneumonia.       Stage 3 chronic kidney disease (HCC)- (present on admission)   Assessment & Plan    Creatinine rising, Lasix was stopped  Avoiding nephrotoxics  Renally dose medications  Monitor BMP         GERD (gastroesophageal reflux disease)   Assessment & Plan    Continue omeprazole     Pulmonary hypertension (HCC)- (present on admission)   Assessment & Plan    Continue supplemental oxygen   She received diuresis     Multifocal pneumonia- (present on admission)   Assessment & Plan    Reviewed CTA chest:  Increased " consolidation seen throughout the right upper, middle and lower lung fields.  Some infiltrates seen LLL as well.  She was treated with a course of cefepime and doxycycline     Chronic atrial fibrillation (HCC)- (present on admission)   Assessment & Plan      Restarting anticoagulation, given renal insufficiency changed to Eliquis.  May need to stop Eliquis if renal function declines.  Monitor BMP  Controlled on coreg     COPD (chronic obstructive pulmonary disease) (HCC)- (present on admission)   Assessment & Plan    Continue oral steroids  Continue Spiriva and Symbicort  DuoNeb with RT protocol     Pleural effusion on right- (present on admission)   Assessment & Plan    Status post thoracentesis 6/26, yielding 700 cc of fluid  Fluid cx have been neg       Swelling of lower leg- (present on admission)   Assessment & Plan    Negative for DVT  Elevate extremities  Received diuresis          VTE prophylaxis: eliquis

## 2019-07-07 LAB
ANION GAP SERPL CALC-SCNC: 5 MMOL/L (ref 0–11.9)
BUN SERPL-MCNC: 53 MG/DL (ref 8–22)
CALCIUM SERPL-MCNC: 8.8 MG/DL (ref 8.5–10.5)
CHLORIDE SERPL-SCNC: 98 MMOL/L (ref 96–112)
CO2 SERPL-SCNC: 33 MMOL/L (ref 20–33)
CREAT SERPL-MCNC: 1.9 MG/DL (ref 0.5–1.4)
GLUCOSE BLD-MCNC: 161 MG/DL (ref 65–99)
GLUCOSE BLD-MCNC: 212 MG/DL (ref 65–99)
GLUCOSE BLD-MCNC: 279 MG/DL (ref 65–99)
GLUCOSE SERPL-MCNC: 161 MG/DL (ref 65–99)
POTASSIUM SERPL-SCNC: 5.1 MMOL/L (ref 3.6–5.5)
POTASSIUM SERPL-SCNC: 5.6 MMOL/L (ref 3.6–5.5)
SODIUM SERPL-SCNC: 136 MMOL/L (ref 135–145)

## 2019-07-07 PROCEDURE — A9270 NON-COVERED ITEM OR SERVICE: HCPCS | Performed by: INTERNAL MEDICINE

## 2019-07-07 PROCEDURE — 94640 AIRWAY INHALATION TREATMENT: CPT

## 2019-07-07 PROCEDURE — 770006 HCHG ROOM/CARE - MED/SURG/GYN SEMI*

## 2019-07-07 PROCEDURE — 700102 HCHG RX REV CODE 250 W/ 637 OVERRIDE(OP): Performed by: INTERNAL MEDICINE

## 2019-07-07 PROCEDURE — 94668 MNPJ CHEST WALL SBSQ: CPT

## 2019-07-07 PROCEDURE — 99232 SBSQ HOSP IP/OBS MODERATE 35: CPT | Performed by: INTERNAL MEDICINE

## 2019-07-07 PROCEDURE — 36415 COLL VENOUS BLD VENIPUNCTURE: CPT

## 2019-07-07 PROCEDURE — 700111 HCHG RX REV CODE 636 W/ 250 OVERRIDE (IP): Performed by: INTERNAL MEDICINE

## 2019-07-07 PROCEDURE — 80048 BASIC METABOLIC PNL TOTAL CA: CPT

## 2019-07-07 PROCEDURE — 82962 GLUCOSE BLOOD TEST: CPT

## 2019-07-07 PROCEDURE — 700101 HCHG RX REV CODE 250: Performed by: INTERNAL MEDICINE

## 2019-07-07 PROCEDURE — 84132 ASSAY OF SERUM POTASSIUM: CPT

## 2019-07-07 RX ADMIN — CARVEDILOL 6.25 MG: 6.25 TABLET, FILM COATED ORAL at 17:57

## 2019-07-07 RX ADMIN — TIOTROPIUM BROMIDE 1 CAPSULE: 18 CAPSULE ORAL; RESPIRATORY (INHALATION) at 07:53

## 2019-07-07 RX ADMIN — INSULIN HUMAN 3 UNITS: 100 INJECTION, SOLUTION PARENTERAL at 12:08

## 2019-07-07 RX ADMIN — CARVEDILOL 6.25 MG: 6.25 TABLET, FILM COATED ORAL at 07:53

## 2019-07-07 RX ADMIN — PREDNISONE 40 MG: 20 TABLET ORAL at 05:31

## 2019-07-07 RX ADMIN — IPRATROPIUM BROMIDE AND ALBUTEROL SULFATE 3 ML: .5; 3 SOLUTION RESPIRATORY (INHALATION) at 19:09

## 2019-07-07 RX ADMIN — INSULIN HUMAN 3 UNITS: 100 INJECTION, SOLUTION PARENTERAL at 21:04

## 2019-07-07 RX ADMIN — IPRATROPIUM BROMIDE AND ALBUTEROL SULFATE 3 ML: .5; 3 SOLUTION RESPIRATORY (INHALATION) at 07:52

## 2019-07-07 RX ADMIN — SENNOSIDES AND DOCUSATE SODIUM 2 TABLET: 8.6; 5 TABLET ORAL at 05:30

## 2019-07-07 RX ADMIN — BUPROPION HYDROCHLORIDE 150 MG: 150 TABLET, EXTENDED RELEASE ORAL at 05:30

## 2019-07-07 RX ADMIN — INSULIN HUMAN 5 UNITS: 100 INJECTION, SOLUTION PARENTERAL at 17:11

## 2019-07-07 RX ADMIN — INSULIN GLARGINE 5 UNITS: 100 INJECTION, SOLUTION SUBCUTANEOUS at 18:39

## 2019-07-07 RX ADMIN — PREDNISONE 40 MG: 20 TABLET ORAL at 21:04

## 2019-07-07 RX ADMIN — OMEPRAZOLE 20 MG: 20 CAPSULE, DELAYED RELEASE ORAL at 05:30

## 2019-07-07 RX ADMIN — APIXABAN 2.5 MG: 2.5 TABLET, FILM COATED ORAL at 17:57

## 2019-07-07 RX ADMIN — INSULIN HUMAN 2 UNITS: 100 INJECTION, SOLUTION PARENTERAL at 07:59

## 2019-07-07 RX ADMIN — APIXABAN 2.5 MG: 2.5 TABLET, FILM COATED ORAL at 05:31

## 2019-07-07 ASSESSMENT — ENCOUNTER SYMPTOMS
ABDOMINAL PAIN: 0
COUGH: 0
MYALGIAS: 0
HEADACHES: 0
SHORTNESS OF BREATH: 1
NAUSEA: 0
WEAKNESS: 1

## 2019-07-07 NOTE — CARE PLAN
Problem: Safety  Goal: Will remain free from falls    Intervention: Implement fall precautions  Verified that safety precautions are in place and education provided to pt on fall safety and utilization of call button

## 2019-07-07 NOTE — PROGRESS NOTES
Pt alert sitting up in chair eating lunch with son . Pt sating in the high 90's on 4 L/min via NC and oxygen was decreased to 3 L/min to see if pt tolerates. Pt is on continuous pulse ox. No signs or symptoms of resp distress noted on 3 L.min via NC at rest. No complaints of pain at this time. Chair alarm in place and functioning properly, call button within reach and fall precautions are in place

## 2019-07-07 NOTE — PROGRESS NOTES
Bedside report received and pt care assumed.  Pt is A&Ox4, sitting at the edge of bed, family present at bedside. Pt denies any pain or additional needs at this time. Bed in lowest position, bed alarm on pt educated on fall risk and verbalized understanding, call light within reach.

## 2019-07-07 NOTE — PROGRESS NOTES
Bedside report received from night shift. Pt in bed sleeping comfortably with no signs os symptoms of resp distress noted on 4 L/min via NC. No signs or symptoms of pain. Bed in low and locked position, call button within reach and fall precautions are in place

## 2019-07-07 NOTE — CARE PLAN
Problem: Infection  Goal: Will remain free from infection    Intervention: Implement standard precautions and perform hand washing before and after patient contact  Standard precautions followed and hand hygiene performed prior to and after patient contact. Education provided to pt on importance of good hand hygiene and proper technique

## 2019-07-07 NOTE — PALLIATIVE CARE
Palliative Care follow-up  Advance directive completed, notarized and scanned into EMR. Original left with pt and son at bedside; no questions at this time.        Thank you for allowing Palliative Care to participate in this patient's care. Please feel free to call [phone] with any questions or concerns.

## 2019-07-08 LAB
ANION GAP SERPL CALC-SCNC: 8 MMOL/L (ref 0–11.9)
BUN SERPL-MCNC: 51 MG/DL (ref 8–22)
CALCIUM SERPL-MCNC: 8.7 MG/DL (ref 8.5–10.5)
CHLORIDE SERPL-SCNC: 99 MMOL/L (ref 96–112)
CO2 SERPL-SCNC: 28 MMOL/L (ref 20–33)
CREAT SERPL-MCNC: 1.72 MG/DL (ref 0.5–1.4)
GLUCOSE BLD-MCNC: 159 MG/DL (ref 65–99)
GLUCOSE BLD-MCNC: 200 MG/DL (ref 65–99)
GLUCOSE BLD-MCNC: 229 MG/DL (ref 65–99)
GLUCOSE BLD-MCNC: 247 MG/DL (ref 65–99)
GLUCOSE BLD-MCNC: 271 MG/DL (ref 65–99)
GLUCOSE SERPL-MCNC: 116 MG/DL (ref 65–99)
POTASSIUM SERPL-SCNC: 4.7 MMOL/L (ref 3.6–5.5)
SODIUM SERPL-SCNC: 135 MMOL/L (ref 135–145)

## 2019-07-08 PROCEDURE — 80048 BASIC METABOLIC PNL TOTAL CA: CPT

## 2019-07-08 PROCEDURE — 700111 HCHG RX REV CODE 636 W/ 250 OVERRIDE (IP): Performed by: INTERNAL MEDICINE

## 2019-07-08 PROCEDURE — 97530 THERAPEUTIC ACTIVITIES: CPT

## 2019-07-08 PROCEDURE — 700102 HCHG RX REV CODE 250 W/ 637 OVERRIDE(OP): Performed by: INTERNAL MEDICINE

## 2019-07-08 PROCEDURE — A9270 NON-COVERED ITEM OR SERVICE: HCPCS | Performed by: INTERNAL MEDICINE

## 2019-07-08 PROCEDURE — 94640 AIRWAY INHALATION TREATMENT: CPT

## 2019-07-08 PROCEDURE — 36415 COLL VENOUS BLD VENIPUNCTURE: CPT

## 2019-07-08 PROCEDURE — 700102 HCHG RX REV CODE 250 W/ 637 OVERRIDE(OP): Performed by: STUDENT IN AN ORGANIZED HEALTH CARE EDUCATION/TRAINING PROGRAM

## 2019-07-08 PROCEDURE — 700101 HCHG RX REV CODE 250: Performed by: INTERNAL MEDICINE

## 2019-07-08 PROCEDURE — A9270 NON-COVERED ITEM OR SERVICE: HCPCS | Performed by: STUDENT IN AN ORGANIZED HEALTH CARE EDUCATION/TRAINING PROGRAM

## 2019-07-08 PROCEDURE — 92526 ORAL FUNCTION THERAPY: CPT

## 2019-07-08 PROCEDURE — 97116 GAIT TRAINING THERAPY: CPT

## 2019-07-08 PROCEDURE — 99232 SBSQ HOSP IP/OBS MODERATE 35: CPT | Performed by: INTERNAL MEDICINE

## 2019-07-08 PROCEDURE — 82962 GLUCOSE BLOOD TEST: CPT | Mod: 91

## 2019-07-08 PROCEDURE — 770006 HCHG ROOM/CARE - MED/SURG/GYN SEMI*

## 2019-07-08 RX ORDER — ADENOSINE 3 MG/ML
INJECTION, SOLUTION INTRAVENOUS
Status: DISCONTINUED
Start: 2019-07-08 | End: 2019-07-08

## 2019-07-08 RX ORDER — FUROSEMIDE 10 MG/ML
20 INJECTION INTRAMUSCULAR; INTRAVENOUS ONCE
Status: COMPLETED | OUTPATIENT
Start: 2019-07-08 | End: 2019-07-08

## 2019-07-08 RX ADMIN — IPRATROPIUM BROMIDE AND ALBUTEROL SULFATE 3 ML: .5; 3 SOLUTION RESPIRATORY (INHALATION) at 07:58

## 2019-07-08 RX ADMIN — SULFAMETHOXAZOLE AND TRIMETHOPRIM 1 TABLET: 800; 160 TABLET ORAL at 09:19

## 2019-07-08 RX ADMIN — IPRATROPIUM BROMIDE AND ALBUTEROL SULFATE 3 ML: .5; 3 SOLUTION RESPIRATORY (INHALATION) at 20:40

## 2019-07-08 RX ADMIN — FUROSEMIDE 20 MG: 10 INJECTION, SOLUTION INTRAMUSCULAR; INTRAVENOUS at 12:17

## 2019-07-08 RX ADMIN — INSULIN GLARGINE 5 UNITS: 100 INJECTION, SOLUTION SUBCUTANEOUS at 20:39

## 2019-07-08 RX ADMIN — INSULIN HUMAN 2 UNITS: 100 INJECTION, SOLUTION PARENTERAL at 09:21

## 2019-07-08 RX ADMIN — CARVEDILOL 6.25 MG: 6.25 TABLET, FILM COATED ORAL at 17:24

## 2019-07-08 RX ADMIN — OMEPRAZOLE 20 MG: 20 CAPSULE, DELAYED RELEASE ORAL at 05:26

## 2019-07-08 RX ADMIN — INSULIN HUMAN 5 UNITS: 100 INJECTION, SOLUTION PARENTERAL at 12:18

## 2019-07-08 RX ADMIN — CARVEDILOL 6.25 MG: 6.25 TABLET, FILM COATED ORAL at 09:19

## 2019-07-08 RX ADMIN — APIXABAN 2.5 MG: 2.5 TABLET, FILM COATED ORAL at 05:26

## 2019-07-08 RX ADMIN — PREDNISONE 40 MG: 20 TABLET ORAL at 05:27

## 2019-07-08 RX ADMIN — INSULIN HUMAN 2 UNITS: 100 INJECTION, SOLUTION PARENTERAL at 21:30

## 2019-07-08 RX ADMIN — PREDNISONE 40 MG: 20 TABLET ORAL at 20:07

## 2019-07-08 RX ADMIN — TIOTROPIUM BROMIDE 1 CAPSULE: 18 CAPSULE ORAL; RESPIRATORY (INHALATION) at 08:00

## 2019-07-08 RX ADMIN — SENNOSIDES AND DOCUSATE SODIUM 2 TABLET: 8.6; 5 TABLET ORAL at 17:24

## 2019-07-08 RX ADMIN — APIXABAN 2.5 MG: 2.5 TABLET, FILM COATED ORAL at 17:24

## 2019-07-08 RX ADMIN — INSULIN HUMAN 3 UNITS: 100 INJECTION, SOLUTION PARENTERAL at 17:22

## 2019-07-08 RX ADMIN — BUPROPION HYDROCHLORIDE 150 MG: 150 TABLET, EXTENDED RELEASE ORAL at 05:26

## 2019-07-08 ASSESSMENT — COGNITIVE AND FUNCTIONAL STATUS - GENERAL
STANDING UP FROM CHAIR USING ARMS: A LITTLE
TURNING FROM BACK TO SIDE WHILE IN FLAT BAD: A LOT
WALKING IN HOSPITAL ROOM: A LITTLE
MOVING FROM LYING ON BACK TO SITTING ON SIDE OF FLAT BED: UNABLE
SUGGESTED CMS G CODE MODIFIER MOBILITY: CL
CLIMB 3 TO 5 STEPS WITH RAILING: TOTAL
MOVING TO AND FROM BED TO CHAIR: A LOT
MOBILITY SCORE: 12

## 2019-07-08 ASSESSMENT — GAIT ASSESSMENTS
ASSISTIVE DEVICE: 4 WHEEL WALKER
DEVIATION: BRADYKINETIC
DISTANCE (FEET): 20
GAIT LEVEL OF ASSIST: MINIMAL ASSIST

## 2019-07-08 ASSESSMENT — PATIENT HEALTH QUESTIONNAIRE - PHQ9
SUM OF ALL RESPONSES TO PHQ9 QUESTIONS 1 AND 2: 0
2. FEELING DOWN, DEPRESSED, IRRITABLE, OR HOPELESS: NOT AT ALL
1. LITTLE INTEREST OR PLEASURE IN DOING THINGS: NOT AT ALL

## 2019-07-08 ASSESSMENT — ENCOUNTER SYMPTOMS
SHORTNESS OF BREATH: 1
NAUSEA: 0
COUGH: 0
HEADACHES: 0
WEAKNESS: 1
ABDOMINAL PAIN: 0
MYALGIAS: 0

## 2019-07-08 NOTE — CARE PLAN
Problem: Oxygenation:  Goal: Maintain adequate oxygenation dependent on patient condition  Outcome: PROGRESSING AS EXPECTED  Currently requiring 3L NC  Intervention: Levels of oxygenation will improve to baseline  Patient wears 3L NC at HS at baseline      Problem: Bronchoconstriction:  Goal: Improve in air movement and diminished wheezing  Outcome: PROGRESSING AS EXPECTED    Intervention: Implement inhaled treatments  Duoneb BID  Spiriva daily      Problem: Hyperinflation:  Goal: Prevent or improve atelectasis  Outcome: PROGRESSING AS EXPECTED  Incentive spirometry BID   Patient achieves 1000 ml   Intervention: Instruct incentive spirometry usage  Encourage hourly use of device on own

## 2019-07-08 NOTE — PROGRESS NOTES
Bedside report received, pt care assumed, tele box on.  Pt is A&Ox4, sitting in the chair, family and RT at bedside. Pt denies any additional needs at this time. Bed in lowest position, chair alarm on pt educated on fall risk and verbalized understanding, call light within reach.

## 2019-07-08 NOTE — DISCHARGE PLANNING
Received Choice form at 1200  [facility]  Referral sent per Choice form @ 1200     @1240  [facility]  Spoke To: Sarita  Outcome: Need PT/OT notes showing patient is participating with therapies.    @1330  [facility]  Outcome: Sent updated PT note.    @1510  [facility]  Spoke To: Fani  Outcome: Accepted.    [facility] Name: [facility]  Outcome: Left message, awaiting call back.    [facility]  Spoke To: Sarita  Outcome: Accepted.    @1520  [facility]: [facility]  Spoke To: Fani  Outcome: Will know if she has a bed available tomorrow.

## 2019-07-08 NOTE — PROGRESS NOTES
[facility] Medicine Daily Progress Note    Date of Service  7/8/2019    Chief Complaint  84 y.o. female admitted 6/26/2019 with SOB and fall.    Hospital Course    PMH COPD on 3L O2, afib, pulm HTN, GERD who presented with fall and SOB. She was found with hypoxia. CXR showed multifocal airspace disease. She underwent right thoracentesis 6/26.  She continued to have worsening respiratory failure, placed on high flow nasal cannula.  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  Her antibiotics were broadened to cefepime and doxycycline, she was placed on high-dose steroids and given Lasix for diuresis.  She was weaned off high flow and stable on simple nasal cannula. She had poor prognosis palliative care was consulted and patient decided on a SNF then hospice at home. She will be maintained on oral steroids long-term.  Antibiotics and diuresis were stopped.        Interval Problem Update  She feels well, denies SOB. Denies anxiety.  Creat improving  Has increased rales and edema, lasix ordered for today x1    Consultants/Specialty  Pulm  Critical care  Pall care    Code Status  DNR    Disposition  Lives in [facility] pending    Review of Systems  Review of Systems   Constitutional: Positive for malaise/fatigue (improving).   HENT: Negative for congestion.    Respiratory: Positive for shortness of breath (mild). Negative for cough.    Cardiovascular: Negative for chest pain.   Gastrointestinal: Negative for abdominal pain and nausea.   Musculoskeletal: Negative for myalgias.   Neurological: Positive for weakness. Negative for headaches.        Physical Exam  Temp:  [36.7 °C (98 °F)-37 °C (98.6 °F)] 36.7 °C (98 °F)  Pulse:  [] 90  Resp:  [16-20] 18  BP: (102-115)/(50-59) 109/50  SpO2:  [90 %-94 %] 94 %    Physical Exam   Constitutional: She is oriented to person, place, and time. She appears well-developed and well-nourished.   HENT:   Head: Normocephalic.   Mouth/Throat: Oropharynx is clear and  moist.   Eyes: Conjunctivae are normal.   Cardiovascular: Normal rate.    Irregular   Pulmonary/Chest: She has no wheezes. She has rales.   Mild tachypnea   Abdominal: Soft. There is no tenderness.   Musculoskeletal: She exhibits edema.   Neurological: She is alert and oriented to person, place, and time.   Skin: Skin is warm and dry.   Nursing note and vitals reviewed.      Fluids    Intake/Output Summary (Last 24 hours) at 07/08/19 1707  Last data filed at 07/08/19 1200   Gross per 24 hour   Intake              360 ml   Output              100 ml   Net              260 ml       Laboratory  Recent Labs      07/06/19   0205   WBC  10.0   RBC  3.49*   HEMOGLOBIN  9.9*   HEMATOCRIT  32.0*   MCV  91.7   MCH  28.4   MCHC  30.9*   RDW  52.3*   PLATELETCT  345   MPV  10.2     Recent Labs      07/06/19   0205  07/07/19   0321  07/07/19   0848  07/08/19   0241   SODIUM  139  136   --   135   POTASSIUM  4.9  5.6*  5.1  4.7   CHLORIDE  100  98   --   99   CO2  29  33   --   28   GLUCOSE  180*  161*   --   116*   BUN  63*  53*   --   51*   CREATININE  1.93*  1.90*   --   1.72*   CALCIUM  8.2*  8.8   --   8.7                   Imaging  DX-CHEST-PORTABLE (1 VIEW)   Final Result      No significant change from prior exam.      DX-CHEST-PORTABLE (1 VIEW)   Final Result      Unchanged BILATERAL lung disease, RIGHT-sided volume loss and small RIGHT pleural effusion      DX-CHEST-PORTABLE (1 VIEW)   Final Result      Stable right worse than left pulmonary opacities possibly infection/pneumonia superimposed on fibrosis/interstitial lung disease.      CT-CHEST (THORAX) W/O   Final Result      Extensive airspace opacities on the right are mildly increased in the right upper lobe and slightly improved in the right lower lobe. There is some cavitation.      Airspace opacities on the left are increased in the lingula and mildly improved in the left upper lobe.      Bilateral underlying fibrotic changes and emphysematous changes are noted.       Subpleural nodule in the left lower lobe measures 6 mm.      Follow-up to radiographic resolution recommended.      Small bilateral pleural effusions, right greater than left.      Mediastinal lymphadenopathy is not significantly changed.      Atherosclerotic plaque.      Cardiomegaly.      Pancreatic calcifications likely sequelae of chronic pancreatitis.      Cholelithiasis.      US-EXTREMITY VENOUS LOWER BILAT   Final Result      DX-CHEST-PORTABLE (1 VIEW)   Final Result      Pneumothorax is not identified      Stable pulmonary scarring with right volume loss, right worse than left consolidation      US-THORACENTESIS PUNCTURE RIGHT   Final Result      1. Ultrasound guided right sided diagnostic and therapeutic thoracentesis.      2. 700 mL of fluid withdrawn.      Findings were discussed with Juan on 6/26/2019 12:40 PM.      DX-CHEST-PORTABLE (1 VIEW)   Final Result      1.  Tiny RIGHT pneumothorax   2.  Decreased RIGHT pleural fluid   3.  Otherwise no significant interval change      Findings were discussed with MARSHALL FOX on 6/26/2019 12:33 PM.      DX-CHEST-PORTABLE (1 VIEW)   Final Result      1.  Worsening hypoinflation and multifocal interstitial and alveolar opacities particularly involving the RIGHT lung, favoring pneumonia over edema.  Probable background of interstitial lung disease.   2.  RIGHT pleural fluid collection, apparently increased.           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  Patient elects for hospice  Transition to oral steroids which she will continue on discharge with prophylactic Bactrim  Continue supportive O2    "  Amiodarone pulmonary toxicity   Assessment & Plan    Long course of steroids projected  CT noted    2D echo on 6/9/2019 reveals  Normal left ventricular size and systolic function. Normal regional   wall motion. Left ventricular ejection fraction is visually estimated   to be 70%. Diastolic function is difficult to assess with atrial   Fibrillation. Normal right ventricular size and systolic function.  Mildly dilated left atrium. Mild mitral regurgitation. Moderate aortic insufficiency.   Moderate tricuspid regurgitation. Right atrial pressure is estimated to   be 15 mmHg. Estimated right ventricular systolic pressure  is 55 mmHg     Sepsis associated hypotension (HCC)   Assessment & Plan    This is severe sepsis with the following associated acute organ dysfunction(s): acute respiratory failure.   6/30:  Sepsis protocol initiated for BP 74/47 responded to fluid bolus.  I examined the patient 6/30/2019 4:51 PM  Vital Signs:/56   Pulse 75   Temp 37 °C (98.6 °F) (Temporal)   Resp 19   Ht 1.651 m (5' 5\")   Wt 68.4 kg (150 lb 12.7 oz)   SpO2 90%   Breastfeeding? No   BMI 25.09 kg/m²    Cardiac examination significant for Regular rate and rhythm  Pulmonary examination significant for Crackles  Capillary refill is brisk  Peripheral Pulse is 2+   Skin is normal     Source Rt lung pneumonia.       Stage 3 chronic kidney disease (HCC)- (present on admission)   Assessment & Plan    Creatinine rising, Lasix was stopped  Avoiding nephrotoxics  Renally dose medications  Monitor BMP         GERD (gastroesophageal reflux disease)   Assessment & Plan    Continue omeprazole     Pulmonary hypertension (HCC)- (present on admission)   Assessment & Plan    Continue supplemental oxygen   She received diuresis     Multifocal pneumonia- (present on admission)   Assessment & Plan    Reviewed CTA chest:  Increased consolidation seen throughout the right upper, middle and lower lung fields.  Some infiltrates seen LLL as " well.  She was treated with a course of cefepime and doxycycline     Chronic atrial fibrillation (HCC)- (present on admission)   Assessment & Plan      Restarting anticoagulation, given renal insufficiency changed to Eliquis.  May need to stop Eliquis if renal function declines.  Monitor BMP  Controlled on coreg     COPD (chronic obstructive pulmonary disease) (HCC)- (present on admission)   Assessment & Plan    Continue oral steroids  Continue Spiriva and Symbicort  DuoNeb with RT protocol     Pleural effusion on right- (present on admission)   Assessment & Plan    Status post thoracentesis 6/26, yielding 700 cc of fluid  Fluid cx have been neg       Swelling of lower leg- (present on admission)   Assessment & Plan    Negative for DVT  Elevate extremities  Received diuresis          VTE prophylaxis: eliquis

## 2019-07-08 NOTE — DISCHARGE PLANNING
Local SNF choice obtained and faxed to [facility] at ext. [phone].

## 2019-07-08 NOTE — DIETARY
"Nutrition services: Day 12 of admit.  Ama Castillo is a 84 y.o. female with admitting DX of acute respiratory failure, amiodarone pulmonary toxicity    Consult received for MST score 5 on 7/6 and MST score 3 on  7/8.     RD able to visit pt and family at bedside. Pt states her appetite is fine and has been increasing sine admission d/t steroids, was very hungry this morning for breakfast. States appetite PTA was poor, pt estimates was consuming 50% of what normally does for the past 2-3 months. Pt is unsure of weight hx. Upon hearing current weight of 145 lbs, family member explains that pt had 20 lbs of fluid removed from her body at rehab awhile ago. Relays that edema in bilateral lower extremities has improved since on lasix.     Assessment:  Height: 165.1 cm (5' 5\")  Weight: 65.8 kg (145 lb 1 oz)- via bed scale   Body mass index is 24.14 kg/m²., BMI classification: WNL  Diet/Intake: Renal, 2g sodium restriction, diabetic     Evaluation:   1. Hx of acute on chronic respiratory failure with hypoxia, stage 3 chronic kidney disease, swelling of lower leg, COPD, chronic artrial fibrillation.   2. Per ADLs, majority of meals since admission are >50%. Consumption of 50% from trays is adequate for pt considering advanced age. Based on pt description for intake PTA, meets criteria for <75%of estimated energy for more than 1 month. Denies additional snacks at this time, receives sugar-free pudding once daily.   3. Family member of pt relays pt had 20 lbs of weight loss related to fluid awhile back. Per computer hx, pt weighed 160 lbs (73 kg) on 4/22/19, supporting statement of 20 lb weight loss.   4. Per nutrition-focused physical assessment, pt is adequately nourished per temporal/orbital region and clavicle/shoulder region. Pt denies recent changes in appearance. Per chart, with 2+ bilateral pitting edema for RLE and LLE.   5. Labs: Glucose 116, BUN 51, Creatinine 1.72, A1C 6.9%  6. Meds: furosemide, Lantus-5 " units, SSI, prednisone, senokot-refused, bactrim  7. Last BM: 7/7    Malnutrition Risk: Pt does not meet criteria for malnutrition per ASPEN guidelines at this time.To reevaluate at this time.     Recommendations/Plan:  1. Encourage intake of meals.  2. Document intake of all meals  as % taken in ADL's to provide interdisciplinary communication across all shifts.   3. Monitor weight.  4. Nutrition rep will continue to see patient for ongoing meal and snack preferences.     RD available PRN, please consult as needed. RD to monitor weekly per department policy.

## 2019-07-08 NOTE — THERAPY
"Physical Therapy Treatment completed.   Bed Mobility:  Supine to Sit: (NT, up in chair)  Transfers: Sit to Stand: Minimal Assist (max A at end of session with fatigue, see assessment)  Gait: Level Of Assist: Minimal Assist with 4-Wheel Walker       Plan of Care: Will benefit from Physical Therapy 3 times per week  Discharge Recommendations: Equipment: Will Continue to Assess for Equipment Needs. Recommend post-acute placement for continued physical therapy services prior to discharge home. Patient can tolerate post-acute therapies at a 5x/week frequency.       See \"Rehab Therapy-Acute\" Patient Summary Report for complete documentation.     Pt appears more motivated to participate in therapy. Pt received in chair and states she wants to improve her STS ability. Instructed pt in proper technique to effectively perform STS, pt with good return demo. Pt able to perform STS x6 to 4WW with min A and ambulated 20ft x2. Attempted to perform STS one more time to don underwear, pt fatigued and required max A to stand with increased cueing for sequencing. With fatigue pt tending to lean posteriorly and demonstrated B knee buckling with STS attempt. Pt will continue to benefit from acute PT services to progress mobility. Continue to recommend postacute placement at this time to maximize safety and functional independence prior to return home.   "

## 2019-07-08 NOTE — THERAPY
"Speech Language Therapy dysphagia treatment completed.   Functional Status: Patient currently on regular diet w/ thin liquids and per patient and RN, patient appears to be tolerating diet without difficulty. Patient awake, alert, and up in a chair, on 3L O2 via NC, which she reports is baseline at night. Patient consumed PO trials of soft solids, mixed consistencies, crackers, and thin liquids via cup sip and straw. Patient consumed all PO trials independently and w/ no s/sx of aspiration. SpO2 remained 94% and above during all PO trials and during entire tx session. Patient independently utilized swallow precautions for small bites/sips and slow feeding rate. Laryngeal elevation palpated as complete and initiation of swallow trigger was timely. At this time, recommend patient continue regular diet w/ thin liquid. Patient educated regarding dysphagia, s/sx of aspiration, risks for pneumonia, SLP recs, and to monitor SpO2 intermittently during meal times. Patient verbalized understanding of all education. RN aware.     Recommendations: At this time, recommend patient continue regular diet w/ thin liquids. SLP will no longer actively follow patient, but will be available PRN.   Plan of Care: Patient with no further skilled SLP needs in the acute care setting at this time  Post-Acute Therapy: SLP treatment completed. Patient is currently not being actively followed for therapy services at this time, however may be seen if requested by attending provider for 1 more visit within 30 days to address any discharge needs or if the patient has a change in status.      See \"Rehab Therapy-Acute\" Patient Summary Report for complete documentation.     "

## 2019-07-09 ENCOUNTER — APPOINTMENT (OUTPATIENT)
Dept: PULMONOLOGY | Facility: HOSPICE | Age: 84
End: 2019-07-09
Payer: MEDICARE

## 2019-07-09 LAB
ANION GAP SERPL CALC-SCNC: 9 MMOL/L (ref 0–11.9)
BUN SERPL-MCNC: 47 MG/DL (ref 8–22)
CALCIUM SERPL-MCNC: 8.6 MG/DL (ref 8.5–10.5)
CHLORIDE SERPL-SCNC: 99 MMOL/L (ref 96–112)
CO2 SERPL-SCNC: 27 MMOL/L (ref 20–33)
CREAT SERPL-MCNC: 1.84 MG/DL (ref 0.5–1.4)
FUNGUS SPEC CULT: NORMAL
GLUCOSE BLD-MCNC: 179 MG/DL (ref 65–99)
GLUCOSE BLD-MCNC: 209 MG/DL (ref 65–99)
GLUCOSE SERPL-MCNC: 226 MG/DL (ref 65–99)
POTASSIUM SERPL-SCNC: 5.1 MMOL/L (ref 3.6–5.5)
SIGNIFICANT IND 70042: NORMAL
SITE SITE: NORMAL
SODIUM SERPL-SCNC: 135 MMOL/L (ref 135–145)
SOURCE SOURCE: NORMAL

## 2019-07-09 PROCEDURE — 700101 HCHG RX REV CODE 250: Performed by: INTERNAL MEDICINE

## 2019-07-09 PROCEDURE — 80048 BASIC METABOLIC PNL TOTAL CA: CPT

## 2019-07-09 PROCEDURE — 36415 COLL VENOUS BLD VENIPUNCTURE: CPT

## 2019-07-09 PROCEDURE — 51798 US URINE CAPACITY MEASURE: CPT

## 2019-07-09 PROCEDURE — 94760 N-INVAS EAR/PLS OXIMETRY 1: CPT

## 2019-07-09 PROCEDURE — A9270 NON-COVERED ITEM OR SERVICE: HCPCS | Performed by: INTERNAL MEDICINE

## 2019-07-09 PROCEDURE — 700102 HCHG RX REV CODE 250 W/ 637 OVERRIDE(OP): Performed by: INTERNAL MEDICINE

## 2019-07-09 PROCEDURE — 700111 HCHG RX REV CODE 636 W/ 250 OVERRIDE (IP): Performed by: INTERNAL MEDICINE

## 2019-07-09 PROCEDURE — 82962 GLUCOSE BLOOD TEST: CPT | Mod: 91

## 2019-07-09 PROCEDURE — 770006 HCHG ROOM/CARE - MED/SURG/GYN SEMI*

## 2019-07-09 PROCEDURE — 99232 SBSQ HOSP IP/OBS MODERATE 35: CPT | Performed by: INTERNAL MEDICINE

## 2019-07-09 PROCEDURE — 94640 AIRWAY INHALATION TREATMENT: CPT

## 2019-07-09 RX ORDER — INSULIN GLARGINE 100 [IU]/ML
5 INJECTION, SOLUTION SUBCUTANEOUS EVERY EVENING
Qty: 10 ML
Start: 2019-07-09

## 2019-07-09 RX ORDER — SULFAMETHOXAZOLE AND TRIMETHOPRIM 800; 160 MG/1; MG/1
1 TABLET ORAL
Refills: 0
Start: 2019-07-10

## 2019-07-09 RX ORDER — PREDNISONE 20 MG/1
40 TABLET ORAL 2 TIMES DAILY
Qty: 30 TAB | Refills: 0
Start: 2019-07-09

## 2019-07-09 RX ORDER — OMEPRAZOLE 20 MG/1
20 CAPSULE, DELAYED RELEASE ORAL EVERY 12 HOURS
Status: DISCONTINUED | OUTPATIENT
Start: 2019-07-10 | End: 2019-07-10 | Stop reason: HOSPADM

## 2019-07-09 RX ADMIN — APIXABAN 2.5 MG: 2.5 TABLET, FILM COATED ORAL at 17:44

## 2019-07-09 RX ADMIN — IPRATROPIUM BROMIDE AND ALBUTEROL SULFATE 3 ML: .5; 3 SOLUTION RESPIRATORY (INHALATION) at 08:59

## 2019-07-09 RX ADMIN — APIXABAN 2.5 MG: 2.5 TABLET, FILM COATED ORAL at 05:12

## 2019-07-09 RX ADMIN — INSULIN HUMAN 2 UNITS: 100 INJECTION, SOLUTION PARENTERAL at 08:22

## 2019-07-09 RX ADMIN — CARVEDILOL 6.25 MG: 6.25 TABLET, FILM COATED ORAL at 08:27

## 2019-07-09 RX ADMIN — INSULIN HUMAN 2 UNITS: 100 INJECTION, SOLUTION PARENTERAL at 17:34

## 2019-07-09 RX ADMIN — CARVEDILOL 6.25 MG: 6.25 TABLET, FILM COATED ORAL at 17:44

## 2019-07-09 RX ADMIN — TIOTROPIUM BROMIDE 1 CAPSULE: 18 CAPSULE ORAL; RESPIRATORY (INHALATION) at 08:59

## 2019-07-09 RX ADMIN — SENNOSIDES AND DOCUSATE SODIUM 2 TABLET: 8.6; 5 TABLET ORAL at 17:44

## 2019-07-09 RX ADMIN — PREDNISONE 40 MG: 20 TABLET ORAL at 17:43

## 2019-07-09 RX ADMIN — PREDNISONE 40 MG: 20 TABLET ORAL at 05:12

## 2019-07-09 RX ADMIN — OMEPRAZOLE 20 MG: 20 CAPSULE, DELAYED RELEASE ORAL at 05:12

## 2019-07-09 RX ADMIN — INSULIN HUMAN 3 UNITS: 100 INJECTION, SOLUTION PARENTERAL at 20:43

## 2019-07-09 RX ADMIN — BUPROPION HYDROCHLORIDE 150 MG: 150 TABLET, EXTENDED RELEASE ORAL at 05:12

## 2019-07-09 RX ADMIN — SENNOSIDES AND DOCUSATE SODIUM 2 TABLET: 8.6; 5 TABLET ORAL at 05:12

## 2019-07-09 RX ADMIN — INSULIN GLARGINE 5 UNITS: 100 INJECTION, SOLUTION SUBCUTANEOUS at 17:35

## 2019-07-09 ASSESSMENT — ENCOUNTER SYMPTOMS
NAUSEA: 0
HEADACHES: 0
MYALGIAS: 0
ABDOMINAL PAIN: 0
COUGH: 0
SHORTNESS OF BREATH: 1
WEAKNESS: 1

## 2019-07-09 ASSESSMENT — PATIENT HEALTH QUESTIONNAIRE - PHQ9
1. LITTLE INTEREST OR PLEASURE IN DOING THINGS: NOT AT ALL
SUM OF ALL RESPONSES TO PHQ9 QUESTIONS 1 AND 2: 0
2. FEELING DOWN, DEPRESSED, IRRITABLE, OR HOPELESS: NOT AT ALL

## 2019-07-09 NOTE — DISCHARGE PLANNING
[facility]  Outcome: Left message, awaiting call back.    @1035  [facility]  Outcome: No answer, already left message, awaiting call back.    @1050  [facility]  Spoke To: Fani  Outcome: Bed available tomorrow and transport set up for 1300 on 7/10.

## 2019-07-09 NOTE — DISCHARGE SUMMARY
Discharge Summary    CHIEF COMPLAINT ON ADMISSION  No chief complaint on file.      Reason for Admission  Respiratory Failure     Admission Date  6/26/2019    CODE STATUS  DNAR/DNI    HPI & HOSPITAL COURSE  This is a 84 y.o. female here with acute respiratory failure with hypoxemia.  Patient was transferred to this facility from an outlying facility. Patient has underlying history of chronic obstructive pulmonary disease, chronic hypoxemic respiratory failure requiring 3 L of oxygen on baseline, atrial fibrillation, anticoagulation with Xarelto, pulmonary hypertension, gastroesophageal reflux disease.  Patient was transferred to this facility from [facility], after she was short of breath at home and found to be severely hypoxemic by EMS presenting to the outlying facility.  Prior to presentation, recent hospitalization to [facility] where she was noted to have right-sided pleural effusion requiring thoracentesis, consistent with transudative effusion.  At this time patient was profoundly hypoxemic, requiring admission to the intensive care unit in consultation by pulmonology team.  Establish DNR/DNI CODE STATUS.  Patient was diagnosed with amiodarone pulmonary toxicity by pulmonology team.  She was managed with antibiotic therapy empirically, diuresis and steroid therapy.  In addition she was noted to have right-sided effusion, requiring repeat thoracentesis.  She was slowly weaned off oxygen therapy during her hospitalization.  Patient completed appropriate management with empiric antibiotic therapy during her hospitalization, plans to remain on high-dose steroids/Bactrim therapy.  Patient with overall very poor prognosis with underlying poor pulmonary reserve function.  Hospice care was recommended, palliative team was consulted.  Patient did complete a POLST during her hospitalization.  At this time patient decided to discharge to a skilled nursing facility with subsequent discharge  home with hospice care, arrangements through  at the skilled nursing facility for hospice at home.  At this time patient has a POLST form on file, noting comfort measures.  Patient is currently stable at 3 L of oxygen via nasal cannula, her baseline.  No further acute hospitalization needs, patient is being discharged to a skilled nursing facility in stable condition with recommendations for ongoing care at the skilled nursing facility and subsequent discharge home with hospice care in line with patient wishes.    At this time from pulmonology perspective, patient has been transitioned to prednisone 40 mg twice daily.  Patient will be maintaining this dosage, until outpatient follow-up with pulmonology, on Bactrim prophylaxis.  In addition steroid-induced hyperglycemia on insulin therapy.  Will need ongoing monitoring and titration of therapy per physician at the skilled nursing facility.  Changes in renal function, Xarelto transition to Eliquis.  Patient will need monitoring of CBC/chemistry panel within 72 hours at the skilled nursing facility, subsequently per recommendations of the physician at the skilled nursing facility.  If ongoing changes in her renal function, consider transitioning Eliquis to Coumadin.    No further acute hospitalization needs, patient being discharged to a skilled nursing facility with plans for subsequent discharge to home with hospice care.    Schedulers informed by me to arrange outpatient follow-up with pulmonology team    Therefore, she is discharged in good and stable condition to skilled nursing facility.    The patient met 2-midnight criteria for an inpatient stay at the time of discharge.    Discharge Date  07/10/19    FOLLOW UP ITEMS POST DISCHARGE  Close outpatient follow up with pulmonology     DISCHARGE DIAGNOSES  Principal Problem:    Acute on chronic respiratory failure with hypoxia (HCC) POA: Yes  Active Problems:    Swelling of lower leg POA: Yes    Pleural  effusion on right POA: Yes    COPD (chronic obstructive pulmonary disease) (HCC) POA: Yes    Chronic atrial fibrillation (HCC) POA: Yes    Multifocal pneumonia POA: Yes    Pulmonary hypertension (HCC) POA: Yes    Stage 3 chronic kidney disease (HCC) POA: Yes  Resolved Problems:    * No resolved hospital problems. *      FOLLOW UP  Future Appointments  Date Time Provider Department Center   7/9/2019 12:45 PM [facility] None     No follow-up provider specified.    MEDICATIONS ON DISCHARGE     Medication List      START taking these medications      Instructions   apixaban 2.5mg Tabs  Commonly known as:  ELIQUIS   Take 1 Tab by mouth 2 Times a Day.  Dose:  2.5 mg     insulin glargine 100 UNIT/ML Soln  Commonly known as:  LANTUS   Inject 5 Units as instructed every evening.  Dose:  5 Units     insulin regular 100 Unit/mL Soln  Commonly known as:  HUMULIN R   Inject 2-9 Units as instructed 4 Times a Day,Before Meals and at Bedtime.  Dose:  2-9 Units     predniSONE 20 MG Tabs  Commonly known as:  DELTASONE   Take 2 Tabs by mouth 2 times a day.  Dose:  40 mg     sulfamethoxazole-trimethoprim 800-160 MG tablet  Commonly known as:  BACTRIM DS   Take 1 Tab by mouth every Monday, Wednesday, and Friday.  Dose:  1 Tab        CONTINUE taking these medications      Instructions   budesonide-formoterol 80-4.5 MCG/ACT Aero  Commonly known as:  SYMBICORT   Inhale 2 Puffs by mouth 2 Times a Day.  Dose:  2 Puff     buPROPion  MG Tb12 sustained-release tablet  Commonly known as:  WELLBUTRIN-SR   Take 150 mg by mouth every day.  Dose:  150 mg     carvedilol 6.25 MG Tabs  Commonly known as:  COREG   Take 1 Tab by mouth 2 times a day, with meals.  Dose:  6.25 mg     ferrous gluconate 324 (38 Fe) MG Tabs  Commonly known as:  FERGON   Take 324 mg by mouth every morning with breakfast.  Dose:  324 mg     ipratropium-albuterol 0.5-2.5 (3) MG/3ML nebulizer solution  Commonly known as:  DUONEB   3 mL by Nebulization  route every 6 hours as needed for Shortness of Breath.  Dose:  3 mL     omeprazole 20 MG delayed-release capsule  Commonly known as:  PRILOSEC   Take 20 mg by mouth every day.  Dose:  20 mg     tiotropium 18 MCG Caps  Commonly known as:  SPIRIVA   Inhale 1 Cap by mouth every day.  Dose:  18 mcg        STOP taking these medications    neomycin-polymixin-dexamethasone 3.5-16320-4.1 Oint ophthalmic ointment  Commonly known as:  MAXITROL     XARELTO 15 MG Tabs tablet  Generic drug:  rivaroxaban            Allergies  Allergies   Allergen Reactions   • Pcn [Penicillins] Rash     Tolerated ceftriaxone 6/2019       DIET  Orders Placed This Encounter   Procedures   • Diet Order Renal, 2 Gram Sodium, Diabetic     Standing Status:   Standing     Number of Occurrences:   1     Order Specific Question:   Diet:     Answer:   Renal [8]     Order Specific Question:   Diet:     Answer:   2 Gram Sodium [7]     Order Specific Question:   Diet:     Answer:   Diabetic [3]       ACTIVITY  As tolerated.  Weight bearing as tolerated    CONSULTATIONS  Pulmonology / Critical Care   Palliative Care  Physiatry     PROCEDURES  Thoracentesis     LABORATORY  Lab Results   Component Value Date    SODIUM 135 07/09/2019    POTASSIUM 5.1 07/09/2019    CHLORIDE 99 07/09/2019    CO2 27 07/09/2019    GLUCOSE 226 (H) 07/09/2019    BUN 47 (H) 07/09/2019    CREATININE 1.84 (H) 07/09/2019        Lab Results   Component Value Date    WBC 10.0 07/06/2019    HEMOGLOBIN 9.9 (L) 07/06/2019    HEMATOCRIT 32.0 (L) 07/06/2019    PLATELETCT 345 07/06/2019        Total time of the discharge process exceeds 33 minutes.

## 2019-07-09 NOTE — PROGRESS NOTES
Received bedside report from RN, pt care assumed, VSS, pt assessment complete. Pt AAOx4, chronic c/o  pain at this time. No signs of acute distress noted at this time. POC discussed with pt and verbalizes no questions. Pt denies any additional needs at this time. Bed in lowest position, bed alarm on, pt educated on fall risk and verbalized understanding, call light within reach, hourly rounding initiated. In a regular rhythm noted. Not on heart monitor.

## 2019-07-10 VITALS
TEMPERATURE: 97.4 F | WEIGHT: 147.71 LBS | HEART RATE: 89 BPM | HEIGHT: 65 IN | SYSTOLIC BLOOD PRESSURE: 106 MMHG | DIASTOLIC BLOOD PRESSURE: 54 MMHG | RESPIRATION RATE: 20 BRPM | OXYGEN SATURATION: 96 % | BODY MASS INDEX: 24.61 KG/M2

## 2019-07-10 LAB
ANION GAP SERPL CALC-SCNC: 9 MMOL/L (ref 0–11.9)
BUN SERPL-MCNC: 43 MG/DL (ref 8–22)
CALCIUM SERPL-MCNC: 8.5 MG/DL (ref 8.5–10.5)
CHLORIDE SERPL-SCNC: 99 MMOL/L (ref 96–112)
CO2 SERPL-SCNC: 28 MMOL/L (ref 20–33)
CREAT SERPL-MCNC: 1.66 MG/DL (ref 0.5–1.4)
GLUCOSE BLD-MCNC: 177 MG/DL (ref 65–99)
GLUCOSE BLD-MCNC: 195 MG/DL (ref 65–99)
GLUCOSE BLD-MCNC: 196 MG/DL (ref 65–99)
GLUCOSE SERPL-MCNC: 181 MG/DL (ref 65–99)
POTASSIUM SERPL-SCNC: 4.9 MMOL/L (ref 3.6–5.5)
SODIUM SERPL-SCNC: 136 MMOL/L (ref 135–145)

## 2019-07-10 PROCEDURE — 700102 HCHG RX REV CODE 250 W/ 637 OVERRIDE(OP): Performed by: STUDENT IN AN ORGANIZED HEALTH CARE EDUCATION/TRAINING PROGRAM

## 2019-07-10 PROCEDURE — 94640 AIRWAY INHALATION TREATMENT: CPT

## 2019-07-10 PROCEDURE — 82962 GLUCOSE BLOOD TEST: CPT

## 2019-07-10 PROCEDURE — 700111 HCHG RX REV CODE 636 W/ 250 OVERRIDE (IP): Performed by: INTERNAL MEDICINE

## 2019-07-10 PROCEDURE — A9270 NON-COVERED ITEM OR SERVICE: HCPCS | Performed by: INTERNAL MEDICINE

## 2019-07-10 PROCEDURE — A9270 NON-COVERED ITEM OR SERVICE: HCPCS | Performed by: STUDENT IN AN ORGANIZED HEALTH CARE EDUCATION/TRAINING PROGRAM

## 2019-07-10 PROCEDURE — 36415 COLL VENOUS BLD VENIPUNCTURE: CPT

## 2019-07-10 PROCEDURE — 99239 HOSP IP/OBS DSCHRG MGMT >30: CPT | Performed by: INTERNAL MEDICINE

## 2019-07-10 PROCEDURE — 80048 BASIC METABOLIC PNL TOTAL CA: CPT

## 2019-07-10 PROCEDURE — 700101 HCHG RX REV CODE 250: Performed by: INTERNAL MEDICINE

## 2019-07-10 PROCEDURE — 700102 HCHG RX REV CODE 250 W/ 637 OVERRIDE(OP): Performed by: INTERNAL MEDICINE

## 2019-07-10 RX ADMIN — TIOTROPIUM BROMIDE 1 CAPSULE: 18 CAPSULE ORAL; RESPIRATORY (INHALATION) at 07:30

## 2019-07-10 RX ADMIN — SULFAMETHOXAZOLE AND TRIMETHOPRIM 1 TABLET: 800; 160 TABLET ORAL at 07:55

## 2019-07-10 RX ADMIN — INSULIN HUMAN 2 UNITS: 100 INJECTION, SOLUTION PARENTERAL at 07:55

## 2019-07-10 RX ADMIN — CARVEDILOL 6.25 MG: 6.25 TABLET, FILM COATED ORAL at 07:55

## 2019-07-10 RX ADMIN — INSULIN HUMAN 2 UNITS: 100 INJECTION, SOLUTION PARENTERAL at 12:01

## 2019-07-10 RX ADMIN — PREDNISONE 40 MG: 20 TABLET ORAL at 05:07

## 2019-07-10 RX ADMIN — IPRATROPIUM BROMIDE AND ALBUTEROL SULFATE 3 ML: .5; 3 SOLUTION RESPIRATORY (INHALATION) at 07:30

## 2019-07-10 RX ADMIN — BUPROPION HYDROCHLORIDE 150 MG: 150 TABLET, EXTENDED RELEASE ORAL at 05:07

## 2019-07-10 RX ADMIN — APIXABAN 2.5 MG: 2.5 TABLET, FILM COATED ORAL at 05:07

## 2019-07-10 RX ADMIN — OMEPRAZOLE 20 MG: 20 CAPSULE, DELAYED RELEASE ORAL at 05:07

## 2019-07-10 NOTE — PROGRESS NOTES
Pt dcd via [facility] to [facility]. Family and escort present. All discharge education reviewed and given to pt as well as facility copy. LDAs & monitor removed. No complaints/concerns at this time.

## 2019-07-10 NOTE — DISCHARGE PLANNING
Anticipated Discharge Disposition: SNF    Action: CM reviewed chart and spoke with Miller WYNN and he states that pt has been accepted to [facility] and transport has been arranged for 1300.   CM met with pt at bedside and she confirmed that she would like to go to [facility] and states that her son Corbin is already aware and she doesn't need this CM to call him.   CM completed chart copy and COBRA.     Barriers to Discharge:     Plan: Pt to [facility] at 1300.

## 2019-07-10 NOTE — PROGRESS NOTES
[facility] Medicine Daily Progress Note    Date of Service  7/9/2019    Chief Complaint  84 y.o. female admitted 6/26/2019 with SOB and fall.    Hospital Course    PMH COPD on 3L O2, afib, pulm HTN, GERD who presented with fall and SOB. She was found with hypoxia. CXR showed multifocal airspace disease. She underwent right thoracentesis 6/26.  She continued to have worsening respiratory failure, placed on high flow nasal cannula.  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  Her antibiotics were broadened to cefepime and doxycycline, she was placed on high-dose steroids and given Lasix for diuresis.  She was weaned off high flow and stable on simple nasal cannula. She had poor prognosis palliative care was consulted and patient decided on a SNF then hospice at home. She will be maintained on oral steroids long-term.  Antibiotics and diuresis were stopped.        Interval Problem Update  Patient seen and evaluated on rounds  Discussed with nursing staff on rounds  Awaiting discharge, discussed with social workers, plan for tomorrow  No acute issues at this time    Consultants/Specialty  Pulm  Critical care  Pall care    Code Status  DNR    Disposition  Lives in [facility] pending    Review of Systems  Review of Systems   Constitutional: Positive for malaise/fatigue (improving).   HENT: Negative for congestion.    Respiratory: Positive for shortness of breath (mild). Negative for cough.    Cardiovascular: Negative for chest pain.   Gastrointestinal: Negative for abdominal pain and nausea.   Musculoskeletal: Negative for myalgias.   Neurological: Positive for weakness. Negative for headaches.        Physical Exam  Temp:  [36.2 °C (97.1 °F)-37.4 °C (99.4 °F)] 36.8 °C (98.2 °F)  Pulse:  [79-98] 80  Resp:  [18-20] 18  BP: ()/() 100/49  SpO2:  [92 %-97 %] 92 %    Physical Exam   Constitutional: She is oriented to person, place, and time. She appears well-developed and well-nourished.   HENT:    Head: Normocephalic.   Mouth/Throat: Oropharynx is clear and moist.   Eyes: Conjunctivae are normal.   Cardiovascular: Normal rate.    Irregular   Pulmonary/Chest: She has no wheezes. She has rales.   Mild tachypnea   Abdominal: Soft. There is no tenderness.   Musculoskeletal: She exhibits edema.   Neurological: She is alert and oriented to person, place, and time.   Skin: Skin is warm and dry.   Nursing note and vitals reviewed.    Examination remains unchanged compared to yesterday    Fluids    Intake/Output Summary (Last 24 hours) at 07/09/19 1925  Last data filed at 07/09/19 1600   Gross per 24 hour   Intake                0 ml   Output              800 ml   Net             -800 ml       Laboratory      Recent Labs      07/07/19   0321  07/07/19   0848  07/08/19   0241  07/09/19   0325   SODIUM  136   --   135  135   POTASSIUM  5.6*  5.1  4.7  5.1   CHLORIDE  98   --   99  99   CO2  33   --   28  27   GLUCOSE  161*   --   116*  226*   BUN  53*   --   51*  47*   CREATININE  1.90*   --   1.72*  1.84*   CALCIUM  8.8   --   8.7  8.6                   Imaging  DX-CHEST-PORTABLE (1 VIEW)   Final Result      No significant change from prior exam.      DX-CHEST-PORTABLE (1 VIEW)   Final Result      Unchanged BILATERAL lung disease, RIGHT-sided volume loss and small RIGHT pleural effusion      DX-CHEST-PORTABLE (1 VIEW)   Final Result      Stable right worse than left pulmonary opacities possibly infection/pneumonia superimposed on fibrosis/interstitial lung disease.      CT-CHEST (THORAX) W/O   Final Result      Extensive airspace opacities on the right are mildly increased in the right upper lobe and slightly improved in the right lower lobe. There is some cavitation.      Airspace opacities on the left are increased in the lingula and mildly improved in the left upper lobe.      Bilateral underlying fibrotic changes and emphysematous changes are noted.      Subpleural nodule in the left lower lobe measures 6 mm.       Follow-up to radiographic resolution recommended.      Small bilateral pleural effusions, right greater than left.      Mediastinal lymphadenopathy is not significantly changed.      Atherosclerotic plaque.      Cardiomegaly.      Pancreatic calcifications likely sequelae of chronic pancreatitis.      Cholelithiasis.      US-EXTREMITY VENOUS LOWER BILAT   Final Result      DX-CHEST-PORTABLE (1 VIEW)   Final Result      Pneumothorax is not identified      Stable pulmonary scarring with right volume loss, right worse than left consolidation      US-THORACENTESIS PUNCTURE RIGHT   Final Result      1. Ultrasound guided right sided diagnostic and therapeutic thoracentesis.      2. 700 mL of fluid withdrawn.      Findings were discussed with Juan on 6/26/2019 12:40 PM.      DX-CHEST-PORTABLE (1 VIEW)   Final Result      1.  Tiny RIGHT pneumothorax   2.  Decreased RIGHT pleural fluid   3.  Otherwise no significant interval change      Findings were discussed with MARSHALL FOX on 6/26/2019 12:33 PM.      DX-CHEST-PORTABLE (1 VIEW)   Final Result      1.  Worsening hypoinflation and multifocal interstitial and alveolar opacities particularly involving the RIGHT lung, favoring pneumonia over edema.  Probable background of interstitial lung disease.   2.  RIGHT pleural fluid collection, apparently increased.           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  Patient elects for hospice  Transition to oral steroids which she will continue on discharge with prophylactic Bactrim  Continue supportive O2    Evaluated by pulmonology this hospitalization   Amiodarone pulmonary  "toxicity   Assessment & Plan    Long course of steroids projected  CT noted    2D echo on 6/9/2019 reveals  Normal left ventricular size and systolic function. Normal regional   wall motion. Left ventricular ejection fraction is visually estimated   to be 70%. Diastolic function is difficult to assess with atrial   Fibrillation. Normal right ventricular size and systolic function.  Mildly dilated left atrium. Mild mitral regurgitation. Moderate aortic insufficiency.   Moderate tricuspid regurgitation. Right atrial pressure is estimated to   be 15 mmHg. Estimated right ventricular systolic pressure  is 55 mmHg      Evaluated by pulmonology this hospitalization       Sepsis associated hypotension (HCC)   Assessment & Plan    This is severe sepsis with the following associated acute organ dysfunction(s): acute respiratory failure.   6/30:  Sepsis protocol initiated for BP 74/47 responded to fluid bolus.  I examined the patient 6/30/2019 4:51 PM  Vital Signs:/56   Pulse 75   Temp 37 °C (98.6 °F) (Temporal)   Resp 19   Ht 1.651 m (5' 5\")   Wt 68.4 kg (150 lb 12.7 oz)   SpO2 90%   Breastfeeding? No   BMI 25.09 kg/m²    Cardiac examination significant for Regular rate and rhythm  Pulmonary examination significant for Crackles  Capillary refill is brisk  Peripheral Pulse is 2+   Skin is normal     Source Rt lung pneumonia.       Stage 3 chronic kidney disease (HCC)- (present on admission)   Assessment & Plan    Creatinine rising, Lasix was stopped  Avoiding nephrotoxics  Renally dose medications  Monitor BMP         GERD (gastroesophageal reflux disease)   Assessment & Plan    Continue omeprazole     Pulmonary hypertension (HCC)- (present on admission)   Assessment & Plan    Continue supplemental oxygen   She received diuresis     Multifocal pneumonia- (present on admission)   Assessment & Plan    Reviewed CTA chest:  Increased consolidation seen throughout the right upper, middle and lower lung fields.  Some " infiltrates seen LLL as well.  She was treated with a course of cefepime and doxycycline     Chronic atrial fibrillation (HCC)- (present on admission)   Assessment & Plan      Restarting anticoagulation, given renal insufficiency changed to Eliquis.  May need to stop Eliquis if renal function declines.  Monitor BMP  Controlled on coreg    Discussed with pharmacy, high stroke risk, will continue Eliquis, further worsening consider transitioning to Coumadin     COPD (chronic obstructive pulmonary disease) (HCC)- (present on admission)   Assessment & Plan    Continue oral steroids  Continue Spiriva and Symbicort  DuoNeb with RT protocol     Pleural effusion on right- (present on admission)   Assessment & Plan    Status post thoracentesis 6/26, yielding 700 cc of fluid  Fluid cx have been neg       Swelling of lower leg- (present on admission)   Assessment & Plan    Negative for DVT  Elevate extremities  Received diuresis            VTE prophylaxis: eliquis

## 2019-07-10 NOTE — PROGRESS NOTES
Attempted to call report for transfer to [facility]. Instructed to call  to 11. Will retry at that time.

## 2019-07-10 NOTE — DISCHARGE INSTRUCTIONS
Discharge Instructions    Discharged to other by medical transportation with escort. Discharged via wheelchair, hospital escort: Yes.  Special equipment needed: Oxygen  Be sure to schedule a follow-up appointment with your primary care doctor or any specialists as instructed.     Discharge Plan:   Smoking Cessation Offered: Patient Counseled  Pneumococcal Vaccine Administered/Refused: Not given - Patient refused pneumococcal vaccine  Influenza Vaccine Indication: Patient Refuses    I understand that a diet low in cholesterol, fat, and sodium is recommended for good health. Unless I have been given specific instructions below for another diet, I accept this instruction as my diet prescription.   Other diet: Renal Diabetic     Special Instructions: None    · Is patient discharged on Warfarin / Coumadin?   No       Apixaban oral tablets  What is this medicine?  APIXABAN (a PIX a ban) is an anticoagulant (blood thinner). It is used to lower the chance of stroke in people with a medical condition called atrial fibrillation. It is also used to treat or prevent blood clots in the lungs or in the veins.  This medicine may be used for other purposes; ask your health care provider or pharmacist if you have questions.  COMMON BRAND NAME(S): Eliquis  What should I tell my health care provider before I take this medicine?  They need to know if you have any of these conditions:  -bleeding disorders  -bleeding in the brain  -blood in your stools (black or tarry stools) or if you have blood in your vomit  -history of stomach bleeding  -kidney disease  -liver disease  -mechanical heart valve  -an unusual or allergic reaction to apixaban, other medicines, foods, dyes, or preservatives  -pregnant or trying to get pregnant  -breast-feeding  How should I use this medicine?  Take this medicine by mouth with a glass of water. Follow the directions on the prescription label. You can take it with or without food. If it upsets your stomach,  take it with food. Take your medicine at regular intervals. Do not take it more often than directed. Do not stop taking except on your doctor's advice. Stopping this medicine may increase your risk of a blot clot. Be sure to refill your prescription before you run out of medicine.  Talk to your pediatrician regarding the use of this medicine in children. Special care may be needed.  Overdosage: If you think you have taken too much of this medicine contact a poison control center or emergency room at once.  NOTE: This medicine is only for you. Do not share this medicine with others.  What if I miss a dose?  If you miss a dose, take it as soon as you can. If it is almost time for your next dose, take only that dose. Do not take double or extra doses.  What may interact with this medicine?  This medicine may interact with the following:  -aspirin and aspirin-like medicines  -certain medicines for fungal infections like ketoconazole and itraconazole  -certain medicines for seizures like carbamazepine and phenytoin  -certain medicines that treat or prevent blood clots like warfarin, enoxaparin, and dalteparin  -clarithromycin  -NSAIDs, medicines for pain and inflammation, like ibuprofen or naproxen  -rifampin  -ritonavir  -Yuma's wort  This list may not describe all possible interactions. Give your health care provider a list of all the medicines, herbs, non-prescription drugs, or dietary supplements you use. Also tell them if you smoke, drink alcohol, or use illegal drugs. Some items may interact with your medicine.  What should I watch for while using this medicine?  Visit your doctor or health care professional for regular checks on your progress.  Notify your doctor or health care professional and seek emergency treatment if you develop breathing problems; changes in vision; chest pain; severe, sudden headache; pain, swelling, warmth in the leg; trouble speaking; sudden numbness or weakness of the face, arm or  "leg. These can be signs that your condition has gotten worse.  If you are going to have surgery or other procedure, tell your doctor that you are taking this medicine.  What side effects may I notice from receiving this medicine?  Side effects that you should report to your doctor or health care professional as soon as possible:  -allergic reactions like skin rash, itching or hives, swelling of the face, lips, or tongue  -signs and symptoms of bleeding such as bloody or black, tarry stools; red or dark-brown urine; spitting up blood or brown material that looks like coffee grounds; red spots on the skin; unusual bruising or bleeding from the eye, gums, or nose  This list may not describe all possible side effects. Call your doctor for medical advice about side effects. You may report side effects to FDA at 5-247-FDA-2045.  Where should I keep my medicine?  Keep out of the reach of children.  Store at room temperature between 20 and 25 degrees C (68 and 77 degrees F). Throw away any unused medicine after the expiration date.  NOTE: This sheet is a summary. It may not cover all possible information. If you have questions about this medicine, talk to your doctor, pharmacist, or health care provider.  © 2018 [employer] (2017-07-10 11:54:23)        MY COPD ACTION PLAN     It is recommended that patients and physicians /healthcare providers complete this action plan together. This plan should be discussed at each physician visit and updated as needed.    The green, yellow and red zones show groups of symptoms of COPD. This list of symptoms is not comprehensive, and you may experience other symptoms. In the \"Actions\" column, your healthcare provider has recommended actions for you to take based on your symptoms.    Patient Name: Ama Castillo   YOB: 1935   Last Updated on:     Green Zone:  I am doing well today Actions   •  Usual activitiy and exercise level •  Take daily medications   •  " "Usual amounts of cough and phlegm/mucus •  Use oxygen as prescribed   •  Sleep well at night •  Continue regular exercise/diet plan   •  Appetite is good •  At all times avoid cigarette smoke, inhaled irritants     Daily Medications (these medications are taken every day):                Yellow Zone:  I am having a bad day or a COPD flare Actions   •  More breathless than usual •  Continue daily medications   •  I have less energy for my daily activities •  Use quick relief inhaler as ordered   •  Increased or thicker phlegm/mucus •  Use oxygen as prescribed   •  Using quick relief inhaler/nebulizer more often •  Get plenty of rest   •  Swelling of ankles more than usual •  Use pursed lip breathing   •  More coughing than usual •  At all times avoid cigarette smoke, inhaled irritants   •  I feel like I have a \"chest cold\"   •  Poor sleep and my symptoms woke me up   •  My appetite is not good   •  My medicine is not helping    •  Call provider immediately if symptoms don’t improve     Continue daily medications, add rescue medications:               Medications to be used during a flare up, (as Discussed with Provider):              Red Zone:  I need urgent medical care Actions   •  Severe shortness of breath even at rest •  Call 911 or seek medical care immediately   •  Not able to do any activity because of breathing    •  Fever or shaking chills    •  Feeling confused or very drowsy     •  Chest pains    •  Coughing up blood          Depression / Suicide Risk    As you are discharged from this [facility] facility, it is important to learn how to keep safe from harming yourself.    Recognize the warning signs:  · Abrupt changes in personality, positive or negative- including increase in energy   · Giving away possessions  · Change in eating patterns- significant weight changes-  positive or negative  · Change in sleeping patterns- unable to sleep or sleeping all the time   · Unwillingness or inability to " communicate  · Depression  · Unusual sadness, discouragement and loneliness  · Talk of wanting to die  · Neglect of personal appearance   · Rebelliousness- reckless behavior  · Withdrawal from people/activities they love  · Confusion- inability to concentrate     If you or a loved one observes any of these behaviors or has concerns about self-harm, here's what you can do:  · Talk about it- your feelings and reasons for harming yourself  · Remove any means that you might use to hurt yourself (examples: pills, rope, extension cords, firearm)  · Get professional help from the community (Mental Health, Substance Abuse, psychological counseling)  · Do not be alone:Call your Safe Contact- someone whom you trust who will be there for you.  · Call your local CRISIS HOTLINE 515-7755 or 200-829-7086  · Call your local Children's Mobile Crisis Response Team [place] (849) 340-1636 or www.[employer]  · Call the toll free National Suicide Prevention Hotlines   · National Suicide Prevention Lifeline 424-782-KHJZ (2089)  · National Hope Line Network 800-SUICIDE (587-0277)    Prednisone tablets  What is this medicine?  PREDNISONE (PRED ni sone) is a corticosteroid. It is commonly used to treat inflammation of the skin, joints, lungs, and other organs. Common conditions treated include asthma, allergies, and arthritis. It is also used for other conditions, such as blood disorders and diseases of the adrenal glands.  This medicine may be used for other purposes; ask your health care provider or pharmacist if you have questions.  COMMON BRAND NAME(S): Deltasone, Predone, Sterapred, Sterapred DS  What should I tell my health care provider before I take this medicine?  They need to know if you have any of these conditions:  -Cushing's syndrome  -diabetes  -glaucoma  -heart disease  -high blood pressure  -infection (especially a virus infection such as chickenpox, cold sores, or herpes)  -kidney disease  -liver  disease  -mental illness  -myasthenia gravis  -osteoporosis  -seizures  -stomach or intestine problems  -thyroid disease  -an unusual or allergic reaction to lactose, prednisone, other medicines, foods, dyes, or preservatives  -pregnant or trying to get pregnant  -breast-feeding  How should I use this medicine?  Take this medicine by mouth with a glass of water. Follow the directions on the prescription label. Take this medicine with food. If you are taking this medicine once a day, take it in the morning. Do not take more medicine than you are told to take. Do not suddenly stop taking your medicine because you may develop a severe reaction. Your doctor will tell you how much medicine to take. If your doctor wants you to stop the medicine, the dose may be slowly lowered over time to avoid any side effects.  Talk to your pediatrician regarding the use of this medicine in children. Special care may be needed.  Overdosage: If you think you have taken too much of this medicine contact a poison control center or emergency room at once.  NOTE: This medicine is only for you. Do not share this medicine with others.  What if I miss a dose?  If you miss a dose, take it as soon as you can. If it is almost time for your next dose, talk to your doctor or health care professional. You may need to miss a dose or take an extra dose. Do not take double or extra doses without advice.  What may interact with this medicine?  Do not take this medicine with any of the following medications:  -metyrapone  -mifepristone  This medicine may also interact with the following medications:  -aminoglutethimide  -amphotericin B  -aspirin and aspirin-like medicines  -barbiturates  -certain medicines for diabetes, like glipizide or glyburide  -cholestyramine  -cholinesterase inhibitors  -cyclosporine  -digoxin  -diuretics  -ephedrine  -female hormones, like estrogens and birth control pills  -isoniazid  -ketoconazole  -NSAIDS, medicines for pain and  inflammation, like ibuprofen or naproxen  -phenytoin  -rifampin  -toxoids  -vaccines  -warfarin  This list may not describe all possible interactions. Give your health care provider a list of all the medicines, herbs, non-prescription drugs, or dietary supplements you use. Also tell them if you smoke, drink alcohol, or use illegal drugs. Some items may interact with your medicine.  What should I watch for while using this medicine?  Visit your doctor or health care professional for regular checks on your progress. If you are taking this medicine over a prolonged period, carry an identification card with your name and address, the type and dose of your medicine, and your doctor's name and address.  This medicine may increase your risk of getting an infection. Tell your doctor or health care professional if you are around anyone with measles or chickenpox, or if you develop sores or blisters that do not heal properly.  If you are going to have surgery, tell your doctor or health care professional that you have taken this medicine within the last twelve months.  Ask your doctor or health care professional about your diet. You may need to lower the amount of salt you eat.  This medicine may affect blood sugar levels. If you have diabetes, check with your doctor or health care professional before you change your diet or the dose of your diabetic medicine.  What side effects may I notice from receiving this medicine?  Side effects that you should report to your doctor or health care professional as soon as possible:  -allergic reactions like skin rash, itching or hives, swelling of the face, lips, or tongue  -changes in emotions or moods  -changes in vision  -depressed mood  -eye pain  -fever or chills, cough, sore throat, pain or difficulty passing urine  -increased thirst  -swelling of ankles, feet  Side effects that usually do not require medical attention (report to your doctor or health care professional if they  continue or are bothersome):  -confusion, excitement, restlessness  -headache  -nausea, vomiting  -skin problems, acne, thin and shiny skin  -trouble sleeping  -weight gain  This list may not describe all possible side effects. Call your doctor for medical advice about side effects. You may report side effects to FDA at 3-206-CJF-7711.  Where should I keep my medicine?  Keep out of the reach of children.  Store at room temperature between 15 and 30 degrees C (59 and 86 degrees F). Protect from light. Keep container tightly closed. Throw away any unused medicine after the expiration date.  NOTE: This sheet is a summary. It may not cover all possible information. If you have questions about this medicine, talk to your doctor, pharmacist, or health care provider.  © 2018 [employer] Standard (2012-08-02 10:57:14)    Oxygen Use at Home  Oxygen can be prescribed for home use. The prescription will show the flow rate. This is how much oxygen is to be used per minute. This will be listed in liters per minute (LPM or L/M). A liter is a metric measurement of volume.  You will use oxygen therapy as directed. It can be used while exercising, sleeping, or at rest. You may need oxygen continuously. Your health care provider may order a blood oxygen test (arterial blood gas or pulse oximetry test) that will show what your oxygen level is. Your health care provider will use these measurements to learn about your needs and follow your progress.  Home oxygen therapy is commonly used on patients with various lung (pulmonary) related conditions. Some of these conditions include:  · Asthma.  · Lung cancer.  · Pneumonia.  · Emphysema.  · Chronic bronchitis.  · Cystic fibrosis.  · Other lung diseases.  · Pulmonary fibrosis.  · Occupational lung disease.  · Heart failure.  · Chronic obstructive pulmonary disease (COPD).  3 COMMON WAYS OF PROVIDING OXYGEN THERAPY  · Gas: The gas form of oxygen is put into variously sized cylinders or  tanks. The cylinders or oxygen tanks contain compressed oxygen. The cylinder is equipped with a regulator that controls the flow rate. Because the flow of oxygen out of the cylinder is constant, an oxygen conserving device may be attached to the system to avoid waste. This device releases the gas only when you inhale and cuts it off when you exhale. Oxygen can be provided in a small cylinder that can be carried with you. Large tanks are heavy and are only for stationary use. After use, empty tanks must be exchanged for full tanks.  · Liquid: The liquid form of oxygen is put into a container similar to a thermos. When released, the liquid converts to a gas and you breathe it in just like the compressed gas. This storage method takes up less space than the compressed gas cylinder, and you can transfer the liquid to a small, portable vessel at home. Liquid oxygen is more expensive than the compressed gas, and the vessel vents when not in use. An oxygen conserving device may be built into the vessel to conserve the oxygen. Liquid oxygen is very cold, around 297° below zero.  · Oxygen concentrator: This medical device filters oxygen from room air and gives almost 100% oxygen to the patient. Oxygen concentrators are powered by electricity. Benefits of this system are:  ¨ It does not need to be resupplied.  ¨ It is not as costly as liquid oxygen.  ¨ Extra tubing permits the user to move around easier.  There are several types of small, portable oxygen systems available which can help you remain active and mobile. You must have a cylinder of oxygen as a backup in the event of a power failure. Advise your electric power company that you are on oxygen therapy in order to get priority service when there is a power failure.  OXYGEN DELIVERY DEVICES  There are 3 common ways to deliver oxygen to your body.  · Nasal cannula. This is a 2-pronged device inserted in the nostrils that is connected to tubing carrying the oxygen. The  "tubing can rest on the ears or be attached to the frame of eyeglasses.  · Mask. People who need a high flow of oxygen generally use a mask.  · Transtracheal catheter. Transtracheal oxygen therapy requires the insertion of a small, flexible tube (catheter) in the windpipe (trachea). This catheter is held in place by a necklace. Since transtracheal oxygen bypasses the mouth, nose, and throat, a humidifier is absolutely required at flow rates of 1 LPM or greater.  OXYGEN USE SAFETY TIPS  · Never smoke while using oxygen. Oxygen does not burn or explode, but flammable materials will burn faster in the presence of oxygen.  · Keep a fire extinguisher close by. Let your fire department know that you have oxygen in your home.  · Warn visitors not to smoke near you when you are using oxygen. Put up \"no smoking\" signs in your home where you most often use the oxygen.  · When you go to a restaurant with your portable oxygen source, ask to be seated in the nonsmoking section.  · Stay at least 5 feet away from gas stoves, candles, lighted fireplaces, or other heat sources.  · Do not use materials that burn easily (flammable) while using your oxygen.  · If you use an oxygen cylinder, make sure it is secured to some fixed object or in a stand. If you use liquid oxygen, make sure the vessel is kept upright to keep the oxygen from pouring out. Liquid oxygen is so cold it can hurt your skin.  · If you use an oxygen concentrator, call your electric company so you will be given priority service if your power goes out. Avoid using extension cords, if possible.  · Regularly test your smoke detectors at home to make sure they work. If you receive care in your home from a nurse or other health care provider, he or she may also check to make sure your smoke detectors work.  GUIDELINES FOR CLEANING YOUR EQUIPMENT  · Wash the nasal prongs with a liquid soap. Thoroughly rinse them once or twice a week.  · Replace the prongs every 2 to 4 weeks. " If you have an infection (cold, pneumonia) change them when you are well.  · Your health care provider will give you instructions on how to clean your transtracheal catheter.  · The humidifier bottle should be washed with soap and warm water and rinsed thoroughly between each refill. Air-dry the bottle before filling it with sterile or distilled water. The bottle and its top should be disinfected after they are cleaned.  · If you use an oxygen concentrator, unplug the unit. Then wipe down the cabinet with a damp cloth and dry it daily. The air filter should be cleaned at least twice a week.  · Follow your home medical equipment and service company's directions for cleaning the compressor filter.  HOME CARE INSTRUCTIONS   · Do not change the flow of oxygen unless directed by your health care provider.  · Do not use alcohol or other sedating drugs unless instructed. They slow your breathing rate.  · Do not use materials that burn easily (flammable) while using your oxygen.  · Always keep a spare tank of oxygen. Plan ahead for holidays when you may not be able to get a prescription filled.  · Use water-based lubricants on your lips or nostrils. Do not use an oil-based product like petroleum jelly.  · To prevent your cheeks or the skin behind your ears from becoming irritated, tuck some gauze under the tubing.  · If you have persistent redness under your nose, call your health care provider.  · When you no longer need oxygen, your doctor will have the oxygen discontinued. Oxygen is not addicting or habit forming.  · Use the oxygen as instructed. Too much oxygen can be harmful and too little will not give you the benefit you need.  · Shortness of breath is not always from a lack of oxygen. If your oxygen level is not the cause of your shortness of breath, taking oxygen will not help.  SEEK MEDICAL CARE IF:   · You have frequent headaches.  · You have shortness of breath or a lasting cough.  · You have anxiety.  · You are  confused.  · You are drowsy or sleepy all the time.  · You develop an illness which aggravates your breathing.  · You cannot exercise.  · You are restless.  · You have blue lips or fingernails.  · You have difficult or irregular breathing and it is getting worse.  · You have a fever.     This information is not intended to replace advice given to you by your health care provider. Make sure you discuss any questions you have with your health care provider.     Document Released: 03/09/2005 Document Revised: 01/08/2016 Document Reviewed: 07/30/2014  [employer] Interactive Patient Education ©2016 [employer]

## 2019-07-12 ENCOUNTER — HOSPITAL ENCOUNTER (OUTPATIENT)
Dept: RADIOLOGY | Facility: MEDICAL CENTER | Age: 84
End: 2019-07-12

## 2019-07-22 ENCOUNTER — OFFICE VISIT (OUTPATIENT)
Dept: PULMONOLOGY | Facility: HOSPICE | Age: 84
End: 2019-07-22
Payer: MEDICARE

## 2019-07-22 ENCOUNTER — APPOINTMENT (OUTPATIENT)
Dept: RADIOLOGY | Facility: IMAGING CENTER | Age: 84
End: 2019-07-22
Attending: INTERNAL MEDICINE
Payer: MEDICARE

## 2019-07-22 VITALS
RESPIRATION RATE: 16 BRPM | SYSTOLIC BLOOD PRESSURE: 118 MMHG | OXYGEN SATURATION: 98 % | BODY MASS INDEX: 24.49 KG/M2 | HEIGHT: 65 IN | WEIGHT: 147 LBS | HEART RATE: 88 BPM | DIASTOLIC BLOOD PRESSURE: 60 MMHG

## 2019-07-22 DIAGNOSIS — J44.9 CHRONIC OBSTRUCTIVE PULMONARY DISEASE, UNSPECIFIED COPD TYPE (HCC): ICD-10-CM

## 2019-07-22 PROCEDURE — 71046 X-RAY EXAM CHEST 2 VIEWS: CPT | Mod: TC | Performed by: INTERNAL MEDICINE

## 2019-07-22 PROCEDURE — 99214 OFFICE O/P EST MOD 30 MIN: CPT | Performed by: INTERNAL MEDICINE

## 2019-07-22 NOTE — PROGRESS NOTES
CC:  Chief Complaint   Patient presents with   • Hospital Follow-up     D/C :7/10/19     Posthospitalization follow-up, pulmonary amiodarone toxicity    HPI:   The patient is a 84 y.o. female with history of recent hospitalization secondary to hypoxemic respiratory failure thought to be secondary to amiodarone toxicity.  The patient was started on systemic steroids and eventually she was discharged on prednisone 40 mg p.o. twice daily to a nursing facility for short-term rehab.  The patient continued to be on nursing home for short-term rehab and she is planned to be discharged home in [place] about 2 weeks from now.    The patient came today for follow-up.  Repeat chest x-ray today in our clinic showed significant improvement in bilateral opacities.  Clinically the patient is doing better.  She is not hypoxemicon exertion.  She is participating in physical therapy with significant gradual improvement.  However she continues to be very weak.  Her cough has also improved.      ROS:   Constitutional: Denies fevers, chills, night sweats  Eyes: Denies vision loss, pain, drainage, double vision  Ears, Nose, Throat: Denies earache, difficulty hearing, tinnitus, nasal congestion, hoarseness  Cardiovascular: Denies chest pain, tightness, palpitations, orthopnea or edema  Respiratory: Please see HPI  GI: Denies heartburn, dysphagia, nausea, abdominal pain, diarrhea or constipation  : Denies frequent urination, hematuria, discharge or painful urination  Musculoskeletal: Denies back pain, painful joints, sore muscles  Neurological: Denies weakness or headaches  Skin: No rashes  All other ROS were negative except what mentioned in the HPI     Past Medical History:  Past Medical History:   Diagnosis Date   • Arrhythmia     transient a fib   • GERD (gastroesophageal reflux disease)    • Heart burn    • Hypertension    • Indigestion    • Other specified disorder of intestines     occasional constipation   • Pain     back, with  sciatic pain   • Renal disorder     followed by dr clifford, states 40% function   • Shortness of breath                Social History:  Social History     Social History   • Marital status:      Spouse name: N/A   • Number of children: N/A   • Years of education: N/A     Occupational History   • Not on file.     Social History Main Topics   • Smoking status: Former Smoker     Packs/day: 1.00     Years: 40.00     Types: Cigarettes     Start date: 6/8/1952     Quit date: 6/3/2019   • Smokeless tobacco: Never Used   • Alcohol use No   • Drug use: No   • Sexual activity: Not on file     Other Topics Concern   • Not on file     Social History Narrative   • No narrative on file             Family History:  Family History   Problem Relation Age of Onset   • Cancer Mother         Breast Cancer   • Cancer Sister         lukemia   • Cancer Brother         Pancreatic   • Alcohol/Drug Brother        Current Outpatient Prescriptions on File Prior to Visit   Medication Sig Dispense Refill   • sulfamethoxazole-trimethoprim (BACTRIM DS) 800-160 MG tablet Take 1 Tab by mouth every Monday, Wednesday, and Friday.  0   • predniSONE (DELTASONE) 20 MG Tab Take 2 Tabs by mouth 2 times a day. 30 Tab 0   • insulin glargine (LANTUS) 100 UNIT/ML Solution Inject 5 Units as instructed every evening. 10 mL    • insulin regular (HUMULIN R) 100 Unit/mL Solution Inject 2-9 Units as instructed 4 Times a Day,Before Meals and at Bedtime. 10 mL    • apixaban (ELIQUIS) 2.5mg Tab Take 1 Tab by mouth 2 Times a Day. 60 Tab    • carvedilol (COREG) 6.25 MG Tab Take 1 Tab by mouth 2 times a day, with meals. 60 Tab 3   • budesonide-formoterol (SYMBICORT) 80-4.5 MCG/ACT Aerosol Inhale 2 Puffs by mouth 2 Times a Day. 1 Inhaler 3   • tiotropium (SPIRIVA) 18 MCG Cap Inhale 1 Cap by mouth every day. 30 Cap 3   • buPROPion SR (WELLBUTRIN-SR) 150 MG TABLET SR 12 HR sustained-release tablet Take 150 mg by mouth every day.     • ferrous gluconate (FERGON) 324  "(38 Fe) MG Tab Take 324 mg by mouth every morning with breakfast.     • omeprazole (PRILOSEC) 20 MG CPDR Take 20 mg by mouth every day.     • ipratropium-albuterol (DUONEB) 0.5-2.5 (3) MG/3ML nebulizer solution 3 mL by Nebulization route every 6 hours as needed for Shortness of Breath.       No current facility-administered medications on file prior to visit.        Allergies:   Pcn [penicillins]        Vitals:    07/22/19 1113   Height: 1.651 m (5' 5\")   Weight: 66.7 kg (147 lb)   Weight % change since last entry.: 0 %   BP: 118/60   Pulse: 88   BMI (Calculated): 24.46   Resp: 16           Physical Exam:  Appearance:  in no acute distress  HEENT: Normocephalic, atraumatic, white sclera, PERRLA, oropharynx clear  Neck: No adenopathy or masses  Respiratory: no intercostal retractions or accessory muscle use  Lungs auscultation: Clear to auscultation bilaterally  Cardiovascular: Regular rate rhythm. No murmurs, rubs or gallops.  No LE edema  Abdomen: soft, nondistended  Gait: Normal  Digits: No clubbing, cyanosis  Motor: No focal deficits  Orientation: Oriented to time, person and place      DATA:    Labs:  Lab Results   Component Value Date/Time    WBC 10.0 07/06/2019 02:05 AM    RBC 3.49 (L) 07/06/2019 02:05 AM    HEMOGLOBIN 9.9 (L) 07/06/2019 02:05 AM    HEMATOCRIT 32.0 (L) 07/06/2019 02:05 AM    MCV 91.7 07/06/2019 02:05 AM    MCH 28.4 07/06/2019 02:05 AM    MCHC 30.9 (L) 07/06/2019 02:05 AM    MPV 10.2 07/06/2019 02:05 AM    NEUTSPOLYS 94.80 (H) 07/06/2019 02:05 AM    LYMPHOCYTES 0.80 (L) 07/06/2019 02:05 AM    MONOCYTES 3.50 07/06/2019 02:05 AM    EOSINOPHILS 0.00 07/06/2019 02:05 AM    EOSINOPHILS 1 06/26/2019 11:35 AM    BASOPHILS 0.00 07/06/2019 02:05 AM    HYPOCHROMIA 1+ 07/05/2019 03:44 AM    ANISOCYTOSIS 1+ 07/05/2019 03:44 AM      Lab Results   Component Value Date/Time    SODIUM 136 07/10/2019 02:55 AM    POTASSIUM 4.9 07/10/2019 02:55 AM    CHLORIDE 99 07/10/2019 02:55 AM    CO2 28 07/10/2019 02:55 AM "    GLUCOSE 181 (H) 07/10/2019 02:55 AM    BUN 43 (H) 07/10/2019 02:55 AM    CREATININE 1.66 (H) 07/10/2019 02:55 AM          Chest x-ray today was personally reviewed and showed significant improvement from previous chest x-rays        Diagnosis:  1. Chronic obstructive pulmonary disease, unspecified COPD type (HCC)  DX-CHEST-2 VIEWS      2.  Pulmonary amiodarone toxicity  3.  Pulmonary hypertension    Assessment and Plan     Patient is doing better clinically.  Chest x-ray showed significant improvement of bilateral lower lobe consolidations thought to be secondary to amiodarone toxicity.  At this point I am going to start tapering the prednisone down over the next 6 weeks to be eventually discontinued.  Patient should continue Advair inhaler and Incruse.  I am also going to add Lasix 20 mg daily because the patient continued to have significant peripheral edema.  The patient will need to check BMP in a week after starting Lasix.  Patient will need to follow-up with cardiology and primary care physician after she is discharged to [facility].  The patient is currently in nursing home for short-term rehab.  We will also will arrange to see the patient through telemedicine in about 6 weeks from now to evaluate her pulmonary symptoms and if she continued to improve with the steroids from presumed amiodarone toxicity.          Patient will see us in 6 weeks through telemedicine          No Follow-up on file.        This note was created using voice recognition software. I apologize for any overlooked obvious grammar or  vocabulary mistake

## 2019-07-23 LAB
FUNGUS SPEC CULT: NORMAL
SIGNIFICANT IND 70042: NORMAL
SITE SITE: NORMAL
SOURCE SOURCE: NORMAL

## 2019-07-30 DIAGNOSIS — E87.70 HYPERVOLEMIA, UNSPECIFIED HYPERVOLEMIA TYPE: ICD-10-CM

## 2019-07-30 NOTE — ASSESSMENT & PLAN NOTE
-s/p thora, transudative, doing well with diuretics   PRBC x2  Observation  If stable in AM, able to discharge home to continue outpatient management

## 2019-08-04 LAB
MYCOBACTERIUM SPEC CULT: NORMAL
RHODAMINE-AURAMINE STN SPEC: NORMAL
SIGNIFICANT IND 70042: NORMAL
SITE SITE: NORMAL
SOURCE SOURCE: NORMAL

## 2019-08-06 ENCOUNTER — TELEPHONE (OUTPATIENT)
Dept: PULMONOLOGY | Facility: HOSPICE | Age: 84
End: 2019-08-06

## 2019-08-06 NOTE — TELEPHONE ENCOUNTER
PT scheduled to see Dr. Dewitt 8/8/19 labs were ordered at last office visit with Dr. Herman 7/22/19. No results in the system. I have attempt to contact both numbers on file no answer and no answering machine to leave message

## 2019-08-08 ENCOUNTER — APPOINTMENT (OUTPATIENT)
Dept: RADIOLOGY | Facility: IMAGING CENTER | Age: 84
End: 2019-08-08
Attending: INTERNAL MEDICINE
Payer: MEDICARE

## 2019-08-08 ENCOUNTER — OFFICE VISIT (OUTPATIENT)
Dept: PULMONOLOGY | Facility: HOSPICE | Age: 84
End: 2019-08-08
Payer: MEDICARE

## 2019-08-08 VITALS
OXYGEN SATURATION: 98 % | HEIGHT: 65 IN | TEMPERATURE: 98.1 F | DIASTOLIC BLOOD PRESSURE: 64 MMHG | BODY MASS INDEX: 23.91 KG/M2 | RESPIRATION RATE: 16 BRPM | SYSTOLIC BLOOD PRESSURE: 100 MMHG | WEIGHT: 143.5 LBS | HEART RATE: 94 BPM

## 2019-08-08 DIAGNOSIS — J98.4 AMIODARONE PULMONARY TOXICITY: ICD-10-CM

## 2019-08-08 DIAGNOSIS — R06.02 SOB (SHORTNESS OF BREATH): ICD-10-CM

## 2019-08-08 DIAGNOSIS — J96.11 CHRONIC RESPIRATORY FAILURE WITH HYPOXIA (HCC): ICD-10-CM

## 2019-08-08 DIAGNOSIS — T46.2X5A AMIODARONE PULMONARY TOXICITY: ICD-10-CM

## 2019-08-08 DIAGNOSIS — J44.9 CHRONIC OBSTRUCTIVE PULMONARY DISEASE, UNSPECIFIED COPD TYPE (HCC): ICD-10-CM

## 2019-08-08 PROCEDURE — 71046 X-RAY EXAM CHEST 2 VIEWS: CPT | Mod: TC | Performed by: INTERNAL MEDICINE

## 2019-08-08 PROCEDURE — 99214 OFFICE O/P EST MOD 30 MIN: CPT | Performed by: INTERNAL MEDICINE

## 2019-08-08 RX ORDER — FUROSEMIDE 20 MG/1
20 TABLET ORAL DAILY
Qty: 30 TAB | Refills: 11 | Status: SHIPPED | OUTPATIENT
Start: 2019-08-08

## 2019-08-08 RX ORDER — GUAIFENESIN 200 MG/10ML
LIQUID ORAL
COMMUNITY

## 2019-08-08 RX ORDER — MULTIVIT WITH MINERALS/LUTEIN
250 TABLET ORAL DAILY
COMMUNITY

## 2019-08-08 ASSESSMENT — ENCOUNTER SYMPTOMS
CHILLS: 0
EYE REDNESS: 0
NAUSEA: 0
HEADACHES: 0
HEARTBURN: 0
DIARRHEA: 0
SPEECH CHANGE: 0
TREMORS: 0
WEAKNESS: 0
DEPRESSION: 0
PND: 0
STRIDOR: 0
DOUBLE VISION: 0
FOCAL WEAKNESS: 0
BLURRED VISION: 0
WHEEZING: 0
MYALGIAS: 0
CONSTIPATION: 0
CLAUDICATION: 0
EYE PAIN: 0
NECK PAIN: 0
DIZZINESS: 0
VOMITING: 0
SHORTNESS OF BREATH: 1
SINUS PAIN: 0
SPUTUM PRODUCTION: 0
HEMOPTYSIS: 0
DIAPHORESIS: 0
FALLS: 0
ORTHOPNEA: 0
PHOTOPHOBIA: 0
PALPITATIONS: 0
FEVER: 0
BACK PAIN: 0
WEIGHT LOSS: 0
ABDOMINAL PAIN: 0
SORE THROAT: 0
EYE DISCHARGE: 0
COUGH: 0

## 2019-08-08 NOTE — PROGRESS NOTES
Chief Complaint   Patient presents with   • COPD     last seen 7/2/19   • Results     CXR 7/22/19         HPI: This patient is a 84 y.o. female whom is followed in our clinic for chronic hypoxic respiratory failure last seen in our clinic for hospital follow-up on July 22.  The patient has a complicated past medical history including atrial fibrillation, chart diagnosis of COPD with no pulmonary function test that I have access to, history of gastroesophageal reflux disease.  She presented to the emergency department on June 8, 2019 with acute hypoxic respiratory failure.  This was felt to be related to flash pulmonary edema versus amiodarone toxicity.  Her amiodarone was held and she was discharged however she represented on June 26 after a fall with worsening hypoxia and worsening chest x-ray.  Of note her steroids given for presumed amiodarone toxicity were tapered quickly following discharge from her first hospital stay.  She was admitted to the intensive care unit and underwent diuresis as well as resumption of steroids.  She improved and was discharged to rehab facility where she has been staying since that time.  She was seen in our office on July 22 and Lasix was started for some bilateral lower extremity swelling however this was only for a period of 8 days and per records from her facility show no Lasix administered after July 30.  Per weights her weight fell roughly 5 pounds with initiation of Lasix and since July 30 has increased again by roughly 4-1/2 to 5 pounds.  Per family she has had increased chest congestion and swelling over the past week and they are concerned that her symptoms are worsening.  She has been on a long steroid taper currently on 10 mg twice daily with plans to taper off over the next few weeks.  She is no longer on amiodarone.  The patient herself denies fevers, chills, night sweats.  She denies increase in her shortness of breath and oxygen is stable at 3 L/min.  A chest x-ray  done in clinic today shows patchy interstitial opacities mildly increased from last chest x-ray done on .  An echocardiogram done during her initial hospital stay on  showed normal left ventricular ejection fraction with moderate aortic insufficiency and estimated right ventricular systolic pressure of 55 with dilated IVC consistent with volume overload.  The patient has not been seen or establish care in cardiology since her hospital stay.    Past Medical History:   Diagnosis Date   • Arrhythmia     transient a fib   • GERD (gastroesophageal reflux disease)    • Heart burn    • Hypertension    • Indigestion    • Other specified disorder of intestines     occasional constipation   • Pain     back, with sciatic pain   • Renal disorder     followed by dr clifford, states 40% function   • Shortness of breath        Social History     Socioeconomic History   • Marital status:      Spouse name: Not on file   • Number of children: Not on file   • Years of education: Not on file   • Highest education level: Not on file   Occupational History   • Not on file   Social Needs   • Financial resource strain: Not on file   • Food insecurity:     Worry: Not on file     Inability: Not on file   • Transportation needs:     Medical: Not on file     Non-medical: Not on file   Tobacco Use   • Smoking status: Former Smoker     Packs/day: 1.00     Years: 40.00     Pack years: 40.00     Types: Cigarettes     Start date: 1952     Last attempt to quit: 6/3/2019     Years since quittin.1   • Smokeless tobacco: Never Used   Substance and Sexual Activity   • Alcohol use: No   • Drug use: No   • Sexual activity: Not on file   Lifestyle   • Physical activity:     Days per week: Not on file     Minutes per session: Not on file   • Stress: Not on file   Relationships   • Social connections:     Talks on phone: Not on file     Gets together: Not on file     Attends Baptism service: Not on file     Active member of club  or organization: Not on file     Attends meetings of clubs or organizations: Not on file     Relationship status: Not on file   • Intimate partner violence:     Fear of current or ex partner: Not on file     Emotionally abused: Not on file     Physically abused: Not on file     Forced sexual activity: Not on file   Other Topics Concern   • Not on file   Social History Narrative   • Not on file       Family History   Problem Relation Age of Onset   • Cancer Mother         Breast Cancer   • Cancer Sister         lukemia   • Cancer Brother         Pancreatic   • Alcohol/Drug Brother        Current Outpatient Medications on File Prior to Visit   Medication Sig Dispense Refill   • Ascorbic Acid (VITAMIN C) 250 MG Tab Take 250 mg by mouth every day.     • Cholecalciferol (VITAMIN D PO) Take  by mouth.     • guaiFENesin (ROBITUSSIN) 100 MG/5ML liquid Take  by mouth.     • Umeclidinium Bromide (INCRUSE ELLIPTA) 62.5 MCG/INH AEROSOL POWDER, BREATH ACTIVATED Inhale  by mouth.     • nystatin (MYCOSTATIN) 734161 UNIT/ML Suspension Take 500,000 Units by mouth 4 times a day.     • Acetaminophen (TYLENOL) 325 MG Cap Take  by mouth.     • fluticasone-salmeterol (WIXELA INHUB) 100-50 MCG/DOSE AEROSOL POWDER, BREATH ACTIVATED Inhale 1 Puff by mouth every 12 hours.     • sulfamethoxazole-trimethoprim (BACTRIM DS) 800-160 MG tablet Take 1 Tab by mouth every Monday, Wednesday, and Friday.  0   • insulin glargine (LANTUS) 100 UNIT/ML Solution Inject 5 Units as instructed every evening. 10 mL    • insulin regular (HUMULIN R) 100 Unit/mL Solution Inject 2-9 Units as instructed 4 Times a Day,Before Meals and at Bedtime. 10 mL    • apixaban (ELIQUIS) 2.5mg Tab Take 1 Tab by mouth 2 Times a Day. 60 Tab    • carvedilol (COREG) 6.25 MG Tab Take 1 Tab by mouth 2 times a day, with meals. 60 Tab 3   • ferrous gluconate (FERGON) 324 (38 Fe) MG Tab Take 324 mg by mouth every morning with breakfast.     • ipratropium-albuterol (DUONEB) 0.5-2.5 (3)  MG/3ML nebulizer solution 3 mL by Nebulization route every 6 hours as needed for Shortness of Breath.     • omeprazole (PRILOSEC) 20 MG CPDR Take 20 mg by mouth every day.     • predniSONE (DELTASONE) 20 MG Tab Take 2 Tabs by mouth 2 times a day. (Patient not taking: Reported on 8/8/2019) 30 Tab 0   • budesonide-formoterol (SYMBICORT) 80-4.5 MCG/ACT Aerosol Inhale 2 Puffs by mouth 2 Times a Day. (Patient not taking: Reported on 8/8/2019) 1 Inhaler 3   • tiotropium (SPIRIVA) 18 MCG Cap Inhale 1 Cap by mouth every day. (Patient not taking: Reported on 8/8/2019) 30 Cap 3   • buPROPion SR (WELLBUTRIN-SR) 150 MG TABLET SR 12 HR sustained-release tablet Take 150 mg by mouth every day.       No current facility-administered medications on file prior to visit.        Pcn [penicillins]      ROS:   Review of Systems   Constitutional: Negative for chills, diaphoresis, fever, malaise/fatigue and weight loss.   HENT: Negative for congestion, ear discharge, ear pain, hearing loss, nosebleeds, sinus pain, sore throat and tinnitus.    Eyes: Negative for blurred vision, double vision, photophobia, pain, discharge and redness.   Respiratory: Positive for shortness of breath. Negative for cough, hemoptysis, sputum production, wheezing and stridor.    Cardiovascular: Positive for leg swelling. Negative for chest pain, palpitations, orthopnea, claudication and PND.   Gastrointestinal: Negative for abdominal pain, constipation, diarrhea, heartburn, nausea and vomiting.   Genitourinary: Negative for dysuria and urgency.   Musculoskeletal: Negative for back pain, falls, joint pain, myalgias and neck pain.   Skin: Negative for itching and rash.   Neurological: Negative for dizziness, tremors, speech change, focal weakness, weakness and headaches.   Endo/Heme/Allergies: Negative for environmental allergies.   Psychiatric/Behavioral: Negative for depression.       /64 (BP Location: Right arm, Patient Position: Sitting, BP Cuff Size:  "Adult)   Pulse 94   Temp 36.7 °C (98.1 °F) (Temporal)   Resp 16   Ht 1.651 m (5' 5\")   Wt 65.1 kg (143 lb 8 oz)   SpO2 98%   Physical Exam   Constitutional: She is oriented to person, place, and time. She appears well-developed and well-nourished.   HENT:   Head: Normocephalic and atraumatic.   Left Ear: External ear normal.   Mouth/Throat: Oropharynx is clear and moist. No oropharyngeal exudate.   Eyes: Pupils are equal, round, and reactive to light. Conjunctivae and EOM are normal. No scleral icterus.   Neck: Neck supple. No JVD present. No tracheal deviation present.   Cardiovascular: Normal rate and normal heart sounds. Exam reveals no gallop and no friction rub.   No murmur heard.  irreg   Pulmonary/Chest: Effort normal. No accessory muscle usage. No respiratory distress. She has no wheezes. She has no rales.   Few basilar crackles b/l on inspiration   Abdominal: Soft. She exhibits no distension. There is no tenderness.   Musculoskeletal: Normal range of motion. She exhibits edema. She exhibits no tenderness or deformity.   1+ b/l LE edema   Lymphadenopathy:     She has no cervical adenopathy.   Neurological: She is alert and oriented to person, place, and time. No cranial nerve deficit. Gait normal.   Skin: Skin is warm and dry. No rash noted. No cyanosis. Nails show no clubbing.   Psychiatric: She has a normal mood and affect.       Imagaing as reviewed by me personally: As per HPI    Assessment:  1. SOB (shortness of breath)  Basic Metabolic Panel    proBrain Natriuretic Peptide, NT    DX-CHEST-2 VIEWS    furosemide (LASIX) 20 MG Tab   2. Chronic respiratory failure with hypoxia (HCC)     3. Chronic obstructive pulmonary disease, unspecified COPD type (HCC)     4. Amiodarone pulmonary toxicity         Plan:  1.  Patient denies shortness of breath however family is reporting increased pulmonary congestion over the past week which I suspect is related to fluid accumulation given her increase in weight " since being off Lasix.  Her chest x-ray shows patchy interstitial infiltrates increased from chest x-ray from July 22.  I suspect this is related to pulmonary edema in somebody with abnormal underlying lung parenchyma.  I have ordered basic metabolic panel and brain natruretic peptide and ordered her to resume Lasix at 20 mg daily.  I have also referred her to cardiology.  In the meantime we will plan to see her in 1 week to follow-up symptoms, response to Lasix and assess labs.  We may consider repeat chest x-ray at that time.   2.  This is presumed secondary to COPD as patient has a long history of tobacco use.  She is currently on Advair and Incruse.  No evidence of acute exacerbation on exam today.  At some point we will need to obtain pulmonary function test however I would like her to be euvolemic if possible.  Additionally I would like to obtain CT chest again when she is not any volume overloaded state given evidence of underlying parenchymal lung disease which may or may not be related to a history of amiodarone.  3.  This is presumed based on tobacco history.  No history of COPD exacerbation and it appears that the majority of her recent admissions are likely related to volume overload.  I will continue her Advair, Incruse and as needed bronchodilators.  We will attempt to diurese her and get her into cardiology as soon as possible.  4.  This is presumed and her CT chest from her hospital stay although in the setting of acute volume overload is suggestive of underlying interstitial lung disease I cannot confirm it is secondary to amiodarone however she is currently off amiodarone and certainly her chest x-ray improved but this was both with diuresis and with prednisone therapy.  We will continue her prednisone taper and attempt diuresis as per above and see her back for reassessment in 1 week.  Ideally as per above I would like to obtain a CT chest once she is in a euvolemic state.  Return in about 1 week  (around 8/15/2019) for labs.

## 2019-08-09 ENCOUNTER — TELEPHONE (OUTPATIENT)
Dept: PULMONOLOGY | Facility: HOSPICE | Age: 84
End: 2019-08-09

## 2019-08-09 NOTE — TELEPHONE ENCOUNTER
Dr. Dewitt, [facility] called questioning if patient is to continue Bactrim M, W, F dosing along with lasix dosing. I reviewed your directions for lasix per your note.  Should patient continue Bactrim?       Please call Alycia [phone]@  [facility] to clarify.    Patient is in SNF currently.

## 2019-08-12 NOTE — TELEPHONE ENCOUNTER
Dr. Dewitt stated she is on low dose of prednisone but to stop bactrim.    Can you please relay this info to SNF?    Please see message below.

## 2019-08-12 NOTE — TELEPHONE ENCOUNTER
Addis Dewitt M.D.  [address] 1 hour ago (7:50 AM)      I apologize for the delay in reply.  She is on low enough dose of prednisone that the bactrim can be stopped    Routing comment

## 2019-08-12 NOTE — TELEPHONE ENCOUNTER
Left vm [facility] [phone] informed as directed per Dr. Dewitt pt on low dose prednisone and stop Bactrim. Encouraged to call me directly if any questions or concerns.

## 2019-08-15 ENCOUNTER — OFFICE VISIT (OUTPATIENT)
Dept: PULMONOLOGY | Facility: HOSPICE | Age: 84
End: 2019-08-15
Payer: MEDICARE

## 2019-08-15 VITALS
HEART RATE: 69 BPM | DIASTOLIC BLOOD PRESSURE: 64 MMHG | TEMPERATURE: 97.7 F | HEIGHT: 65 IN | RESPIRATION RATE: 16 BRPM | SYSTOLIC BLOOD PRESSURE: 124 MMHG | WEIGHT: 146 LBS | OXYGEN SATURATION: 97 % | BODY MASS INDEX: 24.32 KG/M2

## 2019-08-15 DIAGNOSIS — Z87.891 HISTORY OF TOBACCO USE: ICD-10-CM

## 2019-08-15 DIAGNOSIS — T46.2X5A AMIODARONE PULMONARY TOXICITY: ICD-10-CM

## 2019-08-15 DIAGNOSIS — N18.9 CHRONIC KIDNEY DISEASE, UNSPECIFIED CKD STAGE: ICD-10-CM

## 2019-08-15 DIAGNOSIS — J98.4 AMIODARONE PULMONARY TOXICITY: ICD-10-CM

## 2019-08-15 DIAGNOSIS — E87.70 HYPERVOLEMIA, UNSPECIFIED HYPERVOLEMIA TYPE: ICD-10-CM

## 2019-08-15 DIAGNOSIS — J96.11 CHRONIC RESPIRATORY FAILURE WITH HYPOXIA (HCC): ICD-10-CM

## 2019-08-15 PROCEDURE — 99215 OFFICE O/P EST HI 40 MIN: CPT | Performed by: INTERNAL MEDICINE

## 2019-08-15 NOTE — PROGRESS NOTES
Chief Complaint   Patient presents with   • Shortness of Breath     last seen 8/8/19    • Results     labs home care          HPI: This patient is a 84 y.o. female whom is followed in our clinic for hypoxic respiratory failure last seen by me on 8/8/19.  PMHx is significant for CKD, chronic atrial fibrillation, and chart diagnosis of COPD with no pulmonary function test that I have access to, history of gastroesophageal reflux disease.  The pt presented to the emergency department on June 8, 2019 with acute hypoxic respiratory failure.  This was felt to be related to flash pulmonary edema versus amiodarone toxicity.  Her amiodarone was held and she was tx with systemic steroids and discharged however she represented on June 26 after a fall with worsening hypoxia and worsening chest x-ray.  Of note the steroids given for amiodarone toxicity were tapered quickly following discharge from her first hospital stay.  She was admitted to the intensive care unit and underwent diuresis as well as resumption of high dose steroids.  She improved and was discharged to rehab facility where she has been staying since that time.  She was seen in our office on July 22 and Lasix was started for some bilateral lower extremity swelling however this was only for a period of 8 days and when I saw her for f/u on 8/8 she had been off lasix for about a week with a weight gain of roughly 4-1/2 to 5 pounds.  She had been tapering steroids slowly as prescribed by Dr. Herman on 7/22 and was on stable O2 at 3L. We did a repeat CXR which was slightly worse than her d/c CXR.  I resumed her lasix at only 20 mg daily given b/l LE edema and planned to see her back in one week.  She presents today for f/u.  She is now needing on 2L of supplemental O2.  Her edema is mildly improved over the last 2 days but per family this was not notable at first.  Her labs show Cr of 2.1 and K of 5.3.  She is on 30 mg daily of prednisone and has stopped bactrim.  She  is tolerating rehab and they are planning for d/c next week which family is concerned about as she has not yet been seen by cardiology and she likes alone in [place].  The pt denies cough, worsening sob or chest pain. No f/c.     Past Medical History:   Diagnosis Date   • Arrhythmia     transient a fib   • GERD (gastroesophageal reflux disease)    • Heart burn    • Hypertension    • Indigestion    • Other specified disorder of intestines     occasional constipation   • Pain     back, with sciatic pain   • Renal disorder     followed by dr clifford, states 40% function   • Shortness of breath        Social History     Socioeconomic History   • Marital status:      Spouse name: Not on file   • Number of children: Not on file   • Years of education: Not on file   • Highest education level: Not on file   Occupational History   • Not on file   Social Needs   • Financial resource strain: Not on file   • Food insecurity:     Worry: Not on file     Inability: Not on file   • Transportation needs:     Medical: Not on file     Non-medical: Not on file   Tobacco Use   • Smoking status: Former Smoker     Packs/day: 1.00     Years: 40.00     Pack years: 40.00     Types: Cigarettes     Start date: 1952     Last attempt to quit: 6/3/2019     Years since quittin.2   • Smokeless tobacco: Never Used   Substance and Sexual Activity   • Alcohol use: No   • Drug use: No   • Sexual activity: Not on file   Lifestyle   • Physical activity:     Days per week: Not on file     Minutes per session: Not on file   • Stress: Not on file   Relationships   • Social connections:     Talks on phone: Not on file     Gets together: Not on file     Attends [employer] service: Not on file     Active member of club or organization: Not on file     Attends meetings of clubs or organizations: Not on file     Relationship status: Not on file   • Intimate partner violence:     Fear of current or ex partner: Not on file     Emotionally abused: Not  on file     Physically abused: Not on file     Forced sexual activity: Not on file   Other Topics Concern   • Not on file   Social History Narrative   • Not on file       Family History   Problem Relation Age of Onset   • Cancer Mother         Breast Cancer   • Cancer Sister         lukemia   • Cancer Brother         Pancreatic   • Alcohol/Drug Brother        Current Outpatient Medications on File Prior to Visit   Medication Sig Dispense Refill   • insulin NPH (NOVOLIN N RELION) 100 UNIT/ML Suspension Inject  as instructed.     • Ascorbic Acid (VITAMIN C) 250 MG Tab Take 250 mg by mouth every day.     • Cholecalciferol (VITAMIN D PO) Take  by mouth.     • guaiFENesin (ROBITUSSIN) 100 MG/5ML liquid Take  by mouth.     • Umeclidinium Bromide (INCRUSE ELLIPTA) 62.5 MCG/INH AEROSOL POWDER, BREATH ACTIVATED Inhale  by mouth.     • nystatin (MYCOSTATIN) 564957 UNIT/ML Suspension Take 500,000 Units by mouth 4 times a day.     • Acetaminophen (TYLENOL) 325 MG Cap Take  by mouth.     • fluticasone-salmeterol (WIXELA INHUB) 100-50 MCG/DOSE AEROSOL POWDER, BREATH ACTIVATED Inhale 1 Puff by mouth every 12 hours.     • furosemide (LASIX) 20 MG Tab Take 1 Tab by mouth every day. 30 Tab 11   • predniSONE (DELTASONE) 20 MG Tab Take 2 Tabs by mouth 2 times a day. 30 Tab 0   • apixaban (ELIQUIS) 2.5mg Tab Take 1 Tab by mouth 2 Times a Day. 60 Tab    • carvedilol (COREG) 6.25 MG Tab Take 1 Tab by mouth 2 times a day, with meals. 60 Tab 3   • ferrous gluconate (FERGON) 324 (38 Fe) MG Tab Take 324 mg by mouth every morning with breakfast.     • ipratropium-albuterol (DUONEB) 0.5-2.5 (3) MG/3ML nebulizer solution 3 mL by Nebulization route every 6 hours as needed for Shortness of Breath.     • omeprazole (PRILOSEC) 20 MG CPDR Take 20 mg by mouth every day.     • sulfamethoxazole-trimethoprim (BACTRIM DS) 800-160 MG tablet Take 1 Tab by mouth every Monday, Wednesday, and Friday. (Patient not taking: Reported on 8/15/2019)  0   • insulin  glargine (LANTUS) 100 UNIT/ML Solution Inject 5 Units as instructed every evening. (Patient not taking: Reported on 8/15/2019) 10 mL    • insulin regular (HUMULIN R) 100 Unit/mL Solution Inject 2-9 Units as instructed 4 Times a Day,Before Meals and at Bedtime. (Patient not taking: Reported on 8/15/2019) 10 mL    • budesonide-formoterol (SYMBICORT) 80-4.5 MCG/ACT Aerosol Inhale 2 Puffs by mouth 2 Times a Day. (Patient not taking: Reported on 8/8/2019) 1 Inhaler 3   • tiotropium (SPIRIVA) 18 MCG Cap Inhale 1 Cap by mouth every day. (Patient not taking: Reported on 8/8/2019) 30 Cap 3   • buPROPion SR (WELLBUTRIN-SR) 150 MG TABLET SR 12 HR sustained-release tablet Take 150 mg by mouth every day.       No current facility-administered medications on file prior to visit.        Pcn [penicillins]      ROS:   Review of Systems   Constitutional: Negative for chills, diaphoresis, fever, malaise/fatigue and weight loss.   HENT: Negative for congestion, ear discharge, ear pain, hearing loss, nosebleeds, sinus pain, sore throat and tinnitus.    Eyes: Negative for blurred vision, double vision, photophobia, pain, discharge and redness.   Respiratory: Positive for cough and shortness of breath. Negative for hemoptysis, sputum production, wheezing and stridor.    Cardiovascular: Positive for leg swelling. Negative for chest pain, palpitations, orthopnea, claudication and PND.   Gastrointestinal: Negative for abdominal pain, constipation, diarrhea, heartburn, nausea and vomiting.   Genitourinary: Negative for dysuria and urgency.   Musculoskeletal: Negative for back pain, falls, joint pain, myalgias and neck pain.   Skin: Negative for itching and rash.   Neurological: Negative for dizziness, tremors, speech change, focal weakness, weakness and headaches.   Endo/Heme/Allergies: Negative for environmental allergies.   Psychiatric/Behavioral: Negative for depression.       /64 (BP Location: Left arm, Patient Position: Sitting, BP  "Cuff Size: Adult)   Pulse 69   Temp 36.5 °C (97.7 °F) (Temporal)   Resp 16   Ht 1.651 m (5' 5\")   Wt 66.2 kg (146 lb)   SpO2 97%   Physical Exam   Constitutional: She is oriented to person, place, and time. She appears well-developed and well-nourished.   HENT:   Head: Normocephalic and atraumatic.   Left Ear: External ear normal.   Mouth/Throat: Oropharynx is clear and moist. No oropharyngeal exudate.   Eyes: Pupils are equal, round, and reactive to light. Conjunctivae and EOM are normal. No scleral icterus.   Neck: Neck supple. No JVD present. No tracheal deviation present.   Cardiovascular: Normal rate, regular rhythm and normal heart sounds. Exam reveals no gallop and no friction rub.   No murmur heard.  Pulmonary/Chest: Effort normal. No accessory muscle usage. No respiratory distress. She has no wheezes. She has no rales.   Abdominal: Soft. She exhibits no distension. There is no tenderness.   Musculoskeletal: Normal range of motion. She exhibits edema. She exhibits no tenderness or deformity.   1+ b/l pedal edema   Lymphadenopathy:     She has no cervical adenopathy.   Neurological: She is alert and oriented to person, place, and time. No cranial nerve deficit. Gait normal.   Skin: Skin is warm and dry. No rash noted. No cyanosis. Nails show no clubbing.   Psychiatric: She has a normal mood and affect.       PFTs as reviewed by me personally:    Imagaing as reviewed by me personally:      Assessment:  1. Chronic respiratory failure with hypoxia (HCC)  proBrain Natriuretic Peptide, NT    DX-CHEST-2 VIEWS   2. Amiodarone pulmonary toxicity     3. Hypervolemia, unspecified hypervolemia type  proBrain Natriuretic Peptide, NT   4. Chronic kidney disease, unspecified CKD stage  Basic Metabolic Panel   5. History of tobacco use         Plan:  1. This is presumed to be acute on chronic given e/o ILD on imaging presumed to be related to amiodarone toxicity.  She likely has underlying COPD as well.  We have not " obtained PFTs given concern for hypervolemia.  Her O2 requirements are actually improving despite lowering prednisone dose.  I am recommending we continue low-dose lasix and see her back in one week with repeat labs and CXR.  I have ordered BNP as well.  2. Her imagine from the hospital is certainly suggestive of ILD and did improve with both steroids and diuresis.  She is tolerating reduced doses of prednisone with stable to improved oxygen requirements so I will continue with taper at this point.  We will repeat CXR and labs in one week. She will need regular/close f/u at least monthly following d/c to home.  3. Mild and by my exam improved from our visit last week although I am not sure how effective a dose of 20 mg is with her renal fx.  We will continue for one more week and re-check labs and CXR.  I did advise pt and family to make f/u with renal and establish care with cardiology; consult placed by me.   4. Cr has been between for 1.5-2 for some time.  I suspect this is in part due to diastolic dysfx in the setting of af.  As per above, I am not sure how effective the low dose of lasix is in the setting of CKD but I certainly do not want to make it worse.  She will need f/u with renal as well as cardiology as per above.   5. Tobacco free. Tx for presumed COPD however needs full PFTs once felt to be euvolemic.  Also needs annual low-dose CT chest.  Counseled on ongoing abstinence.   Return in about 1 week (around 8/22/2019) for labs and CXR.

## 2019-08-16 ENCOUNTER — OFFICE VISIT (OUTPATIENT)
Dept: CARDIOLOGY | Facility: MEDICAL CENTER | Age: 84
End: 2019-08-16
Payer: MEDICARE

## 2019-08-16 VITALS
SYSTOLIC BLOOD PRESSURE: 100 MMHG | WEIGHT: 146 LBS | BODY MASS INDEX: 24.32 KG/M2 | HEIGHT: 65 IN | HEART RATE: 88 BPM | DIASTOLIC BLOOD PRESSURE: 62 MMHG | OXYGEN SATURATION: 96 %

## 2019-08-16 DIAGNOSIS — I27.20 PULMONARY HYPERTENSION (HCC): ICD-10-CM

## 2019-08-16 DIAGNOSIS — I50.31 ACUTE DIASTOLIC CONGESTIVE HEART FAILURE (HCC): ICD-10-CM

## 2019-08-16 DIAGNOSIS — I48.20 CHRONIC ATRIAL FIBRILLATION (HCC): ICD-10-CM

## 2019-08-16 DIAGNOSIS — Z79.01 CHRONIC ANTICOAGULATION: ICD-10-CM

## 2019-08-16 PROCEDURE — 99204 OFFICE O/P NEW MOD 45 MIN: CPT | Performed by: INTERNAL MEDICINE

## 2019-08-16 ASSESSMENT — ENCOUNTER SYMPTOMS
DIAPHORESIS: 0
BACK PAIN: 0
BLURRED VISION: 0
PND: 0
PALPITATIONS: 0
CONSTIPATION: 0
DEPRESSION: 0
CLAUDICATION: 0
EYE DISCHARGE: 0
SHORTNESS OF BREATH: 1
PND: 0
WEIGHT LOSS: 0
NECK PAIN: 0
MYALGIAS: 0
BLURRED VISION: 0
DIARRHEA: 0
DOUBLE VISION: 0
NERVOUS/ANXIOUS: 0
HEARTBURN: 0
SINUS PAIN: 0
SPUTUM PRODUCTION: 0
CHILLS: 0
EYE DISCHARGE: 0
CHILLS: 0
FEVER: 0
BRUISES/BLEEDS EASILY: 0
TREMORS: 0
STRIDOR: 0
NAUSEA: 0
WHEEZING: 0
HEADACHES: 0
WEAKNESS: 0
COUGH: 1
DEPRESSION: 0
FALLS: 0
SORE THROAT: 0
COUGH: 0
PALPITATIONS: 0
SPEECH CHANGE: 0
ORTHOPNEA: 0
SHORTNESS OF BREATH: 1
HEMOPTYSIS: 0
ABDOMINAL PAIN: 0
FOCAL WEAKNESS: 0
NAUSEA: 0
DIZZINESS: 0
EYE PAIN: 0
VOMITING: 0
PHOTOPHOBIA: 0
EYE REDNESS: 0
FEVER: 0
HEADACHES: 0
DIZZINESS: 0
HEARTBURN: 0
MYALGIAS: 0

## 2019-08-16 NOTE — PROGRESS NOTES
Chief Complaint   Patient presents with   • Shortness of Breath     questions about Rx       Subjective:   Ama Castillo is a 84 y.o. female who presents today in consultation at the request of Dr. Addis Dewitt for chronic atrial fibrillation.  This patient saw a cardiologist in [place] in April 2018 for paroxysmal atrial fibrillation while taking amiodarone.  At the time she was in sinus rhythm.    Amiodarone was maintained until 2 hospitalizations in June 2019 with severe respiratory distress although never requiring ventilation.  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  She was rehospitalized for respiratory insufficiency a second time and is now recovering in the skilled nursing facility.  Discharge from SNF is being contemplated in the next week and she will probably return to her home in [facility].    She has a long history of tobacco usage.  She has a long history of mild to moderate aortic regurgitation, normal left ventricular systolic function and variable pulmonary artery hypertension, the last visit value being 55 mmHg.  A single EKG on June 8, 2019 when she was in acute distress demonstrated atrial flutter or 2-1 AV block.  No further EKGs were done but she was in chronic atrial fibrillation on repeated hospitalizations and observations.    Currently she has some leg edema but is wearing support hose.  Chronic dyspnea has slowly improved with steroid treatment for amiodarone.  Steroid dosage is being slowly reduced.  She is tolerating Eliquis well at this point.  Some degree of renal insufficiency is also appreciated.    Past Medical History:   Diagnosis Date   • Arrhythmia     transient a fib   • GERD (gastroesophageal reflux disease)    • Heart burn    • Hypertension    • Indigestion    • Other specified disorder of intestines     occasional constipation   • Pain     back, with sciatic pain   • Renal  disorder     followed by dr clifford, states 40% function   • Shortness of breath      Past Surgical History:   Procedure Laterality Date   • LUMBAR FUSION POSTERIOR  2014    Performed by Brendon Juarez M.D. at SUR[facility]   • OTHER NEUROLOGICAL SURG  2013    repair of dura to spine   • KNEE REPLACEMENT, TOTAL  2003    frieda knees     Family History   Problem Relation Age of Onset   • Cancer Mother         Breast Cancer   • Cancer Sister         lukemia   • Cancer Brother         Pancreatic   • Alcohol/Drug Brother      Social History     Socioeconomic History   • Marital status:      Spouse name: Not on file   • Number of children: Not on file   • Years of education: Not on file   • Highest education level: Not on file   Occupational History   • Not on file   Social Needs   • Financial resource strain: Not on file   • Food insecurity:     Worry: Not on file     Inability: Not on file   • Transportation needs:     Medical: Not on file     Non-medical: Not on file   Tobacco Use   • Smoking status: Former Smoker     Packs/day: 1.00     Years: 40.00     Pack years: 40.00     Types: Cigarettes     Start date: 1952     Last attempt to quit: 6/3/2019     Years since quittin.2   • Smokeless tobacco: Never Used   Substance and Sexual Activity   • Alcohol use: No   • Drug use: No   • Sexual activity: Not on file   Lifestyle   • Physical activity:     Days per week: Not on file     Minutes per session: Not on file   • Stress: Not on file   Relationships   • Social connections:     Talks on phone: Not on file     Gets together: Not on file     Attends Muslim service: Not on file     Active member of club or organization: Not on file     Attends meetings of clubs or organizations: Not on file     Relationship status: Not on file   • Intimate partner violence:     Fear of current or ex partner: Not on file     Emotionally abused: Not on file     Physically abused: Not on file     Forced sexual  activity: Not on file   Other Topics Concern   • Not on file   Social History Narrative   • Not on file     Allergies   Allergen Reactions   • Pcn [Penicillins] Rash     Tolerated ceftriaxone 6/2019     Outpatient Encounter Medications as of 8/16/2019   Medication Sig Dispense Refill   • insulin NPH (NOVOLIN N RELION) 100 UNIT/ML Suspension Inject  as instructed.     • Ascorbic Acid (VITAMIN C) 250 MG Tab Take 250 mg by mouth every day.     • Cholecalciferol (VITAMIN D PO) Take  by mouth.     • Umeclidinium Bromide (INCRUSE ELLIPTA) 62.5 MCG/INH AEROSOL POWDER, BREATH ACTIVATED Inhale  by mouth.     • nystatin (MYCOSTATIN) 764089 UNIT/ML Suspension Take 500,000 Units by mouth 4 times a day.     • Acetaminophen (TYLENOL) 325 MG Cap Take  by mouth.     • fluticasone-salmeterol (WIXELA INHUB) 100-50 MCG/DOSE AEROSOL POWDER, BREATH ACTIVATED Inhale 1 Puff by mouth every 12 hours.     • furosemide (LASIX) 20 MG Tab Take 1 Tab by mouth every day. 30 Tab 11   • predniSONE (DELTASONE) 20 MG Tab Take 2 Tabs by mouth 2 times a day. 30 Tab 0   • apixaban (ELIQUIS) 2.5mg Tab Take 1 Tab by mouth 2 Times a Day. 60 Tab    • carvedilol (COREG) 6.25 MG Tab Take 1 Tab by mouth 2 times a day, with meals. 60 Tab 3   • budesonide-formoterol (SYMBICORT) 80-4.5 MCG/ACT Aerosol Inhale 2 Puffs by mouth 2 Times a Day. 1 Inhaler 3   • ferrous gluconate (FERGON) 324 (38 Fe) MG Tab Take 324 mg by mouth every morning with breakfast.     • ipratropium-albuterol (DUONEB) 0.5-2.5 (3) MG/3ML nebulizer solution 3 mL by Nebulization route every 6 hours as needed for Shortness of Breath.     • omeprazole (PRILOSEC) 20 MG CPDR Take 20 mg by mouth every day.     • guaiFENesin (ROBITUSSIN) 100 MG/5ML liquid Take  by mouth.     • sulfamethoxazole-trimethoprim (BACTRIM DS) 800-160 MG tablet Take 1 Tab by mouth every Monday, Wednesday, and Friday. (Patient not taking: Reported on 8/15/2019)  0   • insulin glargine (LANTUS) 100 UNIT/ML Solution Inject 5 Units  "as instructed every evening. (Patient not taking: Reported on 8/15/2019) 10 mL    • insulin regular (HUMULIN R) 100 Unit/mL Solution Inject 2-9 Units as instructed 4 Times a Day,Before Meals and at Bedtime. (Patient not taking: Reported on 8/15/2019) 10 mL    • tiotropium (SPIRIVA) 18 MCG Cap Inhale 1 Cap by mouth every day. (Patient not taking: Reported on 8/8/2019) 30 Cap 3   • [DISCONTINUED] buPROPion SR (WELLBUTRIN-SR) 150 MG TABLET SR 12 HR sustained-release tablet Take 150 mg by mouth every day.       No facility-administered encounter medications on file as of 8/16/2019.      Review of Systems   Constitutional: Positive for malaise/fatigue. Negative for chills and fever.   Eyes: Negative for blurred vision and discharge.   Respiratory: Positive for shortness of breath. Negative for cough.    Cardiovascular: Positive for leg swelling. Negative for chest pain, palpitations and PND.   Gastrointestinal: Negative for heartburn and nausea.   Genitourinary: Negative for dysuria and urgency.   Musculoskeletal: Negative for myalgias.   Skin: Negative for itching and rash.   Neurological: Negative for dizziness and headaches.   Endo/Heme/Allergies: Negative for environmental allergies. Does not bruise/bleed easily.   Psychiatric/Behavioral: Negative for depression. The patient is not nervous/anxious.         Objective:   /62 (BP Location: Left arm, Patient Position: Sitting, BP Cuff Size: Adult)   Pulse 88   Ht 1.651 m (5' 5\")   Wt 66.2 kg (146 lb)   SpO2 96%   BMI 24.30 kg/m²     Physical Exam   Constitutional: She is oriented to person, place, and time.   Pleasant elderly lady somewhat pale wearing oxygen in a wheelchair.  With help, she was able to be transferred to exam table.   Neck:   Neck veins do not appear distended either in upright position or 30 degrees head ambulation.   Cardiovascular: Normal rate. An irregularly irregular rhythm present. Exam reveals no gallop and no friction rub.   No murmur " heard.  Pulmonary/Chest: Effort normal.   Few scattered rales right lower lobe   Abdominal: Soft. There is no tenderness.   Musculoskeletal:   Knee-high support hose with nonpitting swelling in legs   Neurological: She is alert and oriented to person, place, and time.   Skin: Skin is warm and dry.       Assessment:     1. Chronic atrial fibrillation (HCC)     2. Pulmonary hypertension (HCC)     3. Acute diastolic congestive heart failure (HCC)     4. Chronic anticoagulation         Medical Decision Making:  Today's Assessment / Status / Plan:   I agree with the plan to leave her in chronic atrial fibrillation with stroke protection.  Current dose of Lasix of 20 mg/day appears to be optimal for her congested state.  Certainly, some of her edema is related to steroid usage and prednisone dosage continues to be lowered over a 6-week.    I agree with current medications and plan.  No additions to cardiac status or care.  Thank you for this consult.

## 2019-08-16 NOTE — LETTER
[facility]   [address], [facility] [address]  [place] [address]  Phone: [phone]  Fax: [phone]              Ama Castillo  1935    Encounter Date: 8/16/2019    Erick Prado M.D.          PROGRESS NOTE:  Chief Complaint   Patient presents with   • Shortness of Breath     questions about Rx       Subjective:   Ama Castillo is a 84 y.o. female who presents today in consultation at the request of Dr. Addis Dewitt for chronic atrial fibrillation.  This patient saw a cardiologist in [place] in April 2018 for paroxysmal atrial fibrillation while taking amiodarone.  At the time she was in sinus rhythm.    Amiodarone was maintained until 2 hospitalizations in June 2019 with severe respiratory distress although never requiring ventilation.  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  She was rehospitalized for respiratory insufficiency a second time and is now recovering in the skilled nursing facility.  Discharge from SNF is being contemplated in the next week and she will probably return to her home in [facility].    She has a long history of tobacco usage.  She has a long history of mild to moderate aortic regurgitation, normal left ventricular systolic function and variable pulmonary artery hypertension, the last visit value being 55 mmHg.  A single EKG on June 8, 2019 when she was in acute distress demonstrated atrial flutter or 2-1 AV block.  No further EKGs were done but she was in chronic atrial fibrillation on repeated hospitalizations and observations.    Currently she has some leg edema but is wearing support hose.  Chronic dyspnea has slowly improved with steroid treatment for amiodarone.  Steroid dosage is being slowly reduced.  She is tolerating Eliquis well at this point.  Some degree of renal insufficiency is also appreciated.    Past Medical History:    Diagnosis Date   • Arrhythmia     transient a fib   • GERD (gastroesophageal reflux disease)    • Heart burn    • Hypertension    • Indigestion    • Other specified disorder of intestines     occasional constipation   • Pain     back, with sciatic pain   • Renal disorder     followed by dr clifford, states 40% function   • Shortness of breath      Past Surgical History:   Procedure Laterality Date   • LUMBAR FUSION POSTERIOR  2014    Performed by Brendon Juarez M.D. at SURGERY [facility]   • OTHER NEUROLOGICAL SURG  2013    repair of dura to spine   • KNEE REPLACEMENT, TOTAL  2003    frieda knees     Family History   Problem Relation Age of Onset   • Cancer Mother         Breast Cancer   • Cancer Sister         lukemia   • Cancer Brother         Pancreatic   • Alcohol/Drug Brother      Social History     Socioeconomic History   • Marital status:      Spouse name: Not on file   • Number of children: Not on file   • Years of education: Not on file   • Highest education level: Not on file   Occupational History   • Not on file   Social Needs   • Financial resource strain: Not on file   • Food insecurity:     Worry: Not on file     Inability: Not on file   • Transportation needs:     Medical: Not on file     Non-medical: Not on file   Tobacco Use   • Smoking status: Former Smoker     Packs/day: 1.00     Years: 40.00     Pack years: 40.00     Types: Cigarettes     Start date: 1952     Last attempt to quit: 6/3/2019     Years since quittin.2   • Smokeless tobacco: Never Used   Substance and Sexual Activity   • Alcohol use: No   • Drug use: No   • Sexual activity: Not on file   Lifestyle   • Physical activity:     Days per week: Not on file     Minutes per session: Not on file   • Stress: Not on file   Relationships   • Social connections:     Talks on phone: Not on file     Gets together: Not on file     Attends Voodoo service: Not on file     Active member of club or organization: Not on file      Attends meetings of clubs or organizations: Not on file     Relationship status: Not on file   • Intimate partner violence:     Fear of current or ex partner: Not on file     Emotionally abused: Not on file     Physically abused: Not on file     Forced sexual activity: Not on file   Other Topics Concern   • Not on file   Social History Narrative   • Not on file     Allergies   Allergen Reactions   • Pcn [Penicillins] Rash     Tolerated ceftriaxone 6/2019     Outpatient Encounter Medications as of 8/16/2019   Medication Sig Dispense Refill   • insulin NPH (NOVOLIN N RELION) 100 UNIT/ML Suspension Inject  as instructed.     • Ascorbic Acid (VITAMIN C) 250 MG Tab Take 250 mg by mouth every day.     • Cholecalciferol (VITAMIN D PO) Take  by mouth.     • Umeclidinium Bromide (INCRUSE ELLIPTA) 62.5 MCG/INH AEROSOL POWDER, BREATH ACTIVATED Inhale  by mouth.     • nystatin (MYCOSTATIN) 736167 UNIT/ML Suspension Take 500,000 Units by mouth 4 times a day.     • Acetaminophen (TYLENOL) 325 MG Cap Take  by mouth.     • fluticasone-salmeterol (WIXELA INHUB) 100-50 MCG/DOSE AEROSOL POWDER, BREATH ACTIVATED Inhale 1 Puff by mouth every 12 hours.     • furosemide (LASIX) 20 MG Tab Take 1 Tab by mouth every day. 30 Tab 11   • predniSONE (DELTASONE) 20 MG Tab Take 2 Tabs by mouth 2 times a day. 30 Tab 0   • apixaban (ELIQUIS) 2.5mg Tab Take 1 Tab by mouth 2 Times a Day. 60 Tab    • carvedilol (COREG) 6.25 MG Tab Take 1 Tab by mouth 2 times a day, with meals. 60 Tab 3   • budesonide-formoterol (SYMBICORT) 80-4.5 MCG/ACT Aerosol Inhale 2 Puffs by mouth 2 Times a Day. 1 Inhaler 3   • ferrous gluconate (FERGON) 324 (38 Fe) MG Tab Take 324 mg by mouth every morning with breakfast.     • ipratropium-albuterol (DUONEB) 0.5-2.5 (3) MG/3ML nebulizer solution 3 mL by Nebulization route every 6 hours as needed for Shortness of Breath.     • omeprazole (PRILOSEC) 20 MG CPDR Take 20 mg by mouth every day.     • guaiFENesin (ROBITUSSIN) 100  "MG/5ML liquid Take  by mouth.     • sulfamethoxazole-trimethoprim (BACTRIM DS) 800-160 MG tablet Take 1 Tab by mouth every Monday, Wednesday, and Friday. (Patient not taking: Reported on 8/15/2019)  0   • insulin glargine (LANTUS) 100 UNIT/ML Solution Inject 5 Units as instructed every evening. (Patient not taking: Reported on 8/15/2019) 10 mL    • insulin regular (HUMULIN R) 100 Unit/mL Solution Inject 2-9 Units as instructed 4 Times a Day,Before Meals and at Bedtime. (Patient not taking: Reported on 8/15/2019) 10 mL    • tiotropium (SPIRIVA) 18 MCG Cap Inhale 1 Cap by mouth every day. (Patient not taking: Reported on 8/8/2019) 30 Cap 3   • [DISCONTINUED] buPROPion SR (WELLBUTRIN-SR) 150 MG TABLET SR 12 HR sustained-release tablet Take 150 mg by mouth every day.       No facility-administered encounter medications on file as of 8/16/2019.      Review of Systems   Constitutional: Positive for malaise/fatigue. Negative for chills and fever.   Eyes: Negative for blurred vision and discharge.   Respiratory: Positive for shortness of breath. Negative for cough.    Cardiovascular: Positive for leg swelling. Negative for chest pain, palpitations and PND.   Gastrointestinal: Negative for heartburn and nausea.   Genitourinary: Negative for dysuria and urgency.   Musculoskeletal: Negative for myalgias.   Skin: Negative for itching and rash.   Neurological: Negative for dizziness and headaches.   Endo/Heme/Allergies: Negative for environmental allergies. Does not bruise/bleed easily.   Psychiatric/Behavioral: Negative for depression. The patient is not nervous/anxious.         Objective:   /62 (BP Location: Left arm, Patient Position: Sitting, BP Cuff Size: Adult)   Pulse 88   Ht 1.651 m (5' 5\")   Wt 66.2 kg (146 lb)   SpO2 96%   BMI 24.30 kg/m²      Physical Exam   Constitutional: She is oriented to person, place, and time.   Pleasant elderly lady somewhat pale wearing oxygen in a wheelchair.  With help, she was " able to be transferred to exam table.   Neck:   Neck veins do not appear distended either in upright position or 30 degrees head ambulation.   Cardiovascular: Normal rate. An irregularly irregular rhythm present. Exam reveals no gallop and no friction rub.   No murmur heard.  Pulmonary/Chest: Effort normal.   Few scattered rales right lower lobe   Abdominal: Soft. There is no tenderness.   Musculoskeletal:   Knee-high support hose with nonpitting swelling in legs   Neurological: She is alert and oriented to person, place, and time.   Skin: Skin is warm and dry.       Assessment:     1. Chronic atrial fibrillation (HCC)     2. Pulmonary hypertension (HCC)     3. Acute diastolic congestive heart failure (HCC)     4. Chronic anticoagulation         Medical Decision Making:  Today's Assessment / Status / Plan:   I agree with the plan to leave her in chronic atrial fibrillation with stroke protection.  Current dose of Lasix of 20 mg/day appears to be optimal for her congested state.  Certainly, some of her edema is related to steroid usage and prednisone dosage continues to be lowered over a 6-week.    I agree with current medications and plan.  No additions to cardiac status or care.  Thank you for this consult.      MICKEY Sandy  [address] [facility] [place] [address]  VIA Facsimile: [phone]     Addis Dewitt M.D.  [address] [facility] [address]  [facility] [address]  VIA In Basket

## 2019-08-22 ENCOUNTER — APPOINTMENT (OUTPATIENT)
Dept: RADIOLOGY | Facility: IMAGING CENTER | Age: 84
End: 2019-08-22
Attending: INTERNAL MEDICINE
Payer: MEDICARE

## 2019-08-22 ENCOUNTER — OFFICE VISIT (OUTPATIENT)
Dept: PULMONOLOGY | Facility: HOSPICE | Age: 84
End: 2019-08-22
Payer: MEDICARE

## 2019-08-22 VITALS
DIASTOLIC BLOOD PRESSURE: 60 MMHG | BODY MASS INDEX: 24.66 KG/M2 | SYSTOLIC BLOOD PRESSURE: 114 MMHG | OXYGEN SATURATION: 95 % | TEMPERATURE: 97.7 F | WEIGHT: 148 LBS | RESPIRATION RATE: 16 BRPM | HEART RATE: 101 BPM | HEIGHT: 65 IN

## 2019-08-22 DIAGNOSIS — E87.70 HYPERVOLEMIA, UNSPECIFIED HYPERVOLEMIA TYPE: ICD-10-CM

## 2019-08-22 DIAGNOSIS — J96.11 CHRONIC RESPIRATORY FAILURE WITH HYPOXIA (HCC): ICD-10-CM

## 2019-08-22 DIAGNOSIS — T46.2X5A AMIODARONE PULMONARY TOXICITY: ICD-10-CM

## 2019-08-22 DIAGNOSIS — J98.4 AMIODARONE PULMONARY TOXICITY: ICD-10-CM

## 2019-08-22 DIAGNOSIS — N18.9 CHRONIC KIDNEY DISEASE, UNSPECIFIED CKD STAGE: ICD-10-CM

## 2019-08-22 DIAGNOSIS — I48.20 CHRONIC ATRIAL FIBRILLATION (HCC): ICD-10-CM

## 2019-08-22 PROCEDURE — 71046 X-RAY EXAM CHEST 2 VIEWS: CPT | Mod: TC | Performed by: INTERNAL MEDICINE

## 2019-08-22 PROCEDURE — 99214 OFFICE O/P EST MOD 30 MIN: CPT | Performed by: INTERNAL MEDICINE

## 2019-08-22 ASSESSMENT — ENCOUNTER SYMPTOMS
TREMORS: 0
COUGH: 0
VOMITING: 0
WEAKNESS: 0
CHILLS: 0
WHEEZING: 0
PALPITATIONS: 0
NAUSEA: 0
WEIGHT LOSS: 0
EYE PAIN: 0
FOCAL WEAKNESS: 0
FEVER: 0
EYE REDNESS: 0
SPUTUM PRODUCTION: 0
HEMOPTYSIS: 0
STRIDOR: 0
MYALGIAS: 0
CLAUDICATION: 0
PND: 0
DEPRESSION: 0
HEADACHES: 0
ABDOMINAL PAIN: 0
DIAPHORESIS: 0
SORE THROAT: 0
HEARTBURN: 0
DOUBLE VISION: 0
SHORTNESS OF BREATH: 0
SPEECH CHANGE: 0
PHOTOPHOBIA: 0
EYE DISCHARGE: 0
FALLS: 0
DIZZINESS: 0
NECK PAIN: 0
BLURRED VISION: 0
DIARRHEA: 0
CONSTIPATION: 0
ORTHOPNEA: 0
SINUS PAIN: 0
BACK PAIN: 0

## 2019-08-22 NOTE — PROGRESS NOTES
Chief Complaint   Patient presents with   • Follow-Up     chronic respiratory failure with hypoxia last seen 8/15/19    • Results     CXR 8/22/19       Pt is an 85 yo female followed in our clinic for chronic respiratory failure and recent hx of amiodarone toxicity last seen by me on 8/15/19.  PMHx is significant for CKD, chronic atrial fibrillation, and chart diagnosis of COPD with no pulmonary function test that I have access to, history of gastroesophageal reflux disease.  The pt presented to the emergency department on June 8, 2019 with acute hypoxic respiratory failure.  This was felt to be related to flash pulmonary edema versus amiodarone toxicity.  Her amiodarone was held and she was tx with systemic steroids and discharged however she represented on June 26 after a fall with worsening hypoxia and worsening chest x-ray.  Of note the steroids given for amiodarone toxicity were tapered quickly following discharge from her first hospital stay.  She was admitted to the intensive care unit and underwent diuresis as well as resumption of high dose steroids.  She improved and was discharged to rehab facility where she has been staying since that time. She was seen in our office on July 22 and Lasix was started for some bilateral lower extremity swelling however this was only for a period of 8 days and when I saw her for f/u on 8/8 she had been off lasix for about a week with a weight gain of roughly 4-1/2 to 5 pounds.  She had been tapering steroids slowly as prescribed by Dr. Herman on 7/22 and was on stable O2 at 3L. We did a repeat CXR which was slightly worse than her d/c CXR.  I resumed her lasix at only 20 mg daily given b/l LE edema and I saw her for f/u last week.  Sxs and edema were improved and labs showed Cr. Of 2.1 and K of 5.3.  We opted to continue lasix at 20 mg, continue steroid taper and have her f/u in one week. She presents today for repeat CXR, currently on 20 mg daily of prednisone and 2.5 L of  supplemental O2 but occasionally only needing 1L at rehab facility.  She has been seen by renal and cardiology and both felt current diuretic regimen okay.  Cardiology made no Rx changes, amlodipine was stopped for bp given mild ongoing LE edema.  Her CXR is improved even from 19 indicating she is tolerating steroid taper.  She plans to stay in [place] through the end of September at rehab to ensure stability of medical issues prior to going back  Home to [place].    Past Medical History:   Diagnosis Date   • Arrhythmia     transient a fib   • GERD (gastroesophageal reflux disease)    • Heart burn    • Hypertension    • Indigestion    • Other specified disorder of intestines     occasional constipation   • Pain     back, with sciatic pain   • Renal disorder     followed by dr clifford, states 40% function   • Shortness of breath        Social History     Socioeconomic History   • Marital status:      Spouse name: Not on file   • Number of children: Not on file   • Years of education: Not on file   • Highest education level: Not on file   Occupational History   • Not on file   Social Needs   • Financial resource strain: Not on file   • Food insecurity:     Worry: Not on file     Inability: Not on file   • Transportation needs:     Medical: Not on file     Non-medical: Not on file   Tobacco Use   • Smoking status: Former Smoker     Packs/day: 1.00     Years: 40.00     Pack years: 40.00     Types: Cigarettes     Start date: 1952     Last attempt to quit: 6/3/2019     Years since quittin.2   • Smokeless tobacco: Never Used   Substance and Sexual Activity   • Alcohol use: No   • Drug use: No   • Sexual activity: Not on file   Lifestyle   • Physical activity:     Days per week: Not on file     Minutes per session: Not on file   • Stress: Not on file   Relationships   • Social connections:     Talks on phone: Not on file     Gets together: Not on file     Attends Anglican service: Not on file     Active  member of club or organization: Not on file     Attends meetings of clubs or organizations: Not on file     Relationship status: Not on file   • Intimate partner violence:     Fear of current or ex partner: Not on file     Emotionally abused: Not on file     Physically abused: Not on file     Forced sexual activity: Not on file   Other Topics Concern   • Not on file   Social History Narrative   • Not on file       Family History   Problem Relation Age of Onset   • Cancer Mother         Breast Cancer   • Cancer Sister         lukemia   • Cancer Brother         Pancreatic   • Alcohol/Drug Brother        Current Outpatient Medications on File Prior to Visit   Medication Sig Dispense Refill   • insulin NPH (NOVOLIN N RELION) 100 UNIT/ML Suspension Inject  as instructed.     • Ascorbic Acid (VITAMIN C) 250 MG Tab Take 250 mg by mouth every day.     • Cholecalciferol (VITAMIN D PO) Take  by mouth.     • guaiFENesin (ROBITUSSIN) 100 MG/5ML liquid Take  by mouth.     • Umeclidinium Bromide (INCRUSE ELLIPTA) 62.5 MCG/INH AEROSOL POWDER, BREATH ACTIVATED Inhale  by mouth.     • nystatin (MYCOSTATIN) 055997 UNIT/ML Suspension Take 500,000 Units by mouth 4 times a day.     • Acetaminophen (TYLENOL) 325 MG Cap Take  by mouth.     • fluticasone-salmeterol (WIXELA INHUB) 100-50 MCG/DOSE AEROSOL POWDER, BREATH ACTIVATED Inhale 1 Puff by mouth every 12 hours.     • furosemide (LASIX) 20 MG Tab Take 1 Tab by mouth every day. 30 Tab 11   • predniSONE (DELTASONE) 20 MG Tab Take 2 Tabs by mouth 2 times a day. 30 Tab 0   • apixaban (ELIQUIS) 2.5mg Tab Take 1 Tab by mouth 2 Times a Day. 60 Tab    • carvedilol (COREG) 6.25 MG Tab Take 1 Tab by mouth 2 times a day, with meals. 60 Tab 3   • ferrous gluconate (FERGON) 324 (38 Fe) MG Tab Take 324 mg by mouth every morning with breakfast.     • ipratropium-albuterol (DUONEB) 0.5-2.5 (3) MG/3ML nebulizer solution 3 mL by Nebulization route every 6 hours as needed for Shortness of Breath.     •  omeprazole (PRILOSEC) 20 MG CPDR Take 20 mg by mouth every day.     • sulfamethoxazole-trimethoprim (BACTRIM DS) 800-160 MG tablet Take 1 Tab by mouth every Monday, Wednesday, and Friday. (Patient not taking: Reported on 8/15/2019)  0   • insulin glargine (LANTUS) 100 UNIT/ML Solution Inject 5 Units as instructed every evening. (Patient not taking: Reported on 8/15/2019) 10 mL    • insulin regular (HUMULIN R) 100 Unit/mL Solution Inject 2-9 Units as instructed 4 Times a Day,Before Meals and at Bedtime. (Patient not taking: Reported on 8/15/2019) 10 mL    • budesonide-formoterol (SYMBICORT) 80-4.5 MCG/ACT Aerosol Inhale 2 Puffs by mouth 2 Times a Day. (Patient not taking: Reported on 8/22/2019) 1 Inhaler 3   • tiotropium (SPIRIVA) 18 MCG Cap Inhale 1 Cap by mouth every day. (Patient not taking: Reported on 8/8/2019) 30 Cap 3     No current facility-administered medications on file prior to visit.        Pcn [penicillins]      ROS:   Review of Systems   Constitutional: Negative for chills, diaphoresis, fever, malaise/fatigue and weight loss.   HENT: Negative for congestion, ear discharge, ear pain, hearing loss, nosebleeds, sinus pain, sore throat and tinnitus.    Eyes: Negative for blurred vision, double vision, photophobia, pain, discharge and redness.   Respiratory: Negative for cough, hemoptysis, sputum production, shortness of breath, wheezing and stridor.    Cardiovascular: Negative for chest pain, palpitations, orthopnea, claudication, leg swelling and PND.   Gastrointestinal: Negative for abdominal pain, constipation, diarrhea, heartburn, nausea and vomiting.   Genitourinary: Negative for dysuria and urgency.   Musculoskeletal: Negative for back pain, falls, joint pain, myalgias and neck pain.   Skin: Negative for itching and rash.   Neurological: Negative for dizziness, tremors, speech change, focal weakness, weakness and headaches.   Endo/Heme/Allergies: Negative for environmental allergies.  "  Psychiatric/Behavioral: Negative for depression.       /60 (BP Location: Left arm, Patient Position: Sitting, BP Cuff Size: Adult)   Pulse (!) 101   Temp 36.5 °C (97.7 °F) (Temporal)   Resp 16   Ht 1.651 m (5' 5\")   Wt 67.1 kg (148 lb)   SpO2 95%   Physical Exam   Constitutional: She is oriented to person, place, and time. She appears well-developed and well-nourished.   HENT:   Head: Normocephalic and atraumatic.   Left Ear: External ear normal.   Mouth/Throat: Oropharynx is clear and moist. No oropharyngeal exudate.   Eyes: Pupils are equal, round, and reactive to light. Conjunctivae and EOM are normal. No scleral icterus.   Neck: Normal range of motion. Neck supple. No JVD present. No tracheal deviation present.   Cardiovascular: Normal rate, regular rhythm and normal heart sounds. Exam reveals no gallop and no friction rub.   No murmur heard.  Pulmonary/Chest: Effort normal and breath sounds normal. No accessory muscle usage. No respiratory distress. She has no wheezes. She has no rales.   Abdominal: Soft. She exhibits no distension. There is no tenderness.   Musculoskeletal: Normal range of motion. She exhibits edema. She exhibits no tenderness or deformity.   Trace b/l LE edema   Lymphadenopathy:     She has no cervical adenopathy.   Neurological: She is alert and oriented to person, place, and time. No cranial nerve deficit. Gait normal.   Skin: Skin is warm and dry. No rash noted. No cyanosis. Nails show no clubbing.   Psychiatric: She has a normal mood and affect.       PFTs as reviewed by me personally:none  CXR reviewed as per above    Assessment:  1. Chronic respiratory failure with hypoxia (HCC)     2. Amiodarone pulmonary toxicity     3. Hypervolemia, unspecified hypervolemia type     4. Chronic kidney disease, unspecified CKD stage     5. Chronic atrial fibrillation (HCC)         Plan:  1.  This is likely multifactorial as I suspect there is some element of COPD complicated by amiodarone " toxicity and some element of volume overload.  She appears euvolemic on exam today and certainly her chest x-ray has continued to improve despite steroid taper.  We will continue to taper steroids and plan to see her back within the next 3 weeks at which point she should have been off her taper for at least 1 week.  We will plan to obtain pulmonary function test at that time.  In the meantime continue supplemental oxygen.  2.  Patient is tolerating steroid taper with continued improvement in chest x-ray as per above.  I am hopeful that she will tolerate discontinuation.  Refrain from amiodarone in the future.  3.  This was multifactorial and likely made worse by steroids.  Both renal and cardiology agree with chronic low-dose Lasix at 20 mg.  Creatinine is overall stable and potassium is improved.  She has follow-up with renal in the next month and I will see her in the next 3 weeks.  4.  Creatinine has ranged from 1.5 to above 2 in the past.  Most recent is 2.2 and she was seen by renal in the last week.  She plans to follow-up with them in the next month.  5.  Patient with mild tachycardia in clinic today.  She is followed by cardiology and on Coreg as well as Eliquis.  She may need up titration in her beta-blocker.  Will defer to cardiology.  Return in about 3 weeks (around 9/12/2019) for respiratory failure.

## 2019-09-09 ENCOUNTER — TELEPHONE (OUTPATIENT)
Dept: PULMONOLOGY | Facility: HOSPICE | Age: 84
End: 2019-09-09

## 2019-09-09 NOTE — TELEPHONE ENCOUNTER
Thank you.  I tried calling the center where she was admitted but no answer.  Just to let you know in case they call again.

## 2019-09-16 ENCOUNTER — APPOINTMENT (OUTPATIENT)
Dept: RADIOLOGY | Facility: IMAGING CENTER | Age: 84
End: 2019-09-16
Attending: INTERNAL MEDICINE
Payer: MEDICARE

## 2019-09-16 ENCOUNTER — OFFICE VISIT (OUTPATIENT)
Dept: PULMONOLOGY | Facility: HOSPICE | Age: 84
End: 2019-09-16
Payer: MEDICARE

## 2019-09-16 ENCOUNTER — APPOINTMENT (OUTPATIENT)
Dept: PULMONOLOGY | Facility: HOSPICE | Age: 84
End: 2019-09-16
Attending: INTERNAL MEDICINE
Payer: MEDICARE

## 2019-09-16 VITALS
WEIGHT: 148 LBS | HEART RATE: 96 BPM | OXYGEN SATURATION: 100 % | TEMPERATURE: 97.7 F | SYSTOLIC BLOOD PRESSURE: 124 MMHG | DIASTOLIC BLOOD PRESSURE: 64 MMHG | HEIGHT: 65 IN | BODY MASS INDEX: 24.66 KG/M2 | RESPIRATION RATE: 16 BRPM

## 2019-09-16 DIAGNOSIS — J98.4 AMIODARONE PULMONARY TOXICITY: ICD-10-CM

## 2019-09-16 DIAGNOSIS — T46.2X5A AMIODARONE PULMONARY TOXICITY: ICD-10-CM

## 2019-09-16 DIAGNOSIS — N18.9 CHRONIC KIDNEY DISEASE, UNSPECIFIED CKD STAGE: ICD-10-CM

## 2019-09-16 DIAGNOSIS — J96.11 CHRONIC RESPIRATORY FAILURE WITH HYPOXIA (HCC): ICD-10-CM

## 2019-09-16 DIAGNOSIS — J44.9 CHRONIC OBSTRUCTIVE PULMONARY DISEASE, UNSPECIFIED COPD TYPE (HCC): ICD-10-CM

## 2019-09-16 DIAGNOSIS — I48.20 CHRONIC ATRIAL FIBRILLATION (HCC): ICD-10-CM

## 2019-09-16 PROCEDURE — 71045 X-RAY EXAM CHEST 1 VIEW: CPT | Mod: TC | Performed by: INTERNAL MEDICINE

## 2019-09-16 PROCEDURE — 99214 OFFICE O/P EST MOD 30 MIN: CPT | Performed by: INTERNAL MEDICINE

## 2019-09-16 RX ORDER — AMOXICILLIN 250 MG
1 CAPSULE ORAL DAILY
COMMUNITY

## 2019-09-16 RX ORDER — ALBUTEROL SULFATE 90 UG/1
2 AEROSOL, METERED RESPIRATORY (INHALATION) EVERY 4 HOURS PRN
Qty: 1 INHALER | Refills: 1 | Status: SHIPPED | OUTPATIENT
Start: 2019-09-16

## 2019-09-16 RX ORDER — METHOCARBAMOL 500 MG/1
1000 TABLET, FILM COATED ORAL 4 TIMES DAILY
COMMUNITY

## 2019-09-16 RX ORDER — TRAMADOL HYDROCHLORIDE 50 MG/1
50 TABLET ORAL
COMMUNITY

## 2019-09-16 RX ORDER — BUDESONIDE 0.5 MG/2ML
500 INHALANT ORAL 2 TIMES DAILY
COMMUNITY
End: 2019-09-16

## 2019-09-16 RX ORDER — LIDOCAINE 50 MG/G
1 PATCH TOPICAL EVERY 24 HOURS
COMMUNITY

## 2019-09-16 ASSESSMENT — ENCOUNTER SYMPTOMS
PND: 0
ABDOMINAL PAIN: 0
STRIDOR: 0
VOMITING: 0
NECK PAIN: 0
EYE REDNESS: 0
NAUSEA: 0
PHOTOPHOBIA: 0
HEADACHES: 0
DIARRHEA: 0
MYALGIAS: 0
DEPRESSION: 0
WHEEZING: 0
WEAKNESS: 0
CHILLS: 0
DIAPHORESIS: 0
CONSTIPATION: 0
COUGH: 0
ORTHOPNEA: 0
SPEECH CHANGE: 0
DOUBLE VISION: 0
EYE DISCHARGE: 0
CLAUDICATION: 0
FEVER: 0
FALLS: 0
BACK PAIN: 1
WEIGHT LOSS: 0
EYE PAIN: 0
HEMOPTYSIS: 0
SHORTNESS OF BREATH: 0
PALPITATIONS: 0
HEARTBURN: 0
SINUS PAIN: 0
SORE THROAT: 0
TREMORS: 0
BLURRED VISION: 0
SPUTUM PRODUCTION: 0
DIZZINESS: 0
FOCAL WEAKNESS: 0

## 2019-09-16 NOTE — PROGRESS NOTES
"Chief Complaint   Patient presents with   • Follow-Up     chronic respiratory faliure with hypoxia last seen 8/22/19   • Results     PFT 60 9/16/19 ( no pft at this time, \"pneumonia\")         HPI: This patient is a 84 y.o. female whom is followed in our clinic for chronic hypoxic respiratory failure, presumed COPD (no PFTs) and amiodarone pulmonary toxicity last seen by me on 8/22/19.  PMHx is significant for CKD, chronic atrial fibrillation, and chart diagnosis of COPD, DJD s/p back surgery c/b chronic LBP and sciatica and history of gastroesophageal reflux disease.    A brief review of most recent relevant PMHx:  The pt presented to the emergency department on June 8, 2019 with acute hypoxic respiratory failure felt to be related to flash pulmonary edema versus amiodarone toxicity.  Amiodarone was held and she was tx with systemic steroids which were tapered quickly and discharged however she represented on June 26 after a fall with worsening hypoxia and worsening chest x-ray. She was admitted to the intensive care unit and underwent diuresis as well as resumption of high dose steroids.  She improved and was discharged to rehab facility where she has been staying since that time. She has been followed frequently in our office since 7/22 mainly managing volume status.  She is on Wixela and prn albuterol for presumed COPD.  Her O2 needs were as low as 1-2L supplemental O2 at our last clinic apt and CXR abnormal but improving. She has been seen by renal and cardiology and both felt current diuretic regimen of 20 mg lasix daily okay.  She was apparently told she no longer needed f/u in cardiology.  The plan at last clinic visit was to continue steroid taper and see her back for PFTs.     The patient presents today for routine follow-up.  She did not obtain pulmonary function test today due to recent treatment for pneumonia.  Per son who is present with patient today she was noted to have increased chest congestion and " a chest x-ray was obtained on September 6 from which I have only the report but no actual images showing patchy bilateral infiltrates.  Apparently this was worse when compared to chest x-ray from earlier in July.  She was treated with prednisone 10 mg x 7 days and 10 days of levofloxacin.  Patient herself denies increased shortness of breath, sputum production, fever, chills or chest pain.  Her main complaint today is sciatica and associated low back pain which has been worse over the past several weeks causing her to be less active.  She is using an incentive spirometer but not a flutter valve given to her during her hospital stay.  She is 100% on 4 L today but it is not clear why it was turned up previously from 1 to 2 L.  The patient does not yet have primary care provider and I do think she needs to be followed by both cardiology and renal once she is discharged home from her facility to [place].      Past Medical History:   Diagnosis Date   • Arrhythmia     transient a fib   • GERD (gastroesophageal reflux disease)    • Heart burn    • Hypertension    • Indigestion    • Other specified disorder of intestines     occasional constipation   • Pain     back, with sciatic pain   • Renal disorder     followed by dr clifford, states 40% function   • Shortness of breath        Social History     Socioeconomic History   • Marital status:      Spouse name: Not on file   • Number of children: Not on file   • Years of education: Not on file   • Highest education level: Not on file   Occupational History   • Not on file   Social Needs   • Financial resource strain: Not on file   • Food insecurity:     Worry: Not on file     Inability: Not on file   • Transportation needs:     Medical: Not on file     Non-medical: Not on file   Tobacco Use   • Smoking status: Former Smoker     Packs/day: 1.00     Years: 40.00     Pack years: 40.00     Types: Cigarettes     Start date: 6/8/1952     Last attempt to quit: 6/3/2019     Years  since quittin.2   • Smokeless tobacco: Never Used   Substance and Sexual Activity   • Alcohol use: No   • Drug use: No   • Sexual activity: Not on file   Lifestyle   • Physical activity:     Days per week: Not on file     Minutes per session: Not on file   • Stress: Not on file   Relationships   • Social connections:     Talks on phone: Not on file     Gets together: Not on file     Attends Buddhism service: Not on file     Active member of club or organization: Not on file     Attends meetings of clubs or organizations: Not on file     Relationship status: Not on file   • Intimate partner violence:     Fear of current or ex partner: Not on file     Emotionally abused: Not on file     Physically abused: Not on file     Forced sexual activity: Not on file   Other Topics Concern   • Not on file   Social History Narrative   • Not on file       Family History   Problem Relation Age of Onset   • Cancer Mother         Breast Cancer   • Cancer Sister         lukemia   • Cancer Brother         Pancreatic   • Alcohol/Drug Brother        Current Outpatient Medications on File Prior to Visit   Medication Sig Dispense Refill   • Calcium Carb-Cholecalciferol (CALCIUM 600+D3 PO) Take  by mouth.     • budesonide (PULMICORT) 0.5 MG/2ML Suspension 500 mcg by Nebulization route 2 times a day.     • lidocaine (LIDODERM) 5 % Patch Apply 1 Patch to skin as directed every 24 hours.     • Magnesium 400 MG Cap Take  by mouth.     • methocarbamol (ROBAXIN) 500 MG Tab Take 1,000 mg by mouth 4 times a day.     • senna-docusate (SENNA-PLUS) 8.6-50 MG Tab Take 1 Tab by mouth every day.     • tramadol (ULTRAM) 50 MG Tab Take 50 mg by mouth.     • insulin NPH (NOVOLIN N RELION) 100 UNIT/ML Suspension Inject  as instructed.     • Ascorbic Acid (VITAMIN C) 250 MG Tab Take 250 mg by mouth every day.     • Cholecalciferol (VITAMIN D PO) Take  by mouth.     • guaiFENesin (ROBITUSSIN) 100 MG/5ML liquid Take  by mouth.     • Umeclidinium Bromide  (INCRUSE ELLIPTA) 62.5 MCG/INH AEROSOL POWDER, BREATH ACTIVATED Inhale  by mouth.     • nystatin (MYCOSTATIN) 085554 UNIT/ML Suspension Take 500,000 Units by mouth 4 times a day.     • Acetaminophen (TYLENOL) 325 MG Cap Take  by mouth.     • fluticasone-salmeterol (WIXELA INHUB) 100-50 MCG/DOSE AEROSOL POWDER, BREATH ACTIVATED Inhale 1 Puff by mouth every 12 hours.     • furosemide (LASIX) 20 MG Tab Take 1 Tab by mouth every day. 30 Tab 11   • predniSONE (DELTASONE) 20 MG Tab Take 2 Tabs by mouth 2 times a day. 30 Tab 0   • apixaban (ELIQUIS) 2.5mg Tab Take 1 Tab by mouth 2 Times a Day. 60 Tab    • carvedilol (COREG) 6.25 MG Tab Take 1 Tab by mouth 2 times a day, with meals. 60 Tab 3   • ferrous gluconate (FERGON) 324 (38 Fe) MG Tab Take 324 mg by mouth every morning with breakfast.     • ipratropium-albuterol (DUONEB) 0.5-2.5 (3) MG/3ML nebulizer solution 3 mL by Nebulization route every 6 hours as needed for Shortness of Breath.     • omeprazole (PRILOSEC) 20 MG CPDR Take 20 mg by mouth every day.     • sulfamethoxazole-trimethoprim (BACTRIM DS) 800-160 MG tablet Take 1 Tab by mouth every Monday, Wednesday, and Friday. (Patient not taking: Reported on 8/15/2019)  0   • insulin glargine (LANTUS) 100 UNIT/ML Solution Inject 5 Units as instructed every evening. (Patient not taking: Reported on 8/15/2019) 10 mL    • insulin regular (HUMULIN R) 100 Unit/mL Solution Inject 2-9 Units as instructed 4 Times a Day,Before Meals and at Bedtime. (Patient not taking: Reported on 8/15/2019) 10 mL    • budesonide-formoterol (SYMBICORT) 80-4.5 MCG/ACT Aerosol Inhale 2 Puffs by mouth 2 Times a Day. (Patient not taking: Reported on 8/22/2019) 1 Inhaler 3   • tiotropium (SPIRIVA) 18 MCG Cap Inhale 1 Cap by mouth every day. (Patient not taking: Reported on 8/8/2019) 30 Cap 3     No current facility-administered medications on file prior to visit.        Pcn [penicillins]      ROS:   Review of Systems   Constitutional: Positive for  "malaise/fatigue. Negative for chills, diaphoresis, fever and weight loss.   HENT: Negative for congestion, ear discharge, ear pain, hearing loss, nosebleeds, sinus pain, sore throat and tinnitus.    Eyes: Negative for blurred vision, double vision, photophobia, pain, discharge and redness.   Respiratory: Negative for cough, hemoptysis, sputum production, shortness of breath, wheezing and stridor.    Cardiovascular: Negative for chest pain, palpitations, orthopnea, claudication, leg swelling and PND.   Gastrointestinal: Negative for abdominal pain, constipation, diarrhea, heartburn, nausea and vomiting.   Genitourinary: Negative for dysuria and urgency.   Musculoskeletal: Positive for back pain. Negative for falls, joint pain, myalgias and neck pain.   Skin: Negative for itching and rash.   Neurological: Negative for dizziness, tremors, speech change, focal weakness, weakness and headaches.   Endo/Heme/Allergies: Negative for environmental allergies.   Psychiatric/Behavioral: Negative for depression.       /64 (BP Location: Right arm, Patient Position: Sitting, BP Cuff Size: Adult)   Pulse 96   Temp 36.5 °C (97.7 °F) (Temporal)   Resp 16   Ht 1.651 m (5' 5\")   Wt 67.1 kg (148 lb)   SpO2 100%   Physical Exam   Constitutional: She is oriented to person, place, and time. She appears well-developed and well-nourished. No distress.   HENT:   Head: Normocephalic and atraumatic.   Right Ear: External ear normal.   Left Ear: External ear normal.   Mouth/Throat: Oropharynx is clear and moist. No oropharyngeal exudate.   Eyes: Pupils are equal, round, and reactive to light. Conjunctivae and EOM are normal. No scleral icterus.   Neck: Normal range of motion. Neck supple. No JVD present. No tracheal deviation present.   Cardiovascular: Normal rate, regular rhythm and normal heart sounds. Exam reveals no gallop and no friction rub.   No murmur heard.  Pulmonary/Chest: Effort normal. No accessory muscle usage. No " respiratory distress. She has no wheezes. She has no rales.   Decreased bs at R lung base   Abdominal: Soft. She exhibits no distension. There is no tenderness.   Musculoskeletal: Normal range of motion. She exhibits no edema, tenderness or deformity.   Lymphadenopathy:     She has no cervical adenopathy.   Neurological: She is alert and oriented to person, place, and time. No cranial nerve deficit. Gait normal.   Skin: Skin is warm and dry. No rash noted. No cyanosis. Nails show no clubbing.   Psychiatric: She has a normal mood and affect.       PFTs as reviewed by me personally: not performed today    Imaging as reviewed by me personally:  pending    Assessment:  1. Chronic obstructive pulmonary disease, unspecified COPD type (HCC)  PULMONARY FUNCTION TESTS -Test requested: Complete Pulmonary Function Test   2. Chronic respiratory failure with hypoxia (HCC)     3. Amiodarone pulmonary toxicity  DX-CHEST-LIMITED (1 VIEW)   4. Chronic kidney disease, unspecified CKD stage     5. Chronic atrial fibrillation (HCC)         Plan:  1.  This is presumed based on her tobacco history and chronic hypoxic respiratory failure.  No PFTs obtained today due to recent treatment for pneumonia and difficulty with back pain even transferring from wheelchair to pulmonary function test bustillo.  We will plan to continue Wixela and short acting bronchodilators on an as-needed basis.  We will stop inhaled budesonide as this is not necessary if she is on Wixella.  I am also writing an order to initiate flutter valve therapy every 6 hours to help with mucus clearance.  2.  This is multifactorial given presumed amiodarone toxicity, presumed COPD, chronic atrial fibrillation and I suspect some issues with mucus clearance given recent inactivity and back pain.  She has been requiring as little as 1 to 2 L of supplemental oxygen in the past.  Reportedly had a pneumonia which worsened her oxygenation although she is 100% on 4 L in clinic today.   We will continue supplemental oxygen with the goal SaO2 of 90-94.  There is no reason this patient needs a saturation of 100%.  We will continue treatment for COPD as per above, continue supplemental oxygen and flutter valve every 6 hours as per above.  We will obtain chest x-ray in clinic today.  3.  This is presumed based on her history.  She has tolerated tapering steroids and was on minimal oxygen at our last visit.  Recently had resumption of steroids at low-dose due to presumed pneumonia.  We will obtain chest x-ray today and for now continue to remain off systemic steroids.  Avoid amiodarone in the future.  4.  Patient was seen in renal for this.  Her recent creatinine is 1.6 down from 2.2.  She will need regular follow-up with renal.  5.  Patient has chronic atrial fibrillation with relatively poor heart rate control on only carvedilol.  She is also on systemic anticoagulation.  I do think she should have regular follow-up with cardiology.  Return in about 3 weeks (around 10/7/2019) for hypoxic failure.

## 2019-10-02 ENCOUNTER — OFFICE VISIT (OUTPATIENT)
Dept: PULMONOLOGY | Facility: HOSPICE | Age: 84
End: 2019-10-02
Payer: MEDICARE

## 2019-10-02 ENCOUNTER — NON-PROVIDER VISIT (OUTPATIENT)
Dept: PULMONOLOGY | Facility: HOSPICE | Age: 84
End: 2019-10-02
Attending: INTERNAL MEDICINE
Payer: MEDICARE

## 2019-10-02 VITALS
BODY MASS INDEX: 22.49 KG/M2 | TEMPERATURE: 98.4 F | OXYGEN SATURATION: 97 % | DIASTOLIC BLOOD PRESSURE: 66 MMHG | WEIGHT: 135 LBS | SYSTOLIC BLOOD PRESSURE: 110 MMHG | HEIGHT: 65 IN | RESPIRATION RATE: 16 BRPM | HEART RATE: 98 BPM

## 2019-10-02 VITALS — WEIGHT: 135 LBS | BODY MASS INDEX: 22.47 KG/M2

## 2019-10-02 DIAGNOSIS — N18.9 CHRONIC KIDNEY DISEASE, UNSPECIFIED CKD STAGE: ICD-10-CM

## 2019-10-02 DIAGNOSIS — E87.70 HYPERVOLEMIA, UNSPECIFIED HYPERVOLEMIA TYPE: ICD-10-CM

## 2019-10-02 DIAGNOSIS — J98.4 AMIODARONE PULMONARY TOXICITY: ICD-10-CM

## 2019-10-02 DIAGNOSIS — T46.2X5A AMIODARONE PULMONARY TOXICITY: ICD-10-CM

## 2019-10-02 DIAGNOSIS — J96.11 CHRONIC RESPIRATORY FAILURE WITH HYPOXIA (HCC): ICD-10-CM

## 2019-10-02 DIAGNOSIS — I48.20 CHRONIC ATRIAL FIBRILLATION (HCC): ICD-10-CM

## 2019-10-02 DIAGNOSIS — Z87.891 HISTORY OF TOBACCO USE: ICD-10-CM

## 2019-10-02 DIAGNOSIS — J44.9 CHRONIC OBSTRUCTIVE PULMONARY DISEASE, UNSPECIFIED COPD TYPE (HCC): ICD-10-CM

## 2019-10-02 PROCEDURE — 94729 DIFFUSING CAPACITY: CPT | Performed by: INTERNAL MEDICINE

## 2019-10-02 PROCEDURE — 94726 PLETHYSMOGRAPHY LUNG VOLUMES: CPT | Performed by: INTERNAL MEDICINE

## 2019-10-02 PROCEDURE — 99214 OFFICE O/P EST MOD 30 MIN: CPT | Mod: 25 | Performed by: INTERNAL MEDICINE

## 2019-10-02 PROCEDURE — 94060 EVALUATION OF WHEEZING: CPT | Performed by: INTERNAL MEDICINE

## 2019-10-02 RX ORDER — POTASSIUM CHLORIDE 750 MG/1
10 CAPSULE, EXTENDED RELEASE ORAL 2 TIMES DAILY
COMMUNITY

## 2019-10-02 ASSESSMENT — ENCOUNTER SYMPTOMS
PALPITATIONS: 0
ORTHOPNEA: 0
SINUS PAIN: 0
FEVER: 0
BACK PAIN: 1
CHILLS: 0
FALLS: 0
EYE DISCHARGE: 0
DEPRESSION: 0
BLURRED VISION: 0
WEAKNESS: 0
NECK PAIN: 0
HEMOPTYSIS: 0
SHORTNESS OF BREATH: 0
SORE THROAT: 0
COUGH: 1
SPEECH CHANGE: 0
ABDOMINAL PAIN: 0
CLAUDICATION: 0
DIAPHORESIS: 0
WEIGHT LOSS: 0
VOMITING: 0
NAUSEA: 0
SPUTUM PRODUCTION: 1
DIZZINESS: 0
TREMORS: 0
DIARRHEA: 0
PHOTOPHOBIA: 0
EYE REDNESS: 0
DOUBLE VISION: 0
CONSTIPATION: 0
PND: 0
EYE PAIN: 0
MYALGIAS: 0
HEARTBURN: 0
FOCAL WEAKNESS: 0
WHEEZING: 0
STRIDOR: 0
HEADACHES: 0

## 2019-10-02 ASSESSMENT — PULMONARY FUNCTION TESTS
FEV1/FVC_PREDICTED: 73
FEV1: 1.26
FEV1_PERCENT_CHANGE: 4
FEV1/FVC: 75
FEV1/FVC_PERCENT_PREDICTED: 106
FEV1/FVC_PERCENT_PREDICTED: 107
FEV1/FVC: 75
FEV1/FVC_PERCENT_PREDICTED: 102
FVC_PREDICTED: 2.54
FEV1: 1.32
FVC_PERCENT_PREDICTED: 66
FEV1_LLN: 1.57
FEV1/FVC_PERCENT_PREDICTED: 102
FVC_LLN: 2.12
FEV1/FVC_PERCENT_PREDICTED: 74
FEV1/FVC: 78.11
FEV1/FVC_PERCENT_CHANGE: 4
FVC: 1.68
FEV1/FVC: 78
FEV1_PERCENT_PREDICTED: 70
FVC_PERCENT_PREDICTED: 65
FEV1_PERCENT_PREDICTED: 66
FEV1_PERCENT_CHANGE: 0
FEV1_PREDICTED: 1.88
FEV1/FVC_PERCENT_LLN: 61
FVC: 1.69

## 2019-10-02 NOTE — PROCEDURES
Technician: Laura Del Rio RRT   Good patient effort & cooperation.  The results of this test meet the ATS/ERS standards for acceptability & reproducibility.  Test was performed on the [employer] Body Plethysmograph-Elite DX system.  Predicted values were [facility], [place]-3 for spirometry, [facility] for DLCO, ITS for Lung Volumes.  The DLCO was uncorrected for Hgb.  A bronchodilator of Ventolin HFA -2puffs via spacer administered.  DLCO performed during dilation period.    Interpretation;   1.  Baseline spirometry shows restriction with reduction in both FEV1 and FVC.  2.  There is no significant bronchodilator response.  3.  Lung volumes show restriction to the same degree as airflows with total lung capacity at 62% predicted.  4.  DLCO is severely reduced at 38% predicted.  Pulmonary function tests show restrictive lung disease with reduced DLCO consistent with a diagnosis of interstitial lung disease.  Suggest clinical correlation.

## 2019-10-02 NOTE — PROGRESS NOTES
Chief Complaint   Patient presents with   • COPD     last seen 9/16/19    • Results     PFt 60 10/2/19          HPI: This patient is a 84 y.o. 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  PMHx is significant for CKD, chronic atrial fibrillation, and chart diagnosis of COPD, DJD s/p back surgery c/b chronic LBP and sciatica and history of gastroesophageal reflux disease.    A brief review of most recent relevant PMHx:  The pt presented to the emergency department on June 8, 2019 with acute hypoxic respiratory failure felt to be related to flash pulmonary edema versus amiodarone toxicity.  Amiodarone was held and she was tx with systemic steroids which were tapered quickly and discharged however she represented on June 26 after a fall with worsening hypoxia and worsening chest x-ray. She was admitted to the intensive care unit and underwent diuresis as well as resumption of high dose steroids.  She improved and was discharged to rehab facility where she has been staying since that time. She has been followed frequently in our office since 7/22 mainly managing volume status.  She is on Wixela and prn albuterol for presumed COPD.  Her O2 needs were as low as 1-2L supplemental O2 at our last clinic apt and CXR abnormal but improving. She has been seen by renal and cardiology and both felt current diuretic regimen of 20 mg lasix daily okay.  She was apparently told she no longer needed f/u in cardiology. At our last appointment we obtained CXR after she was given increased prednisone and abx for chest congestion but no other clinical s/s of pna.  Her CXR showed stable b/l interstitial infiltrates.  We planned to start acapella valve for mucous clearance as pt is sedentary due to chronic back pain and sciatic nerve compression but she did not get valve yet. PFTs obtained for f/u today show restrictive airflows, restrictive  lung volumes and severe reduction in DLCO.  She is off prednisone as of  and on 2L supplemental O2 with SaO2 of 97%.  She continues to have some issues with chest congestion/difficulty expectorating but denies SOB.  She is seeing pain specialist for back with plans to do MRI in next several days. She is hoping to return home to [place] from rehab facility in the next mo. Labs reviewed by me from  show stable Cr of 2, HCO3 of 30 up from 25 and Cl down to 95.  She is listed as taking lasix 20 mg BID. Her KCl was stopped after labs this week which I do not have.     Past Medical History:   Diagnosis Date   • Arrhythmia     transient a fib   • GERD (gastroesophageal reflux disease)    • Heart burn    • Hypertension    • Indigestion    • Other specified disorder of intestines     occasional constipation   • Pain     back, with sciatic pain   • Renal disorder     followed by dr clifford, states 40% function   • Shortness of breath        Social History     Socioeconomic History   • Marital status:      Spouse name: Not on file   • Number of children: Not on file   • Years of education: Not on file   • Highest education level: Not on file   Occupational History   • Not on file   Social Needs   • Financial resource strain: Not on file   • Food insecurity:     Worry: Not on file     Inability: Not on file   • Transportation needs:     Medical: Not on file     Non-medical: Not on file   Tobacco Use   • Smoking status: Former Smoker     Packs/day: 1.00     Years: 40.00     Pack years: 40.00     Types: Cigarettes     Start date: 1952     Last attempt to quit: 6/3/2019     Years since quittin.3   • Smokeless tobacco: Never Used   Substance and Sexual Activity   • Alcohol use: No   • Drug use: No   • Sexual activity: Not on file   Lifestyle   • Physical activity:     Days per week: Not on file     Minutes per session: Not on file   • Stress: Not on file   Relationships   • Social connections:     Talks on  phone: Not on file     Gets together: Not on file     Attends [employer] service: Not on file     Active member of club or organization: Not on file     Attends meetings of clubs or organizations: Not on file     Relationship status: Not on file   • Intimate partner violence:     Fear of current or ex partner: Not on file     Emotionally abused: Not on file     Physically abused: Not on file     Forced sexual activity: Not on file   Other Topics Concern   • Not on file   Social History Narrative   • Not on file       Family History   Problem Relation Age of Onset   • Cancer Mother         Breast Cancer   • Cancer Sister         lukemia   • Cancer Brother         Pancreatic   • Alcohol/Drug Brother        Current Outpatient Medications on File Prior to Visit   Medication Sig Dispense Refill   • oxyCODONE HCl (OXYCONTIN PO) Take 5 mg by mouth every day.     • potassium chloride (MICRO-K) 10 MEQ capsule Take 10 mEq by mouth 2 times a day.     • polyethyl glycol-propyl glycol (SYSTANE) 0.4-0.3 % Solution Place 1 Drop in both eyes as needed.     • Calcium Carb-Cholecalciferol (CALCIUM 600+D3 PO) Take  by mouth.     • lidocaine (LIDODERM) 5 % Patch Apply 1 Patch to skin as directed every 24 hours.     • Magnesium 400 MG Cap Take  by mouth.     • senna-docusate (SENNA-PLUS) 8.6-50 MG Tab Take 1 Tab by mouth every day.     • albuterol (PROAIR HFA) 108 (90 Base) MCG/ACT Aero Soln inhalation aerosol Inhale 2 Puffs by mouth every four hours as needed for Shortness of Breath (wheezing). 1 Inhaler 1   • Ascorbic Acid (VITAMIN C) 250 MG Tab Take 250 mg by mouth every day.     • Cholecalciferol (VITAMIN D PO) Take  by mouth.     • guaiFENesin (ROBITUSSIN) 100 MG/5ML liquid Take  by mouth.     • Umeclidinium Bromide (INCRUSE ELLIPTA) 62.5 MCG/INH AEROSOL POWDER, BREATH ACTIVATED Inhale  by mouth.     • Acetaminophen (TYLENOL) 325 MG Cap Take  by mouth.     • fluticasone-salmeterol (WIXELA INHUB) 100-50 MCG/DOSE AEROSOL POWDER,  BREATH ACTIVATED Inhale 1 Puff by mouth every 12 hours.     • furosemide (LASIX) 20 MG Tab Take 1 Tab by mouth every day. 30 Tab 11   • apixaban (ELIQUIS) 2.5mg Tab Take 1 Tab by mouth 2 Times a Day. 60 Tab    • carvedilol (COREG) 6.25 MG Tab Take 1 Tab by mouth 2 times a day, with meals. 60 Tab 3   • ferrous gluconate (FERGON) 324 (38 Fe) MG Tab Take 324 mg by mouth every morning with breakfast.     • ipratropium-albuterol (DUONEB) 0.5-2.5 (3) MG/3ML nebulizer solution 3 mL by Nebulization route every 6 hours as needed for Shortness of Breath.     • omeprazole (PRILOSEC) 20 MG CPDR Take 20 mg by mouth every day.     • methocarbamol (ROBAXIN) 500 MG Tab Take 1,000 mg by mouth 4 times a day.     • tramadol (ULTRAM) 50 MG Tab Take 50 mg by mouth.     • insulin NPH (NOVOLIN N RELION) 100 UNIT/ML Suspension Inject  as instructed.     • nystatin (MYCOSTATIN) 995824 UNIT/ML Suspension Take 500,000 Units by mouth 4 times a day.     • sulfamethoxazole-trimethoprim (BACTRIM DS) 800-160 MG tablet Take 1 Tab by mouth every Monday, Wednesday, and Friday. (Patient not taking: Reported on 8/15/2019)  0   • predniSONE (DELTASONE) 20 MG Tab Take 2 Tabs by mouth 2 times a day. (Patient not taking: Reported on 10/2/2019) 30 Tab 0   • insulin glargine (LANTUS) 100 UNIT/ML Solution Inject 5 Units as instructed every evening. (Patient not taking: Reported on 8/15/2019) 10 mL    • insulin regular (HUMULIN R) 100 Unit/mL Solution Inject 2-9 Units as instructed 4 Times a Day,Before Meals and at Bedtime. (Patient not taking: Reported on 8/15/2019) 10 mL    • tiotropium (SPIRIVA) 18 MCG Cap Inhale 1 Cap by mouth every day. (Patient not taking: Reported on 8/8/2019) 30 Cap 3     No current facility-administered medications on file prior to visit.        Pcn [penicillins]      ROS:   Review of Systems   Constitutional: Negative for chills, diaphoresis, fever, malaise/fatigue and weight loss.   HENT: Positive for congestion. Negative for ear  "discharge, ear pain, hearing loss, nosebleeds, sinus pain, sore throat and tinnitus.    Eyes: Negative for blurred vision, double vision, photophobia, pain, discharge and redness.   Respiratory: Positive for cough and sputum production. Negative for hemoptysis, shortness of breath, wheezing and stridor.    Cardiovascular: Negative for chest pain, palpitations, orthopnea, claudication, leg swelling and PND.   Gastrointestinal: Negative for abdominal pain, constipation, diarrhea, heartburn, nausea and vomiting.   Genitourinary: Negative for dysuria and urgency.   Musculoskeletal: Positive for back pain. Negative for falls, joint pain, myalgias and neck pain.        Sciatic pain   Skin: Negative for itching and rash.   Neurological: Negative for dizziness, tremors, speech change, focal weakness, weakness and headaches.   Endo/Heme/Allergies: Negative for environmental allergies.   Psychiatric/Behavioral: Negative for depression.       /66 (BP Location: Left arm, Patient Position: Sitting, BP Cuff Size: Adult)   Pulse 98   Temp 36.9 °C (98.4 °F) (Temporal)   Resp 16   Ht 1.651 m (5' 5\")   Wt 61.2 kg (135 lb)   SpO2 97%   Physical Exam   Constitutional: She is oriented to person, place, and time. She appears well-developed and well-nourished. No distress.   HENT:   Head: Normocephalic and atraumatic.   Right Ear: External ear normal.   Left Ear: External ear normal.   Mouth/Throat: Oropharynx is clear and moist. No oropharyngeal exudate.   Eyes: Pupils are equal, round, and reactive to light. Conjunctivae and EOM are normal. No scleral icterus.   Neck: Normal range of motion. Neck supple. No JVD present. No tracheal deviation present.   Cardiovascular: Normal rate, regular rhythm and normal heart sounds. Exam reveals no gallop and no friction rub.   No murmur heard.  Pulmonary/Chest: Effort normal. No accessory muscle usage. No respiratory distress. She has wheezes. She has no rales.   Few inspiratory and " expiratory wheezes b/l, fine bibasilar inspiratory crackles.   Abdominal: Soft. She exhibits no distension. There is no tenderness.   Musculoskeletal: Normal range of motion. She exhibits no edema, tenderness or deformity.   Lymphadenopathy:     She has no cervical adenopathy.   Neurological: She is alert and oriented to person, place, and time. No cranial nerve deficit. Gait normal.   Skin: Skin is warm and dry. No rash noted. No cyanosis. Nails show no clubbing.   Psychiatric: She has a normal mood and affect.       PFTs as reviewed by me personally: as per HPI    Imagaing as reviewed by me personally:  As per HPI    Assessment:  1. Chronic obstructive pulmonary disease, unspecified COPD type (HCC)  DME Other   2. Chronic respiratory failure with hypoxia (HCC)     3. Amiodarone pulmonary toxicity     4. Chronic atrial fibrillation     5. Hypervolemia, unspecified hypervolemia type     6. Chronic kidney disease, unspecified CKD stage     7. History of tobacco use         Plan:  1. PFTs are restrictive which is likely due to ILD although suspect some element of underlying COPD given extensive tobacco hx. Continue wixella, nebulized HANY, supplemental O2 and start acapella as recommended last visit.  2. Secondary to ILD and COPD overlap. Presumption is that this amiodarone toxicity. Clinically she is stable/has not worsened with steroid taper however last CXR not markedly improved.  We will continue supplemental O2 and tx of COPD.  No indication to resume steroids given clinical improvement/stability but will need f/u CT at later date.   3. Presumed and clinically she appears to have responded to steroids w/o worsening as tapered.  CXR does appear to much improved which begs ? Whether she has underlying ILD not related to amio.  Either way, no indication to resume steroids, We will remain of amio and repeat imaging at later date/follow clinically and PFTs  4. Per cardiology no f/u recommended?  She is tachy in clinic  today on low dose coreg. Will scale back on lasix as per above but I do believe she needs regular cardiology f/u.  5. Euvolemic in clinic today.  Scale back on lasix as per above.  6. Cr stable at 2.  7. Tobacco free. Encouraged ongoing abstinence. Will need annual low-dose CT.  Return in about 2 weeks (around 10/16/2019) for respiratory failure.

## 2019-10-16 ENCOUNTER — OFFICE VISIT (OUTPATIENT)
Dept: PULMONOLOGY | Facility: HOSPICE | Age: 84
End: 2019-10-16
Payer: MEDICARE

## 2019-10-16 VITALS
SYSTOLIC BLOOD PRESSURE: 120 MMHG | WEIGHT: 141 LBS | DIASTOLIC BLOOD PRESSURE: 64 MMHG | OXYGEN SATURATION: 95 % | TEMPERATURE: 97.9 F | BODY MASS INDEX: 23.46 KG/M2 | RESPIRATION RATE: 16 BRPM | HEART RATE: 87 BPM

## 2019-10-16 DIAGNOSIS — J44.9 CHRONIC OBSTRUCTIVE PULMONARY DISEASE, UNSPECIFIED COPD TYPE (HCC): ICD-10-CM

## 2019-10-16 DIAGNOSIS — I48.20 CHRONIC ATRIAL FIBRILLATION (HCC): ICD-10-CM

## 2019-10-16 DIAGNOSIS — J98.4 AMIODARONE PULMONARY TOXICITY: ICD-10-CM

## 2019-10-16 DIAGNOSIS — Z87.891 HISTORY OF TOBACCO USE: ICD-10-CM

## 2019-10-16 DIAGNOSIS — J96.11 CHRONIC RESPIRATORY FAILURE WITH HYPOXIA (HCC): ICD-10-CM

## 2019-10-16 DIAGNOSIS — T46.2X5A AMIODARONE PULMONARY TOXICITY: ICD-10-CM

## 2019-10-16 DIAGNOSIS — Z23 NEED FOR VACCINATION: ICD-10-CM

## 2019-10-16 PROCEDURE — G0009 ADMIN PNEUMOCOCCAL VACCINE: HCPCS | Performed by: INTERNAL MEDICINE

## 2019-10-16 PROCEDURE — 90670 PCV13 VACCINE IM: CPT | Performed by: INTERNAL MEDICINE

## 2019-10-16 PROCEDURE — 90662 IIV NO PRSV INCREASED AG IM: CPT | Performed by: INTERNAL MEDICINE

## 2019-10-16 PROCEDURE — G0008 ADMIN INFLUENZA VIRUS VAC: HCPCS | Performed by: INTERNAL MEDICINE

## 2019-10-16 PROCEDURE — 99214 OFFICE O/P EST MOD 30 MIN: CPT | Mod: 25 | Performed by: INTERNAL MEDICINE

## 2019-10-16 RX ORDER — FAMOTIDINE 10 MG
10 TABLET ORAL 2 TIMES DAILY
COMMUNITY

## 2019-10-16 ASSESSMENT — ENCOUNTER SYMPTOMS
NAUSEA: 0
SORE THROAT: 0
HEADACHES: 0
NECK PAIN: 0
CLAUDICATION: 0
VOMITING: 0
FOCAL WEAKNESS: 0
WEIGHT LOSS: 0
BLURRED VISION: 0
EYE REDNESS: 0
SPUTUM PRODUCTION: 0
TREMORS: 0
PALPITATIONS: 0
HEARTBURN: 0
DIAPHORESIS: 0
STRIDOR: 0
FALLS: 0
CHILLS: 0
SPEECH CHANGE: 0
SHORTNESS OF BREATH: 0
WEAKNESS: 0
PHOTOPHOBIA: 0
BACK PAIN: 1
ORTHOPNEA: 0
PND: 0
DOUBLE VISION: 0
DIARRHEA: 0
EYE PAIN: 0
DEPRESSION: 0
MYALGIAS: 0
CONSTIPATION: 0
ABDOMINAL PAIN: 0
EYE DISCHARGE: 0
FEVER: 0
DIZZINESS: 0
HEMOPTYSIS: 0
WHEEZING: 0
SINUS PAIN: 0
COUGH: 0

## 2019-10-16 NOTE — PROGRESS NOTES
Chief Complaint   Patient presents with   • COPD     last seen 10/2/19         HPI: This patient is a 84 y.o. female whom is followed in our clinic for chronic hypoxic respiratory failure presumed 2/2 a combination of COPD and amiodarone toxicity last seen by me on 10/2/19. PMHx is significant for CKD, chronic atrial fibrillation, and chart diagnosis of COPD, DJD s/p back surgery c/b chronic LBP and sciatica and history of gastroesophageal reflux disease.     A brief review of most recent relevant PMHx:  The pt presented to the emergency department on June 8, 2019 with acute hypoxic respiratory failure felt to be related to flash pulmonary edema versus amiodarone toxicity.  Amiodarone was held and she was tx with systemic steroids which were tapered quickly and discharged however she represented on June 26 after a fall with worsening hypoxia and worsening chest x-ray. She was admitted to the intensive care unit and underwent diuresis as well as resumption of high dose steroids.  She improved and was discharged to rehab facility where she has been staying since that time. She has been followed frequently in our office since 7/22 mainly managing volume status.  She is on Wixela and prn albuterol for presumed COPD.  Her O2 needs were as low as 1-2L supplemental O2 at our last clinic apt and CXR abnormal but improving. She has been seen by renal and cardiology and both felt current diuretic regimen of 20 mg lasix daily okay.  She was apparently told she no longer needed f/u in cardiology. I ordered a CXR after she was given increased prednisone and abx for chest congestion at rehab but no other clinical s/s of pna.  Her CXR showed stable b/l interstitial infiltrates.  We planned to start acapella valve for mucous clearance as pt is sedentary due to chronic back pain and sciatic nerve compression but she did not get valve yet. PFTs obtained at our last visit show restrictive airflows, restrictive lung volumes and severe  reduction in DLCO.  She is off prednisone as of  and on 2L supplemental O2 with SaO2 of 95%.  She is off anticoagulation temporarily for steroid injections to tx her back pain.  We had decreased her lasix form 20 mg BID to daily at last visit due to increasing serum bicarb however pt gained 7 lbs quickly and BID dosing was resumed with rapid return to baseline.  She is doing quite well today.  She has remained off prednisone with no worsening of resp status and chest congestion has improved despite not having received acapella.  She is getting schedule mucinex and nebulized HANY.  She is hopeful to return home to [place] in the next week.     Past Medical History:   Diagnosis Date   • Arrhythmia     transient a fib   • GERD (gastroesophageal reflux disease)    • Heart burn    • Hypertension    • Indigestion    • Other specified disorder of intestines     occasional constipation   • Pain     back, with sciatic pain   • Renal disorder     followed by dr clifford, states 40% function   • Shortness of breath        Social History     Socioeconomic History   • Marital status:      Spouse name: Not on file   • Number of children: Not on file   • Years of education: Not on file   • Highest education level: Not on file   Occupational History   • Not on file   Social Needs   • Financial resource strain: Not on file   • Food insecurity:     Worry: Not on file     Inability: Not on file   • Transportation needs:     Medical: Not on file     Non-medical: Not on file   Tobacco Use   • Smoking status: Former Smoker     Packs/day: 1.00     Years: 40.00     Pack years: 40.00     Types: Cigarettes     Start date: 1952     Last attempt to quit: 6/3/2019     Years since quittin.3   • Smokeless tobacco: Never Used   Substance and Sexual Activity   • Alcohol use: No   • Drug use: No   • Sexual activity: Not on file   Lifestyle   • Physical activity:     Days per week: Not on file     Minutes per session: Not on file   •  Stress: Not on file   Relationships   • Social connections:     Talks on phone: Not on file     Gets together: Not on file     Attends Worship service: Not on file     Active member of club or organization: Not on file     Attends meetings of clubs or organizations: Not on file     Relationship status: Not on file   • Intimate partner violence:     Fear of current or ex partner: Not on file     Emotionally abused: Not on file     Physically abused: Not on file     Forced sexual activity: Not on file   Other Topics Concern   • Not on file   Social History Narrative   • Not on file       Family History   Problem Relation Age of Onset   • Cancer Mother         Breast Cancer   • Cancer Sister         lukemia   • Cancer Brother         Pancreatic   • Alcohol/Drug Brother        Current Outpatient Medications on File Prior to Visit   Medication Sig Dispense Refill   • famotidine (PEPCID) 10 MG tablet Take 10 mg by mouth 2 times a day.     • oxyCODONE HCl (OXYCONTIN PO) Take 5 mg by mouth every day.     • potassium chloride (MICRO-K) 10 MEQ capsule Take 10 mEq by mouth 2 times a day.     • polyethyl glycol-propyl glycol (SYSTANE) 0.4-0.3 % Solution Place 1 Drop in both eyes as needed.     • lidocaine (LIDODERM) 5 % Patch Apply 1 Patch to skin as directed every 24 hours.     • Magnesium 400 MG Cap Take  by mouth.     • senna-docusate (SENNA-PLUS) 8.6-50 MG Tab Take 1 Tab by mouth every day.     • albuterol (PROAIR HFA) 108 (90 Base) MCG/ACT Aero Soln inhalation aerosol Inhale 2 Puffs by mouth every four hours as needed for Shortness of Breath (wheezing). 1 Inhaler 1   • Ascorbic Acid (VITAMIN C) 250 MG Tab Take 250 mg by mouth every day.     • Cholecalciferol (VITAMIN D PO) Take  by mouth.     • guaiFENesin (ROBITUSSIN) 100 MG/5ML liquid Take  by mouth.     • Umeclidinium Bromide (INCRUSE ELLIPTA) 62.5 MCG/INH AEROSOL POWDER, BREATH ACTIVATED Inhale  by mouth.     • Acetaminophen (TYLENOL) 325 MG Cap Take  by mouth.     •  fluticasone-salmeterol (WIXELA INHUB) 100-50 MCG/DOSE AEROSOL POWDER, BREATH ACTIVATED Inhale 1 Puff by mouth every 12 hours.     • furosemide (LASIX) 20 MG Tab Take 1 Tab by mouth every day. 30 Tab 11   • carvedilol (COREG) 6.25 MG Tab Take 1 Tab by mouth 2 times a day, with meals. 60 Tab 3   • ferrous gluconate (FERGON) 324 (38 Fe) MG Tab Take 324 mg by mouth every morning with breakfast.     • ipratropium-albuterol (DUONEB) 0.5-2.5 (3) MG/3ML nebulizer solution 3 mL by Nebulization route every 6 hours as needed for Shortness of Breath.     • Calcium Carb-Cholecalciferol (CALCIUM 600+D3 PO) Take  by mouth.     • methocarbamol (ROBAXIN) 500 MG Tab Take 1,000 mg by mouth 4 times a day.     • tramadol (ULTRAM) 50 MG Tab Take 50 mg by mouth.     • insulin NPH (NOVOLIN N RELION) 100 UNIT/ML Suspension Inject  as instructed.     • nystatin (MYCOSTATIN) 143607 UNIT/ML Suspension Take 500,000 Units by mouth 4 times a day.     • sulfamethoxazole-trimethoprim (BACTRIM DS) 800-160 MG tablet Take 1 Tab by mouth every Monday, Wednesday, and Friday. (Patient not taking: Reported on 8/15/2019)  0   • predniSONE (DELTASONE) 20 MG Tab Take 2 Tabs by mouth 2 times a day. (Patient not taking: Reported on 10/2/2019) 30 Tab 0   • insulin glargine (LANTUS) 100 UNIT/ML Solution Inject 5 Units as instructed every evening. (Patient not taking: Reported on 8/15/2019) 10 mL    • insulin regular (HUMULIN R) 100 Unit/mL Solution Inject 2-9 Units as instructed 4 Times a Day,Before Meals and at Bedtime. (Patient not taking: Reported on 8/15/2019) 10 mL    • apixaban (ELIQUIS) 2.5mg Tab Take 1 Tab by mouth 2 Times a Day. (Patient not taking: Reported on 10/16/2019) 60 Tab    • omeprazole (PRILOSEC) 20 MG CPDR Take 20 mg by mouth every day.       No current facility-administered medications on file prior to visit.        Pcn [penicillins]      ROS:   Review of Systems   Constitutional: Positive for malaise/fatigue. Negative for chills,  diaphoresis, fever and weight loss.   HENT: Negative for congestion, ear discharge, ear pain, hearing loss, nosebleeds, sinus pain, sore throat and tinnitus.    Eyes: Negative for blurred vision, double vision, photophobia, pain, discharge and redness.   Respiratory: Negative for cough, hemoptysis, sputum production, shortness of breath, wheezing and stridor.    Cardiovascular: Negative for chest pain, palpitations, orthopnea, claudication, leg swelling and PND.   Gastrointestinal: Negative for abdominal pain, constipation, diarrhea, heartburn, nausea and vomiting.   Genitourinary: Negative for dysuria and urgency.   Musculoskeletal: Positive for back pain. Negative for falls, joint pain, myalgias and neck pain.   Skin: Negative for itching and rash.   Neurological: Negative for dizziness, tremors, speech change, focal weakness, weakness and headaches.   Endo/Heme/Allergies: Negative for environmental allergies.   Psychiatric/Behavioral: Negative for depression.       /64 (BP Location: Left arm, Patient Position: Sitting, BP Cuff Size: Adult)   Pulse 87   Temp 36.6 °C (97.9 °F) (Temporal)   Resp 16   Wt 64 kg (141 lb)   SpO2 95%   Physical Exam   Constitutional: She is oriented to person, place, and time. She appears well-developed and well-nourished.   HENT:   Head: Normocephalic and atraumatic.   Left Ear: External ear normal.   Mouth/Throat: Oropharynx is clear and moist. No oropharyngeal exudate.   Eyes: Pupils are equal, round, and reactive to light. Conjunctivae and EOM are normal. No scleral icterus.   Neck: Neck supple. No JVD present. No tracheal deviation present.   Cardiovascular: Normal rate and normal heart sounds. Exam reveals no gallop and no friction rub.   No murmur heard.  irreg   Pulmonary/Chest: Effort normal and breath sounds normal. No accessory muscle usage. No respiratory distress. She has no wheezes. She has no rales.   Bilateral basilar inspiratory crackles   Abdominal: Soft. She  exhibits no distension. There is no tenderness.   Musculoskeletal: Normal range of motion. She exhibits no edema, tenderness or deformity.   Lymphadenopathy:     She has no cervical adenopathy.   Neurological: She is alert and oriented to person, place, and time. No cranial nerve deficit. Gait normal.   Skin: Skin is warm and dry. No rash noted. No cyanosis. Nails show no clubbing.   Psychiatric: She has a normal mood and affect.       Imagaing as reviewed by me personally:  As per hPI    Assessment:  1. Need for vaccination     2. Chronic obstructive pulmonary disease, unspecified COPD type (HCC)     3. Amiodarone pulmonary toxicity     4. Chronic respiratory failure with hypoxia (HCC)     5. Chronic atrial fibrillation     6. History of tobacco use         Plan:  1. Pt given influenza and prevnar 13 in clinic today  2. PFTs are mainly restrictive however suspect she has some element of underlying COPD. No e/o acute exacerbation today. Continue wixella and incruse. I okayed pt to go to prn use of nebulized bronchodilators and mucinex. Pt is tobacco free.  Continue supplemental O2.  3. This is presumed cause of her interstitial infiltrates but I cannot r/o underlying ILD given her imagaing has not improved significantly despite tx of steroids and her respiratory improvement seemed to be related more to attaining euvolemia.  Either way, her O2 needs have decreased and she is stable currently off steroids. We will plan on repeat CT one year from her last or sooner if sxs worsen.  I do think she needs at least annual f/u with cardiology to ensure diuretics and Rx for AF are optimized.  4. This is presumed 2/2 COPD vs. ILD.  See discussion re amio above. PFTs more c/w ILD.  Overall resp status is improved/stabilized since her hospital admission. Continue supplemental O2  5. Rate controlled on anticoagulation. Hx of amio toxicity presumed as per above. She certainly has had issues with volume status and I am recommending  at least annual f/u with cardiology.  6. Pt is tobacco free. Encouraged ongoing abstinence.  She will need annual low-dose CT chest to screen for lung Ca.    Return in about 6 weeks (around 11/27/2019) for 6-8 weeks .

## 2019-10-29 ENCOUNTER — APPOINTMENT (OUTPATIENT)
Dept: PULMONOLOGY | Facility: HOSPICE | Age: 84
End: 2019-10-29
Payer: MEDICARE

## 2019-10-30 ENCOUNTER — OFFICE VISIT (OUTPATIENT)
Dept: PULMONOLOGY | Facility: HOSPICE | Age: 84
End: 2019-10-30
Payer: MEDICARE

## 2019-10-30 VITALS
DIASTOLIC BLOOD PRESSURE: 70 MMHG | WEIGHT: 133 LBS | RESPIRATION RATE: 16 BRPM | SYSTOLIC BLOOD PRESSURE: 120 MMHG | OXYGEN SATURATION: 97 % | HEART RATE: 121 BPM | BODY MASS INDEX: 22.16 KG/M2 | HEIGHT: 65 IN

## 2019-10-30 DIAGNOSIS — R09.02 HYPOXIA: ICD-10-CM

## 2019-10-30 DIAGNOSIS — J44.9 CHRONIC OBSTRUCTIVE PULMONARY DISEASE, UNSPECIFIED COPD TYPE (HCC): ICD-10-CM

## 2019-10-30 DIAGNOSIS — R06.89 AIRWAY CLEARANCE IMPAIRMENT: ICD-10-CM

## 2019-10-30 PROCEDURE — 99214 OFFICE O/P EST MOD 30 MIN: CPT | Performed by: PHYSICIAN ASSISTANT

## 2019-10-30 RX ORDER — DOXYCYCLINE HYCLATE 100 MG/1
100 CAPSULE ORAL 2 TIMES DAILY
Qty: 20 CAP | Refills: 0 | Status: SHIPPED | OUTPATIENT
Start: 2019-10-30

## 2019-10-30 RX ORDER — PREDNISONE 10 MG/1
TABLET ORAL
Qty: 40 TAB | Refills: 1 | Status: SHIPPED | OUTPATIENT
Start: 2019-10-30

## 2019-10-30 ASSESSMENT — ENCOUNTER SYMPTOMS
HEADACHES: 0
SPUTUM PRODUCTION: 1
EYES NEGATIVE: 1
WHEEZING: 1
SORE THROAT: 1
TREMORS: 0
PALPITATIONS: 0
DIZZINESS: 0
CHILLS: 0
SINUS PAIN: 0
INSOMNIA: 0
CLAUDICATION: 0
WEIGHT LOSS: 0
SHORTNESS OF BREATH: 1
COUGH: 0
HEARTBURN: 0
ORTHOPNEA: 0
FEVER: 0

## 2019-10-30 NOTE — PATIENT INSTRUCTIONS
1-provide sample Trelegy today and prescription filled   2-emergency pack of prednisone and doxycycline  3-humidifier and filter for home oxygen  4-emergency inhaler use indications   -increased cough   -increased shortness of breath   -wheezing   5-reviewed secretion clearance device including the pickle also known as acapella  The alternative known as Aerobika, technique and cleaning  6-have follow up

## 2019-10-30 NOTE — PROGRESS NOTES
CC: Shortness of breath with activity    HPI:  Ama Castillo is a 84 y.o. year old female here today for follow-up on COPD.  Requested earlier follow-up due to increased cough and transfer back to [place].  Last seen in clinic 10/16/2019 by Dr. Addis Dewitt. Patient is accompanied by son and his wife.  Patient is a former smoker with reported quit date in June 2019 and 40-pack-year history.  Continued abstinence advised.    Patient has been followed in clinic since June 8, 2019 when she experienced acute hypoxic respiratory failure felt to be related to flash pulmonary edema versus amiodarone toxicity.  She subsequently had a fall with worsening hypoxia and was admitted to ICU for high-dose steroids and diuresis.  She has been in a rehab facility being followed frequently in our clinic with tenuous volume status.  Son reports she is being transferred today via ambulance back to [place] where she lives with them.    Pertinent past medical history includes arrhythmia, heartburn, chronic respiratory failure presumed secondary to COPD and amiodarone toxicity, chronic kidney disease, chronic A. Fib.    Reviewed in clinic vitals including blood pressure 120/70, heart rate 121, O2 sat 97% on 2 L O2, BMI 22.13 kg/m².  Patient has worn 3 L oxygen at night for about 2 years now, 2 L during the day about 5 months now.    Reviewed home medication regimen including  emergency pack of doxycycline and prednisone for them to have on hand, albuterol, DuoNeb, guaifenesin for cough, generic Advair and Incruse, Eliquis and omeprazole.  Oral care is certainly a concern and patient would likely benefit from once a day dosing of once daily Trelegy for ease of administration.  Patient has Acapella valve and technique and cleaning for this was reviewed with son and family peer    Reviewed most recent imaging including chest x-ray obtained 9/16/2019 demonstrating bilateral diffuse interstitial opacities and bronchiectasis similar to most  recent findings, consistent with fibrosis, no new infiltrates, elevated right hemidiaphragm, cardiomegaly.    Echocardiogram obtained 6/9/2019 demonstrated LVEF estimated at 70%, moderate aortic insufficiency, estimated RVSP of 55 mmHg.    Patient did have pulmonary function testing 10/2/2019 demonstrating FEV1 of 1.26 L or 66% predicted, FVC of 1.68 L or 65% predicted, FEV1/FVC ratio 75, no significant bronchodilator response, residual volume 61% predicted, total lung capacity 62% predicted, DLCO 38% predicted.  Per pulmonologist interpretation  restrictive lung disease with severely reduced DLCO consistent with diagnosis of interstitial lung disease.      Review of Systems   Constitutional: Negative for chills, fever, malaise/fatigue and weight loss.   HENT: Positive for congestion, hearing loss and sore throat (sometimes AM). Negative for nosebleeds, sinus pain and tinnitus.    Eyes: Negative.         Prescription eyeglasses   Respiratory: Positive for sputum production (slightly green ), shortness of breath (with activity ) and wheezing. Negative for cough.    Cardiovascular: Positive for leg swelling. Negative for chest pain, palpitations, orthopnea and claudication.   Gastrointestinal: Negative for heartburn.        Partial, no difficulty swallowing      Skin: Negative.    Neurological: Negative for dizziness, tremors and headaches.   Psychiatric/Behavioral: The patient does not have insomnia.        Past Medical History:   Diagnosis Date   • Arrhythmia     transient a fib   • GERD (gastroesophageal reflux disease)    • Heart burn    • Hypertension    • Indigestion    • Other specified disorder of intestines     occasional constipation   • Pain     back, with sciatic pain   • Renal disorder     followed by dr clifford, states 40% function   • Shortness of breath        Past Surgical History:   Procedure Laterality Date   • LUMBAR FUSION POSTERIOR  1/29/2014    Performed by Brendon Juarez M.D. at [facility]   • OTHER NEUROLOGICAL SURG  2013    repair of dura to spine   • KNEE REPLACEMENT, TOTAL  2003    frieda knees       Family History   Problem Relation Age of Onset   • Cancer Mother         Breast Cancer   • Cancer Sister         lukemia   • Cancer Brother         Pancreatic   • Alcohol/Drug Brother        Social History     Socioeconomic History   • Marital status:      Spouse name: Not on file   • Number of children: Not on file   • Years of education: Not on file   • Highest education level: Not on file   Occupational History   • Not on file   Social Needs   • Financial resource strain: Not on file   • Food insecurity:     Worry: Not on file     Inability: Not on file   • Transportation needs:     Medical: Not on file     Non-medical: Not on file   Tobacco Use   • Smoking status: Former Smoker     Packs/day: 1.00     Years: 40.00     Pack years: 40.00     Types: Cigarettes     Start date: 1952     Last attempt to quit: 6/3/2019     Years since quittin.4   • Smokeless tobacco: Never Used   Substance and Sexual Activity   • Alcohol use: No   • Drug use: No   • Sexual activity: Not on file   Lifestyle   • Physical activity:     Days per week: Not on file     Minutes per session: Not on file   • Stress: Not on file   Relationships   • Social connections:     Talks on phone: Not on file     Gets together: Not on file     Attends Taoism service: Not on file     Active member of club or organization: Not on file     Attends meetings of clubs or organizations: Not on file     Relationship status: Not on file   • Intimate partner violence:     Fear of current or ex partner: Not on file     Emotionally abused: Not on file     Physically abused: Not on file     Forced sexual activity: Not on file   Other Topics Concern   • Not on file   Social History Narrative   • Not on file       Allergies as of 10/30/2019 - Reviewed 10/30/2019   Allergen Reaction Noted   • Pcn [penicillins] Rash 2014  "       @Vital signs for this encounter:  Vitals:    10/30/19 0815   Height: 1.651 m (5' 5\")   Weight: 60.3 kg (133 lb)   Weight % change since last entry.: 0 %   BP: 120/70   Pulse: (!) 121   BMI (Calculated): 22.13   Resp: 16       Current medications as of today   Current Outpatient Medications   Medication Sig Dispense Refill   • Fluticasone-Umeclidin-Vilant (TRELEGY ELLIPTA) 100-62.5-25 MCG/INH AEROSOL POWDER, BREATH ACTIVATED Inhale 1 Puff by mouth every day. Rinse mouth after use 1 Each 11   • Fluticasone-Umeclidin-Vilant (TRELEGY ELLIPTA) 100-62.5-25 MCG/INH AEROSOL POWDER, BREATH ACTIVATED Inhale 1 Puff by mouth every day. Rinse mouth after use 1 Each 0   • doxycycline (VIBRAMYCIN) 100 MG Cap Take 1 Cap by mouth 2 times a day. Take as prescribed until gone. 20 Cap 0   • predniSONE (DELTASONE) 10 MG Tab Take 40mg x 4 days, then take 30mg x 4 days, then take 20mg x 4 days, then 10mg x 4 days with food, then discontinue. 40 Tab 1   • famotidine (PEPCID) 10 MG tablet Take 10 mg by mouth 2 times a day.     • oxyCODONE HCl (OXYCONTIN PO) Take 5 mg by mouth every day.     • potassium chloride (MICRO-K) 10 MEQ capsule Take 10 mEq by mouth 2 times a day.     • polyethyl glycol-propyl glycol (SYSTANE) 0.4-0.3 % Solution Place 1 Drop in both eyes as needed.     • Calcium Carb-Cholecalciferol (CALCIUM 600+D3 PO) Take  by mouth.     • lidocaine (LIDODERM) 5 % Patch Apply 1 Patch to skin as directed every 24 hours.     • Magnesium 400 MG Cap Take  by mouth.     • senna-docusate (SENNA-PLUS) 8.6-50 MG Tab Take 1 Tab by mouth every day.     • albuterol (PROAIR HFA) 108 (90 Base) MCG/ACT Aero Soln inhalation aerosol Inhale 2 Puffs by mouth every four hours as needed for Shortness of Breath (wheezing). 1 Inhaler 1   • insulin NPH (NOVOLIN N RELION) 100 UNIT/ML Suspension Inject  as instructed.     • Ascorbic Acid (VITAMIN C) 250 MG Tab Take 250 mg by mouth every day.     • Cholecalciferol (VITAMIN D PO) Take  by mouth.     • " guaiFENesin (ROBITUSSIN) 100 MG/5ML liquid Take  by mouth.     • Umeclidinium Bromide (INCRUSE ELLIPTA) 62.5 MCG/INH AEROSOL POWDER, BREATH ACTIVATED Inhale  by mouth.     • nystatin (MYCOSTATIN) 373654 UNIT/ML Suspension Take 500,000 Units by mouth 4 times a day.     • Acetaminophen (TYLENOL) 325 MG Cap Take  by mouth.     • furosemide (LASIX) 20 MG Tab Take 1 Tab by mouth every day. 30 Tab 11   • sulfamethoxazole-trimethoprim (BACTRIM DS) 800-160 MG tablet Take 1 Tab by mouth every Monday, Wednesday, and Friday.  0   • predniSONE (DELTASONE) 20 MG Tab Take 2 Tabs by mouth 2 times a day. 30 Tab 0   • insulin glargine (LANTUS) 100 UNIT/ML Solution Inject 5 Units as instructed every evening. 10 mL    • insulin regular (HUMULIN R) 100 Unit/mL Solution Inject 2-9 Units as instructed 4 Times a Day,Before Meals and at Bedtime. 10 mL    • apixaban (ELIQUIS) 2.5mg Tab Take 1 Tab by mouth 2 Times a Day. 60 Tab    • carvedilol (COREG) 6.25 MG Tab Take 1 Tab by mouth 2 times a day, with meals. 60 Tab 3   • ferrous gluconate (FERGON) 324 (38 Fe) MG Tab Take 324 mg by mouth every morning with breakfast.     • ipratropium-albuterol (DUONEB) 0.5-2.5 (3) MG/3ML nebulizer solution 3 mL by Nebulization route every 6 hours as needed for Shortness of Breath.     • omeprazole (PRILOSEC) 20 MG CPDR Take 20 mg by mouth every day.     • methocarbamol (ROBAXIN) 500 MG Tab Take 1,000 mg by mouth 4 times a day.     • tramadol (ULTRAM) 50 MG Tab Take 50 mg by mouth.       No current facility-administered medications for this visit.          Physical Exam:   Gen:           Alert and oriented, No apparent distress. Mood and affect     appropriate, normal interaction with provider.  Eyes:          sclere white, conjunctive moist.  Hearing:     Grossly intact.  Dentition:    Good dentition.  Oropharynx:   Tongue normal, posterior pharynx without erythema or exudate.  Neck:        Supple, trachea midline, no masses.  Respiratory Effort: No  intercostal retractions or use of accessory muscles.   Lung Auscultation:      Fine crackles bases; no rhonchi or wheezing.  CV:            irregular rate and rhythm.  Trace edema. No murmurs, rubs or gallops.  Digits, Nails, Ext: No clubbing, cyanosis, petechiae, or nodes.   Skin:        No rashes, lesions or ulcers noted on back or exposed skin surfaces.      Gait and Station: Wheelchair                 Assessment:  1. Chronic obstructive pulmonary disease, unspecified COPD type (HCC)  Fluticasone-Umeclidin-Vilant (TRELEGY ELLIPTA) 100-62.5-25 MCG/INH AEROSOL POWDER, BREATH ACTIVATED    Fluticasone-Umeclidin-Vilant (TRELEGY ELLIPTA) 100-62.5-25 MCG/INH AEROSOL POWDER, BREATH ACTIVATED    doxycycline (VIBRAMYCIN) 100 MG Cap    predniSONE (DELTASONE) 10 MG Tab   2. Hypoxia  DME Other   3. Airway clearance impairment   instruction on airway clearance device usage and cleaning       Immunizations:    Flu: 10/16/2019  Pneumovax 23: 10/1/2010  Prevnar 13: 10/16/2019    Plan:    1-provide sample Trelegy today and prescription filled   2-emergency pack of prednisone and doxycycline  3-humidifier and filter for home oxygen  4-emergency inhaler use indications   -increased cough   -increased shortness of breath   -wheezing   5-reviewed secretion clearance device including the pickle also known as acapella  The alternative known as Aerobika, technique and cleaning  6-have follow up appointment with Dr. Dewitt as scheduled    This dictation was created using voice recognition software. The accuracy of the dictation is limited to the abilities of the software. I expect there may be some errors of grammar and possibly content.

## 2020-02-03 ENCOUNTER — TELEPHONE (OUTPATIENT)
Dept: PULMONOLOGY | Facility: HOSPICE | Age: 85
End: 2020-02-03

## 2020-02-03 NOTE — TELEPHONE ENCOUNTER
DOCUMENTATION OF PRIOR AUTH STATUS    1. Medication name and dose: Trelegy Ellipta 100/62.5/25 mcg    2. Name and Phone # of Prescription coverage company: [employer]    3. Date Prior Auth was submitted: 2/3/2020    4. What information was given to obtain insurance decision: Clinical notes    5. Prior Auth letter Approved or Denied: Approved through 2/2/2021    6. Pharmacy notified: Yes    7. Patient notified: Yes

## 2020-02-03 NOTE — TELEPHONE ENCOUNTER
MEDICATION PRIOR AUTHORIZATION NEEDED:    1. Name of Medication: Trelegy Ellipta 100/62.5/25 mcg    2. Requested By (Name of Pharmacy): [facility]     3. Is insurance on file current? yes    4. What is the name & phone number of the 3rd party payor? Sadaf IVORY

## 2020-02-06 NOTE — LETTER
Addis Dewitt M.D.  [facility]   [address] [place] [address] [place] [address]  Phone: [phone] - Fax: [phone]           Encounter Date: 10/16/2019  Provider: Addis Dewitt M.D.  Location of Care: [facility]      Patient:   Ama Castillo   MR Number: [id number]   YOB: 1935     PROGRESS NOTE:  Chief Complaint   Patient presents with   • COPD     last seen 10/2/19         HPI: This patient is a 84 y.o. female whom is followed in our clinic for chronic hypoxic respiratory failure presumed 2/2 a combination of COPD and amiodarone toxicity last seen by me on 10/2/19. PMHx is significant for CKD, chronic atrial fibrillation, and chart diagnosis of COPD, DJD s/p back surgery c/b chronic LBP and sciatica and history of gastroesophageal reflux disease.     A brief review of most recent relevant PMHx:  The pt presented to the emergency department on June 8, 2019 with acute hypoxic respiratory failure felt to be related to flash pulmonary edema versus amiodarone toxicity.  Amiodarone was held and she was tx with systemic steroids which were tapered quickly and discharged however she represented on June 26 after a fall with worsening hypoxia and worsening chest x-ray. She was admitted to the intensive care unit and underwent diuresis as well as resumption of high dose steroids.  She improved and was discharged to rehab facility where she has been staying since that time. She has been followed frequently in our office since 7/22 mainly managing volume status.  She is on Wixela and prn albuterol for presumed COPD.  Her O2 needs were as low as 1-2L supplemental O2 at our last clinic apt and CXR abnormal but improving. She has been seen by renal and cardiology and both felt current diuretic regimen of 20 mg lasix daily okay.  She was apparently told she no longer needed f/u in cardiology. I ordered a CXR after she was given  Patient's sister educated on pt's MASD to buttocks  Explained the importance of using a barrier cream to prevent and treat further breakdown  Barrier cream provided  Explained the importance for VNA to continue to assess and implement appropriate interventions  increased prednisone and abx for chest congestion at rehab but no other clinical s/s of pna.  Her CXR showed stable b/l interstitial infiltrates.  We planned to start acapella valve for mucous clearance as pt is sedentary due to chronic back pain and sciatic nerve compression but she did not get valve yet. PFTs obtained at our last visit show restrictive airflows, restrictive lung volumes and severe reduction in DLCO.  She is off prednisone as of 9/20 and on 2L supplemental O2 with SaO2 of 95%.  She  is off anticoagulation temporarily for steroid injections to tx her back pain.  We had decreased her lasix form 20 mg BID to daily at last visit due to increasing serum bicarb however pt gained 7 lbs quickly and BID dosing was resumed with rapid return to baseline.  She is doing quite well today.  She has remained off prednisone with no worsening of resp status and chest congestion has improved despite not having received acapella.  She is getting schedule mucinex and nebulized AHNY.  She is hopeful to return home to [place] in the next week.     Past Medical History:   Diagnosis Date   • Arrhythmia     transient a fib   • GERD (gastroesophageal reflux disease)    • Heart burn    • Hypertension    • Indigestion    • Other specified disorder of intestines     occasional constipation   • Pain     back, with sciatic pain   • Renal disorder     followed by dr clifford, states 40% function   • Shortness of breath        Social History     Socioeconomic History   • Marital status:      Spouse name: Not on file   • Number of children: Not on file   • Years of education: Not on file   • Highest education level: Not on file   Occupational History   • Not on file   Social Needs   • Financial resource strain: Not on file   • Food insecurity:     Worry: Not on file     Inability: Not on file   • Transportation needs:     Medical: Not on file     Non-medical: Not on file   Tobacco Use   • Smoking status: Former Smoker      Packs/day: 1.00     Years: 40.00     Pack years: 40.00     Types: Cigarettes     Start date: 1952     Last attempt to quit: 6/3/2019     Years since quittin.3   • Smokeless tobacco: Never Used   Substance and Sexual Activity   • Alcohol use: No   • Drug use: No   • Sexual activity: Not on file   Lifestyle   • Physical activity:     Days per week: Not on file     Minutes per session: Not on file   • Stress: Not on file   Relationships   • Social connections:     Talks on phone: Not on file     Gets together: Not on file     Attends Latter day service: Not on file     Active member of club or organization: Not on file     Attends meetings of clubs or organizations: Not on file     Relationship status: Not on file   • Intimate partner violence:     Fear of current or ex partner: Not on file     Emotionally abused: Not on file     Physically abused: Not on file     Forced sexual activity: Not on file   Other Topics Concern   • Not on file   Social History Narrative   • Not on file       Family History   Problem Relation Age of Onset   • Cancer Mother         Breast Cancer   • Cancer Sister         lukemia   • Cancer Brother         Pancreatic   • Alcohol/Drug Brother        Current Outpatient Medications on File Prior to Visit   Medication Sig Dispense Refill   • famotidine (PEPCID) 10 MG tablet Take 10 mg by mouth 2 times a day.     • oxyCODONE HCl (OXYCONTIN PO) Take 5 mg by mouth every day.     • potassium chloride (MICRO-K) 10 MEQ capsule Take 10 mEq by mouth 2 times a day.     • polyethyl glycol-propyl glycol (SYSTANE) 0.4-0.3 % Solution Place 1 Drop in both eyes as needed.     • lidocaine (LIDODERM) 5 % Patch Apply 1 Patch to skin as directed every 24 hours.     • Magnesium 400 MG Cap Take  by mouth.     • senna-docusate (SENNA-PLUS) 8.6-50 MG Tab Take 1 Tab by mouth every day.     • albuterol (PROAIR HFA) 108 (90 Base) MCG/ACT Aero Soln inhalation aerosol Inhale 2 Puffs by mouth every four hours as needed  for Shortness of Breath (wheezing). 1 Inhaler 1   • Ascorbic Acid (VITAMIN C) 250 MG Tab Take 250 mg by mouth every day.     • Cholecalciferol (VITAMIN D PO) Take  by mouth.     • guaiFENesin (ROBITUSSIN) 100 MG/5ML liquid Take  by mouth.     • Umeclidinium Bromide (INCRUSE ELLIPTA) 62.5 MCG/INH AEROSOL POWDER, BREATH ACTIVATED Inhale  by mouth.     • Acetaminophen (TYLENOL) 325 MG Cap Take  by mouth.     • fluticasone-salmeterol (WIXELA INHUB) 100-50 MCG/DOSE AEROSOL POWDER, BREATH ACTIVATED Inhale 1 Puff by mouth every 12 hours.     • furosemide (LASIX) 20 MG Tab Take 1 Tab by mouth every day. 30 Tab 11   • carvedilol (COREG) 6.25 MG Tab Take 1 Tab by mouth 2 times a day, with meals. 60 Tab 3   • ferrous gluconate (FERGON) 324 (38 Fe) MG Tab Take 324 mg by mouth every morning with breakfast.     • ipratropium-albuterol (DUONEB) 0.5-2.5 (3) MG/3ML nebulizer solution 3 mL by Nebulization route every 6 hours as needed for Shortness of Breath.     • Calcium Carb-Cholecalciferol (CALCIUM 600+D3 PO) Take  by mouth.     • methocarbamol (ROBAXIN) 500 MG Tab Take 1,000 mg by mouth 4 times a day.     • tramadol (ULTRAM) 50 MG Tab Take 50 mg by mouth.     • insulin NPH (NOVOLIN N RELION) 100 UNIT/ML Suspension Inject  as instructed.     • nystatin (MYCOSTATIN) 397340 UNIT/ML Suspension Take 500,000 Units by mouth 4 times a day.     • sulfamethoxazole-trimethoprim (BACTRIM DS) 800-160 MG tablet Take 1 Tab by mouth every Monday, Wednesday, and Friday. (Patient not taking: Reported on 8/15/2019)  0   • predniSONE (DELTASONE) 20 MG Tab Take 2 Tabs by mouth 2 times a day. (Patient not taking: Reported on 10/2/2019) 30 Tab 0   • insulin glargine (LANTUS) 100 UNIT/ML Solution Inject 5 Units as instructed every evening. (Patient not taking: Reported on 8/15/2019) 10 mL    • insulin regular (HUMULIN R) 100 Unit/mL Solution Inject 2-9 Units as instructed 4 Times a Day,Before Meals and at Bedtime. (Patient not taking: Reported on  8/15/2019) 10 mL    • apixaban (ELIQUIS) 2.5mg Tab Take 1 Tab by mouth 2 Times a Day. (Patient not taking: Reported on 10/16/2019) 60 Tab    • omeprazole (PRILOSEC) 20 MG CPDR Take 20 mg by mouth every day.       No current facility-administered medications on file prior to visit.        Pcn [penicillins]      ROS:   Review of Systems   Constitutional: Positive for malaise/fatigue. Negative for chills, diaphoresis, fever and weight loss.   HENT: Negative for congestion, ear discharge, ear pain, hearing loss, nosebleeds, sinus pain, sore throat and tinnitus.    Eyes: Negative for blurred vision, double vision, photophobia, pain, discharge and redness.   Respiratory: Negative for cough, hemoptysis, sputum production, shortness of breath, wheezing and stridor.    Cardiovascular: Negative for chest pain, palpitations, orthopnea, claudication, leg swelling and PND.   Gastrointestinal: Negative for abdominal pain, constipation, diarrhea, heartburn, nausea and vomiting.   Genitourinary: Negative for dysuria and urgency.   Musculoskeletal: Positive for back pain. Negative for falls, joint pain, myalgias and neck pain.   Skin: Negative for itching and rash.   Neurological: Negative for dizziness, tremors, speech change, focal weakness, weakness and headaches.   Endo/Heme/Allergies: Negative for environmental allergies.   Psychiatric/Behavioral: Negative for depression.       /64 (BP Location: Left arm, Patient Position: Sitting, BP Cuff Size: Adult)   Pulse 87   Temp 36.6 °C (97.9 °F) (Temporal)   Resp 16   Wt 64 kg (141 lb)   SpO2 95%   Physical Exam   Constitutional: She is oriented to person, place, and time. She appears well-developed and well-nourished.   HENT:   Head: Normocephalic and atraumatic.   Left Ear: External ear normal.   Mouth/Throat: Oropharynx is clear and moist. No oropharyngeal exudate.   Eyes: Pupils are equal, round, and reactive to light. Conjunctivae and EOM are normal. No scleral icterus.    Neck: Neck supple. No JVD present. No tracheal deviation present.   Cardiovascular: Normal rate and normal heart sounds. Exam reveals no gallop and no friction rub.   No murmur heard.  irreg   Pulmonary/Chest: Effort normal and breath sounds normal. No accessory muscle usage. No respiratory distress. She has no wheezes. She has no rales.   Bilateral basilar inspiratory crackles   Abdominal: Soft. She exhibits no distension. There is no tenderness.   Musculoskeletal: Normal range of motion. She exhibits no edema, tenderness or deformity.   Lymphadenopathy:     She has no cervical adenopathy.   Neurological: She is alert and oriented to person, place, and time. No cranial nerve deficit. Gait normal.   Skin: Skin is warm and dry. No rash noted. No cyanosis. Nails show no clubbing.   Psychiatric: She has a normal mood and affect.       Imagaing as reviewed by me personally:  As per hPI    Assessment:  1. Need for vaccination     2. Chronic obstructive pulmonary disease, unspecified COPD type (HCC)     3. Amiodarone pulmonary toxicity     4. Chronic respiratory failure with hypoxia (HCC)     5. Chronic atrial fibrillation     6. History of tobacco use         Plan:  1. Pt given influenza and prevnar 13 in clinic today  2. PFTs are mainly restrictive however suspect she has some element of underlying COPD. No e/o acute exacerbation today. Continue wixella and incruse. I okayed pt to go to prn use of nebulized bronchodilators and mucinex. Pt is tobacco free.  Continue supplemental O2.  3. This is presumed cause of her interstitial infiltrates but I cannot r/o underlying ILD given her imagaing has not improved significantly despite tx of steroids and her respiratory improvement seemed to be related more to attaining euvolemia.  Either way, her O2 needs have decreased and she is stable currently off steroids. We will plan on repeat CT one year from her last or sooner if sxs worsen.  I do think she needs at least annual f/u  with cardiology to ensure diuretics and Rx for AF are optimized.  4. This is presumed 2/2 COPD vs. ILD.  See discussion re amio above. PFTs more c/w ILD.  Overall resp status is improved/stabilized since her hospital admission. Continue supplemental O2  5. Rate controlled on anticoagulation. Hx of amio toxicity presumed as per above. She certainly has had issues with volume status and I am recommending at least annual f/u with cardiology.  6. Pt is tobacco free. Encouraged ongoing abstinence.  She will need annual low-dose CT chest to screen for lung Ca.    Return in about 6 weeks (around 11/27/2019) for 6-8 weeks .          Electronically signed by Addis Dewitt M.D.  on 10/16/19    Erick Prado M.D.  [id number] [facility] [address]  [facility] [address]  VIA In Basket
